# Patient Record
Sex: FEMALE | Race: WHITE | NOT HISPANIC OR LATINO | Employment: PART TIME | URBAN - METROPOLITAN AREA
[De-identification: names, ages, dates, MRNs, and addresses within clinical notes are randomized per-mention and may not be internally consistent; named-entity substitution may affect disease eponyms.]

---

## 2017-01-05 ENCOUNTER — OFFICE VISIT (OUTPATIENT)
Dept: FAMILY MEDICINE | Facility: CLINIC | Age: 67
End: 2017-01-05
Payer: COMMERCIAL

## 2017-01-05 VITALS
RESPIRATION RATE: 15 BRPM | TEMPERATURE: 98.7 F | BODY MASS INDEX: 27.43 KG/M2 | SYSTOLIC BLOOD PRESSURE: 114 MMHG | DIASTOLIC BLOOD PRESSURE: 60 MMHG | HEART RATE: 99 BPM | OXYGEN SATURATION: 96 % | WEIGHT: 150 LBS

## 2017-01-05 DIAGNOSIS — F17.200 TOBACCO USE DISORDER: ICD-10-CM

## 2017-01-05 DIAGNOSIS — J20.9 ACUTE BRONCHITIS, UNSPECIFIED ORGANISM: Primary | ICD-10-CM

## 2017-01-05 PROCEDURE — 99213 OFFICE O/P EST LOW 20 MIN: CPT | Performed by: PHYSICIAN ASSISTANT

## 2017-01-05 RX ORDER — AZITHROMYCIN 250 MG/1
TABLET, FILM COATED ORAL
Qty: 6 TABLET | Refills: 0 | Status: SHIPPED | OUTPATIENT
Start: 2017-01-05 | End: 2017-07-17

## 2017-01-05 NOTE — MR AVS SNAPSHOT
After Visit Summary   1/5/2017    Reanna Kumar    MRN: [id number]           Patient Information     Date Of Birth          1950        Visit Information        Provider Department      1/5/2017 1:10 PM Cassandra Gong PA-C [facility]        Today's Diagnoses     Acute bronchitis, unspecified organism    -  1     Tobacco use disorder           Care Instructions      What Is Acute Bronchitis?    Acute or short-term bronchitis last for days or weeks. It occurs when the bronchial tubes (airways in the lungs) are irritated by a virus, bacteria, or allergen. This causes a cough that produces yellow or greenish mucus.  Inside Healthy Lungs  Air travels in and out of the lungs through the airways. The linings of these airways produce sticky mucus. This mucus traps particles that enter the lungs. Tiny structures called cilia then sweep the particles out of the airways.    Healthy Airway: Airways are normally open. Air moves in and out easily.   Healthy Cilia: Tiny, hairlike cilia sweep mucus and particles up and out of the airways.   Lings with Bronchitis  Bronchitis often occurs when a cold or the flu virus. The airways become inflamed (red and swollen). There is a deep  hacking  cough from the extra mucus. Other symptoms may include:    Wheezin    Chest discomfort    Shortness of breath    Mild fever  A second infection, this time due to bacteria, may then occur. And, airways irritated by allergens or smoke are more likely to get infected.    Inflamed Airway: Inflammation and excess mucus narrow the airway, causing shortness of breath.   Impaired Cilia: Excess mucus impairs cilia, causing congestion and wheezing. Smoking worsens the problem.   Making a Diagnosis  A physical exam, medical history, and certain tests help your health care provider make the diagnosis.  Medical History  Your health care provider will ask you about your symptoms.  The Exam  Your  provider listens to your chest for signs of congestion. He or she may also check your ears, nose, and throat.  Possible Tests    A sputum test for bacteria. This requires a sample of mucus from the lungs.    A nasal or throat swab for bacterial infection.    A chest X-ray if your health care provider suspects pneumonia.    Tests to check for an underlying condition, such as allergies, asthma, or COPD. You may be referred to a specialist for further lung function testing.  Treating a Cough  The main treatment for bronchitis is easing symptoms. Avoiding smoke, allergens, and other things that trigger coughing can often help. If the infection is bacterial, antibiotics may be used. During the illness, it's important to get plenty of sleep. To ease symptoms:    Don t smoke, and avoid secondhand smoke.    Use a humidifier, or breathe in steam from a hot shower. This may help loosen mucus.    Drink a lot of water and juice. They can soothe the throat and may help thin mucus.    Sit up or use extra pillows when in bed to help lessen coughing and congestion.    Ask your provider about using cough medicine, pain and fever medication, or a decongestant.  Antibiotics  Most cases of bronchitis are caused by cold or flu viruses. Antibiotics don t treat viral illness. Taking antibiotics when they are not needed increases your risk of getting an infection later that is antibiotic-resistant. Your provider will prescribe antibiotics if the infection is caused by bacteria. If they are prescribed:    Take the medication until it is used up, even if symptoms have improved. If you don t, the bronchitis may come back.    Take them as directed. For instance, some medications should be taken with food.    Ask your provider or pharmacist what side effects are common, and what to do about them.  Follow-Up  You should go see your provider again in 2-3 weeks. By this time, symptoms should have improved. An infection that lasts longer may signal  "a more serious problem.  Prevention    Avoid tobacco smoke. If you smoke, quit. Stay away from smoky places. Ask friends and family not to smoke around you, or in your home or car.    Get checked for allergies.    Ask your provider about getting a yearly flu shot, and possibly a pneumoccocal or pneumonia shot.    Wash your hands often. This helps reduce the chance of picking up viruses that cause colds and flu.  Call Your Health Care Provider If:    Symptoms worsen, or new symptoms develop.    Breathing problems worsen or  become severe.    Symptoms don t improve within a week, or within 3 days of taking antibiotics.        2823-9747 [employer]. [address] [place] [address]. All rights reserved. This information is not intended as a substitute for professional medical care. Always follow your healthcare professional's instructions.        Follow-ups after your visit        Who to contact     If you have questions or need follow up information about today's clinic visit or your schedule please contact [facility] directly at [phone].  Normal or non-critical lab and imaging results will be communicated to you by [employer], letter or phone within 4 business days after the clinic has received the results. If you do not hear from us within 7 days, please contact the clinic through [employer] or phone. If you have a critical or abnormal lab result, we will notify you by phone as soon as possible.  Submit refill requests through [employer] or call your pharmacy and they will forward the refill request to us. Please allow 3 business days for your refill to be completed.          Additional Information About Your Visit        [employer] Information     [employer] lets you send messages to your doctor, view your test results, renew your prescriptions, schedule appointments and more. To sign up, go to [email or web address] . Click on \"Log in\" on the left side of the screen, " "which will take you to the Welcome page. Then click on \"Sign up Now\" on the right side of the page.     You will be asked to enter the access code listed below, as well as some personal information. Please follow the directions to create your username and password.     Your access code is: [email or web address]  Expires: 2017  3:04 PM     Your access code will  in 90 days. If you need help or a new code, please call your [facility] or [phone].        Care EveryWhere ID     This is your Care EveryWhere ID. This could be used by other organizations to access your [facility] medical records  [id number]        Your Vitals Were     Pulse Temperature Respirations Pulse Oximetry          99 98.7  F (37.1  C) (Tympanic) 15 96%         Blood Pressure from Last 3 Encounters:   17 114/60   10/04/16 114/62   16 134/76    Weight from Last 3 Encounters:   17 150 lb (68.04 kg)   10/04/16 146 lb (66.225 kg)   16 149 lb (67.586 kg)              Today, you had the following     No orders found for display         Today's Medication Changes          These changes are accurate as of: 17  3:04 PM.  If you have any questions, ask your nurse or doctor.               Start taking these medicines.        Dose/Directions    azithromycin 250 MG tablet   Commonly known as:  ZITHROMAX   Used for:  Acute bronchitis, unspecified organism   Started by:  Cassandra Gong PA-C        Two tablets first day, then one tablet daily for four days.   Quantity:  6 tablet   Refills:  0            Where to get your medicines      These medications were sent to [facility] [id number] [place] [address] AT [address] [address]     Phone:  [phone]    - azithromycin 250 MG tablet             Primary Care Provider Office Phone # Fax #    Cassandra Gong PA-C [phone]       [place] [address] " [address] [facility] [address]  [facility] [address]        Thank you!     Thank you for choosing [facility]  for your care. Our goal is always to provide you with excellent care. Hearing back from our patients is one way we can continue to improve our services. Please take a few minutes to complete the written survey that you may receive in the mail after your visit with us. Thank you!             Your Updated Medication List - Protect others around you: Learn how to safely use, store and throw away your medicines at www.disposemymeds.org.          This list is accurate as of: 1/5/17  3:04 PM.  Always use your most recent med list.                   Brand Name Dispense Instructions for use    azithromycin 250 MG tablet    ZITHROMAX    6 tablet    Two tablets first day, then one tablet daily for four days.       simvastatin 20 MG tablet    ZOCOR    90 tablet    Take 1 tablet (20 mg) by mouth daily       SLEEP AID PO      Take 1 capsule by mouth At Bedtime.       vitamin D 1000 UNITS capsule      Take 3 capsules by mouth daily.

## 2017-01-05 NOTE — NURSING NOTE
"Chief Complaint   Patient presents with     URI       Initial /60 mmHg  Pulse 99  Temp(Src) 98.7  F (37.1  C) (Tympanic)  Resp 15  Wt 150 lb (68.04 kg)  SpO2 96% Estimated body mass index is 27.43 kg/(m^2) as calculated from the following:    Height as of 9/24/16: 5' 2\" (1.575 m).    Weight as of this encounter: 150 lb (68.04 kg).  BP completed using cuff size: regular    "

## 2017-01-05 NOTE — PROGRESS NOTES
SUBJECTIVE:                                                    Reanna Kumar is a 66 year old female who presents to clinic today for the following health issues:    ENT Symptoms             Symptoms: cc Present Absent Comment   Fever/Chills   x    Fatigue  x     Muscle Aches   x    Eye Irritation   x    Sneezing   x    Nasal Scott/Drg   x    Sinus Pressure/Pain   x    Loss of smell   x    Dental pain   x    Sore Throat   x    Swollen Glands   x    Ear Pain/Fullness   x    Cough  x     Wheeze   x    Chest Pain   x    Shortness of breath   x    Rash   x    Other   x      Symptom duration:  2-3 weeks   Symptom severity:  moderate   Treatments tried:  nyquil   Contacts:  sister but she has different symptoms and got sick about a week after she saw her     Additional complaints: None    HPI additional notes: Manda presents today with   Chief Complaint   Patient presents with     URI   Cough seems to be getting worse like it did before she had pneumonia in September.  Denies fever.  Cough has been getting worsening in the evenings and at night.         ROS:  C: Negative for fever, chills, recent change in weight  Skin: Negative for worrisome rashes or lesions  ENT: Negative for ear, mouth and throat problems  Resp: POSITIVE for cough occasionally productive without  SOB and wheezing  MS: POSITIVE for generalized fatigue.  NEURO: Negative  for headaches or dizziness.  P: Negative for changes in mood or affect  ROS otherwise negative.    Chart Review:  History   Smoking status     Current Every Day Smoker -- 1.00 packs/day for 40 years     Types: Cigarettes   Smokeless tobacco     Never Used     Patient Active Problem List   Diagnosis     Hyperlipidemia LDL goal <130     Vitamin D deficiency     Advance Care Planning     Tobacco use disorder     Family history of hearing loss     Past Surgical History   Procedure Laterality Date     Hip surgery       Problem list, Medication list, Allergies, Medical/Social/Surg hx  reviewed in [facility], updated as appropriate.   OBJECTIVE:                                                    /60 mmHg  Pulse 99  Temp(Src) 98.7  F (37.1  C) (Tympanic)  Resp 15  Wt 150 lb (68.04 kg)  SpO2 96%  Body mass index is 27.43 kg/(m^2).  GENERAL: healthy, alert, in no acute distress  EYES: Grossly normal to inspection, EOMI, PERRL  HENT: Ear canals normal; TMs pearly gray without effusion. Nasal mucosa moist without edema or discharge. Oral mucous membranes moist, no lesions or ulcerations. Pharynx pink.  Uvula midline.  No postnasal drainage. Sinuses non-tender to palpation.  NECK:1+ posterior cervical LAD bilateral  RESP: lungs clear to auscultation - no rales, rhonchi or wheezes, prolonged expiratory phase, cough with deep inspiration  and rales R lower posterior and L lower posterior  CV: regular rate and rhythm, normal S1 S2.  No peripheral edema.  SKIN: no suspicious lesions, no rashes  PSYCH: Alert and oriented times 3;  Able to articulate logical thoughts. Affect is normal.    Diagnostic test results: None     ASSESSMENT/PLAN:                                                        ICD-10-CM    1. Acute bronchitis, unspecified organism J20.9 azithromycin (ZITHROMAX) 250 MG tablet   2. Tobacco use disorder F17.200        Please see patient instructions for treatment details.    Follow up in 7-10 days if not improving as anticipated.    Cassandra Gong PA-C  [facility]

## 2017-01-05 NOTE — PATIENT INSTRUCTIONS
What Is Acute Bronchitis?    Acute or short-term bronchitis last for days or weeks. It occurs when the bronchial tubes (airways in the lungs) are irritated by a virus, bacteria, or allergen. This causes a cough that produces yellow or greenish mucus.  Inside Healthy Lungs  Air travels in and out of the lungs through the airways. The linings of these airways produce sticky mucus. This mucus traps particles that enter the lungs. Tiny structures called cilia then sweep the particles out of the airways.    Healthy Airway: Airways are normally open. Air moves in and out easily.   Healthy Cilia: Tiny, hairlike cilia sweep mucus and particles up and out of the airways.   Lings with Bronchitis  Bronchitis often occurs when a cold or the flu virus. The airways become inflamed (red and swollen). There is a deep  hacking  cough from the extra mucus. Other symptoms may include:    Wheezin    Chest discomfort    Shortness of breath    Mild fever  A second infection, this time due to bacteria, may then occur. And, airways irritated by allergens or smoke are more likely to get infected.    Inflamed Airway: Inflammation and excess mucus narrow the airway, causing shortness of breath.   Impaired Cilia: Excess mucus impairs cilia, causing congestion and wheezing. Smoking worsens the problem.   Making a Diagnosis  A physical exam, medical history, and certain tests help your health care provider make the diagnosis.  Medical History  Your health care provider will ask you about your symptoms.  The Exam  Your provider listens to your chest for signs of congestion. He or she may also check your ears, nose, and throat.  Possible Tests    A sputum test for bacteria. This requires a sample of mucus from the lungs.    A nasal or throat swab for bacterial infection.    A chest X-ray if your health care provider suspects pneumonia.    Tests to check for an underlying condition, such as allergies, asthma, or COPD. You may be referred to a  specialist for further lung function testing.  Treating a Cough  The main treatment for bronchitis is easing symptoms. Avoiding smoke, allergens, and other things that trigger coughing can often help. If the infection is bacterial, antibiotics may be used. During the illness, it's important to get plenty of sleep. To ease symptoms:    Don t smoke, and avoid secondhand smoke.    Use a humidifier, or breathe in steam from a hot shower. This may help loosen mucus.    Drink a lot of water and juice. They can soothe the throat and may help thin mucus.    Sit up or use extra pillows when in bed to help lessen coughing and congestion.    Ask your provider about using cough medicine, pain and fever medication, or a decongestant.  Antibiotics  Most cases of bronchitis are caused by cold or flu viruses. Antibiotics don t treat viral illness. Taking antibiotics when they are not needed increases your risk of getting an infection later that is antibiotic-resistant. Your provider will prescribe antibiotics if the infection is caused by bacteria. If they are prescribed:    Take the medication until it is used up, even if symptoms have improved. If you don t, the bronchitis may come back.    Take them as directed. For instance, some medications should be taken with food.    Ask your provider or pharmacist what side effects are common, and what to do about them.  Follow-Up  You should go see your provider again in 2-3 weeks. By this time, symptoms should have improved. An infection that lasts longer may signal a more serious problem.  Prevention    Avoid tobacco smoke. If you smoke, quit. Stay away from smoky places. Ask friends and family not to smoke around you, or in your home or car.    Get checked for allergies.    Ask your provider about getting a yearly flu shot, and possibly a pneumoccocal or pneumonia shot.    Wash your hands often. This helps reduce the chance of picking up viruses that cause colds and flu.  Call Your  Health Care Provider If:    Symptoms worsen, or new symptoms develop.    Breathing problems worsen or  become severe.    Symptoms don t improve within a week, or within 3 days of taking antibiotics.        3898-8204 [employer]. [address] [place] [address], [place] [address]. All rights reserved. This information is not intended as a substitute for professional medical care. Always follow your healthcare professional's instructions.

## 2017-01-10 ENCOUNTER — TELEPHONE (OUTPATIENT)
Dept: FAMILY MEDICINE | Facility: CLINIC | Age: 67
End: 2017-01-10

## 2017-01-10 DIAGNOSIS — J20.9 ACUTE BRONCHITIS, UNSPECIFIED ORGANISM: Primary | ICD-10-CM

## 2017-01-10 DIAGNOSIS — F17.200 TOBACCO USE DISORDER: ICD-10-CM

## 2017-01-10 DIAGNOSIS — R06.2 WHEEZING: ICD-10-CM

## 2017-01-10 RX ORDER — PREDNISONE 20 MG/1
20 TABLET ORAL 2 TIMES DAILY
Qty: 10 TABLET | Refills: 0 | Status: SHIPPED | OUTPATIENT
Start: 2017-01-10 | End: 2017-07-17

## 2017-01-10 NOTE — TELEPHONE ENCOUNTER
Talked with Reanna.  Barking part of the cough better but still frequent cough. Has been using a product like dayquil and nightquil. Bringing up some phlemg. Is taking increased fluids. Not using any extra humidity.

## 2017-01-10 NOTE — TELEPHONE ENCOUNTER
Reason for call:  Patient reporting a symptom    Symptom or request:   Patient not getting better   Would like a steroid prescribed    Duration (how long have symptoms been present):   Over a week    Have you been treated for this before? Yes    Additional comments:   Ph[facility]acy is [facility] on [address] [place]    Phone Number patient can be reached at:  Home number on file [phone] (home)    Best Time: Anytime       Can we leave a detailed message on this number:  YES    Call taken on 1/10/2017 at 8:56 AM by ZHOU WALTON

## 2017-07-17 ENCOUNTER — HOSPITAL ENCOUNTER (OUTPATIENT)
Dept: MAMMOGRAPHY | Facility: CLINIC | Age: 67
Discharge: HOME | End: 2017-07-17
Attending: PHYSICIAN ASSISTANT | Admitting: PHYSICIAN ASSISTANT
Payer: COMMERCIAL

## 2017-07-17 DIAGNOSIS — Z12.31 VISIT FOR SCREENING MAMMOGRAM: ICD-10-CM

## 2017-07-17 PROCEDURE — G0202 SCR MAMMO BI INCL CAD: HCPCS

## 2017-07-17 NOTE — PROGRESS NOTES
SUBJECTIVE:   Reanna Kumar is a 67 year old female who presents for Preventive Visit.    Are you in the first 12 months of your Medicare Part B coverage?  No    Healthy Habits:    Do you get at least three servings of calcium containing foods daily (dairy, green leafy vegetables, etc.)? yes    Amount of exercise or daily activities, outside of work: 1-2 day(s) per week    Problems taking medications regularly No    Medication side effects: No    Have you had an eye exam in the past two years? no    Do you see a dentist twice per year? yes    Do you have sleep apnea, excessive snoring or daytime drowsiness? no    COGNITIVE SCREEN  1) Repeat 3 items (Banana, Sunrise, Chair)    2) Clock draw: NORMAL  3) 3 item recall: Recalls 3 objects  Results: 3 items recalled: COGNITIVE IMPAIRMENT LESS LIKELY    Mini-CogTM Copyright S [employer]. Licensed by the author for use in [facility]; reprinted with permission (rex@[facility]). All rights reserved.      Reviewed and updated as needed this visit by clinical staff  Tobacco  Allergies  Meds  Problems  Med Hx  Surg Hx  Fam Hx  Soc Hx          Reviewed and updated as needed this visit by Provider  Tobacco  Allergies  Meds  Problems  Med Hx  Surg Hx  Fam Hx  Soc Hx         Social History   Substance Use Topics     Smoking status: Current Every Day Smoker     Packs/day: 1.00     Years: 40.00     Types: Cigarettes     Smokeless tobacco: Never Used     Alcohol use Yes      Comment: Occ       The patient does not drink >3 drinks per day nor >7 drinks per week.    Today's PHQ-2 Score:   PHQ-2 ( 1999 Pfizer) 7/18/2017 1/5/2017   Q1: Little interest or pleasure in doing things 0 0   Q2: Feeling down, depressed or hopeless 0 0   PHQ-2 Score 0 0       Do you feel safe in your environment - Yes    Do you have a Health Care Directive?: Yes: Advance Directive has been received and scanned.    Current providers sharing in care for this patient include:   Patient Care  Team:  Cassandra Gong PA-C as PCP - General (Physician Assistant)    Hearing impairment: No    Ability to successfully perform activities of daily living: Yes, no assistance needed     Fall risk:  Fallen 2 or more times in the past year?: No  Any fall with injury in the past year?: No    Home safety:  none identified    The following health maintenance items are reviewed in Epic and correct as of today:  Health Maintenance   Topic Date Due     FALL RISK ASSESSMENT  03/20/2014     DEXA SCAN SCREENING (SYSTEM ASSIGNED)  03/20/2014     INFLUENZA VACCINE (SYSTEM ASSIGNED)  09/01/2017     TETANUS IMMUNIZATION (SYSTEM ASSIGNED)  03/26/2018     MAMMO SCREEN Q2 YR (SYSTEM ASSIGNED)  07/17/2019     COLONOSCOPY Q5 YR  07/23/2019     ADVANCE DIRECTIVE PLANNING Q5 YRS  09/30/2019     LIPID SCREEN Q5 YR FEMALE (SYSTEM ASSIGNED)  07/13/2021     PNEUMOCOCCAL  Completed     HEPATITIS C SCREENING  Completed     Labs reviewed in EPIC  BP Readings from Last 3 Encounters:   07/18/17 104/62   01/05/17 114/60   10/04/16 114/62    Wt Readings from Last 3 Encounters:   07/18/17 149 lb (67.6 kg)   01/05/17 150 lb (68 kg)   10/04/16 146 lb (66.2 kg)          Recent Labs   Lab Test  09/22/16   1904  07/13/16   0932  07/08/15   1012  04/17/13   1011  04/11/12   1427  03/24/11   1617   LDL   --   89  151*  93  124.4*  135*   HDL   --   68  65  56  56  63   TRIG   --   110  126  165*  143  134   ALT   --    --    --   21  31.0  8   CR  0.62   --    --    --    --    --    GFRESTIMATED  >90  Non  GFR Calc     --    --    --    --    --    GFRESTBLACK  >90   GFR Calc     --    --    --    --    --    POTASSIUM  4.2   --    --    --    --    --       Mammogram Screening: Patient over age 50, mutual decision to screen reflected in health maintenance.    ROS:  C: NEGATIVE for fever, chills, change in weight  I: NEGATIVE for worrisome rashes, moles or lesions  E: NEGATIVE for vision changes or  "irritation  E/M: NEGATIVE for ear, mouth and throat problems  R: NEGATIVE for significant cough or SOB  B: NEGATIVE for masses, tenderness or discharge  CV: NEGATIVE for chest pain, palpitations or peripheral edema  GI: NEGATIVE for nausea, abdominal pain, heartburn, or change in bowel habits  : NEGATIVE for frequency, dysuria, or hematuria  M: NEGATIVE for significant arthralgias or myalgia  N: NEGATIVE for weakness, dizziness or paresthesias  E: NEGATIVE for temperature intolerance, skin/hair changes  H: NEGATIVE for bleeding problems  P: NEGATIVE for changes in mood or affect    OBJECTIVE:   /62 (BP Location: Left arm, Patient Position: Chair, Cuff Size: Adult Regular)  Pulse 83  Temp 97.7  F (36.5  C) (Tympanic)  Resp 14  Ht 5' 2\" (1.575 m)  Wt 149 lb (67.6 kg)  SpO2 99%  BMI 27.25 kg/m2 Estimated body mass index is 27.25 kg/(m^2) as calculated from the following:    Height as of this encounter: 5' 2\" (1.575 m).    Weight as of this encounter: 149 lb (67.6 kg).  EXAM:   GENERAL APPEARANCE: healthy, alert and no distress  EYES: Eyes grossly normal to inspection, PERRL and conjunctivae and sclerae normal  HENT: ear canals and TM's normal, nose and mouth without ulcers or lesions, oropharynx clear and oral mucous membranes moist  NECK: no adenopathy, no asymmetry, masses, or scars and thyroid normal to palpation  RESP: lungs clear to auscultation - no rales, rhonchi or wheezes  CV: regular rate and rhythm, normal S1 S2, no S3 or S4, no murmur, click or rub, no peripheral edema and peripheral pulses strong  ABDOMEN: soft, nontender, no hepatosplenomegaly, no masses and bowel sounds normal  MS: no musculoskeletal defects are noted and gait is age appropriate without ataxia  SKIN: no suspicious lesions or rashes  NEURO: Normal strength and tone, sensory exam grossly normal, mentation intact and speech normal  PSYCH: mentation appears normal and affect normal/bright    ASSESSMENT / PLAN:       ICD-10-CM  " "  1. Routine general medical examination at a health care facility Z00.00 Glucose     Screening Pelvic / Breast Exam ()   2. Hyperlipidemia LDL goal <130 E78.5 Lipid with Reflex to Direct LDL     simvastatin (ZOCOR) 20 MG tablet   3. Asymptomatic postmenopausal status Z78.0    4. Vitamin D deficiency E55.9 Vitamin D Deficiency     End of Life Planning:  Patient currently has an advanced directive: Yes.  Practitioner is supportive of decision.    COUNSELING:  Reviewed preventive health counseling, as reflected in patient instructions      Estimated body mass index is 27.25 kg/(m^2) as calculated from the following:    Height as of this encounter: 5' 2\" (1.575 m).    Weight as of this encounter: 149 lb (67.6 kg).    Tobacco Cessation Action Plan: Information offered: Patient not interested at this time   reports that she has been smoking Cigarettes.  She has a 40.00 pack-year smoking history. She has never used smokeless tobacco.      Appropriate preventive services were discussed with this patient, including applicable screening as appropriate for cardiovascular disease, diabetes, osteopenia/osteoporosis, and glaucoma.  As appropriate for age/gender, discussed screening for colorectal cancer, prostate cancer, breast cancer, and cervical cancer. Checklist reviewing preventive services available has been given to the patient.    Reviewed patients plan of care and provided an AVS. The Basic Care Plan (routine screening as documented in Health Maintenance) for Reanna meets the Care Plan requirement. This Care Plan has been established and reviewed with the Patient.    Counseling Resources:  ATP IV Guidelines  Pooled Cohorts Equation Calculator  Breast Cancer Risk Calculator  FRAX Risk Assessment  ICSI Preventive Guidelines  Dietary Guidelines for Americans, 2010  USDA's MyPlate  ASA Prophylaxis  Lung CA Screening    Cassandra Gong PA-C  [facility]  "

## 2017-07-18 ENCOUNTER — OFFICE VISIT (OUTPATIENT)
Dept: FAMILY MEDICINE | Facility: CLINIC | Age: 67
End: 2017-07-18
Payer: COMMERCIAL

## 2017-07-18 VITALS
WEIGHT: 149 LBS | HEART RATE: 83 BPM | TEMPERATURE: 97.7 F | DIASTOLIC BLOOD PRESSURE: 62 MMHG | SYSTOLIC BLOOD PRESSURE: 104 MMHG | OXYGEN SATURATION: 99 % | RESPIRATION RATE: 14 BRPM | HEIGHT: 62 IN | BODY MASS INDEX: 27.42 KG/M2

## 2017-07-18 DIAGNOSIS — E55.9 VITAMIN D DEFICIENCY: ICD-10-CM

## 2017-07-18 DIAGNOSIS — Z00.00 ROUTINE GENERAL MEDICAL EXAMINATION AT A HEALTH CARE FACILITY: Primary | ICD-10-CM

## 2017-07-18 DIAGNOSIS — Z78.0 ASYMPTOMATIC POSTMENOPAUSAL STATUS: ICD-10-CM

## 2017-07-18 DIAGNOSIS — E78.5 HYPERLIPIDEMIA LDL GOAL <130: ICD-10-CM

## 2017-07-18 LAB
ANION GAP SERPL CALCULATED.3IONS-SCNC: 6 MMOL/L (ref 3–14)
BUN SERPL-MCNC: 16 MG/DL (ref 7–30)
CALCIUM SERPL-MCNC: 9 MG/DL (ref 8.5–10.1)
CHLORIDE SERPL-SCNC: 105 MMOL/L (ref 94–109)
CHOLEST SERPL-MCNC: 191 MG/DL
CO2 SERPL-SCNC: 27 MMOL/L (ref 20–32)
CREAT SERPL-MCNC: 0.6 MG/DL (ref 0.52–1.04)
DEPRECATED CALCIDIOL+CALCIFEROL SERPL-MC: 32 UG/L (ref 20–75)
GFR SERPL CREATININE-BSD FRML MDRD: ABNORMAL ML/MIN/1.7M2
GLUCOSE SERPL-MCNC: 109 MG/DL (ref 70–99)
HDLC SERPL-MCNC: 68 MG/DL
LDLC SERPL CALC-MCNC: 93 MG/DL
NONHDLC SERPL-MCNC: 123 MG/DL
POTASSIUM SERPL-SCNC: 4 MMOL/L (ref 3.4–5.3)
SODIUM SERPL-SCNC: 138 MMOL/L (ref 133–144)
TRIGL SERPL-MCNC: 150 MG/DL

## 2017-07-18 PROCEDURE — 80061 LIPID PANEL: CPT | Performed by: PHYSICIAN ASSISTANT

## 2017-07-18 PROCEDURE — 80048 BASIC METABOLIC PNL TOTAL CA: CPT | Performed by: PHYSICIAN ASSISTANT

## 2017-07-18 PROCEDURE — 36415 COLL VENOUS BLD VENIPUNCTURE: CPT | Performed by: PHYSICIAN ASSISTANT

## 2017-07-18 PROCEDURE — 99397 PER PM REEVAL EST PAT 65+ YR: CPT | Performed by: PHYSICIAN ASSISTANT

## 2017-07-18 PROCEDURE — 82306 VITAMIN D 25 HYDROXY: CPT | Performed by: PHYSICIAN ASSISTANT

## 2017-07-18 RX ORDER — SIMVASTATIN 20 MG
20 TABLET ORAL DAILY
Qty: 90 TABLET | Refills: 3 | Status: SHIPPED | OUTPATIENT
Start: 2017-07-18 | End: 2018-08-02

## 2017-07-18 NOTE — NURSING NOTE
"Chief Complaint   Patient presents with     Physical       Initial /62 (BP Location: Left arm, Patient Position: Chair, Cuff Size: Adult Regular)  Pulse 83  Temp 97.7  F (36.5  C) (Tympanic)  Resp 14  Ht 5' 2\" (1.575 m)  Wt 149 lb (67.6 kg)  SpO2 99%  BMI 27.25 kg/m2 Estimated body mass index is 27.25 kg/(m^2) as calculated from the following:    Height as of this encounter: 5' 2\" (1.575 m).    Weight as of this encounter: 149 lb (67.6 kg).  Medication Reconciliation: complete    "

## 2017-07-18 NOTE — MR AVS SNAPSHOT
After Visit Summary   7/18/2017    Reanna Kumar    MRN: [id number]           Patient Information     Date Of Birth          1950        Visit Information        Provider Department      7/18/2017 9:10 AM Cassandra Gong PA-C [facility]        Today's Diagnoses     Routine general medical examination at a health care facility    -  1    Hyperlipidemia LDL goal <130        Asymptomatic postmenopausal status        Vitamin D deficiency          Care Instructions      Preventive Health Recommendations    Female Ages 65 +    Yearly exam:     See your health care provider every year in order to  o Review health changes.   o Discuss preventive care.    o Review your medicines if your doctor has prescribed any.      You no longer need a yearly Pap test unless you've had an abnormal Pap test in the past 10 years. If you have vaginal symptoms, such as bleeding or discharge, be sure to talk with your provider about a Pap test.      Every 1 to 2 years, have a mammogram.  If you are over 69, talk with your health care provider about whether or not you want to continue having screening mammograms.      Every 10 years, have a colonoscopy. Or, have a yearly FIT test (stool test). These exams will check for colon cancer.       Have a cholesterol test every 5 years, or more often if your doctor advises it.       Have a diabetes test (fasting glucose) every three years. If you are at risk for diabetes, you should have this test more often.       At age 65, have a bone density scan (DEXA) to check for osteoporosis (brittle bone disease).    Shots:    Get a flu shot each year.    Get a tetanus shot every 10 years.    Talk to your doctor about your pneumonia vaccines. There are now two you should receive - Pneumovax (PPSV 23) and Prevnar (PCV 13).    Talk to your doctor about the shingles vaccine.    Talk to your doctor about the hepatitis B vaccine.    Nutrition:     Eat  "at least 5 servings of fruits and vegetables each day.      Eat whole-grain bread, whole-wheat pasta and brown rice instead of white grains and rice.      Talk to your provider about Calcium and Vitamin D.     Lifestyle    Exercise at least 150 minutes a week (30 minutes a day, 5 days a week). This will help you control your weight and prevent disease.      Limit alcohol to one drink per day.      No smoking.       Wear sunscreen to prevent skin cancer.       See your dentist twice a year for an exam and cleaning.      See your eye doctor every 1 to 2 years to screen for conditions such as glaucoma, macular degeneration and cataracts.          Follow-ups after your visit        Who to contact     If you have questions or need follow up information about today's clinic visit or your schedule please contact [facility] directly at [phone].  Normal or non-critical lab and imaging results will be communicated to you by Clipikhart, letter or phone within 4 business days after the clinic has received the results. If you do not hear from us within 7 days, please contact the clinic through Clipikhart or phone. If you have a critical or abnormal lab result, we will notify you by phone as soon as possible.  Submit refill requests through [employer] or call your pharmacy and they will forward the refill request to us. Please allow 3 business days for your refill to be completed.          Additional Information About Your Visit        [employer] Information     [employer] lets you send messages to your doctor, view your test results, renew your prescriptions, schedule appointments and more. To sign up, go to [email or web address] . Click on \"Log in\" on the left side of the screen, which will take you to the Welcome page. Then click on \"Sign up Now\" on the right side of the page.     You will be asked to enter the access code listed below, as well as some personal information. Please follow the directions " "to create your username and password.     Your access code is: [email or web address]  Expires: 10/16/2017  9:19 AM     Your access code will  in 90 days. If you need help or a new code, please call your [facility] clinic or [phone].        Care EveryWhere ID     This is your Care EveryWhere ID. This could be used by other organizations to access your [facility] medical records  [id number]        Your Vitals Were     Pulse Temperature Respirations Height Pulse Oximetry BMI (Body Mass Index)    83 97.7  F (36.5  C) (Tympanic) 14 5' 2\" (1.575 m) 99% 27.25 kg/m2       Blood Pressure from Last 3 Encounters:   17 104/62   17 114/60   10/04/16 114/62    Weight from Last 3 Encounters:   17 149 lb (67.6 kg)   17 150 lb (68 kg)   10/04/16 146 lb (66.2 kg)              We Performed the Following     Basic metabolic panel     Lipid with Reflex to Direct LDL     Vitamin D Deficiency          Today's Medication Changes          These changes are accurate as of: 17  9:20 AM.  If you have any questions, ask your nurse or doctor.               Stop taking these medicines if you haven't already. Please contact your care team if you have questions.     azithromycin 250 MG tablet   Commonly known as:  ZITHROMAX   Stopped by:  Cassandra Gong PA-C           predniSONE 20 MG tablet   Commonly known as:  DELTASONE   Stopped by:  Cassandra Gong PA-C                Where to get your medicines      These medications were sent to [facility] [address] [place] [address] AT [address] [address]     Phone:  [phone]     simvastatin 20 MG tablet                Primary Care Provider Office Phone # Fax #    Cassandra Gong PA-C [phone]       [facility] [address]  [facility] [address]        Equal Access to Services     CHRISTIANO ANGEL AH: Lali saxena " lorene Zimmer, watomasda lumatthewadaha, qaazamta kalisset pierre, taco voss karlierashida mejia laxupieter malena. So [facility] [phone].    ATENCIÓN: Si habla español, tiene a chowdhury disposición servicios gratuitos de asistencia lingüística. Dom al [phone].    We comply with applicable federal civil rights laws and [place] laws. We do not discriminate on the basis of race, color, national origin, age, disability sex, sexual orientation or gender identity.            Thank you!     Thank you for choosing [facility]  for your care. Our goal is always to provide you with excellent care. Hearing back from our patients is one way we can continue to improve our services. Please take a few minutes to complete the written survey that you may receive in the mail after your visit with us. Thank you!             Your Updated Medication List - Protect others around you: Learn how to safely use, store and throw away your medicines at www.disposemymeds.org.          This list is accurate as of: 7/18/17  9:20 AM.  Always use your most recent med list.                   Brand Name Dispense Instructions for use Diagnosis    simvastatin 20 MG tablet    ZOCOR    90 tablet    Take 1 tablet (20 mg) by mouth daily    Hyperlipidemia LDL goal <130       SLEEP AID PO      Take 1 capsule by mouth At Bedtime.        vitamin D 1000 UNITS capsule      Take 3 capsules by mouth daily.

## 2017-07-18 NOTE — LETTER
"      [facility]  [address]  [place] [id number] [phone]                                                                                                           Reanna Kumar  [address]  [place] [facility] [address]    July 19, 2017      Dear Reanna,    The results of your recent tests were reviewed and are enclosed.     - Your Cholesterol is normal.  - Your metabolic panel shows:  normal electrolytes (sodium, potassium, calcium) normal kidney function (creatinine and GFR) and a slightly elevated blood sugar (100-125), in the range of \"pre-diabetes\".  Try to decrease sugars and carbohydrates from your diet and increase exercise to keep those numbers down.  We'll recheck next year.  - Your Vitamin D level is normal.    Results for orders placed or performed in visit on 07/18/17   Lipid with Reflex to Direct LDL   Result Value Ref Range    Cholesterol 191 <200 mg/dL    Triglycerides 150 (H) <150 mg/dL    HDL Cholesterol 68 >49 mg/dL    LDL Cholesterol Calculated 93 <100 mg/dL    Non HDL Cholesterol 123 <130 mg/dL   Vitamin D Deficiency   Result Value Ref Range    Vitamin D Deficiency screening 32 20 - 75 ug/L   Basic metabolic panel   Result Value Ref Range    Sodium 138 133 - 144 mmol/L    Potassium 4.0 3.4 - 5.3 mmol/L    Chloride 105 94 - 109 mmol/L    Carbon Dioxide 27 20 - 32 mmol/L    Anion Gap 6 3 - 14 mmol/L    Glucose 109 (H) 70 - 99 mg/dL    Urea Nitrogen 16 7 - 30 mg/dL    Creatinine 0.60 0.52 - 1.04 mg/dL    GFR Estimate >90  Non  GFR Calc >60 mL/min/1.7m2    Calcium 9.0 8.5 - 10.1 mg/dL     Thank you for choosing [facility].  We appreciate the opportunity to serve you and look forward to supporting your healthcare needs in the future.    If you have any questions or concerns, please call me or my staff at [phone].      Sincerely,        Cassandra Gong PA-C    "

## 2017-11-07 ENCOUNTER — OFFICE VISIT (OUTPATIENT)
Dept: FAMILY MEDICINE | Facility: CLINIC | Age: 67
End: 2017-11-07
Payer: COMMERCIAL

## 2017-11-07 VITALS
BODY MASS INDEX: 27.44 KG/M2 | TEMPERATURE: 99 F | HEART RATE: 97 BPM | DIASTOLIC BLOOD PRESSURE: 84 MMHG | OXYGEN SATURATION: 97 % | SYSTOLIC BLOOD PRESSURE: 136 MMHG | RESPIRATION RATE: 16 BRPM | WEIGHT: 150 LBS

## 2017-11-07 DIAGNOSIS — F17.200 TOBACCO USE DISORDER: ICD-10-CM

## 2017-11-07 DIAGNOSIS — J20.9 ACUTE BRONCHITIS, UNSPECIFIED ORGANISM: Primary | ICD-10-CM

## 2017-11-07 PROCEDURE — 99213 OFFICE O/P EST LOW 20 MIN: CPT | Performed by: PHYSICIAN ASSISTANT

## 2017-11-07 RX ORDER — AZITHROMYCIN 250 MG/1
TABLET, FILM COATED ORAL
Qty: 6 TABLET | Refills: 0 | Status: SHIPPED | OUTPATIENT
Start: 2017-11-07 | End: 2018-02-07

## 2017-11-07 NOTE — MR AVS SNAPSHOT
After Visit Summary   11/7/2017    Reanna Kumar    MRN: [id number]           Patient Information     Date Of Birth          1950        Visit Information        Provider Department      11/7/2017 2:10 PM Cassandra Gong PA-C [facility]        Today's Diagnoses     Acute bronchitis, unspecified organism    -  1    Tobacco use disorder          Care Instructions      What Is Acute Bronchitis?    Acute or short-term bronchitis last for days or weeks. It occurs when the bronchial tubes (airways in the lungs) are irritated by a virus, bacteria, or allergen. This causes a cough that produces yellow or greenish mucus.  Inside Healthy Lungs  Air travels in and out of the lungs through the airways. The linings of these airways produce sticky mucus. This mucus traps particles that enter the lungs. Tiny structures called cilia then sweep the particles out of the airways.    Healthy Airway: Airways are normally open. Air moves in and out easily.   Healthy Cilia: Tiny, hairlike cilia sweep mucus and particles up and out of the airways.   Lings with Bronchitis  Bronchitis often occurs when a cold or the flu virus. The airways become inflamed (red and swollen). There is a deep  hacking  cough from the extra mucus. Other symptoms may include:    Wheezing    Chest discomfort    Shortness of breath    Mild fever  A second infection, this time due to bacteria, may then occur. And, airways irritated by allergens or smoke are more likely to get infected.    Inflamed Airway: Inflammation and excess mucus narrow the airway, causing shortness of breath.   Impaired Cilia: Excess mucus impairs cilia, causing congestion and wheezing. Smoking worsens the problem.   Making a Diagnosis  A physical exam, medical history, and certain tests help your health care provider make the diagnosis.  Medical History  Your health care provider will ask you about your symptoms.  The Exam  Your  provider listens to your chest for signs of congestion. He or she may also check your ears, nose, and throat.  Possible Tests    A sputum test for bacteria. This requires a sample of mucus from the lungs.    A nasal or throat swab for bacterial infection.    A chest X-ray if your health care provider suspects pneumonia.    Tests to check for an underlying condition, such as allergies, asthma, or COPD. You may be referred to a specialist for further lung function testing.  Treating a Cough  The main treatment for bronchitis is easing symptoms. Avoiding smoke, allergens, and other things that trigger coughing can often help. If the infection is bacterial, antibiotics may be used. During the illness, it's important to get plenty of sleep. To ease symptoms:    Don t smoke, and avoid secondhand smoke.    Use a humidifier, or breathe in steam from a hot shower. This may help loosen mucus.    Drink a lot of water and juice. They can soothe the throat and may help thin mucus.    Sit up or use extra pillows when in bed to help lessen coughing and congestion.    Ask your provider about using cough medicine, pain and fever medication, or a decongestant.  Antibiotics  Most cases of bronchitis are caused by cold or flu viruses. Antibiotics don t treat viral illness. Taking antibiotics when they are not needed increases your risk of getting an infection later that is antibiotic-resistant. Your provider will prescribe antibiotics if the infection is caused by bacteria. If they are prescribed:    Take the medication until it is used up, even if symptoms have improved. If you don t, the bronchitis may come back.    Take them as directed. For instance, some medications should be taken with food.    Ask your provider or pharmacist what side effects are common, and what to do about them.  Follow-Up  You should go see your provider again in 2-3 weeks. By this time, symptoms should have improved. An infection that lasts longer may signal  "a more serious problem.  Prevention    Avoid tobacco smoke. If you smoke, quit. Stay away from smoky places. Ask friends and family not to smoke around you, or in your home or car.    Get checked for allergies.    Ask your provider about getting a yearly flu shot, and possibly a pneumoccocal or pneumonia shot.    Wash your hands often. This helps reduce the chance of picking up viruses that cause colds and flu.  Call Your Health Care Provider If:    Symptoms worsen, or new symptoms develop.    Breathing problems worsen or  become severe.    Symptoms don t improve within a week, or within 3 days of taking antibiotics.        5789-0705 [employer]. [address] [place] [address]. All rights reserved. This information is not intended as a substitute for professional medical care. Always follow your healthcare professional's instructions.          Follow-ups after your visit        Who to contact     If you have questions or need follow up information about today's clinic visit or your schedule please contact [facility] directly at [phone].  Normal or non-critical lab and imaging results will be communicated to you by [facility], letter or phone within 4 business days after the clinic has received the results. If you do not hear from us within 7 days, please contact the clinic through [employer] [facility] or phone. If you have a critical or abnormal lab result, we will notify you by phone as soon as possible.  Submit refill requests through [facility] or call your pharmacy and they will forward the refill request to us. Please allow 3 business days for your refill to be completed.          Additional Information About Your Visit        [facility] Information     [facility] lets you send messages to your doctor, view your test results, renew your prescriptions, schedule appointments and more. To sign up, go to [email or web address] . Click on \"Log in\" on the left side of the screen, " "which will take you to the Welcome page. Then click on \"Sign up Now\" on the right side of the page.     You will be asked to enter the access code listed below, as well as some personal information. Please follow the directions to create your username and password.     Your access code is: [email or web address]  Expires: 2018  2:29 PM     Your access code will  in 90 days. If you need help or a new code, please call your [facility] or [phone].        Care EveryWhere ID     This is your Care EveryWhere ID. This could be used by other organizations to access your [facility] medical records  [id number]        Your Vitals Were     Pulse Temperature Respirations Pulse Oximetry BMI (Body Mass Index)       97 99  F (37.2  C) (Tympanic) 16 97% 27.44 kg/m2        Blood Pressure from Last 3 Encounters:   17 136/84   17 104/62   17 114/60    Weight from Last 3 Encounters:   17 150 lb (68 kg)   17 149 lb (67.6 kg)   17 150 lb (68 kg)              Today, you had the following     No orders found for display         Today's Medication Changes          These changes are accurate as of: 17  2:29 PM.  If you have any questions, ask your nurse or doctor.               Start taking these medicines.        Dose/Directions    azithromycin 250 MG tablet   Commonly known as:  ZITHROMAX   Used for:  Acute bronchitis, unspecified organism   Started by:  Cassandra Gong PA-C        Two tablets first day, then one tablet daily for four days.   Quantity:  6 tablet   Refills:  0            Where to get your medicines      These medications were sent to [facility] [address] [place] [address] AT [address] [place] [id number]     Phone:  [phone]     azithromycin 250 MG tablet                Primary Care Provider Office Phone # Fax #    Cassandra Gong PA-C [phone]       [address] [facility] [address]  [facility] [address]        Equal Access to Services     CHRISTIANO ANGEL : Hadii aad ku hadservandoo Soomaali, waaxda luqadaha, qaybta kaalmada pauljohnpolo, taco durant rizwanapieter jackaxellucretia guy. So [place] [phone].    ATENCIÓN: Si habla español, tiene a chowdhury disposición servicios gratuitos de asistencia lingüística. Llame al [phone].    We comply with applicable federal civil rights laws and [place] laws. We do not discriminate on the basis of race, color, national origin, age, disability, sex, sexual orientation, or gender identity.            Thank you!     Thank you for choosing [facility]  for your care. Our goal is always to provide you with excellent care. Hearing back from our patients is one way we can continue to improve our services. Please take a few minutes to complete the written survey that you may receive in the mail after your visit with us. Thank you!             Your Updated Medication List - Protect others around you: Learn how to safely use, store and throw away your medicines at www.disposemymeds.org.          This list is accurate as of: 11/7/17  2:29 PM.  Always use your most recent med list.                   Brand Name Dispense Instructions for use Diagnosis    azithromycin 250 MG tablet    ZITHROMAX    6 tablet    Two tablets first day, then one tablet daily for four days.    Acute bronchitis, unspecified organism       simvastatin 20 MG tablet    ZOCOR    90 tablet    Take 1 tablet (20 mg) by mouth daily    Hyperlipidemia LDL goal <130       SLEEP AID PO      Take 1 capsule by mouth At Bedtime.        vitamin D 1000 UNITS capsule      Take 3 capsules by mouth daily.

## 2017-11-07 NOTE — PROGRESS NOTES
SUBJECTIVE:   Reanna Kumar is a 67 year old female who presents to clinic today for the following health issues:    ENT Symptoms             Symptoms: cc Present Absent Comment   Fever/Chills  x     Fatigue  x     Muscle Aches  x     Eye Irritation   x    Sneezing   x    Nasal Scott/Drg  x     Sinus Pressure/Pain   x    Loss of smell   x    Dental pain   x    Sore Throat   x    Swollen Glands   x    Ear Pain/Fullness   x    Cough  x  Coughed so hard she vomited   Wheeze   x    Chest Pain   x    Shortness of breath   x    Rash   x    Other   x      Symptom duration:  started friday   Symptom severity:  moderate   Treatments tried:  cough medications OTC (robitussin DM)   Contacts:  none     Reviewed and updated as needed this visit by clinical staff  Tobacco  Allergies  Meds  Problems  Med Hx  Surg Hx  Fam Hx  Soc Hx        Reviewed and updated as needed this visit by Provider  Tobacco  Allergies  Meds  Problems  Med Hx  Surg Hx  Fam Hx  Soc Hx        Additional complaints: None    HPI additional notes: Manda presents today with   Chief Complaint   Patient presents with     URI   History of pneumonia last year that started with similar symptoms.  Has been using cough syrup at home.       ROS:  C: POSITIVE for fever and chills.  Skin: Negative for worrisome rashes or lesions  ENT/MOUTH:POSITIVE for congestion.  Negative for ear pain and sinus pressure.  Resp: POSITIVE for cough occasionally productive without  SOB and wheezing  MS: POSITIVE for generalized fatigue and malaise.  NEURO: Negative  for headaches or dizziness.  P: Negative for changes in mood or affect  ROS otherwise negative.    Chart Review:  History   Smoking Status     Current Every Day Smoker     Packs/day: 1.00     Years: 50.00     Types: Cigarettes   Smokeless Tobacco     Never Used       PFSH: Current tobacco abuse       Patient Active Problem List   Diagnosis     Hyperlipidemia LDL goal <130     Vitamin D deficiency     Advance  Care Planning     Tobacco use disorder     Family history of hearing loss     Past Surgical History:   Procedure Laterality Date     HIP SURGERY       Problem list, Medication list, Allergies, Medical/Social/Surg hx reviewed in [facility], updated as appropriate.   OBJECTIVE:                                                    /84  Pulse 97  Temp 99  F (37.2  C) (Tympanic)  Resp 16  Wt 150 lb (68 kg)  SpO2 97%  BMI 27.44 kg/m2  Body mass index is 27.44 kg/(m^2).  GENERAL: alert, active and mild distress  EYES: Grossly normal to inspection, EOMI, PERRL  HENT: Ear canals normal; TMs pearly gray without effusion. Nasal mucosa moist without edema or discharge. Oral mucous membranes moist, no lesions or ulcerations. Pharynx pink.  Uvula midline.  No postnasal drainage. Sinuses non-tender to palpation.  NECK: Non-tender, no adenopathy.  RESP: no rales or rhonchi, prolonged expiratory phase and cough with deep inspiration   CV: regular rate and rhythm, normal S1 S2.  No peripheral edema.  SKIN: no suspicious lesions, no rashes  PSYCH: Alert and oriented times 3;  Able to articulate logical thoughts. Affect is normal.    Diagnostic test results: None     ASSESSMENT/PLAN:                                                      Tobacco Cessation:   reports that she has been smoking Cigarettes.  She has a 50.00 pack-year smoking history. She has never used smokeless tobacco.  Tobacco Cessation Action Plan: Information offered: Patient not interested at this time        ICD-10-CM    1. Acute bronchitis, unspecified organism J20.9 azithromycin (ZITHROMAX) 250 MG tablet   2. Tobacco use disorder F17.200      Will start on antibiotic due to prolonged history of tobacco use.  No wheezing on exam but pt can call if she starts to wheeze at home and I will send in a prescription for prednisone.      Please see patient instructions for treatment details.    Follow up in 7-10 days if not improving as anticipated.    Cassandra Ingram  TYRA Gong  [facility]

## 2017-11-07 NOTE — PATIENT INSTRUCTIONS
What Is Acute Bronchitis?    Acute or short-term bronchitis last for days or weeks. It occurs when the bronchial tubes (airways in the lungs) are irritated by a virus, bacteria, or allergen. This causes a cough that produces yellow or greenish mucus.  Inside Healthy Lungs  Air travels in and out of the lungs through the airways. The linings of these airways produce sticky mucus. This mucus traps particles that enter the lungs. Tiny structures called cilia then sweep the particles out of the airways.    Healthy Airway: Airways are normally open. Air moves in and out easily.   Healthy Cilia: Tiny, hairlike cilia sweep mucus and particles up and out of the airways.   Lings with Bronchitis  Bronchitis often occurs when a cold or the flu virus. The airways become inflamed (red and swollen). There is a deep  hacking  cough from the extra mucus. Other symptoms may include:    Wheezing    Chest discomfort    Shortness of breath    Mild fever  A second infection, this time due to bacteria, may then occur. And, airways irritated by allergens or smoke are more likely to get infected.    Inflamed Airway: Inflammation and excess mucus narrow the airway, causing shortness of breath.   Impaired Cilia: Excess mucus impairs cilia, causing congestion and wheezing. Smoking worsens the problem.   Making a Diagnosis  A physical exam, medical history, and certain tests help your health care provider make the diagnosis.  Medical History  Your health care provider will ask you about your symptoms.  The Exam  Your provider listens to your chest for signs of congestion. He or she may also check your ears, nose, and throat.  Possible Tests    A sputum test for bacteria. This requires a sample of mucus from the lungs.    A nasal or throat swab for bacterial infection.    A chest X-ray if your health care provider suspects pneumonia.    Tests to check for an underlying condition, such as allergies, asthma, or COPD. You may be referred to a  specialist for further lung function testing.  Treating a Cough  The main treatment for bronchitis is easing symptoms. Avoiding smoke, allergens, and other things that trigger coughing can often help. If the infection is bacterial, antibiotics may be used. During the illness, it's important to get plenty of sleep. To ease symptoms:    Don t smoke, and avoid secondhand smoke.    Use a humidifier, or breathe in steam from a hot shower. This may help loosen mucus.    Drink a lot of water and juice. They can soothe the throat and may help thin mucus.    Sit up or use extra pillows when in bed to help lessen coughing and congestion.    Ask your provider about using cough medicine, pain and fever medication, or a decongestant.  Antibiotics  Most cases of bronchitis are caused by cold or flu viruses. Antibiotics don t treat viral illness. Taking antibiotics when they are not needed increases your risk of getting an infection later that is antibiotic-resistant. Your provider will prescribe antibiotics if the infection is caused by bacteria. If they are prescribed:    Take the medication until it is used up, even if symptoms have improved. If you don t, the bronchitis may come back.    Take them as directed. For instance, some medications should be taken with food.    Ask your provider or pharmacist what side effects are common, and what to do about them.  Follow-Up  You should go see your provider again in 2 3 weeks. By this time, symptoms should have improved. An infection that lasts longer may signal a more serious problem.  Prevention    Avoid tobacco smoke. If you smoke, quit. Stay away from smoky places. Ask friends and family not to smoke around you, or in your home or car.    Get checked for allergies.    Ask your provider about getting a yearly flu shot, and possibly a pneumoccocal or pneumonia shot.    Wash your hands often. This helps reduce the chance of picking up viruses that cause colds and flu.  Call Your  Health Care Provider If:    Symptoms worsen, or new symptoms develop.    Breathing problems worsen or  become severe.    Symptoms don t improve within a week, or within 3 days of taking antibiotics.        2243-9673 [employer]. [address] [place] [address], [place] [address]. All rights reserved. This information is not intended as a substitute for professional medical care. Always follow your healthcare professional's instructions.

## 2017-11-22 DIAGNOSIS — E78.5 HYPERLIPIDEMIA LDL GOAL <130: ICD-10-CM

## 2017-11-22 RX ORDER — SIMVASTATIN 20 MG
TABLET ORAL
Qty: 90 TABLET | Refills: 1 | Status: SHIPPED | OUTPATIENT
Start: 2017-11-22 | End: 2018-02-07

## 2017-11-22 NOTE — TELEPHONE ENCOUNTER
Last OV 11/07/2017.  Requested Prescriptions   Pending Prescriptions Disp Refills     simvastatin (ZOCOR) 20 MG tablet [Pharmacy Med Name: SIMVASTATIN 20MG TABLETS] 90 tablet 0     Sig: TAKE 1 TABLET BY MOUTH EVERY DAY    Statins Protocol Passed    11/22/2017 10:26 AM       Passed - LDL on file in past 12 months    Recent Labs   Lab Test  07/18/17   0920   LDL  93            Passed - No abnormal creatine kinase in past 12 months    No lab results found.         Passed - Recent or future visit with authorizing provider    Patient had office visit in the last year or has a visit in the next 30 days with authorizing provider.  See chart review.              Passed - Patient is age 18 or older       Passed - No active pregnancy on record       Passed - No positive pregnancy test in past 12 months

## 2018-02-07 ENCOUNTER — OFFICE VISIT (OUTPATIENT)
Dept: FAMILY MEDICINE | Facility: CLINIC | Age: 68
End: 2018-02-07
Payer: COMMERCIAL

## 2018-02-07 VITALS
DIASTOLIC BLOOD PRESSURE: 70 MMHG | RESPIRATION RATE: 14 BRPM | WEIGHT: 146 LBS | HEART RATE: 85 BPM | SYSTOLIC BLOOD PRESSURE: 110 MMHG | BODY MASS INDEX: 26.7 KG/M2 | OXYGEN SATURATION: 95 % | TEMPERATURE: 98.1 F

## 2018-02-07 DIAGNOSIS — R05.9 COUGH: ICD-10-CM

## 2018-02-07 DIAGNOSIS — J10.1 INFLUENZA A: Primary | ICD-10-CM

## 2018-02-07 DIAGNOSIS — F17.200 TOBACCO USE DISORDER: ICD-10-CM

## 2018-02-07 LAB
FLUAV+FLUBV AG SPEC QL: NEGATIVE
FLUAV+FLUBV AG SPEC QL: POSITIVE
SPECIMEN SOURCE: ABNORMAL

## 2018-02-07 PROCEDURE — 99213 OFFICE O/P EST LOW 20 MIN: CPT | Performed by: PHYSICIAN ASSISTANT

## 2018-02-07 PROCEDURE — 87804 INFLUENZA ASSAY W/OPTIC: CPT | Performed by: PHYSICIAN ASSISTANT

## 2018-02-07 RX ORDER — BENZONATATE 200 MG/1
200 CAPSULE ORAL 3 TIMES DAILY PRN
Qty: 30 CAPSULE | Refills: 1 | Status: SHIPPED | OUTPATIENT
Start: 2018-02-07 | End: 2018-08-03

## 2018-02-07 RX ORDER — AZITHROMYCIN 250 MG/1
TABLET, FILM COATED ORAL
Qty: 6 TABLET | Refills: 0 | Status: CANCELLED | OUTPATIENT
Start: 2018-02-07

## 2018-02-07 NOTE — NURSING NOTE
"Chief Complaint   Patient presents with     URI       Initial /70  Pulse 85  Temp 98.1  F (36.7  C) (Tympanic)  Resp 14  Wt 146 lb (66.2 kg)  SpO2 95%  BMI 26.7 kg/m2 Estimated body mass index is 26.7 kg/(m^2) as calculated from the following:    Height as of 7/18/17: 5' 2\" (1.575 m).    Weight as of this encounter: 146 lb (66.2 kg).  Medication Reconciliation: complete    "

## 2018-02-07 NOTE — MR AVS SNAPSHOT
After Visit Summary   2/7/2018    Reanna Kumar    MRN: [id number]           Patient Information     Date Of Birth          1950        Visit Information        Provider Department      2/7/2018 2:10 PM Cassandra Gong PA-C [facility]        Today's Diagnoses     Influenza A    -  1    Cough        Tobacco use disorder          Care Instructions      Influenza (Adult)    Influenza is also called the flu. It is a viral illness that affects the air passages of your lungs. It is different from the common cold. The flu can easily be passed from one to person to another. It may be spread through the air by coughing and sneezing. Or it can be spread by touching the sick person and then touching your own eyes, nose, or mouth.  The flu starts 1 to 3 days after you are exposed to the flu virus. It may last for 1 to 2 weeks but many people feel tired or fatigued for many weeks afterward. You usually don t need to take antibiotics unless you have a complication. This might be an ear or sinus infection or pneumonia.  Symptoms of the flu may be mild or severe. They can include extreme tiredness (wanting to stay in bed all day), chills, fevers, muscle aches, soreness with eye movement, headache, and a dry, hacking cough.  Home care  Follow these guidelines when caring for yourself at home:    Avoid being around cigarette smoke, whether yours or other people s.    Acetaminophen or ibuprofen will help ease your fever, muscle aches, and headache. Don t give aspirin to anyone younger than 18 who has the flu. Aspirin can harm the liver.    Nausea and loss of appetite are common with the flu. Eat light meals. Drink 6 to 8 glasses of liquids every day. Good choices are water, sport drinks, soft drinks without caffeine, juices, tea, and soup. Extra fluids will also help loosen secretions in your nose and lungs.    Over-the-counter cold medicines will not make the flu go  away faster. But the medicines may help with coughing, sore throat, and congestion in your nose and sinuses. Don t use a decongestant if you have high blood pressure.    Stay home until your fever has been gone for at least 24 hours without using medicine to reduce fever.  Follow-up care  Follow up with your healthcare provider, or as advised, if you are not getting better over the next week.  If you are age 65 or older, talk with your provider about getting a pneumococcal vaccine every 5 years. You should also get this vaccine if you have chronic asthma or COPD. All adults should get a flu vaccine every fall. Ask your provider about this.  When to seek medical advice  Call your healthcare provider right away if any of these occur:    Cough with lots of colored mucus (sputum) or blood in your mucus    Chest pain, shortness of breath, wheezing, or trouble breathing    Severe headache, or face, neck, or ear pain    New rash with fever    Fever of 100.4 F (38 C) or higher, or as directed by your healthcare provider    Confusion, behavior change, or seizure    Severe weakness or dizziness    You get a new fever or cough after getting better for a few days  Date Last Reviewed: 1/1/2017 2000-2017 [employer]. [address] [place] [address]. All rights reserved. This information is not intended as a substitute for professional medical care. Always follow your healthcare professional's instructions.                Follow-ups after your visit        Who to contact     If you have questions or need follow up information about today's clinic visit or your schedule please contact [facility] directly at [phone].  Normal or non-critical lab and imaging results will be communicated to you by MyChart, letter or phone within 4 business days after the clinic has received the results. If you do not hear from us within 7 days, please contact the clinic through [employer] or  "phone. If you have a critical or abnormal lab result, we will notify you by phone as soon as possible.  Submit refill requests through [employer] or call your pharmacy and they will forward the refill request to us. Please allow 3 business days for your refill to be completed.          Additional Information About Your Visit        [employer] Information     [employer] lets you send messages to your doctor, view your test results, renew your prescriptions, schedule appointments and more. To sign up, go to [email or web address] . Click on \"Log in\" on the left side of the screen, which will take you to the Welcome page. Then click on \"Sign up Now\" on the right side of the page.     You will be asked to enter the access code listed below, as well as some personal information. Please follow the directions to create your username and password.     Your access code is: [email or web address]  Expires: 2018  2:47 PM     Your access code will  in 90 days. If you need help or a new code, please call your [facility] clinic or [phone].        Care EveryWhere ID     This is your Care EveryWhere ID. This could be used by other organizations to access your [facility] medical records  [id number]        Your Vitals Were     Pulse Temperature Respirations Pulse Oximetry BMI (Body Mass Index)       85 98.1  F (36.7  C) (Tympanic) 14 95% 26.7 kg/m2        Blood Pressure from Last 3 Encounters:   18 110/70   17 136/84   17 104/62    Weight from Last 3 Encounters:   18 146 lb (66.2 kg)   17 150 lb (68 kg)   17 149 lb (67.6 kg)              We Performed the Following     Influenza A/B antigen          Today's Medication Changes          These changes are accurate as of 18  2:47 PM.  If you have any questions, ask your nurse or doctor.               Start taking these medicines.        Dose/Directions    benzonatate 200 MG capsule   Commonly known as:  TESSALON   Used for:  Cough   Started by:  " Cassandra Gong PA-C        Dose:  200 mg   Take 1 capsule (200 mg) by mouth 3 times daily as needed for cough   Quantity:  30 capsule   Refills:  1            Where to get your medicines      These medications were sent to [facility] [address] [address] AT [address] & [address] [address]     Phone:  [phone]     benzonatate 200 MG capsule                Primary Care Provider Office Phone # Fax #    Cassandra Gong PA-C [phone] [address] [facility] [address]  [facility] [place] [address]        Equal Access to Services     CHRISTIANO ANGEL : Hadii talia ku hadasho Soomaali, waaxda luqadaha, qaybta kaalmada adeegyada, taco sosain shanika guy. So [facility] [phone].    ATENCIÓN: Si habla español, tiene a chowdhury disposición servicios gratuitos de asistencia lingüística. Llame al [phone].    We comply with applicable federal civil rights laws and [place] laws. We do not discriminate on the basis of race, color, national origin, age, disability, sex, sexual orientation, or gender identity.            Thank you!     Thank you for choosing [facility]  for your care. Our goal is always to provide you with excellent care. Hearing back from our patients is one way we can continue to improve our services. Please take a few minutes to complete the written survey that you may receive in the mail after your visit with us. Thank you!             Your Updated Medication List - Protect others around you: Learn how to safely use, store and throw away your medicines at www.disposemymeds.org.          This list is accurate as of 2/7/18  2:47 PM.  Always use your most recent med list.                   Brand Name Dispense Instructions for use Diagnosis    benzonatate 200 MG capsule    TESSALON    30 capsule    Take 1 capsule (200 mg) by mouth 3 times daily as needed for cough     Cough       simvastatin 20 MG tablet    ZOCOR    90 tablet    Take 1 tablet (20 mg) by mouth daily    Hyperlipidemia LDL goal <130       SLEEP AID PO      Take 1 capsule by mouth At Bedtime.        vitamin D 1000 UNITS capsule      Take 3 capsules by mouth daily.

## 2018-02-07 NOTE — PROGRESS NOTES
SUBJECTIVE:   Reanna Kumar is a 67 year old female who presents to clinic today for the following health issues:    ENT Symptoms             Symptoms: cc Present Absent Comment   Fever/Chills  x  100.4 this am, low grade otherwise, has since resolved, pt is feeling slightly better today.     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  Has had decreased energy level and decreased appetite.        ROS:  C: POSITIVE for fever and chills.  Skin: Negative for worrisome rashes or lesions  ENT: Negative for ear, mouth and throat problems  Resp: POSITIVE for cough occasionally productive with  SOB and wheezing  GI: POSITIVE for abdominal pain mild generalized and diarrhea  MS: Negative for significant arthralgias or myalgias  NEURO: Negative  for headaches or dizziness.  P: Negative for changes in mood or affect  ROS otherwise negative.    Chart Review:  History   Smoking Status     Current Every Day Smoker     Packs/day: 1.00     Years: 50.00     Types: Cigarettes   Smokeless Tobacco     Never Used     Patient Active Problem  List   Diagnosis     Hyperlipidemia LDL goal <130     Vitamin D deficiency     Advance Care Planning     Tobacco use disorder     Family history of hearing loss     Past Surgical History:   Procedure Laterality Date     HIP SURGERY       Problem list, Medication list, Allergies, Medical/Social/Surg hx reviewed in [facility], updated as appropriate.   OBJECTIVE:                                                    /70  Pulse 85  Temp 98.1  F (36.7  C) (Tympanic)  Resp 14  Wt 146 lb (66.2 kg)  SpO2 95%  BMI 26.7 kg/m2  Body mass index is 26.7 kg/(m^2).  GENERAL: healthy, alert, in no acute distress  EYES: Grossly normal to inspection, EOMI, PERRL  HENT: Ear canals normal bilaterally. TM pearly gray without effusion bilaterally.  Nasal mucosa mildly edematous with clear rhinorrhea.  Mouth- mucous membranes moist, no lesions or ulcerations. Pharynx pink. and No tonsillary hypertrophy. Uvula midline, clear post-nasal drainage.  Maxillary and frontal sinuses nontender to palpation bilaterally  NECK: Non-tender, no adenopathy.  RESP: no rales or rhonchi, decreased respiratory effort and cough with deep inspiration   CV: regular rate and rhythm, normal S1 S2.  No peripheral edema.  SKIN: no suspicious lesions, no rashes  PSYCH: Alert and oriented times 3;  Able to articulate logical thoughts. Affect is normal.    Diagnostic test results:   Results for orders placed or performed in visit on 02/07/18 (from the past 24 hour(s))   Influenza A/B antigen   Result Value Ref Range    Influenza A/B Agn Specimen Nasal     Influenza A Positive (A) NEG^Negative    Influenza B Negative NEG^Negative        ASSESSMENT/PLAN:                                                          ICD-10-CM    1. Influenza A J10.1    2. Cough R05 Influenza A/B antigen     benzonatate (TESSALON) 200 MG capsule   3. Tobacco use disorder F17.200        Leaving tomorrow for [place] for 1 week.  Discussed flu precautions.  Push fluids, tylenol to help with  aches and fever.  Will send in tessalon for cough suppression.  Call if symptoms are worsening.        Please see patient instructions for treatment details.    Follow up in 7-10 days if not improving as anticipated.    Cassandra Gong PA-C  [facility]

## 2018-02-07 NOTE — PATIENT INSTRUCTIONS
Influenza (Adult)    Influenza is also called the flu. It is a viral illness that affects the air passages of your lungs. It is different from the common cold. The flu can easily be passed from one to person to another. It may be spread through the air by coughing and sneezing. Or it can be spread by touching the sick person and then touching your own eyes, nose, or mouth.  The flu starts 1 to 3 days after you are exposed to the flu virus. It may last for 1 to 2 weeks but many people feel tired or fatigued for many weeks afterward. You usually don t need to take antibiotics unless you have a complication. This might be an ear or sinus infection or pneumonia.  Symptoms of the flu may be mild or severe. They can include extreme tiredness (wanting to stay in bed all day), chills, fevers, muscle aches, soreness with eye movement, headache, and a dry, hacking cough.  Home care  Follow these guidelines when caring for yourself at home:    Avoid being around cigarette smoke, whether yours or other people s.    Acetaminophen or ibuprofen will help ease your fever, muscle aches, and headache. Don t give aspirin to anyone younger than 18 who has the flu. Aspirin can harm the liver.    Nausea and loss of appetite are common with the flu. Eat light meals. Drink 6 to 8 glasses of liquids every day. Good choices are water, sport drinks, soft drinks without caffeine, juices, tea, and soup. Extra fluids will also help loosen secretions in your nose and lungs.    Over-the-counter cold medicines will not make the flu go away faster. But the medicines may help with coughing, sore throat, and congestion in your nose and sinuses. Don t use a decongestant if you have high blood pressure.    Stay home until your fever has been gone for at least 24 hours without using medicine to reduce fever.  Follow-up care  Follow up with your healthcare provider, or as advised, if you are not getting better over the next week.  If you are age 65 or  older, talk with your provider about getting a pneumococcal vaccine every 5 years. You should also get this vaccine if you have chronic asthma or COPD. All adults should get a flu vaccine every fall. Ask your provider about this.  When to seek medical advice  Call your healthcare provider right away if any of these occur:    Cough with lots of colored mucus (sputum) or blood in your mucus    Chest pain, shortness of breath, wheezing, or trouble breathing    Severe headache, or face, neck, or ear pain    New rash with fever    Fever of 100.4 F (38 C) or higher, or as directed by your healthcare provider    Confusion, behavior change, or seizure    Severe weakness or dizziness    You get a new fever or cough after getting better for a few days  Date Last Reviewed: 1/1/2017 2000-2017 [employer]. [address] [place] [address], [place] [address]. All rights reserved. This information is not intended as a substitute for professional medical care. Always follow your healthcare professional's instructions.

## 2018-08-03 ENCOUNTER — OFFICE VISIT (OUTPATIENT)
Dept: FAMILY MEDICINE | Facility: CLINIC | Age: 68
End: 2018-08-03
Payer: COMMERCIAL

## 2018-08-03 ENCOUNTER — TRANSFERRED RECORDS (OUTPATIENT)
Dept: HEALTH INFORMATION MANAGEMENT | Facility: CLINIC | Age: 68
End: 2018-08-03

## 2018-08-03 ENCOUNTER — RADIANT APPOINTMENT (OUTPATIENT)
Dept: MAMMOGRAPHY | Facility: CLINIC | Age: 68
End: 2018-08-03
Payer: COMMERCIAL

## 2018-08-03 VITALS
BODY MASS INDEX: 26.5 KG/M2 | DIASTOLIC BLOOD PRESSURE: 62 MMHG | HEART RATE: 82 BPM | SYSTOLIC BLOOD PRESSURE: 110 MMHG | TEMPERATURE: 97.7 F | RESPIRATION RATE: 16 BRPM | HEIGHT: 62 IN | WEIGHT: 144 LBS | OXYGEN SATURATION: 98 %

## 2018-08-03 DIAGNOSIS — Z23 NEED FOR VACCINATION: ICD-10-CM

## 2018-08-03 DIAGNOSIS — E55.9 VITAMIN D DEFICIENCY: ICD-10-CM

## 2018-08-03 DIAGNOSIS — Z00.00 ROUTINE GENERAL MEDICAL EXAMINATION AT A HEALTH CARE FACILITY: Primary | ICD-10-CM

## 2018-08-03 DIAGNOSIS — Z78.0 ASYMPTOMATIC POSTMENOPAUSAL STATUS: ICD-10-CM

## 2018-08-03 DIAGNOSIS — E78.5 HYPERLIPIDEMIA LDL GOAL <130: ICD-10-CM

## 2018-08-03 DIAGNOSIS — F17.200 TOBACCO USE DISORDER: ICD-10-CM

## 2018-08-03 DIAGNOSIS — Z12.31 VISIT FOR SCREENING MAMMOGRAM: ICD-10-CM

## 2018-08-03 DIAGNOSIS — L98.9 SKIN LESION: ICD-10-CM

## 2018-08-03 LAB
ALBUMIN SERPL-MCNC: 3.8 G/DL (ref 3.4–5)
ALP SERPL-CCNC: 101 U/L (ref 40–150)
ALT SERPL W P-5'-P-CCNC: 14 U/L (ref 0–50)
ANION GAP SERPL CALCULATED.3IONS-SCNC: 9 MMOL/L (ref 3–14)
AST SERPL W P-5'-P-CCNC: 16 U/L (ref 0–45)
BILIRUB SERPL-MCNC: 0.4 MG/DL (ref 0.2–1.3)
BUN SERPL-MCNC: 11 MG/DL (ref 7–30)
CALCIUM SERPL-MCNC: 9.2 MG/DL (ref 8.5–10.1)
CHLORIDE SERPL-SCNC: 106 MMOL/L (ref 94–109)
CHOLEST SERPL-MCNC: 172 MG/DL
CO2 SERPL-SCNC: 26 MMOL/L (ref 20–32)
CREAT SERPL-MCNC: 0.58 MG/DL (ref 0.52–1.04)
GFR SERPL CREATININE-BSD FRML MDRD: >90 ML/MIN/1.7M2
GLUCOSE SERPL-MCNC: 110 MG/DL (ref 70–99)
HDLC SERPL-MCNC: 79 MG/DL
LDLC SERPL CALC-MCNC: 73 MG/DL
NONHDLC SERPL-MCNC: 93 MG/DL
POTASSIUM SERPL-SCNC: 3.9 MMOL/L (ref 3.4–5.3)
PROT SERPL-MCNC: 6.9 G/DL (ref 6.8–8.8)
SODIUM SERPL-SCNC: 141 MMOL/L (ref 133–144)
TRIGL SERPL-MCNC: 100 MG/DL

## 2018-08-03 PROCEDURE — 36415 COLL VENOUS BLD VENIPUNCTURE: CPT | Performed by: PHYSICIAN ASSISTANT

## 2018-08-03 PROCEDURE — 90471 IMMUNIZATION ADMIN: CPT | Performed by: PHYSICIAN ASSISTANT

## 2018-08-03 PROCEDURE — 90714 TD VACC NO PRESV 7 YRS+ IM: CPT | Performed by: PHYSICIAN ASSISTANT

## 2018-08-03 PROCEDURE — G0439 PPPS, SUBSEQ VISIT: HCPCS | Performed by: PHYSICIAN ASSISTANT

## 2018-08-03 PROCEDURE — 77067 SCR MAMMO BI INCL CAD: CPT | Mod: TC

## 2018-08-03 PROCEDURE — 80053 COMPREHEN METABOLIC PANEL: CPT | Performed by: PHYSICIAN ASSISTANT

## 2018-08-03 PROCEDURE — 82306 VITAMIN D 25 HYDROXY: CPT | Performed by: PHYSICIAN ASSISTANT

## 2018-08-03 PROCEDURE — 80061 LIPID PANEL: CPT | Performed by: PHYSICIAN ASSISTANT

## 2018-08-03 RX ORDER — SIMVASTATIN 20 MG
20 TABLET ORAL DAILY
Qty: 90 TABLET | Refills: 3 | Status: SHIPPED | OUTPATIENT
Start: 2018-08-03 | End: 2019-04-17

## 2018-08-03 ASSESSMENT — ACTIVITIES OF DAILY LIVING (ADL)
I_NEED_ASSISTANCE_FOR_THE_FOLLOWING_DAILY_ACTIVITIES:: NO ASSISTANCE IS NEEDED
CURRENT_FUNCTION: NO ASSISTANCE NEEDED

## 2018-08-03 NOTE — MR AVS SNAPSHOT
After Visit Summary   8/3/2018    Reanna Kumar    MRN: [id number]           Patient Information     Date Of Birth          1950        Visit Information        Provider Department      8/3/2018 9:30 AM Cassandra Gong [facility]C [facility]        Today's Diagnoses     Routine general medical examination at a health care facility    -  1    Hyperlipidemia LDL goal <130        Vitamin D deficiency        Need for vaccination        Tobacco use disorder        Asymptomatic postmenopausal status        Skin lesion          Care Instructions      Preventive Health Recommendations    Female Ages 65 +    Yearly exam:     See your health care provider every year in order to  o Review health changes.   o Discuss preventive care.    o Review your medicines if your doctor has prescribed any.      You no longer need a yearly Pap test unless you've had an abnormal Pap test in the past 10 years. If you have vaginal symptoms, such as bleeding or discharge, be sure to talk with your provider about a Pap test.      Every 1 to 2 years, have a mammogram.  If you are over 69, talk with your health care provider about whether or not you want to continue having screening mammograms.      Every 10 years, have a colonoscopy. Or, have a yearly FIT test (stool test). These exams will check for colon cancer.       Have a cholesterol test every 5 years, or more often if your doctor advises it.       Have a diabetes test (fasting glucose) every three years. If you are at risk for diabetes, you should have this test more often.       At age 65, have a bone density scan (DEXA) to check for osteoporosis (brittle bone disease).    Shots:    Get a flu shot each year.    Get a tetanus shot every 10 years.    Talk to your doctor about your pneumonia vaccines. There are now two you should receive - Pneumovax (PPSV 23) and Prevnar (PCV 13).    Talk to your pharmacist about the shingles  vaccine.    Talk to your doctor about the hepatitis B vaccine.    Nutrition:     Eat at least 5 servings of fruits and vegetables each day.      Eat whole-grain bread, whole-wheat pasta and brown rice instead of white grains and rice.      Get adequate Calcium and Vitamin D.     Lifestyle    Exercise at least 150 minutes a week (30 minutes a day, 5 days a week). This will help you control your weight and prevent disease.      Limit alcohol to one drink per day.      No smoking.       Wear sunscreen to prevent skin cancer.       See your dentist twice a year for an exam and cleaning.      See your eye doctor every 1 to 2 years to screen for conditions such as glaucoma, macular degeneration and cataracts.          Follow-ups after your visit        Additional Services     OPHTHALMOLOGY ADULT REFERRAL       Your provider has referred you to: [facility]: [facility] - [place] [phone] [email or web address]    Please be aware that coverage of these services is subject to the terms and limitations of your health insurance plan.  Call member services at your health plan with any benefit or coverage questions.      Please bring the following with you to your appointment:    (1) Any X-Rays, CTs or MRIs which have been performed.  Contact the facility where they were done to arrange for  prior to your scheduled appointment.    (2) List of current medications  (3) This referral request   (4) Any documents/labs given to you for this referral            PLASTIC SURGERY REFERRAL       Your provider has referred you to: Plastic Surgery [facility]    Please be aware that coverage of these services is subject to the terms and limitations of your health insurance plan.  Call member services at your health plan with any benefit or coverage questions.      Please bring the following with you to your appointment:    (1) Any X-Rays, CTs or MRIs which have been performed.  Contact the  facility where they were done to arrange for  prior to your scheduled appointment.    (2) List of current medications  (3) This referral request   (4) Any documents/labs given to you for this referral            RADIOLOGY REFERRAL       [facility]: [facility], [address]    [address] [place] [address]  Scheduling Phone: [phone]      Scheduling Fax: [phone]    Test Requested: Low Dose Chest CT lung cancer screening  Timeline Request: 2 weeks  Signs/Sx: None  Known Diagnosis: Tobacco abuse, 50 yr pack history, current smoker  Previous Testing:None  Lab Results       Component                Value               Date                       CR                       0.60                07/18/2017                Please be aware that coverage of these services is subject to the terms and limitations of your health insurance plan.  Call member services at your health plan with any benefit or coverage questions.      Please bring the following to your appointment:  >>   Any x-rays, CTs or MRIs which have been performed.    >>   List of current medications   >>   This referral request   >>   Any documents/labs given to you for this referral                  Who to contact     If you have questions or need follow up information about today's clinic visit or your schedule please contact [facility] directly at [phone].  Normal or non-critical lab and imaging results will be communicated to you by MyChart, letter or phone within 4 business days after the clinic has received the results. If you do not hear from us within 7 days, please contact the clinic through MyChart or phone. If you have a critical or abnormal lab result, we will notify you by phone as soon as possible.  Submit refill requests through [employer] or call your pharmacy and they will forward the refill request to us. Please allow 3 business days for your refill to be completed.        "   Additional Information About Your Visit        Care EveryWhere ID     This is your Care EveryWhere ID. This could be used by other organizations to access your [facility] medical records  [id number]        Your Vitals Were     Pulse Temperature Respirations Height Pulse Oximetry BMI (Body Mass Index)    82 97.7  F (36.5  C) (Tympanic) 16 5' 2\" (1.575 m) 98% 26.34 kg/m2       Blood Pressure from Last 3 Encounters:   08/03/18 110/62   02/07/18 110/70   11/07/17 136/84    Weight from Last 3 Encounters:   08/03/18 144 lb (65.3 kg)   02/07/18 146 lb (66.2 kg)   11/07/17 150 lb (68 kg)              We Performed the Following     Comprehensive metabolic panel     Lipid panel reflex to direct LDL Fasting     OPHTHALMOLOGY ADULT REFERRAL     PLASTIC SURGERY REFERRAL     RADIOLOGY REFERRAL     TD (ADULT, 7+) PRESERVE FREE     VACCINE ADMINISTRATION, INITIAL     Vitamin D Deficiency          Today's Medication Changes          These changes are accurate as of 8/3/18 10:04 AM.  If you have any questions, ask your nurse or doctor.               Stop taking these medicines if you haven't already. Please contact your care team if you have questions.     benzonatate 200 MG capsule   Commonly known as:  TESSALON   Stopped by:  Cassandra Gong PA-C                Where to get your medicines      These medications were sent to [facility] [address] [place] [address] AT [address] [place] [address] & [address] [address]     Phone:  [phone]     simvastatin 20 MG tablet                Primary Care Provider Office Phone # Fax #    Cassandra Gong PA-C [phone] [address] [facility] [address]  [facility] [address]        Equal Access to Services     CHRISTIANO ANGEL : Lali paulson Sogladys, waaxda luqadaha, qaybta kaalmada paulyapolo, taco guy. So [facility] [phone].    ATENCIÓN: Si bev espfelix, terrence a chowdhury " disposición servicios gratuitos de asistencia lingüística. Dom bedoya [phone].    We comply with applicable federal civil rights laws and [place] laws. We do not discriminate on the basis of race, color, national origin, age, disability, sex, sexual orientation, or gender identity.            Thank you!     Thank you for choosing [facility]  for your care. Our goal is always to provide you with excellent care. Hearing back from our patients is one way we can continue to improve our services. Please take a few minutes to complete the written survey that you may receive in the mail after your visit with us. Thank you!             Your Updated Medication List - Protect others around you: Learn how to safely use, store and throw away your medicines at www.disposemymeds.org.          This list is accurate as of 8/3/18 10:04 AM.  Always use your most recent med list.                   Brand Name Dispense Instructions for use Diagnosis    simvastatin 20 MG tablet    ZOCOR    90 tablet    Take 1 tablet (20 mg) by mouth daily    Hyperlipidemia LDL goal <130       SLEEP AID PO      Take 1 capsule by mouth At Bedtime.        vitamin D 1000 units capsule      Take 3 capsules by mouth daily.

## 2018-08-03 NOTE — PROGRESS NOTES
SUBJECTIVE:   Reanna Kumar is a 68 year old female who presents for Preventive Visit.      Are you in the first 12 months of your Medicare coverage?  No    Physical   Annual:     Getting at least 3 servings of Calcium per day:  Yes    Bi-annual eye exam:  NO    Dental care twice a year:  Yes    Sleep apnea or symptoms of sleep apnea:  Daytime drowsiness    Diet:  Low fat/cholesterol and Breakfast skipped    Frequency of exercise:  1 day/week    Duration of exercise:  15-30 minutes    Taking medications regularly:  Yes    Medication side effects:  None    Additional concerns today:  No    Ability to successfully perform activities of daily living: no assistance needed    Home Safety:  Throw rugs in the hallway and lack of grab bars in the bathroom    Hearing Impairment: no hearing concerns        Fall risk:  Fallen 2 or more times in the past year?: No  Any fall with injury in the past year?: No    COGNITIVE SCREEN  1) Repeat 3 items (Leader, Season, Table)    2) Clock draw: NORMAL  3) 3 item recall: Recalls 3 objects  Results: 3 items recalled: COGNITIVE IMPAIRMENT LESS LIKELY    Mini-CogTM Copyright GENNA Lisa. Licensed by the author for use in [facility]; reprinted with permission (soob@[facility]). All rights reserved.        Reviewed and updated as needed this visit by clinical staff  Tobacco  Allergies  Meds  Problems  Med Hx  Surg Hx  Fam Hx  Soc Hx          Reviewed and updated as needed this visit by Provider  Tobacco  Allergies  Meds  Problems  Med Hx  Surg Hx  Fam Hx  Soc Hx         Social History   Substance Use Topics     Smoking status: Current Every Day Smoker     Packs/day: 1.00     Years: 50.00     Types: Cigarettes     Smokeless tobacco: Never Used     Alcohol use Yes      Comment: Occ       Alcohol Use 8/3/2018   If you drink alcohol do you typically have greater than 3 drinks per day OR greater than 7 drinks per week? No   No flowsheet data found.        -small spot on  upper left eyelid, no pain and not itchy, wondering if it should be removed.    Today's PHQ-2 Score:   PHQ-2 ( 1999 Pfizer) 8/3/2018   Q1: Little interest or pleasure in doing things 0   Q2: Feeling down, depressed or hopeless 0   PHQ-2 Score 0   Q1: Little interest or pleasure in doing things Not at all   Q2: Feeling down, depressed or hopeless Not at all   PHQ-2 Score 0       Do you feel safe in your environment - Yes    Do you have a Health Care Directive?: Yes: Advance Directive has been received and scanned.    Current providers sharing in care for this patient include:   Patient Care Team:  Cassandra Gong PA-C as PCP - General (Physician Assistant)    The following health maintenance items are reviewed in Epic and correct as of today:  Health Maintenance   Topic Date Due     DEXA SCAN SCREENING (SYSTEM ASSIGNED)  03/20/2014     TETANUS IMMUNIZATION (SYSTEM ASSIGNED)  03/26/2018     LIPID MONITORING Q1 YEAR  07/18/2018     FALL RISK ASSESSMENT  07/18/2018     INFLUENZA VACCINE (1) 09/01/2018     PHQ-2 Q1 YR  11/07/2018     MAMMO SCREEN Q2 YR (SYSTEM ASSIGNED)  07/17/2019     COLONOSCOPY Q5 YR  07/23/2019     ADVANCE DIRECTIVE PLANNING Q5 YRS  09/30/2019     PNEUMOCOCCAL  Completed     HEPATITIS C SCREENING  Completed     BP Readings from Last 3 Encounters:   08/03/18 110/62   02/07/18 110/70   11/07/17 136/84    Wt Readings from Last 3 Encounters:   08/03/18 144 lb (65.3 kg)   02/07/18 146 lb (66.2 kg)   11/07/17 150 lb (68 kg)            Recent Labs   Lab Test  07/18/17   0920  09/22/16   1904  07/13/16   0932  07/08/15   1012  04/17/13   1011  04/11/12   1427  03/24/11   1617   LDL  93   --   89  151*  93  124.4*  135*   HDL  68   --   68  65  56  56  63   TRIG  150*   --   110  126  165*  143  134   ALT   --    --    --    --   21  31.0  8   CR  0.60  0.62   --    --    --    --    --    GFRESTIMATED  >90  Non  GFR Calc    >90  Non  GFR Calc     --    --    --    " --    --    GFRESTBLACK  >90   GFR Calc    >90   GFR Calc     --    --    --    --    --    POTASSIUM  4.0  4.2   --    --    --    --    --         Mammogram Screening: Patient over age 50, mutual decision to screen reflected in health maintenance.    Review of Systems  CONSTITUTIONAL: NEGATIVE for fever, chills, change in weight  INTEGUMENTARY/SKIN: POSITIVE for eye lid lesions she would like removed  EYES: NEGATIVE for vision changes or irritation, has not had a vision exam in about 15 years.  ENT/MOUTH: NEGATIVE for ear, mouth and throat problems  RESP: NEGATIVE for significant cough or SOB  BREAST: NEGATIVE for masses, tenderness or discharge  CV: NEGATIVE for chest pain, palpitations or peripheral edema  GI: NEGATIVE for nausea, abdominal pain, heartburn, or change in bowel habits  : NEGATIVE for frequency, dysuria, or hematuria  MUSCULOSKELETAL: NEGATIVE for significant arthralgias or myalgia  NEURO: NEGATIVE for weakness, dizziness or paresthesias  ENDOCRINE: NEGATIVE for temperature intolerance, skin/hair changes  HEME: NEGATIVE for bleeding problems  PSYCHIATRIC: NEGATIVE for changes in mood or affect    OBJECTIVE:   /62  Pulse 82  Temp 97.7  F (36.5  C) (Tympanic)  Resp 16  Ht 5' 2\" (1.575 m)  Wt 144 lb (65.3 kg)  SpO2 98%  BMI 26.34 kg/m2 Estimated body mass index is 26.34 kg/(m^2) as calculated from the following:    Height as of this encounter: 5' 2\" (1.575 m).    Weight as of this encounter: 144 lb (65.3 kg).  Physical Exam  GENERAL APPEARANCE: healthy, alert and no distress  EYES: Eyes grossly normal to inspection, PERRL and conjunctivae and sclerae normal  HENT: ear canals and TM's normal, nose and mouth without ulcers or lesions, oropharynx clear and oral mucous membranes moist  NECK: no adenopathy, no asymmetry, masses, or scars and thyroid normal to palpation  RESP: lungs clear to auscultation - no rales, rhonchi or wheezes  CV: regular rate and " "rhythm, normal S1 S2, no S3 or S4, no murmur, click or rub, no peripheral edema and peripheral pulses strong  ABDOMEN: soft, nontender, no hepatosplenomegaly, no masses and bowel sounds normal  MS: no musculoskeletal defects are noted and gait is age appropriate without ataxia  SKIN: no suspicious lesions or rashes and yellow deposits on eye lashes  NEURO: Normal strength and tone, sensory exam grossly normal, mentation intact and speech normal  PSYCH: mentation appears normal and affect normal/bright    Diagnostic Test Results: Pending    ASSESSMENT / PLAN:       ICD-10-CM    1. Routine general medical examination at a health care facility Z00.00 Lipid panel reflex to direct LDL Fasting     Comprehensive metabolic panel   2. Hyperlipidemia LDL goal <130 E78.5 simvastatin (ZOCOR) 20 MG tablet   3. Vitamin D deficiency E55.9 Vitamin D Deficiency   4. Need for vaccination Z23 TD (ADULT, 7+) PRESERVE FREE     VACCINE ADMINISTRATION, INITIAL   5. Tobacco use disorder F17.200 RADIOLOGY REFERRAL   6. Asymptomatic postmenopausal status Z78.0    7. Skin lesion L98.9    Will follow up with plastics on skin lesion removal, will get lung cancer screening CT.  Td today.  Mammogram today.    End of Life Planning:  Patient currently has an advanced directive: Yes.  Practitioner is supportive of decision.    COUNSELING:  Reviewed preventive health counseling, as reflected in patient instructions    BP Readings from Last 1 Encounters:   08/03/18 110/62     Estimated body mass index is 26.34 kg/(m^2) as calculated from the following:    Height as of this encounter: 5' 2\" (1.575 m).    Weight as of this encounter: 144 lb (65.3 kg).         reports that she has been smoking Cigarettes.  She has a 50.00 pack-year smoking history. She has never used smokeless tobacco.  Tobacco Cessation Action Plan: Information offered: Patient not interested at this time  Will get cancer screening CT    Appropriate preventive services were discussed " with this patient, including applicable screening as appropriate for cardiovascular disease, diabetes, osteopenia/osteoporosis, and glaucoma.  As appropriate for age/gender, discussed screening for colorectal cancer, prostate cancer, breast cancer, and cervical cancer. Checklist reviewing preventive services available has been given to the patient.    Reviewed patients plan of care and provided an AVS. The Basic Care Plan (routine screening as documented in Health Maintenance) for Reanna meets the Care Plan requirement. This Care Plan has been established and reviewed with the Patient.    Counseling Resources:  ATP IV Guidelines  Pooled Cohorts Equation Calculator  Breast Cancer Risk Calculator  FRAX Risk Assessment  ICSI Preventive Guidelines  Dietary Guidelines for Americans, 2010  USDA's MyPlate  ASA Prophylaxis  Lung CA Screening    Cassandra Gong PA-C  [facility]  Answers for HPI/ROS submitted by the patient on 8/3/2018   PHQ-2 Score: 0

## 2018-08-03 NOTE — NURSING NOTE
Screening Questionnaire for Adult Immunization    Are you sick today?   No   Do you have allergies to medications, food, a vaccine component or latex?   No   Have you ever had a serious reaction after receiving a vaccination?   No   Do you have a long-term health problem with heart disease, lung disease, asthma, kidney disease, metabolic disease (e.g. diabetes), anemia, or other blood disorder?   No   Do you have cancer, leukemia, HIV/AIDS, or any other immune system problem?   No   In the past 3 months, have you taken medications that affect  your immune system, such as prednisone, other steroids, or anticancer drugs; drugs for the treatment of rheumatoid arthritis, Crohn s disease, or psoriasis; or have you had radiation treatments?   No   Have you had a seizure, or a brain or other nervous system problem?   No   During the past year, have you received a transfusion of blood or blood     products, or been given immune (gamma) globulin or antiviral drug?   No   For women: Are you pregnant or is there a chance you could become        pregnant during the next month?   No   Have you received any vaccinations in the past 4 weeks?   No     Immunization questionnaire answers were all negative.        Per orders of JO ANN Mcgowan, injection of TD given by Cinda Zarco. Patient instructed to remain in clinic for 15 minutes afterwards, and to report any adverse reaction to me immediately.       Screening performed by Cinda Zarco on 8/3/2018 at 9:56 AM.

## 2018-08-03 NOTE — LETTER
"August 6, 2018      Reanna Kumar  [address]  [facility] [address]        Dear ,    We are writing to inform you of your test results.    - Your Cholesterol is normal.  - Your metabolic panel shows:  normal electrolytes (sodium, potassium, calcium), normal kidney function (Cr GFR), normal liver function (AST, ALT) and a slightly elevated fasting blood sugar (100-125), in the range of \"pre-diabetes\".  Try to decrease sugars and carbohydrates from your diet and increase exercise to keep those numbers down.  We'll recheck next year.  - Your Vitamin D level is normal.      Resulted Orders   Lipid panel reflex to direct LDL Fasting   Result Value Ref Range    Cholesterol 172 <200 mg/dL    Triglycerides 100 <150 mg/dL      Fasting specimen    HDL Cholesterol 79 >49 mg/dL    LDL Cholesterol Calculated 73 <100 mg/dL      Comment:      Desirable:       <100 mg/dl    Non HDL Cholesterol 93 <130 mg/dL   Vitamin D Deficiency   Result Value Ref Range    Vitamin D Deficiency screening 46 20 - 75 ug/L   Comprehensive metabolic panel   Result Value Ref Range    Sodium 141 133 - 144 mmol/L    Potassium 3.9 3.4 - 5.3 mmol/L    Chloride 106 94 - 109 mmol/L    Carbon Dioxide 26 20 - 32 mmol/L    Anion Gap 9 3 - 14 mmol/L    Glucose 110 (H) 70 - 99 mg/dL      Comment:      Fasting specimen    Urea Nitrogen 11 7 - 30 mg/dL    Creatinine 0.58 0.52 - 1.04 mg/dL    GFR Estimate >90 >60 mL/min/1.7m2      Non African American GFR Calc    Calcium 9.2 8.5 - 10.1 mg/dL    Bilirubin Total 0.4 0.2 - 1.3 mg/dL    Albumin 3.8 3.4 - 5.0 g/dL    Protein Total 6.9 6.8 - 8.8 g/dL    Alkaline Phosphatase 101 40 - 150 U/L    ALT 14 0 - 50 U/L    AST 16 0 - 45 U/L       If you have any questions or concerns, please call the clinic at the number listed above.       Sincerely,        Cassandra Gong PA-C                "

## 2018-08-05 LAB — DEPRECATED CALCIDIOL+CALCIFEROL SERPL-MC: 46 UG/L (ref 20–75)

## 2018-08-06 NOTE — PROGRESS NOTES
"Lab letter printed and signed.  Message comments below:  - Your Cholesterol is normal.  - Your metabolic panel shows:  normal electrolytes (sodium, potassium, calcium), normal kidney function (Cr GFR), normal liver function (AST, ALT) and a slightly elevated fasting blood sugar (100-125), in the range of \"pre-diabetes\".  Try to decrease sugars and carbohydrates from your diet and increase exercise to keep those numbers down.  We'll recheck next year.  - Your Vitamin D level is normal."

## 2018-08-07 PROBLEM — R91.8 PULMONARY NODULES: Status: ACTIVE | Noted: 2018-08-07

## 2018-08-24 ENCOUNTER — TELEPHONE (OUTPATIENT)
Dept: FAMILY MEDICINE | Facility: CLINIC | Age: 68
End: 2018-08-24

## 2018-08-24 NOTE — TELEPHONE ENCOUNTER
Per chart review, CT order was scanned on epic.  Routing message to provider for advice on follow up orders.

## 2018-08-24 NOTE — TELEPHONE ENCOUNTER
Reason for Call:  Other call back    Detailed comments: Roland from [facility] called: On the CT lung cancer screening there is a category 3 finding. 6 Month follow up recommended. And an incidental finding of moderate to severe coronary artery calcium. Return a call to Roalnd for any questions.     Phone Number Patient can be reached at: [facility], ask for Roland: [phone]    Best Time: any    Can we leave a detailed message on this number? YES    Call taken on 8/24/2018 at 3:23 PM by Eliaan Cortez

## 2018-08-27 NOTE — TELEPHONE ENCOUNTER
I sent her a letter on the 7th:      Your lung CT showed three small and likely innocent nodules in your lungs.  The radiologist would like to repeat this test in 6 months to make sure they aren't increasing in size.  I have placed a remind in your chart so we will call you when this is due.

## 2018-09-13 ENCOUNTER — TELEPHONE (OUTPATIENT)
Dept: FAMILY MEDICINE | Facility: CLINIC | Age: 68
End: 2018-09-13

## 2018-09-13 DIAGNOSIS — G47.00 INSOMNIA, UNSPECIFIED TYPE: Primary | ICD-10-CM

## 2018-09-13 RX ORDER — TRAZODONE HYDROCHLORIDE 50 MG/1
50 TABLET, FILM COATED ORAL
Qty: 30 TABLET | Refills: 5 | Status: SHIPPED | OUTPATIENT
Start: 2018-09-13 | End: 2018-09-27

## 2018-09-13 NOTE — TELEPHONE ENCOUNTER
I sent in prescription for trazodone.  She can increase it to two pills after three nights if not working.  If not having improvement then, should follow up with office visit.

## 2018-09-13 NOTE — TELEPHONE ENCOUNTER
Reason for Call:  Other call back    Detailed comments: Discuss getting a sleep aide. Having issues sleeping and is still working. Patient has been using over the counter. Didn't think needed to come in. Possible telephone visit. Please call to advise.    Phone Number Patient can be reached at: Cell [phone]    Best Time: any    Can we leave a detailed message on this number? YES    Call taken on 9/13/2018 at 8:01 AM by WILI MOSQUEDA

## 2018-09-13 NOTE — TELEPHONE ENCOUNTER
Patient is requesting a prescription sleep aid as she is still working and she is struggling with sleep.Pt states that it has been a problem for ten years but now has become a problem.Pt states that she goes to bed around 10 at night and is able to fall asleep right away,but will wake up between 1:30 and 3:30 and is unable to get back to sleep. Has tried numerous OTC sleep aids without any effectiveness. Wants to know if provider could prescribe something or do a telephone visit. Will forward to provider patient request.

## 2018-09-27 ENCOUNTER — OFFICE VISIT (OUTPATIENT)
Dept: FAMILY MEDICINE | Facility: CLINIC | Age: 68
End: 2018-09-27
Payer: COMMERCIAL

## 2018-09-27 VITALS
SYSTOLIC BLOOD PRESSURE: 104 MMHG | BODY MASS INDEX: 26.52 KG/M2 | WEIGHT: 145 LBS | TEMPERATURE: 97.6 F | HEART RATE: 86 BPM | RESPIRATION RATE: 16 BRPM | DIASTOLIC BLOOD PRESSURE: 68 MMHG | OXYGEN SATURATION: 96 %

## 2018-09-27 DIAGNOSIS — F17.200 TOBACCO USE DISORDER: ICD-10-CM

## 2018-09-27 DIAGNOSIS — J20.9 ACUTE BRONCHITIS WITH SYMPTOMS > 10 DAYS: Primary | ICD-10-CM

## 2018-09-27 DIAGNOSIS — G47.00 INSOMNIA, UNSPECIFIED TYPE: ICD-10-CM

## 2018-09-27 PROCEDURE — 99213 OFFICE O/P EST LOW 20 MIN: CPT | Performed by: PHYSICIAN ASSISTANT

## 2018-09-27 RX ORDER — AZITHROMYCIN 250 MG/1
TABLET, FILM COATED ORAL
Qty: 6 TABLET | Refills: 0 | Status: SHIPPED | OUTPATIENT
Start: 2018-09-27 | End: 2019-02-08

## 2018-09-27 RX ORDER — QUETIAPINE FUMARATE 50 MG/1
50 TABLET, FILM COATED ORAL
Qty: 30 TABLET | Refills: 1 | Status: SHIPPED | OUTPATIENT
Start: 2018-09-27 | End: 2018-12-30

## 2018-09-27 NOTE — MR AVS SNAPSHOT
After Visit Summary   9/27/2018    Reanna Kumar    MRN: [id number]           Patient Information     Date Of Birth          1950        Visit Information        Provider Department      9/27/2018 2:30 PM Cassandra Gong PA-C [facility]        Today's Diagnoses     Acute bronchitis with symptoms > 10 days    -  1    Tobacco use disorder        Insomnia, unspecified type          Care Instructions      What Is Acute Bronchitis?    Acute or short-term bronchitis last for days or weeks. It occurs when the bronchial tubes (airways in the lungs) are irritated by a virus, bacteria, or allergen. This causes a cough that produces yellow or greenish mucus.  Inside Healthy Lungs  Air travels in and out of the lungs through the airways. The linings of these airways produce sticky mucus. This mucus traps particles that enter the lungs. Tiny structures called cilia then sweep the particles out of the airways.    Healthy Airway: Airways are normally open. Air moves in and out easily.   Healthy Cilia: Tiny, hairlike cilia sweep mucus and particles up and out of the airways.   Lings with Bronchitis  Bronchitis often occurs when a cold or the flu virus. The airways become inflamed (red and swollen). There is a deep  hacking  cough from the extra mucus. Other symptoms may include:    Wheezing    Chest discomfort    Shortness of breath    Mild fever  A second infection, this time due to bacteria, may then occur. And, airways irritated by allergens or smoke are more likely to get infected.    Inflamed Airway: Inflammation and excess mucus narrow the airway, causing shortness of breath.   Impaired Cilia: Excess mucus impairs cilia, causing congestion and wheezing. Smoking worsens the problem.   Making a Diagnosis  A physical exam, medical history, and certain tests help your health care provider make the diagnosis.  Medical History  Your health care provider will ask  you about your symptoms.  The Exam  Your provider listens to your chest for signs of congestion. He or she may also check your ears, nose, and throat.  Possible Tests    A sputum test for bacteria. This requires a sample of mucus from the lungs.    A nasal or throat swab for bacterial infection.    A chest X-ray if your health care provider suspects pneumonia.    Tests to check for an underlying condition, such as allergies, asthma, or COPD. You may be referred to a specialist for further lung function testing.  Treating a Cough  The main treatment for bronchitis is easing symptoms. Avoiding smoke, allergens, and other things that trigger coughing can often help. If the infection is bacterial, antibiotics may be used. During the illness, it's important to get plenty of sleep. To ease symptoms:    Don t smoke, and avoid secondhand smoke.    Use a humidifier, or breathe in steam from a hot shower. This may help loosen mucus.    Drink a lot of water and juice. They can soothe the throat and may help thin mucus.    Sit up or use extra pillows when in bed to help lessen coughing and congestion.    Ask your provider about using cough medicine, pain and fever medication, or a decongestant.  Antibiotics  Most cases of bronchitis are caused by cold or flu viruses. Antibiotics don t treat viral illness. Taking antibiotics when they are not needed increases your risk of getting an infection later that is antibiotic-resistant. Your provider will prescribe antibiotics if the infection is caused by bacteria. If they are prescribed:    Take the medication until it is used up, even if symptoms have improved. If you don t, the bronchitis may come back.    Take them as directed. For instance, some medications should be taken with food.    Ask your provider or pharmacist what side effects are common, and what to do about them.  Follow-Up  You should go see your provider again in 2-3 weeks. By this time, symptoms should have improved.  An infection that lasts longer may signal a more serious problem.  Prevention    Avoid tobacco smoke. If you smoke, quit. Stay away from smoky places. Ask friends and family not to smoke around you, or in your home or car.    Get checked for allergies.    Ask your provider about getting a yearly flu shot, and possibly a pneumoccocal or pneumonia shot.    Wash your hands often. This helps reduce the chance of picking up viruses that cause colds and flu.  Call Your Health Care Provider If:    Symptoms worsen, or new symptoms develop.    Breathing problems worsen or  become severe.    Symptoms don t improve within a week, or within 3 days of taking antibiotics.        7036-3815 [employer]. [address] [place] [address]. All rights reserved. This information is not intended as a substitute for professional medical care. Always follow your healthcare professional's instructions.              Follow-ups after your visit        Follow-up notes from your care team     Return in about 9 months (around 6/27/2019) for Annual Exam.      Who to contact     If you have questions or need follow up information about today's clinic visit or your schedule please contact [facility] directly at [phone].  Normal or non-critical lab and imaging results will be communicated to you by MyChart, letter or phone within 4 business days after the clinic has received the results. If you do not hear from us within 7 days, please contact the clinic through MyChart or phone. If you have a critical or abnormal lab result, we will notify you by phone as soon as possible.  Submit refill requests through [facility] or call your pharmacy and they will forward the refill request to us. Please allow 3 business days for your refill to be completed.          Additional Information About Your Visit        Care EveryWhere ID     This is your Care EveryWhere ID. This could be used by other  organizations to access your [facility] medical records  [id number]        Your Vitals Were     Pulse Temperature Respirations Pulse Oximetry BMI (Body Mass Index)       86 97.6  F (36.4  C) (Tympanic) 16 96% 26.52 kg/m2        Blood Pressure from Last 3 Encounters:   09/27/18 104/68   08/03/18 110/62   02/07/18 110/70    Weight from Last 3 Encounters:   09/27/18 145 lb (65.8 kg)   08/03/18 144 lb (65.3 kg)   02/07/18 146 lb (66.2 kg)              Today, you had the following     No orders found for display         Today's Medication Changes          These changes are accurate as of 9/27/18  2:55 PM.  If you have any questions, ask your nurse or doctor.               Start taking these medicines.        Dose/Directions    azithromycin 250 MG tablet   Commonly known as:  ZITHROMAX   Used for:  Acute bronchitis with symptoms > 10 days   Started by:  Cassandra Gong PA-C        Two tablets first day, then one tablet daily for four days.   Quantity:  6 tablet   Refills:  0       QUEtiapine 50 MG tablet   Commonly known as:  SEROQUEL   Used for:  Insomnia, unspecified type   Started by:  Cassandra Gong PA-C        Dose:  50 mg   Take 1 tablet (50 mg) by mouth nightly as needed (for sleep)   Quantity:  30 tablet   Refills:  1         Stop taking these medicines if you haven't already. Please contact your care team if you have questions.     traZODone 50 MG tablet   Commonly known as:  DESYREL   Stopped by:  Cassandra Gong PA-C                Where to get your medicines      These medications were sent to [facility] [address] [place] [address] AT [address] [address]     Phone:  [phone]     azithromycin 250 MG tablet    QUEtiapine 50 MG tablet                Primary Care Provider Office Phone # Fax #    Cassandra Gong PA-C [phone] [address]  [facility] [address]        Equal Access to Services     CHRISTIANO ANGEL : Hadii talia saxena ambermeena Soleonorali, waaxda luqadaha, qaybta kaalmataco kam. So [facility] [phone].    ATENCIÓN: Si habla español, tiene a chowdhury disposición servicios gratuitos de asistencia lingüística. Kristiname al [phone].    We comply with applicable federal civil rights laws and [place] laws. We do not discriminate on the basis of race, color, national origin, age, disability, sex, sexual orientation, or gender identity.            Thank you!     Thank you for choosing [facility]  for your care. Our goal is always to provide you with excellent care. Hearing back from our patients is one way we can continue to improve our services. Please take a few minutes to complete the written survey that you may receive in the mail after your visit with us. Thank you!             Your Updated Medication List - Protect others around you: Learn how to safely use, store and throw away your medicines at www.disposemymeds.org.          This list is accurate as of 9/27/18  2:55 PM.  Always use your most recent med list.                   Brand Name Dispense Instructions for use Diagnosis    azithromycin 250 MG tablet    ZITHROMAX    6 tablet    Two tablets first day, then one tablet daily for four days.    Acute bronchitis with symptoms > 10 days       QUEtiapine 50 MG tablet    SEROQUEL    30 tablet    Take 1 tablet (50 mg) by mouth nightly as needed (for sleep)    Insomnia, unspecified type       simvastatin 20 MG tablet    ZOCOR    90 tablet    Take 1 tablet (20 mg) by mouth daily    Hyperlipidemia LDL goal <130       SLEEP AID PO      Take 1 capsule by mouth At Bedtime.        vitamin D 1000 units capsule      Take 3 capsules by mouth daily.

## 2018-09-27 NOTE — PROGRESS NOTES
SUBJECTIVE:   Reanna Kumar is a 68 year old female who presents to clinic today for the following health issues:      ENT Symptoms             Symptoms: cc Present Absent Comment   Fever/Chills  x     Fatigue  x     Muscle Aches   x    Eye Irritation   x    Sneezing   x    Nasal Scott/Drg  x     Sinus Pressure/Pain   x    Loss of smell   x    Dental pain   x    Sore Throat   x    Swollen Glands   x    Ear Pain/Fullness   x    Cough  x     Wheeze   x no   Chest Pain  x  Heaviness in chest   Shortness of breath  x     Rash   x    Other   x      Symptom duration:  3-4 weeks   Symptom severity:  moderate   Treatments tried:     Contacts:       Reviewed and updated as needed this visit by clinical staff  Tobacco  Allergies  Meds  Problems  Med Hx  Surg Hx  Fam Hx  Soc Hx        Reviewed and updated as needed this visit by Provider  Tobacco  Allergies  Meds  Problems  Med Hx  Surg Hx  Fam Hx  Soc Hx        Additional complaints: None    HPI additional notes: Manda presents today with   Chief Complaint   Patient presents with     URI            ROS:  C: POSITIVE for fever and chills.  Skin: Negative for worrisome rashes or lesions  ENT/MOUTH:POSITIVE for congestion and post-nasal drainage.  Negative for ear pain, sore throat and sinus pressure.  Resp: POSITIVE for cough occasionally productive with  SOB and wheezing  MS: Negative for significant arthralgias or myalgias  NEURO: Negative  for headaches or dizziness.  P: Negative for changes in mood or affect  ROS otherwise negative.    Chart Review:  History   Smoking Status     Current Every Day Smoker     Packs/day: 1.00     Years: 50.00     Types: Cigarettes   Smokeless Tobacco     Never Used       PFSH: History of tobacco abuse       Patient Active Problem List   Diagnosis     Hyperlipidemia LDL goal <130     Vitamin D deficiency     Advance Care Planning     Tobacco use disorder     Family history of hearing loss     Pulmonary nodules     Insomnia,  unspecified type     Past Surgical History:   Procedure Laterality Date     HIP SURGERY       Problem list, Medication list, Allergies, Medical/Social/Surg hx reviewed in [facility], updated as appropriate.   OBJECTIVE:                                                    /68  Pulse 86  Temp 97.6  F (36.4  C) (Tympanic)  Resp 16  Wt 145 lb (65.8 kg)  SpO2 96%  BMI 26.52 kg/m2  Body mass index is 26.52 kg/(m^2).  GENERAL: healthy, alert, in no acute distress  EYES: Grossly normal to inspection, EOMI, PERRL  HENT: Ear canals normal bilaterally. TM pearly gray without effusion bilaterally.  Nasal mucosa mildly edematous with purulent rhinorrhea.  No septal deviation.  Mouth- mucous membranes moist, no lesions or ulcerations. Pharynx pink. and No tonsillary hypertrophy. Uvula midline, purulent post-nasal drainage.  Maxillary and frontal sinuses nontender to palpation bilaterally  NECK: Non-tender, no adenopathy.  RESP: no rales or rhonchi, expiratory wheezes bibasilar, prolonged expiratory phase and cough with deep inspiration   CV: regular rate and rhythm, normal S1 S2.  No peripheral edema.  SKIN: no suspicious lesions, no rashes  PSYCH: Alert and oriented times 3;  Able to articulate logical thoughts. Affect is normal.    Diagnostic test results: None     ASSESSMENT/PLAN:                                                          ICD-10-CM    1. Acute bronchitis with symptoms > 10 days J20.9 azithromycin (ZITHROMAX) 250 MG tablet   2. Tobacco use disorder F17.200    3. Insomnia, unspecified type G47.00 QUEtiapine (SEROQUEL) 50 MG tablet   Trazodone did not help with insomnia, will trial seroquel.      Will call if wants to trial albuterol inhaler.    Please see patient instructions for treatment details.    Return in about 9 months (around 6/27/2019) for Annual Exam.      Cassandra Gong PA-C  [facility]

## 2018-09-27 NOTE — PATIENT INSTRUCTIONS
What Is Acute Bronchitis?    Acute or short-term bronchitis last for days or weeks. It occurs when the bronchial tubes (airways in the lungs) are irritated by a virus, bacteria, or allergen. This causes a cough that produces yellow or greenish mucus.  Inside Healthy Lungs  Air travels in and out of the lungs through the airways. The linings of these airways produce sticky mucus. This mucus traps particles that enter the lungs. Tiny structures called cilia then sweep the particles out of the airways.    Healthy Airway: Airways are normally open. Air moves in and out easily.   Healthy Cilia: Tiny, hairlike cilia sweep mucus and particles up and out of the airways.   Lings with Bronchitis  Bronchitis often occurs when a cold or the flu virus. The airways become inflamed (red and swollen). There is a deep  hacking  cough from the extra mucus. Other symptoms may include:    Wheezing    Chest discomfort    Shortness of breath    Mild fever  A second infection, this time due to bacteria, may then occur. And, airways irritated by allergens or smoke are more likely to get infected.    Inflamed Airway: Inflammation and excess mucus narrow the airway, causing shortness of breath.   Impaired Cilia: Excess mucus impairs cilia, causing congestion and wheezing. Smoking worsens the problem.   Making a Diagnosis  A physical exam, medical history, and certain tests help your health care provider make the diagnosis.  Medical History  Your health care provider will ask you about your symptoms.  The Exam  Your provider listens to your chest for signs of congestion. He or she may also check your ears, nose, and throat.  Possible Tests    A sputum test for bacteria. This requires a sample of mucus from the lungs.    A nasal or throat swab for bacterial infection.    A chest X-ray if your health care provider suspects pneumonia.    Tests to check for an underlying condition, such as allergies, asthma, or COPD. You may be referred to a  specialist for further lung function testing.  Treating a Cough  The main treatment for bronchitis is easing symptoms. Avoiding smoke, allergens, and other things that trigger coughing can often help. If the infection is bacterial, antibiotics may be used. During the illness, it's important to get plenty of sleep. To ease symptoms:    Don t smoke, and avoid secondhand smoke.    Use a humidifier, or breathe in steam from a hot shower. This may help loosen mucus.    Drink a lot of water and juice. They can soothe the throat and may help thin mucus.    Sit up or use extra pillows when in bed to help lessen coughing and congestion.    Ask your provider about using cough medicine, pain and fever medication, or a decongestant.  Antibiotics  Most cases of bronchitis are caused by cold or flu viruses. Antibiotics don t treat viral illness. Taking antibiotics when they are not needed increases your risk of getting an infection later that is antibiotic-resistant. Your provider will prescribe antibiotics if the infection is caused by bacteria. If they are prescribed:    Take the medication until it is used up, even if symptoms have improved. If you don t, the bronchitis may come back.    Take them as directed. For instance, some medications should be taken with food.    Ask your provider or pharmacist what side effects are common, and what to do about them.  Follow-Up  You should go see your provider again in 2-3 weeks. By this time, symptoms should have improved. An infection that lasts longer may signal a more serious problem.  Prevention    Avoid tobacco smoke. If you smoke, quit. Stay away from smoky places. Ask friends and family not to smoke around you, or in your home or car.    Get checked for allergies.    Ask your provider about getting a yearly flu shot, and possibly a pneumoccocal or pneumonia shot.    Wash your hands often. This helps reduce the chance of picking up viruses that cause colds and flu.  Call Your  Health Care Provider If:    Symptoms worsen, or new symptoms develop.    Breathing problems worsen or  become severe.    Symptoms don t improve within a week, or within 3 days of taking antibiotics.        8628-8483 [employer]. [address] [place] [address], [place] [address]. All rights reserved. This information is not intended as a substitute for professional medical care. Always follow your healthcare professional's instructions.

## 2018-12-30 DIAGNOSIS — G47.00 INSOMNIA, UNSPECIFIED TYPE: ICD-10-CM

## 2018-12-31 RX ORDER — QUETIAPINE FUMARATE 50 MG/1
TABLET, FILM COATED ORAL
Qty: 30 TABLET | Refills: 0 | Status: SHIPPED | OUTPATIENT
Start: 2018-12-31 | End: 2019-02-14

## 2019-02-08 DIAGNOSIS — R91.8 PULMONARY NODULES: Primary | ICD-10-CM

## 2019-02-08 NOTE — PROGRESS NOTES
Pt due for 6 month repeat lung nodule CT surveillance.  Order in my outbox for faxing.  She can call and schedule:    [facility]: [facility], [address]    [address] [place] [address]  Scheduling Phone: [phone]      Scheduling Fax: [phone]

## 2019-02-14 DIAGNOSIS — G47.00 INSOMNIA, UNSPECIFIED TYPE: ICD-10-CM

## 2019-02-14 NOTE — TELEPHONE ENCOUNTER
QUETIAPINE 50MG TABLETS  Last Written Prescription Date:  12/31/18  Last Fill Quantity: 30,  # refills: 0   Last office visit: 9/27/2018 with prescribing provider:  09/27/18   Future Office Visit:    Requested Prescriptions   Pending Prescriptions Disp Refills     QUEtiapine (SEROQUEL) 50 MG tablet [Pharmacy Med Name: QUETIAPINE 50MG TABLETS] 30 tablet 0     Sig: TAKE 1 TABLET(50 MG) BY MOUTH EVERY NIGHT AS NEEDED FOR SLEEP    Antipsychotic Medications Failed - 2/14/2019  8:18 AM       Failed - CBC on file in past 12 months    Recent Labs   Lab Test 09/22/16  1531   WBC 30.2*   RBC 4.46   HGB 14.4   HCT 43.0                   Passed - Blood pressure under 140/90 in past 12 months    BP Readings from Last 3 Encounters:   09/27/18 104/68   08/03/18 110/62   02/07/18 110/70                Passed - Patient is 12 years of age or older       Passed - Lipid panel on file within the past 12 months    Recent Labs   Lab Test 08/03/18  0957  07/08/15  1012   CHOL 172   < > 241*   TRIG 100   < > 126   HDL 79   < > 65   LDL 73   < > 151*   NHDL 93   < >  --    VLDL  --   --  25   CHOLHDLRATIO  --   --  3.7    < > = values in this interval not displayed.              Passed - Heart Rate on file within past 12 months    Pulse Readings from Last 3 Encounters:   09/27/18 86   08/03/18 82   02/07/18 85              Passed - A1c or Glucose on file in past 12 months    Recent Labs   Lab Test 08/03/18  0957   *       Please review patients last 3 weights. If a weight gain of >10 lbs exists, you may refill the prescription once after instructing the patient to schedule an appointment within the next 30 days.    Wt Readings from Last 3 Encounters:   09/27/18 65.8 kg (145 lb)   08/03/18 65.3 kg (144 lb)   02/07/18 66.2 kg (146 lb)            Passed - Medication is active on med list       Passed - Patient is not pregnant       Passed - No positve pregnancy test on file in past 12 months       Passed - Recent (6 mo) or future  "(30 days) visit within the authorizing provider's specialty    Patient had office visit in the last 6 months or has a visit in the next 30 days with authorizing provider or within the authorizing provider's specialty.  See \"Patient Info\" tab in inbasket, or \"Choose Columns\" in Meds & Orders section of the refill encounter.              "

## 2019-02-15 ENCOUNTER — TRANSFERRED RECORDS (OUTPATIENT)
Dept: HEALTH INFORMATION MANAGEMENT | Facility: CLINIC | Age: 69
End: 2019-02-15

## 2019-02-19 RX ORDER — QUETIAPINE FUMARATE 50 MG/1
TABLET, FILM COATED ORAL
Qty: 30 TABLET | Refills: 3 | Status: SHIPPED | OUTPATIENT
Start: 2019-02-19 | End: 2019-04-17 | Stop reason: DRUGHIGH

## 2019-02-28 ENCOUNTER — TELEPHONE (OUTPATIENT)
Dept: FAMILY MEDICINE | Facility: CLINIC | Age: 69
End: 2019-02-28

## 2019-02-28 DIAGNOSIS — I25.10 CORONARY ARTERY CALCIFICATION: Primary | ICD-10-CM

## 2019-02-28 NOTE — TELEPHONE ENCOUNTER
Pt returned call please call again  Patient Contact    Attempt # 1    Was call answered?  No.  Left message on voicemail with information to call me back.

## 2019-02-28 NOTE — TELEPHONE ENCOUNTER
Pt chest CT showed high coronary calcifications, recommend follow up with cardiology.  Referral placed:    [facility] [phone]   [email or web address]    Please call and have pt schedule.

## 2019-03-01 NOTE — TELEPHONE ENCOUNTER
Return call to patient. She was given provider message. She will be calling [facility] to schedule a consult.

## 2019-04-17 ENCOUNTER — OFFICE VISIT (OUTPATIENT)
Dept: CARDIOLOGY | Facility: CLINIC | Age: 69
End: 2019-04-17
Attending: PHYSICIAN ASSISTANT
Payer: COMMERCIAL

## 2019-04-17 VITALS
SYSTOLIC BLOOD PRESSURE: 133 MMHG | BODY MASS INDEX: 26.52 KG/M2 | HEART RATE: 92 BPM | HEIGHT: 62 IN | DIASTOLIC BLOOD PRESSURE: 79 MMHG

## 2019-04-17 DIAGNOSIS — E78.5 HYPERLIPIDEMIA LDL GOAL <130: Primary | ICD-10-CM

## 2019-04-17 DIAGNOSIS — I25.10 CORONARY ARTERY CALCIFICATION SEEN ON CT SCAN: ICD-10-CM

## 2019-04-17 PROCEDURE — 99204 OFFICE O/P NEW MOD 45 MIN: CPT | Performed by: INTERNAL MEDICINE

## 2019-04-17 PROCEDURE — 93000 ELECTROCARDIOGRAM COMPLETE: CPT | Performed by: INTERNAL MEDICINE

## 2019-04-17 RX ORDER — ROSUVASTATIN CALCIUM 20 MG/1
20 TABLET, COATED ORAL DAILY
Qty: 90 TABLET | Refills: 3 | Status: SHIPPED | OUTPATIENT
Start: 2019-04-17 | End: 2020-10-14

## 2019-04-17 RX ORDER — QUETIAPINE FUMARATE 50 MG/1
25 TABLET, FILM COATED ORAL DAILY
COMMUNITY
End: 2019-08-26

## 2019-04-17 NOTE — PATIENT INSTRUCTIONS
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  We don't know if it's narrowing the blood flow to the heart just yet.  By your symptoms, I think they're probably okay.  But to test, we will do the STRESS ECHOCARDIOGRAM to check it out.    In the future, we might do a CAROTID ULTRASOUND to check the health of your neck arteries. Please let me know anytime you are interested in that.    Would love you to cut back on the total number of cigarettes if you can.    Would like to change your SIMVASTATIN to a stronger cousin called ROSUVASTATIN (generic Crestor).    If you get muscle aches or any issues with the new medication, please let me know.

## 2019-04-17 NOTE — PROGRESS NOTES
"Cardiology Consultation      Reanna Kumar MRN# [id number]   YOB: 1950 Age: 69 year old   Date of Visit 04/17/2019     Reason for consult:  Coronary calcification           Assessment and Plan:     1. Seemingly asymptomatic coronary calcification incidentally found on CT    We will do stress echocardiogram for baseline assessment.    Change simvastatin to rosuvastatin 20 mg daily.    Recommend nicotine cessation.    Follow-up in 1 year per patient request    This note was transcribed using electronic voice recognition software, typographical errors may be present.                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  She was recently found to have significant coronary artery calcification on CT that she had for following pulmonary nodules.  She denies any exertional chest discomfort or dyspnea on exertion.  She has reasonable LDL control on simvastatin 20 mg daily.  She denies any myalgias or intolerance of statins.    No exertional chest pain  No PND / orthopnea  No presyncope / syncope  No significant palpitations           Physical Exam:     Vitals: /79   Pulse 92   Ht 1.575 m (5' 2\")   BMI 26.52 kg/m    Constitutional:  cooperative, alert and oriented, well developed, well nourished, in no acute distress        Skin:  warm and dry to the touch, no apparent skin lesions or masses noted        Head:  normocephalic, no masses or lesions        Eyes:  pupils equal and round, conjunctivae and lids unremarkable, sclera white, no xanthalasma, EOMS intact, no nystagmus        ENT:  no pallor or cyanosis, dentition good        Neck:  JVP normal        Chest:  normal breath sounds, clear to auscultation, normal A-P diameter, normal symmetry, normal respiratory excursion, no use of accessory muscles        Cardiac: regular rhythm       grade " 1;early systolic murmur;RUSB          Abdomen:      benign    Extremities and Back:  no deformities, clubbing, cyanosis, erythema observed        Neurological:  no gross motor deficits;affect appropriate                    Past Medical History:   I have reviewed this patient's past medical history  History reviewed. No pertinent past medical history.          Past Surgical History:   I have reviewed this patient's past surgical history  Past Surgical History:   Procedure Laterality Date     HIP SURGERY                 Social History:   I have reviewed this patient's social history  Social History     Tobacco Use     Smoking status: Current Every Day Smoker     Packs/day: 1.00     Years: 50.00     Pack years: 50.00     Types: Cigarettes     Smokeless tobacco: Never Used   Substance Use Topics     Alcohol use: Yes     Comment: Occ             Family History:   I have reviewed this patient's family history  Family History   Problem Relation Age of Onset     Cancer Mother      Osteoporosis Mother      Arthritis Father      Genitourinary Problems Sister         Renal calculi             Allergies:     Allergies   Allergen Reactions     Zyban [Bupropion Hydrobromide]      Increased anxiety             Medications:   I have reviewed this patient's current medications  Current Outpatient Medications   Medication Sig Dispense Refill     Cholecalciferol (VITAMIN D) 1000 UNITS capsule Take 3 capsules by mouth daily.       QUEtiapine (SEROQUEL) 50 MG tablet Take 25 mg by mouth 2 times daily       rosuvastatin (CRESTOR) 20 MG tablet Take 1 tablet (20 mg) by mouth daily 90 tablet 3               Review of Systems:     Review of Systems:  Skin:  Negative     Eyes:  Positive for glasses  ENT:  Negative    Respiratory:  Positive for cough  Cardiovascular:  Negative;palpitations;chest pain;edema;fatigue;lightheadedness;dizziness    Gastroenterology: Negative    Genitourinary:  Negative    Musculoskeletal:  Negative    Neurologic:   Negative    Psychiatric:  Negative    Heme/Lymph/Imm:  Negative    Endocrine:  Negative                       Data:   All laboratory data reviewed  Lab Results   Component Value Date    CHOL 172 08/03/2018     Lab Results   Component Value Date    HDL 79 08/03/2018     Lab Results   Component Value Date    LDL 73 08/03/2018     Lab Results   Component Value Date    TRIG 100 08/03/2018     Lab Results   Component Value Date    CHOLHDLRATIO 3.7 07/08/2015     No results found for: TSH  Last Basic Metabolic Panel:  Lab Results   Component Value Date     08/03/2018      Lab Results   Component Value Date    POTASSIUM 3.9 08/03/2018     Lab Results   Component Value Date    CHLORIDE 106 08/03/2018     Lab Results   Component Value Date    NALINI 9.2 08/03/2018     Lab Results   Component Value Date    CO2 26 08/03/2018     Lab Results   Component Value Date    BUN 11 08/03/2018     Lab Results   Component Value Date    CR 0.58 08/03/2018     Lab Results   Component Value Date     08/03/2018     Lab Results   Component Value Date    WBC 30.2 09/22/2016     Lab Results   Component Value Date    RBC 4.46 09/22/2016     Lab Results   Component Value Date    HGB 14.4 09/22/2016     Lab Results   Component Value Date    HCT 43.0 09/22/2016     Lab Results   Component Value Date    MCV 96 09/22/2016     Lab Results   Component Value Date    MCH 32.3 09/22/2016     Lab Results   Component Value Date    MCHC 33.5 09/22/2016     Lab Results   Component Value Date    RDW 13.2 09/22/2016     Lab Results   Component Value Date     09/22/2016

## 2019-04-17 NOTE — LETTER
"4/17/2019    Cassandra Gong PA-C  [address]  [facility] [address]    RE: Reanna Kumar       Dear Colleague,    I had the pleasure of seeing Reanna Kumar in the [facility].    Cardiology Consultation      Reanna Kumar MRN# [id number]   YOB: 1950 Age: 69 year old   Date of Visit 04/17/2019     Reason for consult:  Coronary calcification           Assessment and Plan:     1. Seemingly asymptomatic coronary calcification incidentally found on CT    We will do stress echocardiogram for baseline assessment.    Change simvastatin to rosuvastatin 20 mg daily.    Recommend nicotine cessation.    Follow-up in 1 year per patient request    This note was transcribed using electronic voice recognition software, typographical errors may be present.                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  She was recently found to have significant coronary artery calcification on CT that she had for following pulmonary nodules.  She denies any exertional chest discomfort or dyspnea on exertion.  She has reasonable LDL control on simvastatin 20 mg daily.  She denies any myalgias or intolerance of statins.    No exertional chest pain  No PND / orthopnea  No presyncope / syncope  No significant palpitations           Physical Exam:     Vitals: /79   Pulse 92   Ht 1.575 m (5' 2\")   BMI 26.52 kg/m     Constitutional:  cooperative, alert and oriented, well developed, well nourished, in no acute distress        Skin:  warm and dry to the touch, no apparent skin lesions or masses noted        Head:  normocephalic, no masses or lesions        Eyes:  pupils equal and round, conjunctivae and lids unremarkable, sclera white, no xanthalasma, EOMS intact, no nystagmus        ENT:  no pallor or " cyanosis, dentition good        Neck:  JVP normal        Chest:  normal breath sounds, clear to auscultation, normal A-P diameter, normal symmetry, normal respiratory excursion, no use of accessory muscles        Cardiac: regular rhythm       grade 1;early systolic murmur;RUSB          Abdomen:      benign    Extremities and Back:  no deformities, clubbing, cyanosis, erythema observed        Neurological:  no gross motor deficits;affect appropriate                    Past Medical History:   I have reviewed this patient's past medical history  History reviewed. No pertinent past medical history.          Past Surgical History:   I have reviewed this patient's past surgical history  Past Surgical History:   Procedure Laterality Date     HIP SURGERY                 Social History:   I have reviewed this patient's social history  Social History     Tobacco Use     Smoking status: Current Every Day Smoker     Packs/day: 1.00     Years: 50.00     Pack years: 50.00     Types: Cigarettes     Smokeless tobacco: Never Used   Substance Use Topics     Alcohol use: Yes     Comment: Occ             Family History:   I have reviewed this patient's family history  Family History   Problem Relation Age of Onset     Cancer Mother      Osteoporosis Mother      Arthritis Father      Genitourinary Problems Sister         Renal calculi             Allergies:     Allergies   Allergen Reactions     Zyban [Bupropion Hydrobromide]      Increased anxiety             Medications:   I have reviewed this patient's current medications  Current Outpatient Medications   Medication Sig Dispense Refill     Cholecalciferol (VITAMIN D) 1000 UNITS capsule Take 3 capsules by mouth daily.       QUEtiapine (SEROQUEL) 50 MG tablet Take 25 mg by mouth 2 times daily       rosuvastatin (CRESTOR) 20 MG tablet Take 1 tablet (20 mg) by mouth daily 90 tablet 3               Review of Systems:     Review of Systems:  Skin:  Negative     Eyes:  Positive for  glasses  ENT:  Negative    Respiratory:  Positive for cough  Cardiovascular:  Negative;palpitations;chest pain;edema;fatigue;lightheadedness;dizziness    Gastroenterology: Negative    Genitourinary:  Negative    Musculoskeletal:  Negative    Neurologic:  Negative    Psychiatric:  Negative    Heme/Lymph/Imm:  Negative    Endocrine:  Negative                       Data:   All laboratory data reviewed  Lab Results   Component Value Date    CHOL 172 08/03/2018     Lab Results   Component Value Date    HDL 79 08/03/2018     Lab Results   Component Value Date    LDL 73 08/03/2018     Lab Results   Component Value Date    TRIG 100 08/03/2018     Lab Results   Component Value Date    CHOLHDLRATIO 3.7 07/08/2015     No results found for: TSH  Last Basic Metabolic Panel:  Lab Results   Component Value Date     08/03/2018      Lab Results   Component Value Date    POTASSIUM 3.9 08/03/2018     Lab Results   Component Value Date    CHLORIDE 106 08/03/2018     Lab Results   Component Value Date    NALINI 9.2 08/03/2018     Lab Results   Component Value Date    CO2 26 08/03/2018     Lab Results   Component Value Date    BUN 11 08/03/2018     Lab Results   Component Value Date    CR 0.58 08/03/2018     Lab Results   Component Value Date     08/03/2018     Lab Results   Component Value Date    WBC 30.2 09/22/2016     Lab Results   Component Value Date    RBC 4.46 09/22/2016     Lab Results   Component Value Date    HGB 14.4 09/22/2016     Lab Results   Component Value Date    HCT 43.0 09/22/2016     Lab Results   Component Value Date    MCV 96 09/22/2016     Lab Results   Component Value Date    MCH 32.3 09/22/2016     Lab Results   Component Value Date    MCHC 33.5 09/22/2016     Lab Results   Component Value Date    RDW 13.2 09/22/2016     Lab Results   Component Value Date     09/22/2016       Thank you for allowing me to participate in the care of your patient.    Sincerely,     Aric Angelo MD      [facility]

## 2019-04-24 ENCOUNTER — HOSPITAL ENCOUNTER (OUTPATIENT)
Dept: CARDIOLOGY | Facility: CLINIC | Age: 69
Discharge: HOME | End: 2019-04-24
Attending: INTERNAL MEDICINE | Admitting: INTERNAL MEDICINE
Payer: COMMERCIAL

## 2019-04-24 DIAGNOSIS — I25.10 CORONARY ARTERY CALCIFICATION SEEN ON CT SCAN: ICD-10-CM

## 2019-04-24 DIAGNOSIS — E78.5 HYPERLIPIDEMIA LDL GOAL <130: ICD-10-CM

## 2019-04-24 PROCEDURE — 93350 STRESS TTE ONLY: CPT | Mod: 26 | Performed by: INTERNAL MEDICINE

## 2019-04-24 PROCEDURE — 40000264 ECHO STRESS ECHOCARDIOGRAM

## 2019-04-24 PROCEDURE — 25500064 ZZH RX 255 OP 636: Performed by: INTERNAL MEDICINE

## 2019-04-24 PROCEDURE — 93018 CV STRESS TEST I&R ONLY: CPT | Performed by: INTERNAL MEDICINE

## 2019-04-24 PROCEDURE — 93325 DOPPLER ECHO COLOR FLOW MAPG: CPT | Mod: 26 | Performed by: INTERNAL MEDICINE

## 2019-04-24 PROCEDURE — 93016 CV STRESS TEST SUPVJ ONLY: CPT | Performed by: INTERNAL MEDICINE

## 2019-04-24 PROCEDURE — 93321 DOPPLER ECHO F-UP/LMTD STD: CPT | Mod: 26 | Performed by: INTERNAL MEDICINE

## 2019-04-24 RX ADMIN — HUMAN ALBUMIN MICROSPHERES AND PERFLUTREN 9 ML: 10; .22 INJECTION, SOLUTION INTRAVENOUS at 15:44

## 2019-04-26 ENCOUNTER — TELEPHONE (OUTPATIENT)
Dept: CARDIOLOGY | Facility: CLINIC | Age: 69
End: 2019-04-26

## 2019-04-26 NOTE — TELEPHONE ENCOUNTER
Dr. Angelo's response:          I think [facility] can load it into our PACS system. Jack can help when he gets back, I think there's a [facility] portal way to do it.     Re: the stress echo, she really didn't go very far. We don't know if the shortness of breath is from her smoking or if there might be heart blockages that stopped her from doing more and because she only went for a few minutes on the treadmill, we didn't see it.     Options are:   1. Do more testing if shortness of breath gets worse.     Or.     2. Dobutamine stress echocardiogram so that the lung part of the equation is out of the picture.     I am okay with either.        Tried to call patient at her work per her request. No answer. Left VM to call team 4 back with direct number. Left VM also on home phone.   Will work on getting CT into PACs.

## 2019-04-26 NOTE — TELEPHONE ENCOUNTER
Pt called stating that when she saw Dr. Angelo last week he had mentioned that he would like to see the imaging from the CT scan done at [facility]. Pt wondering if Dr. Angelo would like her to connect with [facility] to get a CD or does he not need it anymore since she had the stress echo.   Pt also wondering the results of the stress echo. Reviewed preliminary results with patient but informed her RN would send over to Dr. Angelo to review and give any recommendations.     4/24/19 stress echo   Interpretation Summary     1. Exercise echocardiogram is negative for myocardial ischemia at a peak heart  rate of 153 BPM (102% of maximal predicted heart rate).  2. No regional wall motion abnormalities.  3. Below average exercise capacity (3:13 minutes on the Devante protocol, 4.6  METS).  4. Patient significantly limited by exertional dyspnea.      4/17/19 OV with Dr. Angelo   1. Seemingly asymptomatic coronary calcification incidentally found on CT    We will do stress echocardiogram for baseline assessment.    Change simvastatin to rosuvastatin 20 mg daily.    Recommend nicotine cessation.    Follow-up in 1 year per patient request     Will route to Dr. Angelo to see if he would still like to get CD of images from CT scan and also review the stress echo

## 2019-04-29 NOTE — TELEPHONE ENCOUNTER
MINI from patient returning call from Friday regarding test results. Spoke with patient about stress echocardiogram results and Dr. Angelo's recommendations. Patient states that she would like to just continue to monitor her symptoms for now and she will let us know if her symptoms worsen or if she changes her mind regarding testing. Patient had no further questions.

## 2019-08-06 ENCOUNTER — ANCILLARY PROCEDURE (OUTPATIENT)
Dept: MAMMOGRAPHY | Facility: CLINIC | Age: 69
End: 2019-08-06
Attending: PHYSICIAN ASSISTANT
Payer: COMMERCIAL

## 2019-08-06 DIAGNOSIS — Z12.31 VISIT FOR SCREENING MAMMOGRAM: ICD-10-CM

## 2019-08-06 PROCEDURE — 77067 SCR MAMMO BI INCL CAD: CPT | Mod: TC

## 2019-08-26 ENCOUNTER — OFFICE VISIT (OUTPATIENT)
Dept: FAMILY MEDICINE | Facility: CLINIC | Age: 69
End: 2019-08-26
Payer: COMMERCIAL

## 2019-08-26 VITALS
HEIGHT: 62 IN | TEMPERATURE: 97.3 F | WEIGHT: 147 LBS | OXYGEN SATURATION: 96 % | DIASTOLIC BLOOD PRESSURE: 60 MMHG | RESPIRATION RATE: 14 BRPM | HEART RATE: 82 BPM | SYSTOLIC BLOOD PRESSURE: 104 MMHG | BODY MASS INDEX: 27.05 KG/M2

## 2019-08-26 DIAGNOSIS — G47.00 INSOMNIA, UNSPECIFIED TYPE: ICD-10-CM

## 2019-08-26 DIAGNOSIS — Z00.00 ROUTINE GENERAL MEDICAL EXAMINATION AT A HEALTH CARE FACILITY: Primary | ICD-10-CM

## 2019-08-26 DIAGNOSIS — Z12.11 SCREENING FOR COLON CANCER: ICD-10-CM

## 2019-08-26 DIAGNOSIS — R73.01 ABNORMAL FASTING GLUCOSE: ICD-10-CM

## 2019-08-26 DIAGNOSIS — E55.9 VITAMIN D DEFICIENCY: ICD-10-CM

## 2019-08-26 DIAGNOSIS — F17.200 TOBACCO USE DISORDER: ICD-10-CM

## 2019-08-26 DIAGNOSIS — E78.5 HYPERLIPIDEMIA LDL GOAL <130: ICD-10-CM

## 2019-08-26 DIAGNOSIS — M25.551 HIP PAIN, RIGHT: ICD-10-CM

## 2019-08-26 LAB
ALBUMIN SERPL-MCNC: 3.9 G/DL (ref 3.4–5)
ALP SERPL-CCNC: 112 U/L (ref 40–150)
ALT SERPL W P-5'-P-CCNC: 16 U/L (ref 0–50)
ANION GAP SERPL CALCULATED.3IONS-SCNC: 6 MMOL/L (ref 3–14)
AST SERPL W P-5'-P-CCNC: 16 U/L (ref 0–45)
BILIRUB SERPL-MCNC: 0.4 MG/DL (ref 0.2–1.3)
BUN SERPL-MCNC: 12 MG/DL (ref 7–30)
CALCIUM SERPL-MCNC: 9.4 MG/DL (ref 8.5–10.1)
CHLORIDE SERPL-SCNC: 106 MMOL/L (ref 94–109)
CHOLEST SERPL-MCNC: 165 MG/DL
CO2 SERPL-SCNC: 26 MMOL/L (ref 20–32)
CREAT SERPL-MCNC: 0.54 MG/DL (ref 0.52–1.04)
DEPRECATED CALCIDIOL+CALCIFEROL SERPL-MC: 38 UG/L (ref 20–75)
ERYTHROCYTE [DISTWIDTH] IN BLOOD BY AUTOMATED COUNT: 13.7 % (ref 10–15)
GFR SERPL CREATININE-BSD FRML MDRD: >90 ML/MIN/{1.73_M2}
GLUCOSE SERPL-MCNC: 117 MG/DL (ref 70–99)
HCT VFR BLD AUTO: 44.7 % (ref 35–47)
HDLC SERPL-MCNC: 69 MG/DL
HGB BLD-MCNC: 15.3 G/DL (ref 11.7–15.7)
LDLC SERPL CALC-MCNC: 65 MG/DL
MCH RBC QN AUTO: 33.6 PG (ref 26.5–33)
MCHC RBC AUTO-ENTMCNC: 34.2 G/DL (ref 31.5–36.5)
MCV RBC AUTO: 98 FL (ref 78–100)
NONHDLC SERPL-MCNC: 96 MG/DL
PLATELET # BLD AUTO: 342 10E9/L (ref 150–450)
POTASSIUM SERPL-SCNC: 4 MMOL/L (ref 3.4–5.3)
PROT SERPL-MCNC: 7.2 G/DL (ref 6.8–8.8)
RBC # BLD AUTO: 4.56 10E12/L (ref 3.8–5.2)
SODIUM SERPL-SCNC: 138 MMOL/L (ref 133–144)
TRIGL SERPL-MCNC: 153 MG/DL
WBC # BLD AUTO: 7.1 10E9/L (ref 4–11)

## 2019-08-26 PROCEDURE — G0439 PPPS, SUBSEQ VISIT: HCPCS | Performed by: PHYSICIAN ASSISTANT

## 2019-08-26 PROCEDURE — 80061 LIPID PANEL: CPT | Performed by: PHYSICIAN ASSISTANT

## 2019-08-26 PROCEDURE — 80053 COMPREHEN METABOLIC PANEL: CPT | Performed by: PHYSICIAN ASSISTANT

## 2019-08-26 PROCEDURE — 36415 COLL VENOUS BLD VENIPUNCTURE: CPT | Performed by: PHYSICIAN ASSISTANT

## 2019-08-26 PROCEDURE — 85027 COMPLETE CBC AUTOMATED: CPT | Performed by: PHYSICIAN ASSISTANT

## 2019-08-26 PROCEDURE — 82306 VITAMIN D 25 HYDROXY: CPT | Performed by: PHYSICIAN ASSISTANT

## 2019-08-26 RX ORDER — QUETIAPINE FUMARATE 50 MG/1
25 TABLET, FILM COATED ORAL DAILY
Qty: 45 TABLET | Refills: 3 | Status: SHIPPED | OUTPATIENT
Start: 2019-08-26 | End: 2020-01-15

## 2019-08-26 ASSESSMENT — ACTIVITIES OF DAILY LIVING (ADL): CURRENT_FUNCTION: NO ASSISTANCE NEEDED

## 2019-08-26 ASSESSMENT — MIFFLIN-ST. JEOR: SCORE: 1140.04

## 2019-08-26 NOTE — LETTER
"August 27, 2019      Reanna Kumar  [address]  [place] [facility] [place] [place] [address]        Dear ,    We are writing to inform you of your test results.    - Your Cholesterol is normal.  - Your metabolic panel shows:  normal electrolytes (sodium, potassium, calcium) normal kidney function (creatinine and GFR) normal liver function (AST/ALT) and a slightly elevated fasting blood sugar (100-125), in the range of \"pre-diabetes\".  Try to decrease sugars and carbohydrates from your diet and increase exercise to keep those numbers down.  We'll recheck next year.  - Your Vitamin D level is normal.  - Your Blood Count Results show normal White Blood Cell count (no sign of infection), normal Hemoglobin (not anemia), and normal Platelets (affects clotting).      Resulted Orders   Lipid panel reflex to direct LDL Fasting   Result Value Ref Range    Cholesterol 165 <200 mg/dL    Triglycerides 153 (H) <150 mg/dL      Comment:      Borderline high:  150-199 mg/dl  High:             200-499 mg/dl  Very high:       >499 mg/dl  Fasting specimen      HDL Cholesterol 69 >49 mg/dL    LDL Cholesterol Calculated 65 <100 mg/dL      Comment:      Desirable:       <100 mg/dl    Non HDL Cholesterol 96 <130 mg/dL   CBC with platelets   Result Value Ref Range    WBC 7.1 4.0 - 11.0 10e9/L    RBC Count 4.56 3.8 - 5.2 10e12/L    Hemoglobin 15.3 11.7 - 15.7 g/dL    Hematocrit 44.7 35.0 - 47.0 %    MCV 98 78 - 100 fl    MCH 33.6 (H) 26.5 - 33.0 pg    MCHC 34.2 31.5 - 36.5 g/dL    RDW 13.7 10.0 - 15.0 %    Platelet Count 342 150 - 450 10e9/L   Comprehensive metabolic panel   Result Value Ref Range    Sodium 138 133 - 144 mmol/L    Potassium 4.0 3.4 - 5.3 mmol/L    Chloride 106 94 - 109 mmol/L    Carbon Dioxide 26 20 - 32 mmol/L    Anion Gap 6 3 - 14 mmol/L    Glucose 117 (H) 70 - 99 mg/dL      Comment:      Fasting specimen    Urea Nitrogen 12 7 - 30 mg/dL    Creatinine 0.54 0.52 - 1.04 mg/dL    GFR Estimate >90 >60 mL/min/[1.73_m2]    Calcium " 9.4 8.5 - 10.1 mg/dL    Bilirubin Total 0.4 0.2 - 1.3 mg/dL    Albumin 3.9 3.4 - 5.0 g/dL    Protein Total 7.2 6.8 - 8.8 g/dL    Alkaline Phosphatase 112 40 - 150 U/L    ALT 16 0 - 50 U/L    AST 16 0 - 45 U/L   Vitamin D Deficiency   Result Value Ref Range    Vitamin D Deficiency screening 38 20 - 75 ug/L       If you have any questions or concerns, please call the clinic at the number listed above.       Sincerely,        Cassandra Gong PA-C

## 2019-08-26 NOTE — PROGRESS NOTES
"SUBJECTIVE:   Reanna Kumar is a 69 year old female who presents for Preventive Visit.    Are you in the first 12 months of your Medicare coverage?  No    Healthy Habits:     In general, how would you rate your overall health?  Good    Frequency of exercise:  None    Do you usually eat at least 4 servings of fruit and vegetables a day, include whole grains    & fiber and avoid regularly eating high fat or \"junk\" foods?  No    Taking medications regularly:  Yes    Medication side effects:  None    Ability to successfully perform activities of daily living:  No assistance needed    Home Safety:  No safety concerns identified    Hearing Impairment:  Feel that people are mumbling or not speaking clearly    In the past 6 months, have you been bothered by leaking of urine? Yes    In general, how would you rate your overall mental or emotional health?  Excellent      PHQ-2 Total Score: 0    Additional concerns today:  Yes    Do you feel safe in your environment? Yes    Do you have a Health Care Directive? Yes: Advance Directive has been received and scanned.      Fall risk  Fallen 2 or more times in the past year?: No  Any fall with injury in the past year?: No    Cognitive Screening   1) Repeat 3 items (Leader, Season, Table)    2) Clock draw: NORMAL  3) 3 item recall: Recalls 3 objects  Results: 3 items recalled: COGNITIVE IMPAIRMENT LESS LIKELY    Mini-CogTM Copyright S [employer]. Licensed by the author for use in [facility]; reprinted with permission (rex@.[facility]). All rights reserved.      Do you have sleep apnea, excessive snoring or daytime drowsiness?: no    Reviewed and updated as needed this visit by clinical staff  Tobacco  Allergies  Meds  Problems  Med Hx  Surg Hx  Fam Hx  Soc Hx          Reviewed and updated as needed this visit by Provider  Tobacco  Allergies  Meds  Problems  Med Hx  Surg Hx  Fam Hx  Soc Hx         Social History     Tobacco Use     Smoking status: Current Every " Day Smoker     Packs/day: 1.00     Years: 50.00     Pack years: 50.00     Types: Cigarettes     Smokeless tobacco: Never Used   Substance Use Topics     Alcohol use: Yes     Comment: Occ     If you drink alcohol do you typically have >3 drinks per day or >7 drinks per week? No    Alcohol Use 8/26/2019   Prescreen: >3 drinks/day or >7 drinks/week? No   Prescreen: >3 drinks/day or >7 drinks/week? -   No flowsheet data found.    -right hip pain, becoming worse within the last month, sharp pains, reminds her of when she needed to have her left hip replaced.  Has been having pain for 4 weeks that hasn't been going away.      Current providers sharing in care for this patient include:   Patient Care Team:  Cassandra Gong PA-C as PCP - General (Physician Assistant)  Cassandra Gong PA-C as Assigned PCP    The following health maintenance items are reviewed in Epic and correct as of today:  Health Maintenance   Topic Date Due     ZOSTER IMMUNIZATION (1 of 2) 03/20/1999     PHQ-2  01/01/2019     LIPID  08/03/2019     FALL RISK ASSESSMENT  08/03/2019     COLONOSCOPY  07/23/2019     MEDICARE ANNUAL WELLNESS VISIT  08/03/2019     INFLUENZA VACCINE (1) 09/01/2019     ADVANCE CARE PLANNING  09/30/2019     MAMMO SCREENING  08/06/2021     DTAP/TDAP/TD IMMUNIZATION (3 - Td) 08/03/2028     DEXA  Completed     HEPATITIS C SCREENING  Completed     PNEUMOCOCCAL IMMUNIZATION 65+ LOW/MEDIUM RISK  Completed     IPV IMMUNIZATION  Aged Out     MENINGITIS IMMUNIZATION  Aged Out     BP Readings from Last 3 Encounters:   08/26/19 104/60   04/17/19 133/79   09/27/18 104/68    Wt Readings from Last 3 Encounters:   08/26/19 66.7 kg (147 lb)   09/27/18 65.8 kg (145 lb)   08/03/18 65.3 kg (144 lb)                  Recent Labs   Lab Test 08/03/18  0957 07/18/17  0920  07/13/16  0932  04/17/13  1011 04/11/12  1427   LDL 73 93  --  89   < > 93 124.4*   HDL 79 68  --  68   < > 56 56   TRIG 100 150*  --  110   < > 165* 143  "  ALT 14  --   --   --   --  21 31.0   CR 0.58 0.60   < >  --   --   --   --    GFRESTIMATED >90 >90  Non African American GFR Calc     < >  --   --   --   --    GFRESTBLACK >90 >90  African American GFR Calc     < >  --   --   --   --    POTASSIUM 3.9 4.0   < >  --   --   --   --     < > = values in this interval not displayed.      Mammogram Screening: Mammogram Screening: Patient over age 50, mutual decision to screen reflected in health maintenance.    Review of Systems  Constitutional, HEENT, cardiovascular, pulmonary, GI, , musculoskeletal, neuro, skin, endocrine and psych systems are negative, except as otherwise noted.    OBJECTIVE:   /60   Pulse 82   Temp 97.3  F (36.3  C) (Tympanic)   Resp 14   Ht 1.575 m (5' 2\")   Wt 66.7 kg (147 lb)   SpO2 96%   BMI 26.89 kg/m   Estimated body mass index is 26.89 kg/m  as calculated from the following:    Height as of this encounter: 1.575 m (5' 2\").    Weight as of this encounter: 66.7 kg (147 lb).  Physical Exam  GENERAL APPEARANCE: healthy, alert and no distress  EYES: Eyes grossly normal to inspection, PERRL and conjunctivae and sclerae normal  HENT: ear canals and TM's normal, nose and mouth without ulcers or lesions, oropharynx clear and oral mucous membranes moist  NECK: no adenopathy, no asymmetry, masses, or scars and thyroid normal to palpation  RESP: lungs clear to auscultation - no rales, rhonchi or wheezes  BREAST: normal without masses, tenderness or nipple discharge and no palpable axillary masses or adenopathy  CV: regular rate and rhythm, normal S1 S2, no S3 or S4, no murmur, click or rub, no peripheral edema and peripheral pulses strong  ABDOMEN: soft, nontender, no hepatosplenomegaly, no masses and bowel sounds normal  MS: no musculoskeletal defects are noted, gait is age appropriate without ataxia  SKIN: no suspicious lesions or rashes  NEURO: Normal strength and tone, sensory exam grossly normal, mentation intact and speech " "normal  PSYCH: mentation appears normal and affect normal/bright    Diagnostic Test Results:  Labs reviewed in Epic    ASSESSMENT / PLAN:       ICD-10-CM    1. Routine general medical examination at a health care facility Z00.00 Lipid panel reflex to direct LDL Fasting     CBC with platelets     Comprehensive metabolic panel   2. Vitamin D deficiency E55.9 Vitamin D Deficiency   3. Hyperlipidemia LDL goal <130 E78.5    4. Pulmonary nodules R91.8    5. Tobacco use disorder F17.200    6. Screening for colon cancer Z12.11 Fecal colorectal cancer screen FIT   7. Hip pain, right M25.551    8. Insomnia, unspecified type G47.00 QUEtiapine (SEROQUEL) 50 MG tablet     Will do fit rather than colonoscopy.    Will follow up with [facility] when ready for hip, does not need referral for this.  Will call in Feb when due for lung cancer screening.     Will see how cholesterol is doing since starting crestor.      End of Life Planning:  Patient currently has an advanced directive: Yes.  Practitioner is supportive of decision.    COUNSELING:  Reviewed preventive health counseling, as reflected in patient instructions    Estimated body mass index is 26.89 kg/m  as calculated from the following:    Height as of this encounter: 1.575 m (5' 2\").    Weight as of this encounter: 66.7 kg (147 lb).         reports that she has been smoking cigarettes.  She has a 50.00 pack-year smoking history. She has never used smokeless tobacco.  Tobacco Cessation Action Plan: Information offered: Patient not interested at this time    Appropriate preventive services were discussed with this patient, including applicable screening as appropriate for cardiovascular disease, diabetes, osteopenia/osteoporosis, and glaucoma.  As appropriate for age/gender, discussed screening for colorectal cancer, prostate cancer, breast cancer, and cervical cancer. Checklist reviewing preventive services available has been given to the patient.    Reviewed patients plan of care " and provided an AVS. The Basic Care Plan (routine screening as documented in Health Maintenance) for Reanna meets the Care Plan requirement. This Care Plan has been established and reviewed with the Patient.    Counseling Resources:  ATP IV Guidelines  Pooled Cohorts Equation Calculator  Breast Cancer Risk Calculator  FRAX Risk Assessment  ICSI Preventive Guidelines  Dietary Guidelines for Americans, 2010  USDA's MyPlate  ASA Prophylaxis  Lung CA Screening    Cassandra Gong PA-C  [facility]    Identified Health Risks:

## 2019-08-26 NOTE — PROGRESS NOTES
She is at risk for lack of exercise and has been provided with information to increase physical activity for the benefit of her well-being.  The patient was counseled and encouraged to consider modifying their diet and eating habits. She was provided with information on recommended healthy diet options.  The patient was provided with written information regarding signs of hearing loss.  Information on urinary incontinence and treatment options given to patient.

## 2019-08-27 PROBLEM — R73.01 ABNORMAL FASTING GLUCOSE: Status: ACTIVE | Noted: 2019-08-27

## 2019-08-27 NOTE — RESULT ENCOUNTER NOTE
"Lab letter printed and signed.  Message comments below:  - Your Cholesterol is normal.  - Your metabolic panel shows:  normal electrolytes (sodium, potassium, calcium) normal kidney function (creatinine and GFR) normal liver function (AST/ALT) and a slightly elevated fasting blood sugar (100-125), in the range of \"pre-diabetes\".  Try to decrease sugars and carbohydrates from your diet and increase exercise to keep those numbers down.  We'll recheck next year.  - Your Vitamin D level is normal.  - Your Blood Count Results show normal White Blood Cell count (no sign of infection), normal Hemoglobin (not anemia), and normal Platelets (affects clotting)."

## 2019-08-28 PROCEDURE — 82274 ASSAY TEST FOR BLOOD FECAL: CPT | Performed by: PHYSICIAN ASSISTANT

## 2019-09-01 LAB — HEMOCCULT STL QL IA: NEGATIVE

## 2019-09-03 DIAGNOSIS — Z12.11 SCREENING FOR COLON CANCER: ICD-10-CM

## 2020-01-08 ENCOUNTER — HOSPITAL ENCOUNTER (INPATIENT)
Facility: CLINIC | Age: 70
Setting detail: SURGERY ADMIT
End: 2020-01-08
Attending: ORTHOPAEDIC SURGERY | Admitting: ORTHOPAEDIC SURGERY

## 2020-01-13 DIAGNOSIS — G47.00 INSOMNIA, UNSPECIFIED TYPE: ICD-10-CM

## 2020-01-13 NOTE — TELEPHONE ENCOUNTER
Reason for Call:  Medication or medication refill:  Do you use a [facility]?  Name of the pharmacy and phone number for the current request:  [id number] - [address] AT [address] [place] [address] & [facility]  [phone]  Name of the medication requested: QUEtiapine (SEROQUEL) 50 MG tablet  Other request: any  Can we leave a detailed message on this number? YES  Phone number patient can be reached at: Home number on file [phone] (home)  Best Time: any  Call taken on 1/13/2020 at 9:05 AM by PERLA WHIPPLE

## 2020-01-13 NOTE — TELEPHONE ENCOUNTER
Requested Prescriptions   Pending Prescriptions Disp Refills     QUEtiapine (SEROQUEL) 50 MG tablet  Last Written Prescription Date:  8/26/2019  Last Fill Quantity: 45 tablet,  # refills: 3   Last office visit: 8/26/2019 with prescribing provider:  Farideh   Future Office Visit:     45 tablet 3     Sig: Take 0.5 tablets (25 mg) by mouth daily       Antipsychotic Medications Passed - 1/13/2020  9:06 AM        Passed - Blood pressure under 140/90 in past 12 months     BP Readings from Last 3 Encounters:   08/26/19 104/60   04/17/19 133/79   09/27/18 104/68                 Passed - Patient is 12 years of age or older        Passed - Lipid panel on file within the past 12 months     Recent Labs   Lab Test 08/26/19  0939  07/08/15  1012   CHOL 165   < > 241*   TRIG 153*   < > 126   HDL 69   < > 65   LDL 65   < > 151*   NHDL 96   < >  --    VLDL  --   --  25   CHOLHDLRATIO  --   --  3.7    < > = values in this interval not displayed.               Passed - CBC on file in past 12 months     Recent Labs   Lab Test 08/26/19  0939   WBC 7.1   RBC 4.56   HGB 15.3   HCT 44.7                    Passed - Heart Rate on file within past 12 months     Pulse Readings from Last 3 Encounters:   08/26/19 82   04/17/19 92   09/27/18 86               Passed - A1c or Glucose on file in past 12 months     Recent Labs   Lab Test 08/26/19  0939   *       Please review patients last 3 weights. If a weight gain of >10 lbs exists, you may refill the prescription once after instructing the patient to schedule an appointment within the next 30 days.    Wt Readings from Last 3 Encounters:   08/26/19 66.7 kg (147 lb)   09/27/18 65.8 kg (145 lb)   08/03/18 65.3 kg (144 lb)             Passed - Medication is active on med list        Passed - Patient is not pregnant        Passed - No positve pregnancy test on file in past 12 months        Passed - Recent (6 mo) or future (30 days) visit within the authorizing provider's specialty      "Patient had office visit in the last 6 months or has a visit in the next 30 days with authorizing provider or within the authorizing provider's specialty.  See \"Patient Info\" tab in inbasket, or \"Choose Columns\" in Meds & Orders section of the refill encounter.               "

## 2020-01-14 RX ORDER — QUETIAPINE FUMARATE 50 MG/1
25 TABLET, FILM COATED ORAL DAILY
Qty: 45 TABLET | Refills: 3 | OUTPATIENT
Start: 2020-01-14

## 2020-01-14 NOTE — TELEPHONE ENCOUNTER
Manda returned a call.  She is asking that a nurse or Marilynn return a call to her yet today at [phone].    Manda feels there was a communication error in the message yesterday and would like a call back.

## 2020-01-14 NOTE — TELEPHONE ENCOUNTER
Duplicate.     Orderd 8/26/2019 for 34 tabs and 3 refills. Taking 0.5 a day, patient should have enough until 5/20

## 2020-01-14 NOTE — TELEPHONE ENCOUNTER
Patient called back. She takes 25 mg (half a tab) and sometimes when it does not work she takes another 25 mg. So most days she takes 50 mg a day. She is requesting a refill because she is totally out.

## 2020-01-14 NOTE — TELEPHONE ENCOUNTER
Patient Contact    Attempt # 1    Was call answered?  No.  Left message on voicemail with information to call me back.    QUEtiapine (SEROQUEL) 50 MG tablet 45 tablet 3 8/26/2019  No   Sig - Route: Take 0.5 tablets (25 mg) by mouth daily - Oral     Patient should have enough until August.

## 2020-01-15 RX ORDER — QUETIAPINE FUMARATE 50 MG/1
25 TABLET, FILM COATED ORAL DAILY
Qty: 45 TABLET | Refills: 3 | Status: SHIPPED | OUTPATIENT
Start: 2020-01-15 | End: 2020-05-22

## 2020-02-11 ENCOUNTER — TELEPHONE (OUTPATIENT)
Dept: FAMILY MEDICINE | Facility: CLINIC | Age: 70
End: 2020-02-11

## 2020-02-11 DIAGNOSIS — F17.200 TOBACCO USE DISORDER: Primary | ICD-10-CM

## 2020-02-11 NOTE — TELEPHONE ENCOUNTER
Order faxed to [facility].  Called patient and left VM explaining order was faxed and she can call to schedule an appointment.

## 2020-02-11 NOTE — TELEPHONE ENCOUNTER
Reason for Call: Request for an order or referral:  Order or referral being requested: referral for a ct scan for the lungs  Date needed: as soon as possible  Has the patient been seen by the PCP for this problem? YES  Additional comments: please call patient when order is sent  Phone number Patient can be reached at:  Home number on file [phone] (home)  Best Time:  any  Can we leave a detailed message on this number?  YES  Call taken on 2/11/2020 at 8:08 AM by PERLA WHIPPLE

## 2020-02-11 NOTE — TELEPHONE ENCOUNTER
Order in my outbox for faxing, can call and schedule:    [facility]: [facility], [address]    [address] [place] [address]  Scheduling Phone: [phone]      Scheduling Fax: [phone]

## 2020-02-18 ENCOUNTER — TRANSFERRED RECORDS (OUTPATIENT)
Dept: HEALTH INFORMATION MANAGEMENT | Facility: CLINIC | Age: 70
End: 2020-02-18

## 2020-05-05 DIAGNOSIS — Z11.59 ENCOUNTER FOR SCREENING FOR OTHER VIRAL DISEASES: Primary | ICD-10-CM

## 2020-05-19 ENCOUNTER — OFFICE VISIT (OUTPATIENT)
Dept: FAMILY MEDICINE | Facility: CLINIC | Age: 70
End: 2020-05-19
Payer: COMMERCIAL

## 2020-05-19 VITALS
BODY MASS INDEX: 27.6 KG/M2 | TEMPERATURE: 97.2 F | OXYGEN SATURATION: 99 % | WEIGHT: 150 LBS | HEIGHT: 62 IN | DIASTOLIC BLOOD PRESSURE: 80 MMHG | HEART RATE: 94 BPM | RESPIRATION RATE: 14 BRPM | SYSTOLIC BLOOD PRESSURE: 120 MMHG

## 2020-05-19 DIAGNOSIS — M25.551 HIP PAIN, RIGHT: ICD-10-CM

## 2020-05-19 DIAGNOSIS — G47.00 INSOMNIA, UNSPECIFIED TYPE: ICD-10-CM

## 2020-05-19 DIAGNOSIS — F17.200 TOBACCO USE DISORDER: ICD-10-CM

## 2020-05-19 DIAGNOSIS — I25.10 CORONARY ARTERY CALCIFICATION: ICD-10-CM

## 2020-05-19 DIAGNOSIS — E55.9 VITAMIN D DEFICIENCY: ICD-10-CM

## 2020-05-19 DIAGNOSIS — E78.5 HYPERLIPIDEMIA LDL GOAL <130: ICD-10-CM

## 2020-05-19 DIAGNOSIS — Z01.818 PREOP GENERAL PHYSICAL EXAM: Primary | ICD-10-CM

## 2020-05-19 LAB
ALBUMIN UR-MCNC: NEGATIVE MG/DL
APPEARANCE UR: CLEAR
BASOPHILS # BLD AUTO: 0 10E9/L (ref 0–0.2)
BASOPHILS NFR BLD AUTO: 0.5 %
BILIRUB UR QL STRIP: NEGATIVE
COLOR UR AUTO: YELLOW
DIFFERENTIAL METHOD BLD: ABNORMAL
EOSINOPHIL # BLD AUTO: 0.3 10E9/L (ref 0–0.7)
EOSINOPHIL NFR BLD AUTO: 3.2 %
ERYTHROCYTE [DISTWIDTH] IN BLOOD BY AUTOMATED COUNT: 12.6 % (ref 10–15)
GLUCOSE UR STRIP-MCNC: NEGATIVE MG/DL
HCT VFR BLD AUTO: 47.9 % (ref 35–47)
HGB BLD-MCNC: 15.8 G/DL (ref 11.7–15.7)
HGB UR QL STRIP: NEGATIVE
KETONES UR STRIP-MCNC: NEGATIVE MG/DL
LEUKOCYTE ESTERASE UR QL STRIP: NEGATIVE
LYMPHOCYTES # BLD AUTO: 1.6 10E9/L (ref 0.8–5.3)
LYMPHOCYTES NFR BLD AUTO: 21 %
MCH RBC QN AUTO: 33.5 PG (ref 26.5–33)
MCHC RBC AUTO-ENTMCNC: 33 G/DL (ref 31.5–36.5)
MCV RBC AUTO: 102 FL (ref 78–100)
MONOCYTES # BLD AUTO: 0.8 10E9/L (ref 0–1.3)
MONOCYTES NFR BLD AUTO: 10.9 %
NEUTROPHILS # BLD AUTO: 5 10E9/L (ref 1.6–8.3)
NEUTROPHILS NFR BLD AUTO: 64.4 %
NITRATE UR QL: NEGATIVE
PH UR STRIP: 5.5 PH (ref 5–7)
PLATELET # BLD AUTO: 330 10E9/L (ref 150–450)
RBC # BLD AUTO: 4.72 10E12/L (ref 3.8–5.2)
RBC #/AREA URNS AUTO: NORMAL /HPF
SOURCE: NORMAL
SP GR UR STRIP: <=1.005 (ref 1–1.03)
UROBILINOGEN UR STRIP-ACNC: 0.2 EU/DL (ref 0.2–1)
WBC # BLD AUTO: 7.7 10E9/L (ref 4–11)
WBC #/AREA URNS AUTO: NORMAL /HPF

## 2020-05-19 PROCEDURE — 85025 COMPLETE CBC W/AUTO DIFF WBC: CPT | Performed by: FAMILY MEDICINE

## 2020-05-19 PROCEDURE — 99214 OFFICE O/P EST MOD 30 MIN: CPT | Mod: 25 | Performed by: FAMILY MEDICINE

## 2020-05-19 PROCEDURE — 80061 LIPID PANEL: CPT | Performed by: FAMILY MEDICINE

## 2020-05-19 PROCEDURE — 36415 COLL VENOUS BLD VENIPUNCTURE: CPT | Performed by: FAMILY MEDICINE

## 2020-05-19 PROCEDURE — 80048 BASIC METABOLIC PNL TOTAL CA: CPT | Performed by: FAMILY MEDICINE

## 2020-05-19 PROCEDURE — 93000 ELECTROCARDIOGRAM COMPLETE: CPT | Performed by: FAMILY MEDICINE

## 2020-05-19 PROCEDURE — 81001 URINALYSIS AUTO W/SCOPE: CPT | Performed by: FAMILY MEDICINE

## 2020-05-19 PROCEDURE — 84460 ALANINE AMINO (ALT) (SGPT): CPT | Performed by: FAMILY MEDICINE

## 2020-05-19 ASSESSMENT — MIFFLIN-ST. JEOR: SCORE: 1148.65

## 2020-05-19 NOTE — LETTER
May 20, 2020      Reanna Kumar  [address]  [facility] [facility] [address]        Dear ,    We are writing to inform you of your test results.    Your test results fall within the expected range(s) or remain unchanged from previous results.  Please continue with current treatment plan.    Resulted Orders   UA with Microscopic   Result Value Ref Range    Color Urine Yellow     Appearance Urine Clear     Glucose Urine Negative NEG^Negative mg/dL    Bilirubin Urine Negative NEG^Negative    Ketones Urine Negative NEG^Negative mg/dL    Specific Gravity Urine <=1.005 1.003 - 1.035    pH Urine 5.5 5.0 - 7.0 pH    Protein Albumin Urine Negative NEG^Negative mg/dL    Urobilinogen Urine 0.2 0.2 - 1.0 EU/dL    Nitrite Urine Negative NEG^Negative    Blood Urine Negative NEG^Negative    Leukocyte Esterase Urine Negative NEG^Negative    Source Midstream Urine     WBC Urine 0 - 5 OTO5^0 - 5 /HPF    RBC Urine O - 2 OTO2^O - 2 /HPF   CBC with platelets differential   Result Value Ref Range    WBC 7.7 4.0 - 11.0 10e9/L    RBC Count 4.72 3.8 - 5.2 10e12/L    Hemoglobin 15.8 (H) 11.7 - 15.7 g/dL    Hematocrit 47.9 (H) 35.0 - 47.0 %     (H) 78 - 100 fl    MCH 33.5 (H) 26.5 - 33.0 pg    MCHC 33.0 31.5 - 36.5 g/dL    RDW 12.6 10.0 - 15.0 %    Platelet Count 330 150 - 450 10e9/L    % Neutrophils 64.4 %    % Lymphocytes 21.0 %    % Monocytes 10.9 %    % Eosinophils 3.2 %    % Basophils 0.5 %    Absolute Neutrophil 5.0 1.6 - 8.3 10e9/L    Absolute Lymphocytes 1.6 0.8 - 5.3 10e9/L    Absolute Monocytes 0.8 0.0 - 1.3 10e9/L    Absolute Eosinophils 0.3 0.0 - 0.7 10e9/L    Absolute Basophils 0.0 0.0 - 0.2 10e9/L    Diff Method Automated Method    Lipid Profile   Result Value Ref Range    Cholesterol 182 <200 mg/dL    Triglycerides 118 <150 mg/dL      Comment:      Fasting specimen    HDL Cholesterol 71 >49 mg/dL    LDL Cholesterol Calculated 87 <100 mg/dL      Comment:      Desirable:       <100 mg/dl    Non HDL Cholesterol 111  <130 mg/dL   ALT   Result Value Ref Range    ALT 18 0 - 50 U/L   Basic metabolic panel   Result Value Ref Range    Sodium 137 133 - 144 mmol/L    Potassium 4.5 3.4 - 5.3 mmol/L    Chloride 103 94 - 109 mmol/L    Carbon Dioxide 27 20 - 32 mmol/L    Anion Gap 7 3 - 14 mmol/L    Glucose 119 (H) 70 - 99 mg/dL      Comment:      Fasting specimen    Urea Nitrogen 12 7 - 30 mg/dL    Creatinine 0.53 0.52 - 1.04 mg/dL    GFR Estimate >90 >60 mL/min/[1.73_m2]      Comment:      Non  GFR Calc  Starting 12/18/2018, serum creatinine based estimated GFR (eGFR) will be   calculated using the Chronic Kidney Disease Epidemiology Collaboration   (CKD-EPI) equation.      GFR Estimate If Black >90 >60 mL/min/[1.73_m2]      Comment:       GFR Calc  Starting 12/18/2018, serum creatinine based estimated GFR (eGFR) will be   calculated using the Chronic Kidney Disease Epidemiology Collaboration   (CKD-EPI) equation.      Calcium 9.8 8.5 - 10.1 mg/dL       If you have any questions or concerns, please call the clinic at the number listed above.       Sincerely,        Evaristo Pierson MD

## 2020-05-19 NOTE — PROGRESS NOTES
[facility]  [address]  [facility] [address]  [phone]  Dept: [phone]    PRE-OP EVALUATION:  Today's date: 2020    Reanna Kumar (: 1950) presents for pre-operative evaluation assessment as requested by Dr. Augie Murray.  She requires evaluation and anesthesia risk assessment prior to undergoing surgery/procedure for treatment of right hip pain.    Proposed Surgery/ Procedure: RIGHT TOTAL HIP ARTHROPLASTY  Date of Surgery/ Procedure: 2020  Time of Surgery/ Procedure: 7:30  Hospital/Surgical center: [facility]  Fax number: [phone]  Primary Physician: Cassandra Gong  Type of Anesthesia Anticipated: General    Patient has a Health Care Directive or Living Will:  YES-on file    1. NO - Do you have a history of heart attack, stroke, stent, bypass or surgery on an artery in the head, neck, heart or legs?  2. NO - Do you ever have any pain or discomfort in your chest?  3. NO - Do you have a history of  Heart Failure?  4. NO - Are you troubled by shortness of breath when: walking on the level, up a slight hill or at night?  5. NO - Do you currently have a cold, bronchitis or other respiratory infection?  6. NO - Do you have a cough, shortness of breath or wheezing?  7. NO - Do you sometimes get pains in the calves of your legs when you walk?  8. NO - Do you or anyone in your family have previous history of blood clots?  9. NO - Do you or does anyone in your family have a serious bleeding problem such as prolonged bleeding following surgeries or cuts?  10. NO - Have you ever had problems with anemia or been told to take iron pills?  11. NO - Have you had any abnormal blood loss such as black, tarry or bloody stools, or abnormal vaginal bleeding?  12. NO - Have you ever had a blood transfusion?  13. NO - Have you or any of your relatives ever had problems with anesthesia?  14. NO - Do you have sleep apnea, excessive  snoring or daytime drowsiness?  15. NO - Do you have any prosthetic heart valves?  16. YES - Do you have prosthetic joints?-left hip  17. NO - Is there any chance that you may be pregnant?      HPI:     HPI related to upcoming procedure: increasing problems with right hip pain due to OA      See problem list for active medical problems.  Problems all longstanding and stable, except as noted/documented.  See ROS for pertinent symptoms related to these conditions.      MEDICAL HISTORY:     Patient Active Problem List    Diagnosis Date Noted     Abnormal fasting glucose 08/27/2019     Priority: Medium     Hip pain, right 08/26/2019     Priority: Medium     Coronary artery calcification 02/28/2019     Priority: Medium     Insomnia, unspecified type 09/13/2018     Priority: Medium     Pulmonary nodules 08/07/2018     Priority: Medium     8/2018: 5 mm spiculated nodule in RLL along major fissure, 3 mm endobrachial nodule in ROEL, 2 mm nodule in periphery of ROEL.  Repeat in 6 months, reminder placed.  Continue annual screening.       Family history of hearing loss 02/18/2016     Priority: Medium     Tobacco use disorder 07/06/2015     Priority: Medium     Advance Care Planning 09/30/2014     Priority: Medium     Advance Care Planning:   Receipt of ACP document:  Received: Health Care Directive which was witnessed or notarized on 11/1/01.  Document not previously scanned.  Validation form completed and sent with document to be scanned.  Code Status needs to be updated to reflect choices in most recent ACP document. Notification sent to Cherise Roth for followup.  Confirmed/documented designated decision maker(s). See permanent comments section of demographics in clinical tab. View document(s) and details by clicking on code status.   Added by Gail Madrid on 9/30/2014.             Hyperlipidemia LDL goal <130 04/17/2013     Priority: Medium     Vitamin D deficiency 04/17/2013     Priority: Medium      Past Medical History:  "  Diagnosis Date     Hyperlipidemia      Insomnia 2015     Past Surgical History:   Procedure Laterality Date     HIP SURGERY       JOINT REPLACEMENT Left 2008    hip     Current Outpatient Medications   Medication Sig Dispense Refill     Cholecalciferol (VITAMIN D) 1000 UNITS capsule Take 3 capsules by mouth daily.       QUEtiapine (SEROQUEL) 50 MG tablet Take 0.5 tablets (25 mg) by mouth daily 45 tablet 3     rosuvastatin (CRESTOR) 20 MG tablet Take 1 tablet (20 mg) by mouth daily 90 tablet 3     OTC products: None, except as noted above    Allergies   Allergen Reactions     Zyban [Bupropion Hydrobromide]      Increased anxiety      Latex Allergy: NO    Social History     Tobacco Use     Smoking status: Current Every Day Smoker     Packs/day: 1.00     Years: 50.00     Pack years: 50.00     Types: Cigarettes     Smokeless tobacco: Never Used   Substance Use Topics     Alcohol use: Yes     Comment: Occ     History   Drug Use No       REVIEW OF SYSTEMS:   CONSTITUTIONAL: NEGATIVE for fever, chills, change in weight  INTEGUMENTARY/SKIN: NEGATIVE for worrisome rashes, moles or lesions  EYES: NEGATIVE for vision changes or irritation  ENT/MOUTH: NEGATIVE for ear, mouth and throat problems  RESP: NEGATIVE for significant cough or SOB  BREAST: NEGATIVE for masses, tenderness or discharge  CV: NEGATIVE for chest pain, palpitations or peripheral edema  GI: NEGATIVE for nausea, abdominal pain, heartburn, or change in bowel habits  : NEGATIVE for frequency, dysuria, or hematuria  MUSCULOSKELETAL:POSITIVE  for arthralgias right hip  NEURO: NEGATIVE for weakness, dizziness or paresthesias  ENDOCRINE: NEGATIVE for temperature intolerance, skin/hair changes  HEME: NEGATIVE for bleeding problems  PSYCHIATRIC: NEGATIVE for changes in mood or affect    EXAM:   /80   Pulse 94   Temp 97.2  F (36.2  C) (Tympanic)   Resp 14   Ht 1.575 m (5' 2\")   Wt 68 kg (150 lb)   SpO2 99%   BMI 27.44 kg/m      GENERAL APPEARANCE: " healthy, alert and no distress     EYES: EOMI, PERRL     HENT: ear canals and TM's normal and nose and mouth without ulcers or lesions     NECK: no adenopathy, no asymmetry, masses, or scars and thyroid normal to palpation     RESP: lungs clear to auscultation - no rales, rhonchi or wheezes     CV: regular rates and rhythm, normal S1 S2, no S3 or S4 and no murmur, click or rub     ABDOMEN:  soft, nontender, no HSM or masses and bowel sounds normal     MS: extremities normal- no gross deformities noted, no evidence of inflammation in joints, FROM in all extremities.     SKIN: no suspicious lesions or rashes     NEURO: Normal strength and tone, sensory exam grossly normal, mentation intact and speech normal     PSYCH: mentation appears normal. and affect normal/bright     LYMPHATICS: No cervical adenopathy    DIAGNOSTICS:       Recent Labs   Lab Test 08/26/19  0939 08/03/18  0957  09/22/16  1531   HGB 15.3  --   --  14.4     --   --  316    141   < >  --    POTASSIUM 4.0 3.9   < >  --    CR 0.54 0.58   < >  --     < > = values in this interval not displayed.        IMPRESSION:   Reason for surgery/procedure: increasing pain right hip due to OA    The proposed surgical procedure is considered INTERMEDIATE risk.    REVISED CARDIAC RISK INDEX  The patient has the following serious cardiovascular risks for perioperative complications such as (MI, PE, VFib and 3  AV Block):  No serious cardiac risks  INTERPRETATION: 0 risks: Class I (very low risk - 0.4% complication rate)    The patient has the following additional risks for perioperative complications:  No identified additional risks      ICD-10-CM    1. Preop general physical exam  Z01.818 UA with Microscopic     CBC with platelets differential     Basic metabolic panel     EKG 12-lead complete w/read - Clinics   2. Hip pain, right  M25.551    3. Hyperlipidemia LDL goal <130  E78.5 Lipid Profile     ALT   4. Tobacco use disorder  F17.200    5. Vitamin D  deficiency  E55.9    6. Insomnia, unspecified type  G47.00    7. Coronary artery calcification  I25.10     I25.84        RECOMMENDATIONS:             APPROVAL GIVEN to proceed with proposed procedure, without further diagnostic evaluation       Signed Electronically by: Evaristo Pierson MD    Copy of this evaluation report is provided to requesting physician.    [facility] Preop Guidelines    Revised Cardiac Risk Index

## 2020-05-20 LAB
ALT SERPL W P-5'-P-CCNC: 18 U/L (ref 0–50)
ANION GAP SERPL CALCULATED.3IONS-SCNC: 7 MMOL/L (ref 3–14)
BUN SERPL-MCNC: 12 MG/DL (ref 7–30)
CALCIUM SERPL-MCNC: 9.8 MG/DL (ref 8.5–10.1)
CHLORIDE SERPL-SCNC: 103 MMOL/L (ref 94–109)
CHOLEST SERPL-MCNC: 182 MG/DL
CO2 SERPL-SCNC: 27 MMOL/L (ref 20–32)
CREAT SERPL-MCNC: 0.53 MG/DL (ref 0.52–1.04)
GFR SERPL CREATININE-BSD FRML MDRD: >90 ML/MIN/{1.73_M2}
GLUCOSE SERPL-MCNC: 119 MG/DL (ref 70–99)
HDLC SERPL-MCNC: 71 MG/DL
LDLC SERPL CALC-MCNC: 87 MG/DL
NONHDLC SERPL-MCNC: 111 MG/DL
POTASSIUM SERPL-SCNC: 4.5 MMOL/L (ref 3.4–5.3)
SODIUM SERPL-SCNC: 137 MMOL/L (ref 133–144)
TRIGL SERPL-MCNC: 118 MG/DL

## 2020-05-22 ENCOUNTER — TELEPHONE (OUTPATIENT)
Dept: FAMILY MEDICINE | Facility: CLINIC | Age: 70
End: 2020-05-22

## 2020-05-22 DIAGNOSIS — G47.00 INSOMNIA, UNSPECIFIED TYPE: ICD-10-CM

## 2020-05-22 RX ORDER — QUETIAPINE FUMARATE 50 MG/1
25 TABLET, FILM COATED ORAL DAILY
Qty: 45 TABLET | Refills: 3 | Status: SHIPPED | OUTPATIENT
Start: 2020-05-22 | End: 2020-06-01

## 2020-05-22 NOTE — TELEPHONE ENCOUNTER
Call back from patient.     States that she takes 0.5mg of Seroquel at HS, but on occasion she does take a second 0.5mg so she runs out early. States that her provider told her this was okay to do. The pharmacy needs a new order that states she can take the second 0.5mg when needed.     This was pended for PCP review.

## 2020-05-22 NOTE — TELEPHONE ENCOUNTER
You meant that she takes 0.5 tablet of the 50 mg Seroquel at night.  25 mg and then takes a 2nd half tablet some nights.  Rx approved for that

## 2020-05-22 NOTE — TELEPHONE ENCOUNTER
Disp  Refills  Start  End  CHRISTIAN    QUEtiapine (SEROQUEL) 50 MG tablet  45 tablet  3  1/15/2020   No    Sig - Route: Take 0.5 tablets (25 mg) by mouth daily - Oral    Sent to pharmacy as: QUEtiapine (SEROQUEL) 50 MG tablet    Class: E-Prescribe    Order: [id number]    E-Prescribing Status: Receipt confirmed by pharmacy (1/15/2020  7:02 AM CST)        MESSAGE TO PRESCRIBER    Patient was told may take up to 1 tablet per day. Please send updated prescription. Thanks.

## 2020-05-22 NOTE — TELEPHONE ENCOUNTER
Left voice message asking pt to call triage back. On call back, please find what Seroquel dose dose pt is taking.

## 2020-05-30 ENCOUNTER — AMBULATORY - HEALTHEAST (OUTPATIENT)
Dept: FAMILY MEDICINE | Facility: CLINIC | Age: 70
End: 2020-05-30

## 2020-05-30 DIAGNOSIS — Z20.822 SUSPECTED COVID-19 VIRUS INFECTION: ICD-10-CM

## 2020-06-01 ENCOUNTER — DOCUMENTATION ONLY (OUTPATIENT)
Dept: EDUCATION SERVICES | Facility: CLINIC | Age: 70
End: 2020-06-01

## 2020-06-01 RX ORDER — QUETIAPINE FUMARATE 50 MG/1
25 TABLET, FILM COATED ORAL EVERY EVENING
COMMUNITY
End: 2020-10-14

## 2020-06-01 RX ORDER — QUETIAPINE FUMARATE 50 MG/1
25 TABLET, FILM COATED ORAL
COMMUNITY
End: 2021-02-24

## 2020-06-01 RX ORDER — AMOXICILLIN 500 MG/1
2000 TABLET, FILM COATED ORAL DAILY PRN
Status: ON HOLD | COMMUNITY
End: 2021-05-10

## 2020-06-02 ENCOUNTER — ANESTHESIA EVENT (OUTPATIENT)
Dept: SURGERY | Facility: CLINIC | Age: 70
End: 2020-06-02
Payer: COMMERCIAL

## 2020-06-02 ENCOUNTER — ANESTHESIA (OUTPATIENT)
Dept: SURGERY | Facility: CLINIC | Age: 70
End: 2020-06-02
Payer: COMMERCIAL

## 2020-06-02 ENCOUNTER — APPOINTMENT (OUTPATIENT)
Dept: GENERAL RADIOLOGY | Facility: CLINIC | Age: 70
End: 2020-06-02
Attending: ORTHOPAEDIC SURGERY
Payer: COMMERCIAL

## 2020-06-02 ENCOUNTER — APPOINTMENT (OUTPATIENT)
Dept: PHYSICAL THERAPY | Facility: CLINIC | Age: 70
End: 2020-06-02
Attending: ORTHOPAEDIC SURGERY
Payer: COMMERCIAL

## 2020-06-02 ENCOUNTER — HOSPITAL ENCOUNTER (OUTPATIENT)
Facility: CLINIC | Age: 70
Discharge: HOME | End: 2020-06-03
Attending: ORTHOPAEDIC SURGERY | Admitting: ORTHOPAEDIC SURGERY
Payer: COMMERCIAL

## 2020-06-02 DIAGNOSIS — Z96.641 STATUS POST TOTAL HIP REPLACEMENT, RIGHT: Primary | ICD-10-CM

## 2020-06-02 LAB
ABO + RH BLD: NORMAL
ABO + RH BLD: NORMAL
BLD GP AB SCN SERPL QL: NORMAL
BLOOD BANK CMNT PATIENT-IMP: NORMAL
SPECIMEN EXP DATE BLD: NORMAL

## 2020-06-02 PROCEDURE — 40000170 ZZH STATISTIC PRE-PROCEDURE ASSESSMENT II: Performed by: ORTHOPAEDIC SURGERY

## 2020-06-02 PROCEDURE — 25000128 H RX IP 250 OP 636: Performed by: NURSE ANESTHETIST, CERTIFIED REGISTERED

## 2020-06-02 PROCEDURE — 97110 THERAPEUTIC EXERCISES: CPT | Mod: GP | Performed by: PHYSICAL THERAPIST

## 2020-06-02 PROCEDURE — 97530 THERAPEUTIC ACTIVITIES: CPT | Mod: GP | Performed by: PHYSICAL THERAPIST

## 2020-06-02 PROCEDURE — 25000128 H RX IP 250 OP 636: Performed by: PHYSICIAN ASSISTANT

## 2020-06-02 PROCEDURE — 27210794 ZZH OR GENERAL SUPPLY STERILE: Performed by: ORTHOPAEDIC SURGERY

## 2020-06-02 PROCEDURE — 36000063 ZZH SURGERY LEVEL 4 EA 15 ADDTL MIN: Performed by: ORTHOPAEDIC SURGERY

## 2020-06-02 PROCEDURE — 25000128 H RX IP 250 OP 636: Performed by: ORTHOPAEDIC SURGERY

## 2020-06-02 PROCEDURE — 40000985 XR PELVIS AD HIP PORTABLE RIGHT 1 VIEW

## 2020-06-02 PROCEDURE — 37000009 ZZH ANESTHESIA TECHNICAL FEE, EACH ADDTL 15 MIN: Performed by: ORTHOPAEDIC SURGERY

## 2020-06-02 PROCEDURE — 97116 GAIT TRAINING THERAPY: CPT | Mod: GP,59 | Performed by: PHYSICAL THERAPIST

## 2020-06-02 PROCEDURE — 25800030 ZZH RX IP 258 OP 636: Performed by: NURSE ANESTHETIST, CERTIFIED REGISTERED

## 2020-06-02 PROCEDURE — 25800025 ZZH RX 258: Performed by: ORTHOPAEDIC SURGERY

## 2020-06-02 PROCEDURE — 25000125 ZZHC RX 250: Performed by: NURSE ANESTHETIST, CERTIFIED REGISTERED

## 2020-06-02 PROCEDURE — 25000125 ZZHC RX 250: Performed by: PHYSICIAN ASSISTANT

## 2020-06-02 PROCEDURE — 25800030 ZZH RX IP 258 OP 636: Performed by: ANESTHESIOLOGY

## 2020-06-02 PROCEDURE — 36000093 ZZH SURGERY LEVEL 4 1ST 30 MIN: Performed by: ORTHOPAEDIC SURGERY

## 2020-06-02 PROCEDURE — C1713 ANCHOR/SCREW BN/BN,TIS/BN: HCPCS | Performed by: ORTHOPAEDIC SURGERY

## 2020-06-02 PROCEDURE — 37000008 ZZH ANESTHESIA TECHNICAL FEE, 1ST 30 MIN: Performed by: ORTHOPAEDIC SURGERY

## 2020-06-02 PROCEDURE — 25800030 ZZH RX IP 258 OP 636: Performed by: ORTHOPAEDIC SURGERY

## 2020-06-02 PROCEDURE — 97161 PT EVAL LOW COMPLEX 20 MIN: CPT | Mod: GP | Performed by: PHYSICAL THERAPIST

## 2020-06-02 PROCEDURE — 71000012 ZZH RECOVERY PHASE 1 LEVEL 1 FIRST HR: Performed by: ORTHOPAEDIC SURGERY

## 2020-06-02 PROCEDURE — 86850 RBC ANTIBODY SCREEN: CPT | Performed by: ANESTHESIOLOGY

## 2020-06-02 PROCEDURE — 25000132 ZZH RX MED GY IP 250 OP 250 PS 637: Performed by: ORTHOPAEDIC SURGERY

## 2020-06-02 PROCEDURE — 86900 BLOOD TYPING SEROLOGIC ABO: CPT | Performed by: ANESTHESIOLOGY

## 2020-06-02 PROCEDURE — 25000125 ZZHC RX 250: Performed by: ORTHOPAEDIC SURGERY

## 2020-06-02 PROCEDURE — C1776 JOINT DEVICE (IMPLANTABLE): HCPCS | Performed by: ORTHOPAEDIC SURGERY

## 2020-06-02 PROCEDURE — 86901 BLOOD TYPING SEROLOGIC RH(D): CPT | Performed by: ANESTHESIOLOGY

## 2020-06-02 DEVICE — IMP STEM SNN STD OFFSET SZ 6 71356006: Type: IMPLANTABLE DEVICE | Site: HIP | Status: FUNCTIONAL

## 2020-06-02 DEVICE — IMP SHELL SNR ACET R3 3H 50MM 71335550: Type: IMPLANTABLE DEVICE | Site: HIP | Status: FUNCTIONAL

## 2020-06-02 DEVICE — IMPLANTABLE DEVICE: Type: IMPLANTABLE DEVICE | Site: HIP | Status: FUNCTIONAL

## 2020-06-02 DEVICE — IMP HEAD FEMORAL SNN 12/14 OXIN 36MM +4MM 71343604: Type: IMPLANTABLE DEVICE | Site: HIP | Status: FUNCTIONAL

## 2020-06-02 DEVICE — IMP SCR ACET SNN SPHERICAL HEAD 6.5X25MM 71332525: Type: IMPLANTABLE DEVICE | Site: HIP | Status: FUNCTIONAL

## 2020-06-02 RX ORDER — ACETAMINOPHEN 325 MG/1
975 TABLET ORAL EVERY 8 HOURS
Status: DISCONTINUED | OUTPATIENT
Start: 2020-06-02 | End: 2020-06-03 | Stop reason: HOSPADM

## 2020-06-02 RX ORDER — DEXAMETHASONE SODIUM PHOSPHATE 4 MG/ML
4 INJECTION, SOLUTION INTRA-ARTICULAR; INTRALESIONAL; INTRAMUSCULAR; INTRAVENOUS; SOFT TISSUE
Status: DISCONTINUED | OUTPATIENT
Start: 2020-06-02 | End: 2020-06-02 | Stop reason: HOSPADM

## 2020-06-02 RX ORDER — ONDANSETRON 4 MG/1
4 TABLET, ORALLY DISINTEGRATING ORAL EVERY 6 HOURS PRN
Status: DISCONTINUED | OUTPATIENT
Start: 2020-06-02 | End: 2020-06-03 | Stop reason: HOSPADM

## 2020-06-02 RX ORDER — ONDANSETRON 2 MG/ML
INJECTION INTRAMUSCULAR; INTRAVENOUS PRN
Status: DISCONTINUED | OUTPATIENT
Start: 2020-06-02 | End: 2020-06-02

## 2020-06-02 RX ORDER — FENTANYL CITRATE 50 UG/ML
INJECTION, SOLUTION INTRAMUSCULAR; INTRAVENOUS PRN
Status: DISCONTINUED | OUTPATIENT
Start: 2020-06-02 | End: 2020-06-02

## 2020-06-02 RX ORDER — ROSUVASTATIN CALCIUM 20 MG/1
20 TABLET, COATED ORAL DAILY
Status: DISCONTINUED | OUTPATIENT
Start: 2020-06-02 | End: 2020-06-03 | Stop reason: HOSPADM

## 2020-06-02 RX ORDER — CEFAZOLIN SODIUM 1 G/3ML
1 INJECTION, POWDER, FOR SOLUTION INTRAMUSCULAR; INTRAVENOUS SEE ADMIN INSTRUCTIONS
Status: DISCONTINUED | OUTPATIENT
Start: 2020-06-02 | End: 2020-06-02 | Stop reason: HOSPADM

## 2020-06-02 RX ORDER — TRANEXAMIC ACID 10 MG/ML
1 INJECTION, SOLUTION INTRAVENOUS ONCE
Status: COMPLETED | OUTPATIENT
Start: 2020-06-02 | End: 2020-06-02

## 2020-06-02 RX ORDER — CEFAZOLIN SODIUM 2 G/100ML
2 INJECTION, SOLUTION INTRAVENOUS
Status: COMPLETED | OUTPATIENT
Start: 2020-06-02 | End: 2020-06-02

## 2020-06-02 RX ORDER — ONDANSETRON 2 MG/ML
4 INJECTION INTRAMUSCULAR; INTRAVENOUS EVERY 30 MIN PRN
Status: DISCONTINUED | OUTPATIENT
Start: 2020-06-02 | End: 2020-06-02 | Stop reason: HOSPADM

## 2020-06-02 RX ORDER — NALOXONE HYDROCHLORIDE 0.4 MG/ML
.1-.4 INJECTION, SOLUTION INTRAMUSCULAR; INTRAVENOUS; SUBCUTANEOUS
Status: CANCELLED | OUTPATIENT
Start: 2020-06-02 | End: 2020-06-03

## 2020-06-02 RX ORDER — ONDANSETRON 4 MG/1
4 TABLET, ORALLY DISINTEGRATING ORAL EVERY 30 MIN PRN
Status: DISCONTINUED | OUTPATIENT
Start: 2020-06-02 | End: 2020-06-02 | Stop reason: HOSPADM

## 2020-06-02 RX ORDER — OXYCODONE HYDROCHLORIDE 5 MG/1
5-10 TABLET ORAL
Status: DISCONTINUED | OUTPATIENT
Start: 2020-06-02 | End: 2020-06-03 | Stop reason: HOSPADM

## 2020-06-02 RX ORDER — QUETIAPINE FUMARATE 25 MG/1
25 TABLET, FILM COATED ORAL EVERY EVENING
Status: DISCONTINUED | OUTPATIENT
Start: 2020-06-02 | End: 2020-06-03 | Stop reason: HOSPADM

## 2020-06-02 RX ORDER — METOCLOPRAMIDE 5 MG/1
5 TABLET ORAL EVERY 6 HOURS PRN
Status: DISCONTINUED | OUTPATIENT
Start: 2020-06-02 | End: 2020-06-03 | Stop reason: HOSPADM

## 2020-06-02 RX ORDER — NALOXONE HYDROCHLORIDE 0.4 MG/ML
.1-.4 INJECTION, SOLUTION INTRAMUSCULAR; INTRAVENOUS; SUBCUTANEOUS
Status: DISCONTINUED | OUTPATIENT
Start: 2020-06-02 | End: 2020-06-03 | Stop reason: HOSPADM

## 2020-06-02 RX ORDER — CEFAZOLIN SODIUM 1 G/3ML
1 INJECTION, POWDER, FOR SOLUTION INTRAMUSCULAR; INTRAVENOUS EVERY 8 HOURS
Status: COMPLETED | OUTPATIENT
Start: 2020-06-02 | End: 2020-06-03

## 2020-06-02 RX ORDER — HYDROMORPHONE HYDROCHLORIDE 1 MG/ML
.3-.5 INJECTION, SOLUTION INTRAMUSCULAR; INTRAVENOUS; SUBCUTANEOUS
Status: DISCONTINUED | OUTPATIENT
Start: 2020-06-02 | End: 2020-06-03 | Stop reason: HOSPADM

## 2020-06-02 RX ORDER — HYDROMORPHONE HYDROCHLORIDE 1 MG/ML
.3-.5 INJECTION, SOLUTION INTRAMUSCULAR; INTRAVENOUS; SUBCUTANEOUS EVERY 5 MIN PRN
Status: DISCONTINUED | OUTPATIENT
Start: 2020-06-02 | End: 2020-06-02 | Stop reason: HOSPADM

## 2020-06-02 RX ORDER — CALCIUM CARBONATE 500 MG/1
1000 TABLET, CHEWABLE ORAL 4 TIMES DAILY PRN
Status: DISCONTINUED | OUTPATIENT
Start: 2020-06-02 | End: 2020-06-03 | Stop reason: HOSPADM

## 2020-06-02 RX ORDER — SODIUM CHLORIDE, SODIUM LACTATE, POTASSIUM CHLORIDE, CALCIUM CHLORIDE 600; 310; 30; 20 MG/100ML; MG/100ML; MG/100ML; MG/100ML
INJECTION, SOLUTION INTRAVENOUS CONTINUOUS
Status: DISCONTINUED | OUTPATIENT
Start: 2020-06-02 | End: 2020-06-02 | Stop reason: HOSPADM

## 2020-06-02 RX ORDER — LIDOCAINE 40 MG/G
CREAM TOPICAL
Status: DISCONTINUED | OUTPATIENT
Start: 2020-06-02 | End: 2020-06-03 | Stop reason: HOSPADM

## 2020-06-02 RX ORDER — PROPOFOL 10 MG/ML
INJECTION, EMULSION INTRAVENOUS CONTINUOUS PRN
Status: DISCONTINUED | OUTPATIENT
Start: 2020-06-02 | End: 2020-06-02

## 2020-06-02 RX ORDER — PROCHLORPERAZINE MALEATE 5 MG
5 TABLET ORAL EVERY 6 HOURS PRN
Status: DISCONTINUED | OUTPATIENT
Start: 2020-06-02 | End: 2020-06-03 | Stop reason: HOSPADM

## 2020-06-02 RX ORDER — METOCLOPRAMIDE HYDROCHLORIDE 5 MG/ML
5 INJECTION INTRAMUSCULAR; INTRAVENOUS EVERY 6 HOURS PRN
Status: DISCONTINUED | OUTPATIENT
Start: 2020-06-02 | End: 2020-06-03 | Stop reason: HOSPADM

## 2020-06-02 RX ORDER — SODIUM CHLORIDE 9 MG/ML
INJECTION, SOLUTION INTRAVENOUS CONTINUOUS
Status: DISCONTINUED | OUTPATIENT
Start: 2020-06-02 | End: 2020-06-03 | Stop reason: HOSPADM

## 2020-06-02 RX ORDER — FENTANYL CITRATE 50 UG/ML
25-50 INJECTION, SOLUTION INTRAMUSCULAR; INTRAVENOUS
Status: DISCONTINUED | OUTPATIENT
Start: 2020-06-02 | End: 2020-06-02 | Stop reason: HOSPADM

## 2020-06-02 RX ORDER — DEXAMETHASONE SODIUM PHOSPHATE 4 MG/ML
INJECTION, SOLUTION INTRA-ARTICULAR; INTRALESIONAL; INTRAMUSCULAR; INTRAVENOUS; SOFT TISSUE PRN
Status: DISCONTINUED | OUTPATIENT
Start: 2020-06-02 | End: 2020-06-02

## 2020-06-02 RX ORDER — MAGNESIUM HYDROXIDE 1200 MG/15ML
LIQUID ORAL PRN
Status: DISCONTINUED | OUTPATIENT
Start: 2020-06-02 | End: 2020-06-02 | Stop reason: HOSPADM

## 2020-06-02 RX ORDER — ONDANSETRON 2 MG/ML
4 INJECTION INTRAMUSCULAR; INTRAVENOUS EVERY 6 HOURS PRN
Status: DISCONTINUED | OUTPATIENT
Start: 2020-06-02 | End: 2020-06-03 | Stop reason: HOSPADM

## 2020-06-02 RX ORDER — BUPIVACAINE HYDROCHLORIDE 5 MG/ML
INJECTION, SOLUTION EPIDURAL; INTRACAUDAL PRN
Status: DISCONTINUED | OUTPATIENT
Start: 2020-06-02 | End: 2020-06-02

## 2020-06-02 RX ADMIN — CEFAZOLIN 1 G: 330 INJECTION, POWDER, FOR SOLUTION INTRAMUSCULAR; INTRAVENOUS at 23:46

## 2020-06-02 RX ADMIN — DEXAMETHASONE SODIUM PHOSPHATE 4 MG: 4 INJECTION, SOLUTION INTRA-ARTICULAR; INTRALESIONAL; INTRAMUSCULAR; INTRAVENOUS; SOFT TISSUE at 08:35

## 2020-06-02 RX ADMIN — ACETAMINOPHEN 975 MG: 325 TABLET, FILM COATED ORAL at 12:36

## 2020-06-02 RX ADMIN — OXYCODONE HYDROCHLORIDE 5 MG: 5 TABLET ORAL at 17:11

## 2020-06-02 RX ADMIN — SODIUM CHLORIDE, POTASSIUM CHLORIDE, SODIUM LACTATE AND CALCIUM CHLORIDE: 600; 310; 30; 20 INJECTION, SOLUTION INTRAVENOUS at 07:22

## 2020-06-02 RX ADMIN — ASPIRIN 325 MG: 325 TABLET, COATED ORAL at 12:36

## 2020-06-02 RX ADMIN — SODIUM CHLORIDE, POTASSIUM CHLORIDE, SODIUM LACTATE AND CALCIUM CHLORIDE: 600; 310; 30; 20 INJECTION, SOLUTION INTRAVENOUS at 07:45

## 2020-06-02 RX ADMIN — OXYCODONE HYDROCHLORIDE 10 MG: 5 TABLET ORAL at 23:46

## 2020-06-02 RX ADMIN — SODIUM CHLORIDE: 9 INJECTION, SOLUTION INTRAVENOUS at 12:37

## 2020-06-02 RX ADMIN — OXYCODONE HYDROCHLORIDE 10 MG: 5 TABLET ORAL at 20:12

## 2020-06-02 RX ADMIN — SODIUM CHLORIDE, POTASSIUM CHLORIDE, SODIUM LACTATE AND CALCIUM CHLORIDE: 600; 310; 30; 20 INJECTION, SOLUTION INTRAVENOUS at 09:16

## 2020-06-02 RX ADMIN — PHENYLEPHRINE HYDROCHLORIDE 0.25 MCG/KG/MIN: 10 INJECTION INTRAVENOUS at 07:53

## 2020-06-02 RX ADMIN — FENTANYL CITRATE 50 MCG: 50 INJECTION, SOLUTION INTRAMUSCULAR; INTRAVENOUS at 07:33

## 2020-06-02 RX ADMIN — PROPOFOL 75 MCG/KG/MIN: 10 INJECTION, EMULSION INTRAVENOUS at 07:44

## 2020-06-02 RX ADMIN — PHENYLEPHRINE HYDROCHLORIDE 100 MCG: 10 INJECTION INTRAVENOUS at 08:03

## 2020-06-02 RX ADMIN — CEFAZOLIN 1 G: 330 INJECTION, POWDER, FOR SOLUTION INTRAMUSCULAR; INTRAVENOUS at 15:49

## 2020-06-02 RX ADMIN — CEFAZOLIN SODIUM 2 G: 2 INJECTION, SOLUTION INTRAVENOUS at 07:36

## 2020-06-02 RX ADMIN — TRANEXAMIC ACID 1 G: 10 INJECTION, SOLUTION INTRAVENOUS at 07:36

## 2020-06-02 RX ADMIN — ONDANSETRON 4 MG: 2 INJECTION INTRAMUSCULAR; INTRAVENOUS at 09:00

## 2020-06-02 RX ADMIN — MIDAZOLAM 2 MG: 1 INJECTION INTRAMUSCULAR; INTRAVENOUS at 07:22

## 2020-06-02 RX ADMIN — TRANEXAMIC ACID 1 G: 10 INJECTION, SOLUTION INTRAVENOUS at 08:57

## 2020-06-02 RX ADMIN — QUETIAPINE 25 MG: 25 TABLET ORAL at 22:07

## 2020-06-02 RX ADMIN — FENTANYL CITRATE 50 MCG: 50 INJECTION, SOLUTION INTRAMUSCULAR; INTRAVENOUS at 07:36

## 2020-06-02 RX ADMIN — ACETAMINOPHEN 975 MG: 325 TABLET, FILM COATED ORAL at 20:04

## 2020-06-02 RX ADMIN — BUPIVACAINE HYDROCHLORIDE 2.5 ML: 5 INJECTION, SOLUTION EPIDURAL; INTRACAUDAL; PERINEURAL at 07:38

## 2020-06-02 ASSESSMENT — COPD QUESTIONNAIRES: COPD: 0

## 2020-06-02 ASSESSMENT — MIFFLIN-ST. JEOR: SCORE: 1147.29

## 2020-06-02 ASSESSMENT — LIFESTYLE VARIABLES: TOBACCO_USE: 1

## 2020-06-02 NOTE — ANESTHESIA PREPROCEDURE EVALUATION
Anesthesia Pre-Procedure Evaluation    Patient: Reanna Kumar   MRN: [id number] : 1950          Preoperative Diagnosis: Degenerative joint disease of right hip [M16.11]    Procedure(s):  RIGHT TOTAL HIP ARTHROPLASTY    Past Medical History:   Diagnosis Date     Hyperlipidemia      Insomnia      Past Surgical History:   Procedure Laterality Date     HIP SURGERY       JOINT REPLACEMENT Left     hip       Anesthesia Evaluation     . Pt has had prior anesthetic. Type: General    No history of anesthetic complications          ROS/MED HX    ENT/Pulmonary:     (+)tobacco use, Current use , . .   (-) asthma and COPD   Neurologic:      (-) CVA, TIA and Neuropathy   Cardiovascular:     (+) --CAD (calcifications seen on chest CT), --. : . . . :. . Previous cardiac testing Echodate:results:Stress Testdate: results: date: results: date: results:         (-) hypertension, irregular heartbeat/palpitations and stent   METS/Exercise Tolerance:     Hematologic:        (-) anemia   Musculoskeletal:   (+) arthritis,  -       GI/Hepatic:        (-) GERD and liver disease   Renal/Genitourinary:      (-) renal disease   Endo:      (-) Type I DM, Type II DM and thyroid disease   Psychiatric:         Infectious Disease:  - neg infectious disease ROS       Malignancy:         Other:                          Physical Exam  Normal systems: cardiovascular, pulmonary and dental    Airway   Mallampati: II  TM distance: >3 FB  Neck ROM: full    Dental     Cardiovascular       Pulmonary              COVID-19 PCR Results     COVID-19 PCR Results  20    COVID-19 Virus PCR to [facility] - Result  Not Detected    COVID-19 Virus PCR to [facility] - Source  Nasopharyngeal          Exercise Stress Echocardiogram   Order: [id number]   Status:  Edited Result - FINAL   Visible to patient:  No (inaccessible in MyChart) Next appt:  Today at 04:30 PM in Physical Therapy (Kymberly Beckford, PT) Dx:  Hyperlipidemia LDL goal <130; Coronar...   Details      Reading Physician  Reading Date  Result Priority    Indio Pete MD  [phone]  2019        Narrative & Impression            [facility]  [facility]  [address]  [address]  [place] [address]  Phone [phone]  Fax [phone]        Name: MACHO PETERSEN  MRN: [id number]  : 1950  Study Date: 2019 03:18 PM  Age: 69 yrs  Gender: Female  Reason For Study: HYPERLIPIDEMIA, CORONARY ARTERY CALCIFICATION  Ordering Physician: SAHIL SANDOVAL  Referring Physician: AWAIS DIEHL  Performed By: Everett Galvez RDCS     BSA: 1.7 m2  Height: 62 in  Weight: 145 lb  HR: 86  BP: 138/90 mmHg  _____________________________________________________________________________  __        Procedure  Stress Echo Complete. Contrast Optison.  _____________________________________________________________________________  __        Interpretation Summary     1. Exercise echocardiogram is negative for myocardial ischemia at a peak heart  rate of 153 BPM (102% of maximal predicted heart rate).  2. No regional wall motion abnormalities.  3. Below average exercise capacity (3:13 minutes on the Devante protocol, 4.6  METS).  4. Patient significantly limited by exertional dyspnea.                  Lab Results   Component Value Date    WBC 7.7 2020    HGB 15.8 (H) 2020    HCT 47.9 (H) 2020     2020     2020    POTASSIUM 4.5 2020    CHLORIDE 103 2020    CO2 27 2020    BUN 12 2020    CR 0.53 2020     (H) 2020    NALINI 9.8 2020    ALBUMIN 3.9 2019    PROTTOTAL 7.2 2019    ALT 18 2020    AST 16 2019    ALKPHOS 112 2019    BILITOTAL 0.4 2019    PTT 26 2008    INR 1.48 (H) 2008       Preop Vitals  BP Readings from Last 3 Encounters:   20 120/80   19 104/60   19 133/79    Pulse Readings from Last 3 Encounters:  "  05/19/20 94   08/26/19 82   04/17/19 92      Resp Readings from Last 3 Encounters:   05/19/20 14   08/26/19 14   09/27/18 16    SpO2 Readings from Last 3 Encounters:   05/19/20 99%   08/26/19 96%   09/27/18 96%      Temp Readings from Last 1 Encounters:   05/19/20 36.2  C (97.2  F) (Tympanic)    Ht Readings from Last 1 Encounters:   05/19/20 1.575 m (5' 2\")      Wt Readings from Last 1 Encounters:   05/19/20 68 kg (150 lb)    Estimated body mass index is 27.44 kg/m  as calculated from the following:    Height as of 5/19/20: 1.575 m (5' 2\").    Weight as of 5/19/20: 68 kg (150 lb).       Anesthesia Plan      History & Physical Review  History and physical reviewed and following examination; no interval change.    ASA Status:  2 .    NPO Status:  > 6 hours    Plan for Spinal   PONV prophylaxis:  Ondansetron (or other 5HT-3)         Postoperative Care  Postoperative pain management:  IV analgesics and Oral pain medications.      Consents  Anesthetic plan, risks, benefits and alternatives discussed with:  Patient..                 Baldev Johnson MD  "

## 2020-06-02 NOTE — OP NOTE
Procedure Date: 06/02/2020      PREOPERATIVE DIAGNOSIS:  Right hip advanced osteoarthritis.      POSTOPERATIVE DIAGNOSIS:  Right hip advanced osteoarthritis.      PROCEDURE:  Right total hip arthroplasty.      SURGEON:  Augie Murray MD      ASSISTANT:  Lacie Mariano, MSN, PA-C      ANESTHESIA:  Spinal.      ESTIMATED BLOOD LOSS:  150 mL.      IMPLANTS:  [employer]hew:   1.  Size 50 R3 uncemented acetabular component.   2.  Size 25 acetabular shell screws x2.   3.  +4 offset 0-degree highly cross-linked polyethylene for 50 cup and 36 head.   4.  +4, 36 mm Oxinium femoral head.   5.  Size 6 standard offset Anthology uncemented femoral stem.      INDICATIONS:  Manda was brought to the operating room 06/02/2020 for elective right total hip arthroplasty.  Seen in the preoperative holding area.  Right hip marked as the correct operative site.  Received a dose of IV antibiotic within 30 minutes prior to surgical incision.  Post-surgical antibiotic and DVT prophylaxis are indicated and will be prescribed.  A skilled assistant was used for positioning, draping, retraction, closure, dressing application.      DESCRIPTION OF PROCEDURE:  The patient was brought to the operating room and placed under a spinal anesthesia.  She was positioned in lateral decubitus with the right hip up.  The right lower extremity was prepped and draped in the standard sterile fashion.  She received a dose of tranexamic acid before incision and a second dose during closure.  Preoperative timeout was taken.  I made an oblique posterolateral surgical incision over the right hip.  Dissection was carried down to deep fascia which was incised longitudinally and posterior hip approach was then carried out, including a T capsulotomy.  The femoral head was dislocated from the acetabulum and the femoral neck was cut 5 mm proximal to the palpable and visible lesser trochanter.  The femoral head was removed and deep retractors were used to expose  the acetabulum.  Pulvinar labrum and osteophytes debrided.  I reamed the acetabulum starting with a 44, extending up to a 51.  I implanted the size 50 R3 uncemented acetabular component into a position of approximately 45 degrees of abduction and 20 degrees of anteversion.  I placed two 25 mm acetabular shell screws for additional fixation and stability of the cup.  I placed a trial +4 offset 0-degree liner for the 50 cup and 36 head.  We then addressed the femur.  Box osteotome, canal finder, and sequential broaching used for the Anthology uncemented stem.  I broached up to a 6.  With the size 6 broach seated, I felt that it was very stable.  I trialed and selected a standard offset modular trial neck and a +4, 36 mm trial head.  With this combination of trial implants and with the trial prosthesis reduced, I was very pleased with length and stability.  Trial components were removed.  Irrigation performed.  I implanted the +4 offset 0-degree highly crosslink polyethylene liner for the 50 cup and 36 head.  I implanted the size 6 standard offset Anthology femoral stem.  I implanted the +4, 36 mm Oxinium femoral head.  Prosthesis reduced and length and stability confirmed to be excellent once again.  Betadine wash performed.  Thorough jet lavage irrigation performed.  Posterior capsule and piriformis tendon repaired.  Deep fascia closed over a medium Hemovac drain.  Superficial soft tissues irrigated and closed in layers.  A sterile dressing was applied.  Hip abduction pillow applied.  The patient was extubated and brought to recovery in stable condition.  Needle and lap counts were correct at the end of the case.  There were no known complications.         BARTOLO NAM MD             D: 2020   T: 2020   MT: DANUTA      Name:     MACHO PETERSEN   MRN:      [id number]        Account:        [id number]   :      1950           Procedure Date: 2020      Document: [id number]

## 2020-06-02 NOTE — BRIEF OP NOTE
[facility]    Brief Operative Note    Pre-operative diagnosis: Degenerative joint disease of right hip [M16.11]  Post-operative diagnosis Same as pre-operative diagnosis    Procedure: Procedure(s):  RIGHT TOTAL HIP ARTHROPLASTY  Surgeon: Surgeon(s) and Role:     * Augie Murray MD - Primary     * Lacie Mariano PA-C - Assisting  Anesthesia: Spinal   Estimated blood loss: 150  Drains: None  Specimens:   ID Type Source Tests Collected by Time Destination   1 : RIGHT HIP BONE DISPOSED PER POLICY AND EXCLUSION LIST Bone Bone OR DOCUMENTATION ONLY Augie Murray MD 6/2/2020  8:22 AM      Findings:   None.  Complications: None.  Implants:   Implant Name Type Inv. Item Serial No.  Lot No. LRB No. Used Action   IMP SCR ACET SNN SPHERICAL HEAD 6.5X25MM [id number] Metallic Hardware/[place] IMP SCR ACET SNN SPHERICAL HEAD 6.5X25MM [id number]  [place]  [id number] Right 1 Implanted   IMP SHELL SNR ACET R3 3H 50MM [id number] Total Joint Component/Insert IMP SHELL SNR ACET R3 3H 50MM [id number]  [place]  [id number] Right 1 Implanted   IMP SCR ACET SNN SPHERICAL HEAD 6.5X25MM [id number] Metallic Hardware/[place] IMP SCR ACET SNN SPHERICAL HEAD 6.5X25MM [id number]  [place]  [id number] Right 1 Implanted   IMP STEM SNN STD OFFSET SZ 6 [id number] Total Joint Component/Insert IMP STEM SNN STD OFFSET SZ 6 [id number]  [place]  [id number] Right 1 Implanted   IMP ACET LINER S&amp;N R3 [id number] +4MM  XLPE 0DEGREE    [employer] [id number] Right 1 Implanted   IMP HEAD FEMORAL SNN 12/14 OXIN 36MM +4MM [id number] Total Joint Component/Insert IMP HEAD FEMORAL SNN 12/14 OXIN 36MM +4MM [id number]  [place]  [id number] Right 1 Implanted

## 2020-06-02 NOTE — PROGRESS NOTES
[facility]      OUTPATIENT PHYSICAL THERAPY EVALUATION  PLAN OF TREATMENT FOR OUTPATIENT REHABILITATION  (COMPLETE FOR INITIAL CLAIMS ONLY)  Patient's Last Name, First Name, M.I.  YOB: 1950  Reanna Kumar                        Provider's Name  [facility] Medical Record No.  [id number]                               Onset Date:  06/02/20   Start of Care Date:  06/02/20      Type:     _X_PT   ___OT   ___SLP Medical Diagnosis:  s/p R ANJALI                        PT Diagnosis:  Impaired gait   Visits from SOC:  1   _________________________________________________________________________________  Plan of Treatment/Functional Goals    Planned Interventions:  ,    bed mobility training, gait training, ROM, strengthening, transfer training, progressive activity/exercise, home program guidelines,       Goals: See Physical Therapy Goals on Care Plan in [employer] electronic health record.    Therapy Frequency: 2x/day  Predicted Duration of Therapy Intervention: 2 days  _________________________________________________________________________________    I CERTIFY THE NEED FOR THESE SERVICES FURNISHED UNDER        THIS PLAN OF TREATMENT AND WHILE UNDER MY CARE     (Physician co-signature of this document indicates review and certification of the therapy plan).                Certification date from: 06/02/20, Certification date to: 06/03/20    Referring Physician: Augie Murray MD            Initial Assessment        See Physical Therapy evaluation dated 06/02/20 in Epic electronic health record.

## 2020-06-02 NOTE — DISCHARGE INSTRUCTIONS
DISCHARGE INSTRUCTIONS FOR YOUR TOTAL HIP REPLACEMENT     BARTOLO NAM MD    Wound Care:    Change your dressing daily. Inspect your incision at the time of dressing change for increased redness, swelling, and drainage.  Some discoloration and bruising is usually seen, but call my office if you are concerned or if you see changes in the wound appearance.  Also, call if you develop a fever above 101 degrees.     You may shower directly over the wound beginning 4 days after your surgery, but do not submerge wound under water until the sutures are removed and the wound is completely sealed and without any drainage. Keep a dressing over the wound until after your post operative clinic visit when the sutures are removed.      Activities and outpatient Physical Therapy:  Physical activity may be resumed gradually according to your comfort level and the restrictions on your hip positioning as instructed in the pre-op class and by therapy. Do not cross your legs and do not flex your hip up past 90 degrees. You may bear weight as tolerated on the operated leg.  Use crutches or the walker as instructed by Physical Therapy.  Follow your home exercise program as instructed by your therapist.     Wear your anti-embolism stockings day and night until seen for your post operative appointment.  Keep them flat on your skin.  Do not allow them to roll up or crease your skin.  Remove twice daily for one hour at a time.  You may hand wash and air dry your stockings.  Notify my office if you experience new pain behind your calf or thigh.  Pump your ankles frequently.        Outpatient Physical Therapy visits are required following discharge from the hospital.  The referral for these outpatient therapy visits is routinely given to you at the time of your surgical scheduling. You should have already scheduled your therapy sessions in advance.  If you have not done so, please immediately contact the therapy location of your choice to  schedule the appointments for the physical therapy regimen that has been prescribed for you. You may discontinue the crutches or walker per the therapist's recommendation.      Medications:  New medications for you on discharge include a pain medication, a stool softener, and a blood thinner.  Detailed instructions come with those medications.  You will also receive instructions on when to resume your home medications.     Antibiotic coverage will be needed before any type of dental procedure for at least the next two years due to the risk of infection.  After that, antibiotic coverage is optional. You should notify your dentist of your total hip surgery and call your dentist or our office one week before a dental appointment for antibiotics.         Post operative clinic appointment:  Your post operative clinic visit has been prearranged.   Call [phone] with any questions.    Augie Murray MD

## 2020-06-02 NOTE — ANESTHESIA PROCEDURE NOTES
Procedure note : intrathecal  Staff -   Anesthesiologist:  Baldev Johnson MD      Performed By: anesthesiologist        Pre-Procedure  Performed by Baldev Johnson MD  Location: OR      Pre-Anesthestic Checklist: patient identified, IV checked, site marked, risks and benefits discussed, informed consent, monitors and equipment checked and pre-op evaluation    Timeout  Correct Patient: Yes   Correct Procedure: Yes   Correct Site: Yes     Correct Position: Yes     .   Procedure Documentation    .    Procedure: intrathecal, .   Patient Position:sitting Insertion Site:L4-5  (right paramedian approach)     Patient Prep/Sterile Barriers; mask, sterile gloves, povidone-iodine 7.5% surgical scrub, patient draped.  .  Needle:  Spinal Needle (gauge): 25  Spinal/LP Needle Length (inches): 3.5 # of attempts: 2 and # of redirects:  Introducer used Introducer: 20 G .        Assessment/Narrative  Paresthesias: No.  .  .  clear CSF fluid removed . Time Injected:while sitting   Comments:  Attempted midline at L4/5, L3/4 with os, +CSF at L4/5 R paramedian.    Injected 12.5 mg of 0.5% bupivacaine PF

## 2020-06-02 NOTE — ANESTHESIA CARE TRANSFER NOTE
Patient: Reanna Kumar    Procedure(s):  RIGHT TOTAL HIP ARTHROPLASTY    Diagnosis: Degenerative joint disease of right hip [M16.11]  Diagnosis Additional Information: No value filed.    Anesthesia Type:   Spinal     Note:  Airway :Face Mask  Patient transferred to:PACU  Comments: At end of procedure, spontaneous respirations, patient alert to voice, able to follow commands. Oxygen via facemask at 8 liters per minute to PACU. Oxygen tubing connected to wall O2 in PACU, SpO2, NiBP, and EKG monitors and alarms on and functioning, Radha Hugger warmer connected to patient gown, report on patient's clinical status given to PACU RN, RN questions answered.Handoff Report: Identifed the Patient, Identified the Reponsible Provider, Reviewed the pertinent medical history, Discussed the surgical course, Reviewed Intra-OP anesthesia mangement and issues during anesthesia, Set expectations for post-procedure period and Allowed opportunity for questions and acknowledgement of understanding      Vitals: (Last set prior to Anesthesia Care Transfer)    CRNA VITALS  6/2/2020 0848 - 6/2/2020 0924      6/2/2020             Resp Rate (set):  10                Electronically Signed By: RENETTA Kumar CRNA  June 2, 2020  9:24 AM

## 2020-06-02 NOTE — ANESTHESIA POSTPROCEDURE EVALUATION
Patient: Reanna Kumar    Procedure(s):  RIGHT TOTAL HIP ARTHROPLASTY    Diagnosis:Degenerative joint disease of right hip [M16.11]  Diagnosis Additional Information: No value filed.    Anesthesia Type:  Spinal    Note:  Anesthesia Post Evaluation    Patient location during evaluation: PACU  Patient participation: Able to participate in evaluation but full recovery from regional anesthesia has not yet ocurrred but is anticipated to occur within 48 hours  Level of consciousness: awake and alert  Pain management: adequate  Airway patency: patent  Cardiovascular status: stable  Respiratory status: room air  Hydration status: stable  PONV: none     Anesthetic complications: None    Comments: No signs of subarachnoid block complications        Last vitals:  Vitals:    06/02/20 1145 06/02/20 1215 06/02/20 1315   BP: 122/76 118/64 124/76   Pulse:      Resp: 14 18 21   Temp:      SpO2: 94% 95% 96%         Electronically Signed By: Evaristo Flores MD  June 2, 2020  2:17 PM

## 2020-06-02 NOTE — PROGRESS NOTES
06/02/20 1630   Quick Adds   Type of Visit Initial PT Evaluation   Living Environment   Lives With spouse   Living Arrangements house   Home Accessibility stairs to enter home   Number of Stairs, Main Entrance 2  (platform steps)   Transportation Anticipated car, drives self   Living Environment Comment Pt has set up main level living for herself, half bath available on main floor; has RTS, upstairs shower   Self-Care   Usual Activity Tolerance excellent   Current Activity Tolerance good   Regular Exercise No   Equipment Currently Used at Home none   Activity/Exercise/Self-Care Comment Stays active, works in school system   Functional Level Prior   Ambulation 0-->independent   Transferring 0-->independent   Toileting 0-->independent   Bathing 0-->independent   Fall history within last six months no   Which of the above functional risks had a recent onset or change? ambulation;transferring   General Information   Onset of Illness/Injury or Date of Surgery - Date 06/02/20   Referring Physician Augie Murray MD   Patient/Family Goals Statement Home   Pertinent History of Current Problem (include personal factors and/or comorbidities that impact the POC) Pt is a 71yo female seen PDO#0 s/p R ANJALI, posterior hip precautions, WBAT.   Precautions/Limitations fall precautions;right hip precautions   Weight-Bearing Status - RLE weight-bearing as tolerated   General Observations Pt resting in bed; pt with hemovac, capnography, IV   General Info Comments Activity: ambulate with assistance 3 times daily   Cognitive Status Examination   Orientation orientation to person, place and time   Level of Consciousness alert   Follows Commands and Answers Questions 100% of the time   Personal Safety and Judgment intact   Integumentary/Edema   Integumentary/Edema Comments dressing and hemovac R hip appears intact   Posture    Posture Not impaired   Range of Motion (ROM)   ROM Comment R hip limited by pain and precautions otherwise  "BLE WFL   Strength   Strength Comments Fransisca for SAQ and heel slide on R   Bed Mobility   Bed Mobility Comments Fransisca supine>sit   Transfer Skills   Transfer Comments Fransisca sit>stand to FWW   Gait   Gait Comments CGA 5' with FWW, step to gait pattern, WBAT on R   Balance   Balance Comments decreased standing balance without UE support   Sensory Examination   Sensory Perception Comments denies N/T   General Therapy Interventions   Planned Therapy Interventions bed mobility training;gait training;ROM;strengthening;transfer training;progressive activity/exercise;home program guidelines   Clinical Impression   Criteria for Skilled Therapeutic Intervention yes, treatment indicated   PT Diagnosis Impaired gait   Influenced by the following impairments Pain, decreased ROM, decreased balance   Functional limitations due to impairments Decreased safety and IND with functional transfers, gait, stairs   Clinical Presentation Stable/Uncomplicated   Clinical Decision Making (Complexity) Low complexity   Therapy Frequency 2x/day   Predicted Duration of Therapy Intervention (days/wks) 3 days   Anticipated Discharge Disposition   (defer to ortho MD)   Risk & Benefits of therapy have been explained Yes   Patient, Family & other staff in agreement with plan of care Yes   [facility] [employer] TM \"6 Clicks\"   2016, Trustees of [facility], under license to [employer].  All rights reserved.   6 Clicks Short Forms Basic Mobility Inpatient Short Form   [facility] [employer]  \"6 Clicks\" V.2 Basic Mobility Inpatient Short Form   1. Turning from your back to your side while in a flat bed without using bedrails? 4 - None   2. Moving from lying on your back to sitting on the side of a flat bed without using bedrails? 3 - A Little   3. Moving to and from a bed to a chair (including a wheelchair)? 3 - A Little   4. Standing up from a chair using your arms (e.g., wheelchair, or bedside chair)? 3 - A Little   5. To walk in hospital " room? 3 - A Little   6. Climbing 3-5 steps with a railing? 3 - A Little   Basic Mobility Raw Score (Score out of 24.Lower scores equate to lower levels of function) 19   Total Evaluation Time   Total Evaluation Time (Minutes) 10

## 2020-06-02 NOTE — PLAN OF CARE
Patient plan: Home tomorrow, states she has OP PT scheduled    Current status: PT orders received, eval completed, treatment initiated. Pt is a 71yo female seen PDO#0 s/p R ANJALI, posterior hip precautions, WBAT.    Pt rates pain 2/10 at rest, 5/10 with activity. Educated on hip precautions. Pt participated in ANJALI exercises and tolerated well. Pt requires Fransisca for RLE for bed mobility, Fransisca for sit<>stand transfers, CGA to ambulate 100' with FWW.     Anticipated status at discharge: Ax1 on stairs, supervision for transfers and ambulation with FWW

## 2020-06-02 NOTE — PROGRESS NOTES
PTA medications updated by Medication Scribe day before surgery via phone call with patient      -LAST DOSES ENTERED BY NURSE-    Medication history sources: Patient, Surescripts and H&P  Medication history source reliability: Good  Adherence assessment: N/A Not Observed    Significant changes made to the medication list:  None      Additional medication history information:   None        Prior to Admission medications    Medication Sig Last Dose Taking? Auth Provider   amoxicillin (AMOXIL) 500 MG tablet Take 2,000 mg by mouth daily as needed (prior to dental procedures)  at prn Yes Reported, Patient   Cholecalciferol (VITAMIN D) 1000 UNITS capsule Take 3 capsules by mouth daily.  Yes Reported, Patient   QUEtiapine (SEROQUEL) 50 MG tablet Take 25 mg by mouth every evening (takes 0.5 x 50mg)  at PM Yes Reported, Patient   QUEtiapine (SEROQUEL) 50 MG tablet Take 25 mg by mouth every evening as needed (in additional to scheduled PM dose)  at PRN Yes Reported, Patient   rosuvastatin (CRESTOR) 20 MG tablet Take 1 tablet (20 mg) by mouth daily  at AM Yes Aric Angelo MD

## 2020-06-03 ENCOUNTER — APPOINTMENT (OUTPATIENT)
Dept: PHYSICAL THERAPY | Facility: CLINIC | Age: 70
End: 2020-06-03
Attending: ORTHOPAEDIC SURGERY
Payer: COMMERCIAL

## 2020-06-03 VITALS
OXYGEN SATURATION: 93 % | SYSTOLIC BLOOD PRESSURE: 93 MMHG | WEIGHT: 146.2 LBS | TEMPERATURE: 98.5 F | HEIGHT: 63 IN | BODY MASS INDEX: 25.91 KG/M2 | HEART RATE: 71 BPM | DIASTOLIC BLOOD PRESSURE: 56 MMHG | RESPIRATION RATE: 16 BRPM

## 2020-06-03 LAB
GLUCOSE SERPL-MCNC: 119 MG/DL (ref 70–99)
HGB BLD-MCNC: 13.5 G/DL (ref 11.7–15.7)

## 2020-06-03 PROCEDURE — 25000132 ZZH RX MED GY IP 250 OP 250 PS 637: Performed by: ORTHOPAEDIC SURGERY

## 2020-06-03 PROCEDURE — 85018 HEMOGLOBIN: CPT | Performed by: ORTHOPAEDIC SURGERY

## 2020-06-03 PROCEDURE — 97110 THERAPEUTIC EXERCISES: CPT | Mod: GP | Performed by: PHYSICAL THERAPIST

## 2020-06-03 PROCEDURE — 97530 THERAPEUTIC ACTIVITIES: CPT | Mod: GP | Performed by: PHYSICAL THERAPIST

## 2020-06-03 PROCEDURE — 36415 COLL VENOUS BLD VENIPUNCTURE: CPT | Performed by: ORTHOPAEDIC SURGERY

## 2020-06-03 PROCEDURE — 97116 GAIT TRAINING THERAPY: CPT | Mod: GP | Performed by: PHYSICAL THERAPIST

## 2020-06-03 PROCEDURE — 82947 ASSAY GLUCOSE BLOOD QUANT: CPT | Performed by: ORTHOPAEDIC SURGERY

## 2020-06-03 RX ORDER — ONDANSETRON 4 MG/1
4-8 TABLET, ORALLY DISINTEGRATING ORAL EVERY 8 HOURS PRN
Qty: 4 TABLET | Refills: 0 | Status: SHIPPED | OUTPATIENT
Start: 2020-06-03 | End: 2021-03-16

## 2020-06-03 RX ORDER — AMOXICILLIN 250 MG
1-2 CAPSULE ORAL 2 TIMES DAILY
Qty: 30 TABLET | Refills: 0 | Status: SHIPPED | OUTPATIENT
Start: 2020-06-03 | End: 2020-10-13

## 2020-06-03 RX ORDER — OXYCODONE HYDROCHLORIDE 5 MG/1
5-10 TABLET ORAL
Qty: 40 TABLET | Refills: 0 | Status: SHIPPED | OUTPATIENT
Start: 2020-06-03 | End: 2020-10-13

## 2020-06-03 RX ADMIN — OXYCODONE HYDROCHLORIDE 10 MG: 5 TABLET ORAL at 12:17

## 2020-06-03 RX ADMIN — ROSUVASTATIN CALCIUM 20 MG: 20 TABLET, FILM COATED ORAL at 09:09

## 2020-06-03 RX ADMIN — ASPIRIN 325 MG: 325 TABLET, COATED ORAL at 09:09

## 2020-06-03 RX ADMIN — OXYCODONE HYDROCHLORIDE 10 MG: 5 TABLET ORAL at 06:20

## 2020-06-03 RX ADMIN — ACETAMINOPHEN 975 MG: 325 TABLET, FILM COATED ORAL at 06:31

## 2020-06-03 RX ADMIN — OXYCODONE HYDROCHLORIDE 10 MG: 5 TABLET ORAL at 09:19

## 2020-06-03 NOTE — PROGRESS NOTES
POD#1 R ANJALI    Pain well managed  No CP, SOB, N/V  AVSS  CMS intact  Dressing CDI  Calf soft  Hgb pending  Plan home today after PT/OT  Aspirin  Follow-up with me in 2 weeks  Posterior hip precautions    Augie Murray MD

## 2020-06-03 NOTE — PROGRESS NOTES
~ Patient vital signs are at baseline: MET   ~ Patient able to ambulate as they were prior to admission or with assist devices provided by therapies during their stay: MET   ~ Patient MUST void prior to discharge: MET   ~ Patient able to tolerate oral intake to maintain hydration status: MET   ~ Patient has adequate pain control using Oral analgesics: MET    A/Ox4. VSS on 1L. Up w 1 and walker. Voiding adequately in BR. Tolerating reg diet. Slept most of night. Will continue to monitor.

## 2020-06-03 NOTE — PROGRESS NOTES
~ Patient vital signs are at baseline: MET   ~ Patient able to ambulate as they were prior to admission or with assist devices provided by therapies during their stay: MET   ~ Patient MUST void prior to discharge: MET   ~ Patient able to tolerate oral intake to maintain hydration status: MET   ~ Patient has adequate pain control using Oral analgesics: MET

## 2020-06-03 NOTE — PLAN OF CARE
A&OX4.  Up with 1 and walker.  Voiding adequately in the bathroom.  Oxycodone given X1.  Tolerating regular diet.  Progressing per plan of care.

## 2020-06-03 NOTE — PLAN OF CARE
Patient vital signs are at baseline: Yes  Patient able to ambulate as they were prior to admission or with assist devices provided by therapies during their stay:  Yes  Patient MUST void prior to discharge:  Yes  Patient able to tolerate oral intake:  Yes  Pain has adequate pain control using Oral analgesics:  Yes   Dressing changed, went over discharge instruction and medications, all questions answered. Pt refused alexandra stocking- educated on risk factors. Ready for discharge- waiting for ride (daughter).

## 2020-06-03 NOTE — PLAN OF CARE
Patient plan: Home today, states she has OP PT scheduled     Current status: Pt rates pain 3/10 at rest, 5/10 with activity. Reviewed hip precautions, pt demo's understanding. Pt participated in ANJALI exercises and tolerated well. Pt requires Fransisca for RLE for bed mobility, SBA for sit<>stand transfers, SBA to ambulate 120' with FWW and 120' with 4WW. Pt able to ascend/descend 2 platform steps with FWW and SBA.     Anticipated status at discharge: supervision for transfers and ambulation with 4WW (pt has at home), supervision on stairs    Physical Therapy Discharge Summary    Reason for therapy discharge:    All goals and outcomes met, no further needs identified.    Progress towards therapy goal(s). See goals on Care Plan in [facility] electronic health record for goal details.  Goals met    Therapy recommendation(s):    Continue home exercise program.  OP PT per MD plan

## 2020-06-03 NOTE — PLAN OF CARE
Discharge Planner OT   Patient plan for discharge: Home   Current status: IP OT orders received, chart reviewed, attempted evaluation, and discussed with patient. Patient reported no concerns for I/ADLs. Pt requesting to discontinue IP OT orders. Will cancel/complete IP OT orders/evaluation per pt request.   Barriers to return to prior living situation: Defer to IP PT   Recommendations for discharge: Defer to IP PT   Rationale for recommendations: Will cancel/complete IP OT orders/evaluation per pt request.        Entered by: Lisbeth Garsia 06/03/2020 11:49 AM

## 2020-10-14 ENCOUNTER — OFFICE VISIT (OUTPATIENT)
Dept: FAMILY MEDICINE | Facility: CLINIC | Age: 70
End: 2020-10-14
Payer: COMMERCIAL

## 2020-10-14 VITALS
OXYGEN SATURATION: 97 % | RESPIRATION RATE: 16 BRPM | WEIGHT: 135 LBS | HEART RATE: 87 BPM | DIASTOLIC BLOOD PRESSURE: 76 MMHG | TEMPERATURE: 97.3 F | SYSTOLIC BLOOD PRESSURE: 120 MMHG | BODY MASS INDEX: 23.91 KG/M2

## 2020-10-14 DIAGNOSIS — E55.9 VITAMIN D DEFICIENCY: ICD-10-CM

## 2020-10-14 DIAGNOSIS — R73.01 ABNORMAL FASTING GLUCOSE: ICD-10-CM

## 2020-10-14 DIAGNOSIS — Z12.11 SCREENING FOR COLON CANCER: ICD-10-CM

## 2020-10-14 DIAGNOSIS — F17.200 TOBACCO USE DISORDER: ICD-10-CM

## 2020-10-14 DIAGNOSIS — Z71.89 ADVANCED CARE PLANNING/COUNSELING DISCUSSION: ICD-10-CM

## 2020-10-14 DIAGNOSIS — E78.5 HYPERLIPIDEMIA LDL GOAL <130: ICD-10-CM

## 2020-10-14 DIAGNOSIS — G47.00 INSOMNIA, UNSPECIFIED TYPE: ICD-10-CM

## 2020-10-14 DIAGNOSIS — Z23 NEED FOR PROPHYLACTIC VACCINATION AND INOCULATION AGAINST INFLUENZA: ICD-10-CM

## 2020-10-14 DIAGNOSIS — R91.8 PULMONARY NODULES: ICD-10-CM

## 2020-10-14 DIAGNOSIS — Z12.31 VISIT FOR SCREENING MAMMOGRAM: ICD-10-CM

## 2020-10-14 DIAGNOSIS — I25.10 CORONARY ARTERY CALCIFICATION SEEN ON CT SCAN: ICD-10-CM

## 2020-10-14 DIAGNOSIS — Z00.00 MEDICARE ANNUAL WELLNESS VISIT, SUBSEQUENT: Primary | ICD-10-CM

## 2020-10-14 LAB
ERYTHROCYTE [DISTWIDTH] IN BLOOD BY AUTOMATED COUNT: 15.4 % (ref 10–15)
HBA1C MFR BLD: 5.8 % (ref 0–5.6)
HCT VFR BLD AUTO: 47.7 % (ref 35–47)
HGB BLD-MCNC: 15.8 G/DL (ref 11.7–15.7)
MCH RBC QN AUTO: 31.9 PG (ref 26.5–33)
MCHC RBC AUTO-ENTMCNC: 33.1 G/DL (ref 31.5–36.5)
MCV RBC AUTO: 96 FL (ref 78–100)
PLATELET # BLD AUTO: 335 10E9/L (ref 150–450)
RBC # BLD AUTO: 4.96 10E12/L (ref 3.8–5.2)
WBC # BLD AUTO: 5.9 10E9/L (ref 4–11)

## 2020-10-14 PROCEDURE — 99397 PER PM REEVAL EST PAT 65+ YR: CPT | Mod: 25 | Performed by: PHYSICIAN ASSISTANT

## 2020-10-14 PROCEDURE — 80053 COMPREHEN METABOLIC PANEL: CPT | Performed by: PHYSICIAN ASSISTANT

## 2020-10-14 PROCEDURE — 83036 HEMOGLOBIN GLYCOSYLATED A1C: CPT | Performed by: PHYSICIAN ASSISTANT

## 2020-10-14 PROCEDURE — 82306 VITAMIN D 25 HYDROXY: CPT | Performed by: PHYSICIAN ASSISTANT

## 2020-10-14 PROCEDURE — 85027 COMPLETE CBC AUTOMATED: CPT | Performed by: PHYSICIAN ASSISTANT

## 2020-10-14 PROCEDURE — G0008 ADMIN INFLUENZA VIRUS VAC: HCPCS | Performed by: PHYSICIAN ASSISTANT

## 2020-10-14 PROCEDURE — 90662 IIV NO PRSV INCREASED AG IM: CPT | Performed by: PHYSICIAN ASSISTANT

## 2020-10-14 PROCEDURE — 80061 LIPID PANEL: CPT | Performed by: PHYSICIAN ASSISTANT

## 2020-10-14 PROCEDURE — 36415 COLL VENOUS BLD VENIPUNCTURE: CPT | Performed by: PHYSICIAN ASSISTANT

## 2020-10-14 RX ORDER — QUETIAPINE FUMARATE 50 MG/1
25-50 TABLET, FILM COATED ORAL AT BEDTIME
Qty: 180 TABLET | Refills: 3 | Status: SHIPPED | OUTPATIENT
Start: 2020-10-14 | End: 2021-10-18

## 2020-10-14 RX ORDER — ROSUVASTATIN CALCIUM 20 MG/1
20 TABLET, COATED ORAL DAILY
Qty: 90 TABLET | Refills: 3 | Status: SHIPPED | OUTPATIENT
Start: 2020-10-14 | End: 2021-10-18

## 2020-10-14 ASSESSMENT — ACTIVITIES OF DAILY LIVING (ADL): CURRENT_FUNCTION: NO ASSISTANCE NEEDED

## 2020-10-14 NOTE — PROGRESS NOTES
"SUBJECTIVE:   Reanna Kumar is a 70 year old female who presents for Preventive Visit.      Patient has been advised of split billing requirements and indicates understanding: No   Are you in the first 12 months of your Medicare coverage?  No    Healthy Habits:     In general, how would you rate your overall health?  Good    Frequency of exercise:  2-3 days/week    Duration of exercise:  15-30 minutes    Do you usually eat at least 4 servings of fruit and vegetables a day, include whole grains    & fiber and avoid regularly eating high fat or \"junk\" foods?  Yes    Taking medications regularly:  Yes    Barriers to taking medications:  None    Medication side effects:  None    Ability to successfully perform activities of daily living:  No assistance needed    Home Safety:  No safety concerns identified    Hearing Impairment:  No hearing concerns    In the past 6 months, have you been bothered by leaking of urine?  No    In general, how would you rate your overall mental or emotional health?  Good      PHQ-2 Total Score: 0    Additional concerns today:  No    Do you feel safe in your environment? Yes    Have you ever done Advance Care Planning? (For example, a Health Directive, POLST, or a discussion with a medical provider or your loved ones about your wishes): Yes, advance care planning is on file.    Fall risk  Fallen 2 or more times in the past year?: No  Any fall with injury in the past year?: No    Cognitive Screening   1) Repeat 3 items (Leader, Season, Table)    2) Clock draw: NORMAL  3) 3 item recall: Recalls 3 objects  Results: 3 items recalled: COGNITIVE IMPAIRMENT LESS LIKELY    [employer]TM Copyright GEN[employer]. Licensed by the author for use in [facility]; reprinted with permission (rex@.[facility]). All rights reserved.      Do you have sleep apnea, excessive snoring or daytime drowsiness?: no    Reviewed and updated as needed this visit by clinical staff  Tobacco  Allergies  Meds   Med Hx  " Surg Hx  Fam Hx  Soc Hx        Reviewed and updated as needed this visit by Provider                Social History     Tobacco Use     Smoking status: Current Every Day Smoker     Packs/day: 1.00     Years: 50.00     Pack years: 50.00     Types: Cigarettes     Smokeless tobacco: Never Used   Substance Use Topics     Alcohol use: Yes     Comment: Occ     If you drink alcohol do you typically have >3 drinks per day or >7 drinks per week? No    Alcohol Use 10/14/2020   Prescreen: >3 drinks/day or >7 drinks/week? No   Prescreen: >3 drinks/day or >7 drinks/week? -       Current providers sharing in care for this patient include:   Patient Care Team:  Cassandra Gong PA-C as PCP - General (Physician Assistant)  Evaristo Pierson MD as Assigned PCP    The following health maintenance items are reviewed in Epic and correct as of today:  Health Maintenance   Topic Date Due     ZOSTER IMMUNIZATION (1 of 2) 03/20/1999     COLORECTAL CANCER SCREENING  08/29/2019     FALL RISK ASSESSMENT  08/26/2020     MAMMO SCREENING  08/06/2021     MEDICARE ANNUAL WELLNESS VISIT  10/14/2021     LIPID  10/14/2021     ADVANCE CARE PLANNING  10/14/2025     DTAP/TDAP/TD IMMUNIZATION (3 - Td) 08/03/2028     DEXA  Completed     HEPATITIS C SCREENING  Completed     PHQ-2  Completed     INFLUENZA VACCINE  Completed     Pneumococcal Vaccine: 65+ Years  Completed     Pneumococcal Vaccine: Pediatrics (0 to 5 Years) and At-Risk Patients (6 to 64 Years)  Aged Out     IPV IMMUNIZATION  Aged Out     MENINGITIS IMMUNIZATION  Aged Out     HEPATITIS B IMMUNIZATION  Aged Out     BP Readings from Last 3 Encounters:   10/14/20 120/76   06/03/20 93/56   05/19/20 120/80    Wt Readings from Last 3 Encounters:   10/14/20 61.2 kg (135 lb)   06/02/20 66.3 kg (146 lb 3.2 oz)   05/19/20 68 kg (150 lb)                  Recent Labs   Lab Test 05/19/20  1001 08/26/19  0939 08/03/18  0957   LDL 87 65 73   HDL 71 69 79   TRIG 118 153* 100   ALT 18 16 14  "  CR 0.53 0.54 0.58   GFRESTIMATED >90 >90 >90   GFRESTBLACK >90 >90 >90   POTASSIUM 4.5 4.0 3.9      Mammogram Screening: Mammogram Screening: Patient over age 50, mutual decision to screen reflected in health maintenance.    Review of Systems  Constitutional, HEENT, cardiovascular, pulmonary, GI, , musculoskeletal, neuro, skin, endocrine and psych systems are negative, except as otherwise noted.    OBJECTIVE:   /76   Pulse 87   Temp 97.3  F (36.3  C)   Resp 16   Wt 61.2 kg (135 lb)   SpO2 97%   BMI 23.91 kg/m   Estimated body mass index is 23.91 kg/m  as calculated from the following:    Height as of 6/2/20: 1.6 m (5' 3\").    Weight as of this encounter: 61.2 kg (135 lb).  Physical Exam  GENERAL APPEARANCE: healthy, alert and no distress  EYES: Eyes grossly normal to inspection, PERRL and conjunctivae and sclerae normal  HENT: ear canals and TM's normal, nose and mouth without ulcers or lesions, oropharynx clear and oral mucous membranes moist  NECK: no adenopathy, no asymmetry, masses, or scars and thyroid normal to palpation  RESP: lungs clear to auscultation - no rales, rhonchi or wheezes  CV: regular rate and rhythm, normal S1 S2, no S3 or S4, no murmur, click or rub, no peripheral edema and peripheral pulses strong  ABDOMEN: soft, nontender, no hepatosplenomegaly, no masses and bowel sounds normal  MS: no musculoskeletal defects are noted and gait is age appropriate without ataxia  SKIN: no suspicious lesions or rashes  NEURO: Normal strength and tone, sensory exam grossly normal, mentation intact and speech normal  PSYCH: mentation appears normal and affect normal/bright    Diagnostic Test Results:  Labs reviewed in Epic    ASSESSMENT / PLAN:       ICD-10-CM    1. Medicare annual wellness visit, subsequent  Z00.00 Lipid panel reflex to direct LDL Fasting     CBC with platelets     Comprehensive metabolic panel   2. Visit for screening mammogram  Z12.31 MA Screening Digital Bilateral   3. " "Screening for colon cancer  Z12.11 Fecal colorectal cancer screen FIT   4. Abnormal fasting glucose  R73.01 Hemoglobin A1c   5. Vitamin D deficiency  E55.9 Vitamin D Deficiency   6. Tobacco use disorder  F17.200    7. Pulmonary nodules  R91.8    8. Advanced care planning/counseling discussion  Z71.89    9. Hyperlipidemia LDL goal <130  E78.5 rosuvastatin (CRESTOR) 20 MG tablet   10. Coronary artery calcification seen on CT scan  I25.10 rosuvastatin (CRESTOR) 20 MG tablet   11. Insomnia, unspecified type  G47.00 QUEtiapine (SEROQUEL) 50 MG tablet   12. Need for prophylactic vaccination and inoculation against influenza  Z23 FLUZONE HIGH DOSE 65+  [67286]     ADMIN INFLUENZA (For MEDICARE Patients ONLY) []       Patient has been advised of split billing requirements and indicates understanding: Yes  COUNSELING:  Reviewed preventive health counseling, as reflected in patient instructions    Estimated body mass index is 23.91 kg/m  as calculated from the following:    Height as of 6/2/20: 1.6 m (5' 3\").    Weight as of this encounter: 61.2 kg (135 lb).        She reports that she has been smoking cigarettes. She has a 50.00 pack-year smoking history. She has never used smokeless tobacco.  Tobacco Cessation Action Plan:   Information offered: Patient not interested at this time      Appropriate preventive services were discussed with this patient, including applicable screening as appropriate for cardiovascular disease, diabetes, osteopenia/osteoporosis, and glaucoma.  As appropriate for age/gender, discussed screening for colorectal cancer, prostate cancer, breast cancer, and cervical cancer. Checklist reviewing preventive services available has been given to the patient.    Reviewed patients plan of care and provided an AVS. The Basic Care Plan (routine screening as documented in Health Maintenance) for Reanna meets the Care Plan requirement. This Care Plan has been established and reviewed with the " Patient.    Counseling Resources:  ATP IV Guidelines  Pooled Cohorts Equation Calculator  Breast Cancer Risk Calculator  Breast Cancer: Medication to Reduce Risk  FRAX Risk Assessment  ICSI Preventive Guidelines  Dietary Guidelines for Americans, 2010  USDA's MyPlate  ASA Prophylaxis  Lung CA Screening    Cassandra Gong PA-C  [facility]    Identified Health Risks:

## 2020-10-14 NOTE — LETTER
October 15, 2020      Reanna Kumar  [address] [place] [address]        Dear ,    We are writing to inform you of your test results.    - Hemoglobin A1c is a test shows your blood sugar level over the last 2-3 months. A normal result for someone who does not have diabetes is 4-5.7% (fasting blood sugar <100). - Your A1c is in the pre-diabetic range and we want to make sure it doesn't reach 6.5%.  Try to decrease sugars and carbohydrates from your diet and increase exercise to keep those numbers down.  We'll recheck next year.  - Please ignore any other labs that are flagged as high or low that I have not mentioned. These are normal variations and not a cause for concern.      Resulted Orders   Lipid panel reflex to direct LDL Fasting   Result Value Ref Range    Cholesterol 228 (H) <200 mg/dL      Comment:      Desirable:       <200 mg/dl    Triglycerides 118 <150 mg/dL      Comment:      Fasting specimen    HDL Cholesterol 83 >49 mg/dL    LDL Cholesterol Calculated 121 (H) <100 mg/dL      Comment:      Above desirable:  100-129 mg/dl  Borderline High:  130-159 mg/dL  High:             160-189 mg/dL  Very high:       >189 mg/dl      Non HDL Cholesterol 145 (H) <130 mg/dL      Comment:      Above Desirable:  130-159 mg/dl  Borderline high:  160-189 mg/dl  High:             190-219 mg/dl  Very high:       >219 mg/dl     CBC with platelets   Result Value Ref Range    WBC 5.9 4.0 - 11.0 10e9/L    RBC Count 4.96 3.8 - 5.2 10e12/L    Hemoglobin 15.8 (H) 11.7 - 15.7 g/dL    Hematocrit 47.7 (H) 35.0 - 47.0 %    MCV 96 78 - 100 fl    MCH 31.9 26.5 - 33.0 pg    MCHC 33.1 31.5 - 36.5 g/dL    RDW 15.4 (H) 10.0 - 15.0 %    Platelet Count 335 150 - 450 10e9/L   Comprehensive metabolic panel   Result Value Ref Range    Sodium 134 133 - 144 mmol/L    Potassium 4.7 3.4 - 5.3 mmol/L    Chloride 101 94 - 109 mmol/L    Carbon Dioxide 26 20 - 32 mmol/L    Anion Gap 7 3 - 14 mmol/L    Glucose 97 70 - 99 mg/dL       Comment:      Fasting specimen    Urea Nitrogen 17 7 - 30 mg/dL    Creatinine 0.61 0.52 - 1.04 mg/dL    GFR Estimate >90 >60 mL/min/[1.73_m2]    Calcium 10.0 8.5 - 10.1 mg/dL    Bilirubin Total 0.6 0.2 - 1.3 mg/dL    Albumin 4.3 3.4 - 5.0 g/dL    Protein Total 7.3 6.8 - 8.8 g/dL    Alkaline Phosphatase 113 40 - 150 U/L    ALT 17 0 - 50 U/L    AST 15 0 - 45 U/L   Hemoglobin A1c   Result Value Ref Range    Hemoglobin A1C 5.8 (H) 0 - 5.6 %      Comment:      Normal <5.7% Prediabetes 5.7-6.4%  Diabetes 6.5% or higher - adopted from ADA   consensus guidelines.     Vitamin D Deficiency   Result Value Ref Range    Vitamin D Deficiency screening 27 20 - 75 ug/L   If you have any questions or concerns, please call the clinic at the number listed above.     Sincerely,      Cassandra Gong PA-C

## 2020-10-15 LAB
ALBUMIN SERPL-MCNC: 4.3 G/DL (ref 3.4–5)
ALP SERPL-CCNC: 113 U/L (ref 40–150)
ALT SERPL W P-5'-P-CCNC: 17 U/L (ref 0–50)
ANION GAP SERPL CALCULATED.3IONS-SCNC: 7 MMOL/L (ref 3–14)
AST SERPL W P-5'-P-CCNC: 15 U/L (ref 0–45)
BILIRUB SERPL-MCNC: 0.6 MG/DL (ref 0.2–1.3)
BUN SERPL-MCNC: 17 MG/DL (ref 7–30)
CALCIUM SERPL-MCNC: 10 MG/DL (ref 8.5–10.1)
CHLORIDE SERPL-SCNC: 101 MMOL/L (ref 94–109)
CHOLEST SERPL-MCNC: 228 MG/DL
CO2 SERPL-SCNC: 26 MMOL/L (ref 20–32)
CREAT SERPL-MCNC: 0.61 MG/DL (ref 0.52–1.04)
DEPRECATED CALCIDIOL+CALCIFEROL SERPL-MC: 27 UG/L (ref 20–75)
GFR SERPL CREATININE-BSD FRML MDRD: >90 ML/MIN/{1.73_M2}
GLUCOSE SERPL-MCNC: 97 MG/DL (ref 70–99)
HDLC SERPL-MCNC: 83 MG/DL
LDLC SERPL CALC-MCNC: 121 MG/DL
NONHDLC SERPL-MCNC: 145 MG/DL
POTASSIUM SERPL-SCNC: 4.7 MMOL/L (ref 3.4–5.3)
PROT SERPL-MCNC: 7.3 G/DL (ref 6.8–8.8)
SODIUM SERPL-SCNC: 134 MMOL/L (ref 133–144)
TRIGL SERPL-MCNC: 118 MG/DL

## 2020-11-17 ENCOUNTER — ANCILLARY PROCEDURE (OUTPATIENT)
Dept: MAMMOGRAPHY | Facility: CLINIC | Age: 70
End: 2020-11-17
Attending: PHYSICIAN ASSISTANT
Payer: COMMERCIAL

## 2020-11-17 DIAGNOSIS — Z12.31 VISIT FOR SCREENING MAMMOGRAM: ICD-10-CM

## 2020-11-17 PROCEDURE — 77067 SCR MAMMO BI INCL CAD: CPT | Performed by: RADIOLOGY

## 2020-11-24 DIAGNOSIS — Z12.11 SCREENING FOR COLON CANCER: ICD-10-CM

## 2020-11-24 PROCEDURE — 82274 ASSAY TEST FOR BLOOD FECAL: CPT | Performed by: PHYSICIAN ASSISTANT

## 2020-11-27 LAB — HEMOCCULT STL QL IA: NEGATIVE

## 2021-01-18 ENCOUNTER — TELEPHONE (OUTPATIENT)
Dept: INTERNAL MEDICINE | Facility: CLINIC | Age: 71
End: 2021-01-18

## 2021-01-18 DIAGNOSIS — F17.200 TOBACCO USE DISORDER: Primary | ICD-10-CM

## 2021-01-18 NOTE — TELEPHONE ENCOUNTER
Can you place a order or need to see patient, according to chart she will be due on 02/18/21 for another 12 month screening?    Gloria Thomas

## 2021-01-18 NOTE — TELEPHONE ENCOUNTER
Please print and fax [facility] to [facility], pt can then call and schedule:    [facility]: [facility], [address]  [address] [place] [address]  Scheduling Phone: [phone]      Scheduling Fax: [phone]

## 2021-01-18 NOTE — TELEPHONE ENCOUNTER
Patient was in to see pcp 10/14/2020 and she was suppose to be getting some information on scheduling a  lung scan at [facility].  Please place order and call patient at [phone] when this has been done.

## 2021-02-22 ENCOUNTER — TRANSFERRED RECORDS (OUTPATIENT)
Dept: HEALTH INFORMATION MANAGEMENT | Facility: CLINIC | Age: 71
End: 2021-02-22

## 2021-02-22 ENCOUNTER — MYC MEDICAL ADVICE (OUTPATIENT)
Dept: FAMILY MEDICINE | Facility: CLINIC | Age: 71
End: 2021-02-22

## 2021-02-22 DIAGNOSIS — R91.8 PULMONARY NODULES: Primary | ICD-10-CM

## 2021-03-01 DIAGNOSIS — R91.8 PULMONARY NODULES: Primary | ICD-10-CM

## 2021-03-01 NOTE — PROGRESS NOTES
Called and discussed nodule size increased from 3 mm last year to almost 2 cm, concern for malignancy, will get pet scan at [facility].  Scheduling number given to pt.  She will call today to schedule.

## 2021-03-08 ENCOUNTER — HOSPITAL ENCOUNTER (OUTPATIENT)
Dept: PET IMAGING | Facility: CLINIC | Age: 71
Discharge: HOME | End: 2021-03-08
Attending: PHYSICIAN ASSISTANT | Admitting: PHYSICIAN ASSISTANT
Payer: COMMERCIAL

## 2021-03-08 DIAGNOSIS — R91.8 PULMONARY NODULES: ICD-10-CM

## 2021-03-08 PROCEDURE — 78816 PET IMAGE W/CT FULL BODY: CPT | Mod: PI

## 2021-03-08 PROCEDURE — 78816 PET IMAGE W/CT FULL BODY: CPT | Mod: 26

## 2021-03-08 PROCEDURE — 71260 CT THORAX DX C+: CPT | Mod: 26

## 2021-03-08 PROCEDURE — 343N000001 HC RX 343: Performed by: PHYSICIAN ASSISTANT

## 2021-03-08 PROCEDURE — 250N000011 HC RX IP 250 OP 636: Performed by: PHYSICIAN ASSISTANT

## 2021-03-08 PROCEDURE — 71260 CT THORAX DX C+: CPT

## 2021-03-08 PROCEDURE — A9552 F18 FDG: HCPCS | Performed by: PHYSICIAN ASSISTANT

## 2021-03-08 RX ORDER — IOPAMIDOL 755 MG/ML
10-135 INJECTION, SOLUTION INTRAVASCULAR ONCE
Status: COMPLETED | OUTPATIENT
Start: 2021-03-08 | End: 2021-03-08

## 2021-03-08 RX ADMIN — FLUDEOXYGLUCOSE F-18 12.02 MCI.: 500 INJECTION, SOLUTION INTRAVENOUS at 14:24

## 2021-03-08 RX ADMIN — IOPAMIDOL 82 ML: 755 INJECTION, SOLUTION INTRAVENOUS at 14:24

## 2021-03-09 ENCOUNTER — TELEPHONE (OUTPATIENT)
Dept: FAMILY MEDICINE | Facility: CLINIC | Age: 71
End: 2021-03-09

## 2021-03-09 DIAGNOSIS — R91.1 PULMONARY NODULE LESS THAN 1 CM IN DIAMETER WITH MODERATE TO HIGH RISK FOR MALIGNANT NEOPLASM: Primary | ICD-10-CM

## 2021-03-09 DIAGNOSIS — Z91.89 PULMONARY NODULE LESS THAN 1 CM IN DIAMETER WITH MODERATE TO HIGH RISK FOR MALIGNANT NEOPLASM: Primary | ICD-10-CM

## 2021-03-09 NOTE — TELEPHONE ENCOUNTER
Called pt with Pet scan results.  Discussed referral was placed for oncology.  They will call her once they have reviewed the referral and get her in for an appointment.  She will wait for their call.  No additional questions at this time.

## 2021-03-09 NOTE — TELEPHONE ENCOUNTER
Called Oncology per Adrienne she states that they have 24 hours from the time they received the referral to review it and will contact the patient to schedule once the referral is placed and reviewed.     Please let patient know of this, and to expect a call, or have you call and consult with a Oncology doctor on call.     Diana Vee/

## 2021-03-09 NOTE — TELEPHONE ENCOUNTER
with moderate to high risk for malignant neoplasm   Order: Oncology/Hematology Adult Referral     Cancer Clinic   [address]   [facility][1] [id number]   Phone: [phone]

## 2021-03-09 NOTE — TELEPHONE ENCOUNTER
Pt needs urgent referral to oncology for pulmonary nodule in [place], has suspicious PET scan read as primary lung cancer until diagnosed otherwise.    I haven't called pt yet, want to see when we can get her appointment for when I call:    for malignant neoplasm   Order: Oncology/Hematology Adult Referral     Cancer Clinic   [address]   [facility] [id number]   Phone: [phone]

## 2021-03-12 NOTE — TELEPHONE ENCOUNTER
Action    Action Taken 3/12/21:    -Spoke w/ [facility] med rec - they will push imaging to [place] PACS shortly.    -Imaging resolved to PACS  2:50 PM       RECORDS STATUS - ALL OTHER DIAGNOSIS      RECORDS RECEIVED FROM: [facility]   DATE RECEIVED: 3/12   NOTES STATUS DETAILS   OFFICE NOTE from referring provider Cassandra Elder PA-C in [facility]: 10/14/20   OFFICE NOTE from medical oncologist     DISCHARGE SUMMARY from hospital     DISCHARGE REPORT from the ER     OPERATIVE REPORT     MEDICATION LIST [facility]    CLINICAL TRIAL TREATMENTS TO DATE     LABS     PATHOLOGY REPORTS     ANYTHING RELATED TO DIAGNOSIS Epic 11/24/20   GENONOMIC TESTING     TYPE:     IMAGING (NEED IMAGES & REPORT)     CT SCANS PACS 2/22/21: [facility], Report in [place]    PACS/Epic:  3/8/21   MRI     MAMMO     ULTRASOUND     PET PACS 3/8/21: Epic

## 2021-03-16 ENCOUNTER — PRE VISIT (OUTPATIENT)
Dept: PULMONOLOGY | Facility: CLINIC | Age: 71
End: 2021-03-16

## 2021-03-16 ENCOUNTER — VIRTUAL VISIT (OUTPATIENT)
Dept: PULMONOLOGY | Facility: CLINIC | Age: 71
End: 2021-03-16
Attending: PHYSICIAN ASSISTANT
Payer: COMMERCIAL

## 2021-03-16 DIAGNOSIS — R91.1 PULMONARY NODULE LESS THAN 1 CM IN DIAMETER WITH MODERATE TO HIGH RISK FOR MALIGNANT NEOPLASM: ICD-10-CM

## 2021-03-16 DIAGNOSIS — Z91.89 PULMONARY NODULE LESS THAN 1 CM IN DIAMETER WITH MODERATE TO HIGH RISK FOR MALIGNANT NEOPLASM: ICD-10-CM

## 2021-03-16 PROCEDURE — 99214 OFFICE O/P EST MOD 30 MIN: CPT | Mod: 95 | Performed by: INTERNAL MEDICINE

## 2021-03-16 NOTE — TELEPHONE ENCOUNTER
Lung Nodule Conference      Patient Name: Reanna Kumar    Reason for conference discussion (brief overview): 70F, good functional status, still working for fun, 2 flights of stairs ok. No COPD or asthma dx.  Still smoking. Small but pet avid RLL nodule which has increased in size, no lymphadenopathy. PFTs ordered.     Specific Question:    EBUS with attempt at kai followed by surg or XRT vs. Straight to surgery     Pertinent Histology:      Referring Physician: Dr. Stevo Mustafa    The patient's case was presented at the multidisciplinary conference for the above noted reason.  There was a consensus recommendation for the following actions:     Recommendation is for patient to see thoracic surgery to discuss a resection.   PFT's will be arranged prior.    Case Lead:  {RENETTA White, CNS    Interventional Radiology Staff Present: TYRA Miranda will call patient and place referral for Dr. Mustafa, order PFTs

## 2021-03-16 NOTE — LETTER
3/16/2021       RE: Reanna Kumar  [address]  [place] [address]     Dear Colleague,    Thank you for referring your patient, Reanna Kumar, to the [facility] at [facility]. Please see a copy of my visit note below.    Manda is a 70 year old who is being evaluated via a billable telephone visit.      What phone number would you like to be contacted at? [phone]  How would you like to obtain your AVS? MyChart    70F with history of smoking, found to have enlarging RLL nodule on screening chest CT.    No bronchitis syndrome.    Good functional status.    Currently smoking.    No other issues.    CT chest reviewed by me and discussed with the patient.  PET avid nodule with no other evident disease.  Small lingula nodule.    Plan for likely EBUS/Guillermo bronch followed by XRT or surgery, or straight to surgery.    PFT ordered.    Will discuss at nodule conference.    25 minutes spent on this telephone visit.              Again, thank you for allowing me to participate in the care of your patient.      Sincerely,    Stevo Mustafa MD

## 2021-03-16 NOTE — PROGRESS NOTES
Manda is a 70 year old who is being evaluated via a billable telephone visit.      What phone number would you like to be contacted at? [phone]  How would you like to obtain your AVS? Victoriano    70F with history of smoking, found to have enlarging RLL nodule on screening chest CT.    No bronchitis syndrome.    Good functional status.    Currently smoking.    No other issues.    CT chest reviewed by me and discussed with the patient.  PET avid nodule with no other evident disease.  Small lingula nodule.    Plan for likely EBUS/Guillermo bronch followed by XRT or surgery, or straight to surgery.    PFT ordered.    Will discuss at nodule conference.    25 minutes spent on this telephone visit.

## 2021-03-19 ENCOUNTER — TEAM CONFERENCE (OUTPATIENT)
Dept: SURGERY | Facility: CLINIC | Age: 71
End: 2021-03-19

## 2021-03-19 DIAGNOSIS — R91.1 NODULE OF RIGHT LUNG: Primary | ICD-10-CM

## 2021-03-19 NOTE — TELEPHONE ENCOUNTER
I called Manda to discuss the recommendations after reviewing her case at Lung Nodule Conference.   The recommendation is for her to get PFT's and meet with thoracic surgery to discuss a resection of the enlarging RLL nodule.    Referral was placed and schedulers will be in touch.  She is in agreement with this plan.

## 2021-03-25 NOTE — PATIENT INSTRUCTIONS
Patient Education   Personalized Prevention Plan  You are due for the preventive services outlined below.  Your care team is available to assist you in scheduling these services.  If you have already completed any of these items, please share that information with your care team to update in your medical record.  Health Maintenance Due   Topic Date Due     Zoster (Shingles) Vaccine (1 of 2) 03/20/1999     PHQ-2  01/01/2019     Cholesterol Lab  08/03/2019     FALL RISK ASSESSMENT  08/03/2019     Colonscopy  07/23/2019     Annual Wellness Visit  08/03/2019       Exercise for a Healthier Heart     Exercise with a friend. When activity is fun, you're more likely to stick with it.   You may wonder how you can improve the health of your heart. If you re thinking about exercise, you re on the right track. You don t need to become an athlete, but you do need a certain amount of brisk exercise to help strengthen your heart. If you have been diagnosed with a heart condition, your doctor may recommend exercise to help stabilize your condition. To help make exercise a habit, choose safe, fun activities.  Be sure to check with your healthcare provider before starting an exercise program.   Why exercise?  Exercising regularly offers many healthy rewards. It can help you do all of the following:    Improve your blood cholesterol level to help prevent further heart trouble    Lower your blood pressure to help prevent a stroke or heart attack    Control diabetes, or reduce your risk of getting this disease    Improve your heart and lung function    Reach and maintain a healthy weight    Make your muscles stronger and more limber so you can stay active    Prevent falls and fractures by slowing the loss of bone mass (osteoporosis)    Manage stress better    Reduce your blood pressure    Improve your sense of self and your body image  Exercise tips  Ease into your routine. Set small goals. Then build on them.  Exercise on most days.  Introduced child life services. Emotional support provided. Coloring pages provided for play.   Aim for a total of 150 or more minutes of moderate to  vigorous intensity activity each week. Consider 40 minutes, 3 to 4 times a week. For best results, activity should last for 40 minutes on average. It is OK to work up to the 40 minute period over time. Examples of moderate-intensity activity is walking 1 mile in 15 minutes or 30 to 45 minutes of yard work.  Step up your daily activity level. Along with your exercise program, try being more active throughout the day. Walk instead of drive. Do more household tasks or yard work.  Choose one or more activities you enjoy. Walking is one of the easiest things you can do. You can also try swimming, riding a bike, dancing, or taking an exercise class.  Stop exercising and call your doctor if you:    Have chest pain or feel dizzy or lightheaded    Feel burning, tightness, pressure, or heaviness in your chest, neck, shoulders, back, or arms    Have unusual shortness of breath    Have increased joint or muscle pain    Have palpitations or an irregular heartbeat   Date Last Reviewed: 5/1/2016 2000-2018 [employer]. [address] [place] [address]. All rights reserved. This information is not intended as a substitute for professional medical care. Always follow your healthcare professional's instructions.          Understanding USDA MyPlate  The USDA (U.S. Department of Agriculture) has guidelines to help you make healthy food choices. These are called MyPlate. MyPlate shows the food groups that make up healthy meals using the image of a place setting. Before you eat, think about the healthiest choices for what to put onto your plate or into your cup or bowl. To learn more about building a healthy plate, visit www.choosemyplate.gov.    The food groups    Fruits. Any fruit or 100% fruit juice counts as part of the Fruit Group. Fruits may be fresh, canned, frozen, or dried, and may be whole, cut-up, or pureed. Make half your plate fruits and  vegetables.    Vegetables. Any vegetable or 100% vegetable juice counts as a member of the Vegetable Group. Vegetables may be fresh, frozen, canned, or dried. They can be served raw or cooked and may be whole, cut-up, or mashed. Make half your plate fruits and vegetables.    Grains. All foods made from grains are part of the Grains Group. These include wheat, rice, oats, cornmeal, and barley such as bread, pasta, oatmeal, cereal, tortillas, and grits. Grains should be no more than a quarter of your plate. At least half of your grains should be whole grains.    Protein. This group includes meat, poultry, seafood, beans and peas, eggs, processed soy products (like tofu), nuts (including nut butters), and seeds. Make protein choices no more than a quarter of your plate. Meat and poultry choices should be lean or low fat.    Dairy. All fluid milk products and foods made from milk that contain calcium, like yogurt and cheese, are part of the Dairy Group. (Foods that have little calcium, such as cream, butter, and cream cheese, are not part of the group.) Most dairy choices should be low-fat or fat-free.    Oils. These are fats that are liquid at room temperature. They include canola, corn, olive, soybean, and sunflower oil. Foods that are mainly oil include mayonnaise, certain salad dressings, and soft margarines. You should have only 5 to 7 teaspoons of oils a day. You probably already get this much from the food you eat.  Date Last Reviewed: 8/1/2017 2000-2018 [employer]. [address] [place] [address], [place] [address]. All rights reserved. This information is not intended as a substitute for professional medical care. Always follow your healthcare professional's instructions.          Signs of Hearing Loss     Hearing much better with one ear can be a sign of hearing loss.     Hearing loss is a problem shared by many people. In fact, it is one of the most common health conditions, particularly as people  age. Most people over age 65 have some hearing loss, and by age 80, almost everyone does. Because hearing loss usually occurs slowly over the years, you may not realize your hearing ability has gotten worse.  Have your hearing checked  Contact your healthcare provider if you:    Have to strain to hear normal conversation    Have to watch other people s faces very carefully to follow what they re saying    Need to ask people to repeat what they ve said    Often misunderstand what people are saying    Turn the volume of the television or radio up so high that others complain    Feel that people are mumbling when they re talking to you    Find that the effort to hear leaves you feeling tired and irritated    Notice, when using the phone, that you hear better with one ear than the other  Date Last Reviewed: 12/1/2016 2000-2018 [employer]. [address] [place] [address]. All rights reserved. This information is not intended as a substitute for professional medical care. Always follow your healthcare professional's instructions.          Urinary Incontinence, Female (Adult)  Urinary incontinence means loss of control of the bladder. This problem affects many women, especially as they get older. If you have incontinence, you may be embarrassed to ask for help. But know that this problem can be treated.  Types of Incontinence  There are different types of incontinence. Two of the main types are described here. You can have more than one type.    Stress incontinence. With this type, urine leaks when pressure (stress) is put on the bladder. This may happen when you cough, sneeze, or laugh. Stress incontinence most often occurs because the pelvic floor muscles that support the bladder and urethra are weak. This can happen after pregnancy and vaginal childbirth or a hysterectomy. It can also be due to excess body weight or hormone changes.    Urge incontinence (also called overactive bladder). With  this type, a sudden urge to urinate is felt often. This may happen even though there may not be much urine in the bladder. The need to urinate often during the night is common. Urge incontinence most often occurs because of bladder spasms. This may be due to bladder irritation or infection. Damage to bladder nerves or pelvic muscles, constipation, and certain medicines can also lead to urge incontinence.  Treatment of urinary incontinence depends on the cause. Further evaluation is needed to find the type you have. This will likely include an exam and certain tests. Based on the results, you and your healthcare provider can then plan treatment. Until a diagnosis is made, the home care tips below can help relieve symptoms.  Home care    Do pelvic floor muscle exercises, if they are prescribed. The pelvic floor muscles help support the bladder and urethra. Many women find that their symptoms improve when doing special exercises that strengthen these muscles. To do the exercises contract the muscles you would use to stop your stream of urine, but do this when you re not urinating. Hold for 10 seconds, then relax. Repeat 10 to 20 times in a row, at least 3 times a day. Your provider may give you other instructions for how to do the exercises and how often.    Keep a bladder diary. This helps track how often and how much you urinate over a set period of time. Bring this diary with you to your next visit with the provider. The information can help your provider learn more about your bladder problem.    Lose weight, if advised to by your provider. Excess weight puts pressure on the bladder. Your provider can help you create a weight-loss plan that s right for you. This may include exercising more and making certain diet changes.    Don't consume foods and drinks that may irritate the bladder. These can include alcohol and caffeinated drinks.    Quit smoking. Smoking and other tobacco use can lead to chronic cough that  strains the pelvic floor muscles. Smoking may also damage the bladder and urethra. Talk with your provider about treatments or methods you can use to quit smoking.    If drinking large amounts of fluid causes you to have symptoms, you may be advised to limit your fluid intake. You may also be advised to drink most of your fluids during the day and to limit fluids at night.    If you re worried about urine leakage or accidents, you may wear absorbent pads to catch urine. Change the pads often. This helps reduce discomfort. It may also reduce the risk of skin or bladder infections.  Follow-up care  Follow up with your healthcare provider, or as directed. It may take some to find the right treatment for your problem. Your treatment plan may include special therapies or medicines. Certain procedures or surgery may also be options. Be sure to discuss any questions you have with your provider.  When to seek medical advice  Call the healthcare provider right away if any of these occur:    Fever of 100.4 F (38 C) or higher, or as directed by your provider    Bladder pain or fullness    Abdominal swelling    Nausea or vomiting    Back pain    Weakness, dizziness or fainting  Date Last Reviewed: 10/1/2017    0781-2193 [employer]. [address] [place] [address]. All rights reserved. This information is not intended as a substitute for professional medical care. Always follow your healthcare professional's instructions.

## 2021-03-30 NOTE — PROGRESS NOTES
"Manda is a 71 year old who is being evaluated via a billable video visit.      How would you like to obtain your AVS? MyChart  If the video visit is dropped, the invitation should be resent by: Send to e-mail at: [email or web address]  Will anyone else be joining your video visit? No     Vitals - Patient Reported  Weight (Patient Reported): 63 kg (139 lb)  Height (Patient Reported): 156.2 cm (5' 1.5\")  BMI (Based on Pt Reported Ht/Wt): 25.84  Pain Score: No Pain (0)    Sabina Scherer CMA March 31, 2021  3:29 PM         Video Start Time: 4:18  Video-Visit Details    Type of service:  Video Visit    Video End Time: 4:38    Originating Location (pt. Location): Home    Distant Location (provider location):  [facility]     Platform used for Video Visit: [facility]      THORACIC SURGERY - NEW PATIENT OFFICE VISIT      Dear Dr. Gong, Cassandra Ingram,    I saw Ms. Kumar at [facility]'s ([facility]) request in consultation for the evaluation and treatment of a RLL indeterminate pulmonary nodule.     HPI  Ms. Reanna Kumar is a 71 year old F with history of smoking who presents with RLL nodule which is 1.2x1.0 cm in size in addition to a small lingular nodule. She does currently smoke. She reports good functional status. Is able to climb 2 flights of stairs without issue.      ECOG 0    Previsit Tests   PFT 3/31/21: FEV1 3L 97%. DLCO 98%.     PET CT Chest 3/8/2021: 1.2 x 1.0 cm  Hypermetabolic RLL nodule (max SUV 3.6). No mediastinal adenopathy or suspicious hypermetabolic activity elsewhere.         CT Chest 2/22/2021: RLL spiculated nodule.             PMH  Right hip arthroscopy   Past Medical History:   Diagnosis Date     Hyperlipidemia      Insomnia 2015      Past Surgical History:   Procedure Laterality Date     ARTHROPLASTY HIP Right 6/2/2020    Procedure: RIGHT TOTAL HIP ARTHROPLASTY;  Surgeon: Augie Murray MD;  Location: [facility] OR     HIP SURGERY       JOINT REPLACEMENT Left 2008 "    hip        Allergies   Allergen Reactions     Zyban [Bupropion Hydrobromide]      Increased anxiety     Current Outpatient Medications   Medication     Cholecalciferol (VITAMIN D) 1000 UNITS capsule     QUEtiapine (SEROQUEL) 50 MG tablet     rosuvastatin (CRESTOR) 20 MG tablet     amoxicillin (AMOXIL) 500 MG tablet     No current facility-administered medications for this visit.        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       IMPRESSION   71 year-old female current smoker, with indeterminate RLL pulmonary nodule.  I had an extensive discussion with the patient and her family regarding the rationale for surgery, the alternatives risks and benefits of the procedure. I explained the expected hospital stay, postoperative course, recovery time, diet and activity restrictions. Informed consent was obtained and they agreed to proceed.    Stage  [id number] by PET.     PLAN  I spent a total of 30 minutes with Ms. Kumar, reviewing her chart and imaging studies. I reviewed the plan as follows:    Procedure planned: Uniportal thoracoscopic segmentectomy (S6), if positive for malignancy then we will proceed with lobectomy and MLND (nodule is to central and unlikely to achieve adequate margins with a segmentectomy).   Necessary Preop Testing: In-person visit and PAC same day.   Regional Anesthesia Plan: Intraop intercostal nerve block.   Anticoagulation Plan: Enoxaparin in preop.  Smoking cessation: I strongly encouraged her to quit smoking at least 3 weeks prior to surgery however, if she is unable to quit we will still proceed with surgery.     They had all their questions answered and were in agreement with the plan.  I appreciate the opportunity to participate in the care of your patient and will keep you updated.  Sincerely,    Palomo Galindo MD

## 2021-03-31 ENCOUNTER — VIRTUAL VISIT (OUTPATIENT)
Dept: SURGERY | Facility: CLINIC | Age: 71
End: 2021-03-31
Attending: CLINICAL NURSE SPECIALIST
Payer: COMMERCIAL

## 2021-03-31 DIAGNOSIS — R91.1 NODULE OF RIGHT LUNG: ICD-10-CM

## 2021-03-31 LAB
6 MIN WALK (FT): 1215 FT
6 MIN WALK (M): 370 M

## 2021-03-31 PROCEDURE — 999N001193 HC VIDEO/TELEPHONE VISIT; NO CHARGE

## 2021-03-31 PROCEDURE — 94618 PULMONARY STRESS TESTING: CPT | Performed by: INTERNAL MEDICINE

## 2021-03-31 PROCEDURE — 94726 PLETHYSMOGRAPHY LUNG VOLUMES: CPT | Performed by: INTERNAL MEDICINE

## 2021-03-31 PROCEDURE — 99204 OFFICE O/P NEW MOD 45 MIN: CPT | Mod: 95 | Performed by: THORACIC SURGERY (CARDIOTHORACIC VASCULAR SURGERY)

## 2021-03-31 PROCEDURE — 94060 EVALUATION OF WHEEZING: CPT | Performed by: INTERNAL MEDICINE

## 2021-03-31 PROCEDURE — 94729 DIFFUSING CAPACITY: CPT | Performed by: INTERNAL MEDICINE

## 2021-03-31 NOTE — LETTER
3/31/2021         RE: Reanna Kumar  [address]  [place] [address]        Dear Colleague,    Thank you for referring your patient, Reanna Kumar, to the [facility]. Please see a copy of my visit note below.    THORACIC SURGERY - NEW PATIENT OFFICE VISIT      Dear Dr. Gong, Cassandra Ingram,    I saw Ms. Kumar at [facility]'s ([facility] request in consultation for the evaluation and treatment of a RLL indeterminate pulmonary nodule.     HPI  Ms. Reanna Kumar is a 71 year old F with history of smoking who presents with RLL nodule which is 1.2x1.0 cm in size in addition to a small lingular nodule. She does currently smoke. She reports good functional status. Is able to climb 2 flights of stairs without issue.      ECOG 0    Previsit Tests   PFT 3/31/21: FEV1 3L 97%. DLCO 98%.     PET CT Chest 3/8/2021: 1.2 x 1.0 cm  Hypermetabolic RLL nodule (max SUV 3.6). No mediastinal adenopathy or suspicious hypermetabolic activity elsewhere.         CT Chest 2/22/2021: RLL spiculated nodule.             PMH  Right hip arthroscopy   Past Medical History:   Diagnosis Date     Hyperlipidemia      Insomnia 2015      Past Surgical History:   Procedure Laterality Date     ARTHROPLASTY HIP Right 6/2/2020    Procedure: RIGHT TOTAL HIP ARTHROPLASTY;  Surgeon: Augie Murray MD;  Location: [facility] OR     HIP SURGERY       JOINT REPLACEMENT Left 2008    hip        Allergies   Allergen Reactions     Zyban [Bupropion Hydrobromide]      Increased anxiety     Current Outpatient Medications   Medication     Cholecalciferol (VITAMIN D) 1000 UNITS capsule     QUEtiapine (SEROQUEL) 50 MG tablet     rosuvastatin (CRESTOR) 20 MG tablet     amoxicillin (AMOXIL) 500 MG tablet     No current facility-administered medications for this visit.        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       IMPRESSION   71 year-old female current smoker, with indeterminate RLL pulmonary nodule.  I had an extensive discussion with the patient and her family regarding the rationale for surgery, the alternatives risks and benefits of the procedure. I explained the expected hospital stay, postoperative course, recovery time, diet and activity restrictions. Informed consent was obtained and they agreed to proceed.    Stage  [id number] by PET.     PLAN  I spent a total of 30 minutes with Ms. Kumar, reviewing her chart and imaging studies. I reviewed the plan as follows:    Procedure planned: Uniportal thoracoscopic segmentectomy (S6), if positive for malignancy then we will proceed with lobectomy and MLND (nodule is to central and unlikely to achieve adequate margins with a segmentectomy).   Necessary Preop Testing: In-person visit and PAC same day.   Regional Anesthesia Plan: Intraop intercostal nerve block.   Anticoagulation Plan: Enoxaparin in preop.  Smoking cessation: I strongly encouraged her to quit smoking at least 3 weeks prior to surgery however, if she is unable to quit we will still proceed with surgery.     They had all their questions answered and were in agreement with the plan.  I appreciate the opportunity to participate in the care of your patient and will keep you updated.    Sincerely,    Palomo Galindo MD

## 2021-04-01 ENCOUNTER — PREP FOR PROCEDURE (OUTPATIENT)
Dept: SURGERY | Facility: CLINIC | Age: 71
End: 2021-04-01

## 2021-04-01 DIAGNOSIS — R91.1 LUNG NODULE: Primary | ICD-10-CM

## 2021-04-01 LAB
DLCOUNC-%PRED-PRE: 98 %
DLCOUNC-PRE: 17.51 ML/MIN/MMHG
DLCOUNC-PRED: 17.72 ML/MIN/MMHG
ERV-%PRED-PRE: 185 %
ERV-PRE: 1.07 L
ERV-PRED: 0.58 L
EXPTIME-PRE: 7.28 SEC
FEF2575-%PRED-POST: 44 %
FEF2575-%PRED-PRE: 57 %
FEF2575-POST: 0.76 L/SEC
FEF2575-PRE: 0.98 L/SEC
FEF2575-PRED: 1.7 L/SEC
FEFMAX-%PRED-PRE: 93 %
FEFMAX-PRE: 4.81 L/SEC
FEFMAX-PRED: 5.12 L/SEC
FEV1-%PRED-PRE: 97 %
FEV1-PRE: 1.93 L
FEV1FEV6-PRE: 66 %
FEV1FEV6-PRED: 79 %
FEV1FVC-PRE: 64 %
FEV1FVC-PRED: 78 %
FEV1SVC-PRE: 63 %
FEV1SVC-PRED: 73 %
FIFMAX-PRE: 2.81 L/SEC
FRCPLETH-%PRED-PRE: 141 %
FRCPLETH-PRE: 3.65 L
FRCPLETH-PRED: 2.57 L
FVC-%PRED-PRE: 118 %
FVC-PRE: 3.02 L
FVC-PRED: 2.55 L
IC-%PRED-PRE: 94 %
IC-PRE: 2.01 L
IC-PRED: 2.13 L
RVPLETH-%PRED-PRE: 130 %
RVPLETH-PRE: 2.57 L
RVPLETH-PRED: 1.98 L
TLCPLETH-%PRED-PRE: 125 %
TLCPLETH-PRE: 5.65 L
TLCPLETH-PRED: 4.52 L
VA-%PRED-PRE: 110 %
VA-PRE: 4.65 L
VC-%PRED-PRE: 113 %
VC-PRE: 3.08 L
VC-PRED: 2.7 L

## 2021-04-01 RX ORDER — CEFAZOLIN SODIUM 2 G/50ML
2 SOLUTION INTRAVENOUS
Status: CANCELLED | OUTPATIENT
Start: 2021-04-01

## 2021-04-01 RX ORDER — ACETAMINOPHEN 325 MG/1
975 TABLET ORAL ONCE
Status: CANCELLED | OUTPATIENT
Start: 2021-04-01 | End: 2021-04-01

## 2021-04-01 RX ORDER — CEFAZOLIN SODIUM 2 G/50ML
2 SOLUTION INTRAVENOUS SEE ADMIN INSTRUCTIONS
Status: CANCELLED | OUTPATIENT
Start: 2021-04-01

## 2021-04-05 ENCOUNTER — MYC MEDICAL ADVICE (OUTPATIENT)
Dept: FAMILY MEDICINE | Facility: CLINIC | Age: 71
End: 2021-04-05

## 2021-04-05 DIAGNOSIS — F17.200 TOBACCO USE DISORDER: Primary | ICD-10-CM

## 2021-04-07 ENCOUNTER — TELEPHONE (OUTPATIENT)
Dept: SURGERY | Facility: CLINIC | Age: 71
End: 2021-04-07

## 2021-04-07 NOTE — TELEPHONE ENCOUNTER
Called patient to schedule procedure with Dr. Palomo Thomas, there was no answer.  Left message with my direct line [phone].

## 2021-04-08 PROBLEM — R91.1 LUNG NODULE: Status: ACTIVE | Noted: 2021-04-08

## 2021-04-08 NOTE — TELEPHONE ENCOUNTER
Spoke with patient to schedule procedure with Dr. Palomo Thomas   Procedure was scheduled on 05/10 at [facility] OR  Patient will have H&P with PAC     Patient is aware a COVID-19 test is needed before their procedure. The test should be with-in 4 days of their procedure.   Test Details: Date 05/07 Location [facility]    Patient is aware a / is needed day of surgery.   Surgery letter was sent via [employer] and mail, patient has my direct contact information for any further questions.

## 2021-04-09 NOTE — TELEPHONE ENCOUNTER
FUTURE VISIT INFORMATION      SURGERY INFORMATION:    Date: 5.10.21    Location: [facility] OR    Surgeon:  Dr. Kian Thomas    Anesthesia Type:  general    Procedure: Possible lobectomy and mediastinal lymphadenectomy    Consult: 3.31.21    RECORDS REQUESTED FROM:       Primary Care Provider: Cassandra Gong    Most recent EKG+ Tracin20    Most recent ECHO: 19 exercise stress echo    Most recent PFT's:3.31.21

## 2021-04-16 ENCOUNTER — MYC MEDICAL ADVICE (OUTPATIENT)
Dept: FAMILY MEDICINE | Facility: CLINIC | Age: 71
End: 2021-04-16

## 2021-04-16 DIAGNOSIS — F17.200 TOBACCO USE DISORDER: Primary | ICD-10-CM

## 2021-04-16 RX ORDER — VARENICLINE TARTRATE 1 MG/1
1 TABLET, FILM COATED ORAL 2 TIMES DAILY
Qty: 60 TABLET | Refills: 2 | Status: SHIPPED | OUTPATIENT
Start: 2021-04-16 | End: 2021-05-03

## 2021-04-16 NOTE — TELEPHONE ENCOUNTER
Pharmacy calling the RX sent for Chantix starting pack is not typical directions do you want the directions as sent?     If not please send new RX?      Eleonora Johnston RN

## 2021-04-16 NOTE — TELEPHONE ENCOUNTER
They are completely different medications.  Will send over chantix.  She can use it with the patch.  Let me know if she needs a prescription for the patch as well and if yes, how much a day she is currently smoking.

## 2021-04-16 NOTE — TELEPHONE ENCOUNTER
"Routing to PCP,    Copied Antengo message from other encounter (4/16-today), patient sends follow-up from current thread (below)    \"Wondering if the Chantix medication to assist in stopping smoking is different from the welbutrin I tried years ago. I think I should give it a try. I have the patches and can they be used together?  If so, do you need to order it through my  pharmacy? Let me know  . . .thanks\"    Hilario Dye RN   "

## 2021-04-20 DIAGNOSIS — Z11.59 ENCOUNTER FOR SCREENING FOR OTHER VIRAL DISEASES: ICD-10-CM

## 2021-04-28 ENCOUNTER — MYC MEDICAL ADVICE (OUTPATIENT)
Dept: FAMILY MEDICINE | Facility: CLINIC | Age: 71
End: 2021-04-28

## 2021-04-29 ENCOUNTER — ANESTHESIA EVENT (OUTPATIENT)
Dept: SURGERY | Facility: CLINIC | Age: 71
DRG: 164 | End: 2021-04-29
Payer: COMMERCIAL

## 2021-04-29 ENCOUNTER — OFFICE VISIT (OUTPATIENT)
Dept: SURGERY | Facility: CLINIC | Age: 71
End: 2021-04-29
Payer: COMMERCIAL

## 2021-04-29 ENCOUNTER — PRE VISIT (OUTPATIENT)
Dept: SURGERY | Facility: CLINIC | Age: 71
End: 2021-04-29

## 2021-04-29 VITALS
HEART RATE: 91 BPM | TEMPERATURE: 98.9 F | BODY MASS INDEX: 24.91 KG/M2 | WEIGHT: 140.6 LBS | RESPIRATION RATE: 18 BRPM | HEIGHT: 63 IN | DIASTOLIC BLOOD PRESSURE: 86 MMHG | OXYGEN SATURATION: 96 % | SYSTOLIC BLOOD PRESSURE: 127 MMHG

## 2021-04-29 DIAGNOSIS — R91.8 PULMONARY NODULES: ICD-10-CM

## 2021-04-29 DIAGNOSIS — Z01.818 PREOP EXAMINATION: Primary | ICD-10-CM

## 2021-04-29 LAB
ANION GAP SERPL CALCULATED.3IONS-SCNC: 6 MMOL/L (ref 3–14)
BUN SERPL-MCNC: 13 MG/DL (ref 7–30)
CALCIUM SERPL-MCNC: 10.4 MG/DL (ref 8.5–10.1)
CHLORIDE SERPL-SCNC: 103 MMOL/L (ref 94–109)
CO2 SERPL-SCNC: 30 MMOL/L (ref 20–32)
CREAT SERPL-MCNC: 0.67 MG/DL (ref 0.52–1.04)
ERYTHROCYTE [DISTWIDTH] IN BLOOD BY AUTOMATED COUNT: 12.6 % (ref 10–15)
GFR SERPL CREATININE-BSD FRML MDRD: 88 ML/MIN/{1.73_M2}
GLUCOSE SERPL-MCNC: 94 MG/DL (ref 70–99)
HCT VFR BLD AUTO: 47.1 % (ref 35–47)
HGB BLD-MCNC: 15.3 G/DL (ref 11.7–15.7)
MCH RBC QN AUTO: 32.8 PG (ref 26.5–33)
MCHC RBC AUTO-ENTMCNC: 32.5 G/DL (ref 31.5–36.5)
MCV RBC AUTO: 101 FL (ref 78–100)
PLATELET # BLD AUTO: 340 10E9/L (ref 150–450)
POTASSIUM SERPL-SCNC: 4.2 MMOL/L (ref 3.4–5.3)
RBC # BLD AUTO: 4.67 10E12/L (ref 3.8–5.2)
SODIUM SERPL-SCNC: 139 MMOL/L (ref 133–144)
WBC # BLD AUTO: 6.6 10E9/L (ref 4–11)

## 2021-04-29 PROCEDURE — 86900 BLOOD TYPING SEROLOGIC ABO: CPT | Performed by: PATHOLOGY

## 2021-04-29 PROCEDURE — 99203 OFFICE O/P NEW LOW 30 MIN: CPT | Performed by: CLINICAL NURSE SPECIALIST

## 2021-04-29 PROCEDURE — 80048 BASIC METABOLIC PNL TOTAL CA: CPT | Performed by: PATHOLOGY

## 2021-04-29 PROCEDURE — 86850 RBC ANTIBODY SCREEN: CPT | Performed by: PATHOLOGY

## 2021-04-29 PROCEDURE — 36415 COLL VENOUS BLD VENIPUNCTURE: CPT | Performed by: PATHOLOGY

## 2021-04-29 PROCEDURE — 86901 BLOOD TYPING SEROLOGIC RH(D): CPT | Performed by: PATHOLOGY

## 2021-04-29 PROCEDURE — 85027 COMPLETE CBC AUTOMATED: CPT | Performed by: PATHOLOGY

## 2021-04-29 RX ORDER — CHLORHEXIDINE GLUCONATE ORAL RINSE 1.2 MG/ML
15 SOLUTION DENTAL ONCE
Status: CANCELLED | OUTPATIENT
Start: 2021-04-29 | End: 2021-04-29

## 2021-04-29 RX ORDER — CELECOXIB 200 MG/1
200 CAPSULE ORAL ONCE
Status: CANCELLED | OUTPATIENT
Start: 2021-04-29 | End: 2021-04-29

## 2021-04-29 RX ORDER — ACETAMINOPHEN 325 MG/1
975 TABLET ORAL ONCE
Status: CANCELLED | OUTPATIENT
Start: 2021-04-29 | End: 2021-04-29

## 2021-04-29 RX ORDER — GABAPENTIN 300 MG/1
300 CAPSULE ORAL ONCE
Status: CANCELLED | OUTPATIENT
Start: 2021-04-29 | End: 2021-04-29

## 2021-04-29 ASSESSMENT — MIFFLIN-ST. JEOR: SCORE: 1116.89

## 2021-04-29 ASSESSMENT — LIFESTYLE VARIABLES: TOBACCO_USE: 1

## 2021-04-29 ASSESSMENT — PAIN SCALES - GENERAL: PAINLEVEL: NO PAIN (0)

## 2021-04-29 NOTE — ANESTHESIA PREPROCEDURE EVALUATION
Anesthesia Pre-Procedure Evaluation    Patient: Reanna Kumar   MRN: [id number] : 1950        Preoperative Diagnosis: Lung nodule [R91.1]   Procedure : Procedure(s):  Uniportal thoracoscopic right lower lobe segmentectomy (S6)  Possible lobectomy and mediastinal lymphadenectomy     Past Medical History:   Diagnosis Date     Hyperlipidemia      Insomnia      Pulmonary nodules       Past Surgical History:   Procedure Laterality Date     ARTHROPLASTY HIP Right 2020    Procedure: RIGHT TOTAL HIP ARTHROPLASTY;  Surgeon: Augie Murray MD;  Location: [facility] OR     HIP SURGERY       JOINT REPLACEMENT Left     hip      Allergies   Allergen Reactions     Zyban [Bupropion Hydrobromide]      Increased anxiety      Social History     Tobacco Use     Smoking status: Current Every Day Smoker     Packs/day: 1.00     Years: 50.00     Pack years: 50.00     Types: Cigarettes     Smokeless tobacco: Never Used   Substance Use Topics     Alcohol use: Yes     Comment: Occ      Wt Readings from Last 1 Encounters:   21 63.8 kg (140 lb 9.6 oz)        Anesthesia Evaluation   Pt has had prior anesthetic. Type: General.    No history of anesthetic complications       ROS/MED HX  ENT/Pulmonary: Comment: Trying to quit with nicotine patches. Now 4-5 cigs daily.     (+) tobacco use, Current use, 50  Pack-Year Hx,      Neurologic: Comment: Insomnia      Cardiovascular: Comment: Coronary artery calcifications      (+) Dyslipidemia --CAD ---Previous cardiac testing   Echo: Date: Results:    Stress Test: Date: Results:  Stress test with no wall motion abnormalities but reduced functional capacity from expected  ECG Reviewed: Date: 20 Results:  SR, LAE  Cath: Date: Results:   (-) taking anticoagulants/antiplatelets   METS/Exercise Tolerance: >4 METS    Hematologic:  - neg hematologic  ROS     Musculoskeletal: Comment: Bilateral hip replacements, last right in 20      GI/Hepatic:  - neg GI/hepatic ROS  (-) GERD    Renal/Genitourinary:  - neg Renal ROS  (-) renal disease   Endo:  - neg endo ROS     Psychiatric/Substance Use:  - neg psychiatric ROS     Infectious Disease:  - neg infectious disease ROS     Malignancy: Comment: Lung nodules      Other:  - neg other ROS          Physical Exam    Airway        Mallampati: II   TM distance: > 3 FB   Neck ROM: full   Mouth opening: > 3 cm    Respiratory Devices and Support         Dental  no notable dental history         Cardiovascular          Rhythm and rate: regular and normal     Pulmonary           breath sounds clear to auscultation   (+) decreased breath sounds           OUTSIDE LABS:  CBC:   Lab Results   Component Value Date    WBC 5.9 10/14/2020    WBC 7.7 05/19/2020    HGB 15.8 (H) 10/14/2020    HGB 13.5 06/03/2020    HCT 47.7 (H) 10/14/2020    HCT 47.9 (H) 05/19/2020     10/14/2020     05/19/2020     BMP:   Lab Results   Component Value Date     10/14/2020     05/19/2020    POTASSIUM 4.7 10/14/2020    POTASSIUM 4.5 05/19/2020    CHLORIDE 101 10/14/2020    CHLORIDE 103 05/19/2020    CO2 26 10/14/2020    CO2 27 05/19/2020    BUN 17 10/14/2020    BUN 12 05/19/2020    CR 0.61 10/14/2020    CR 0.53 05/19/2020    GLC 97 10/14/2020     (H) 06/03/2020     COAGS:   Lab Results   Component Value Date    PTT 26 01/02/2008    INR 1.48 (H) 01/06/2008     POC: No results found for: BGM, HCG, HCGS  HEPATIC:   Lab Results   Component Value Date    ALBUMIN 4.3 10/14/2020    PROTTOTAL 7.3 10/14/2020    ALT 17 10/14/2020    AST 15 10/14/2020    ALKPHOS 113 10/14/2020    BILITOTAL 0.6 10/14/2020     OTHER:   Lab Results   Component Value Date    LACT 2.1 09/22/2016    A1C 5.8 (H) 10/14/2020    NALINI 10.0 10/14/2020       Anesthesia Plan    ASA Status:  3   NPO Status:  NPO Appropriate    Anesthesia Type: General.   Induction: Intravenous.      Techniques and Equipment:     - Airway: Fiberoptic Bronchoscope     - Lines/Monitors: 2nd IV, Arterial Line     -  Blood: T&C     Consents    Anesthesia Plan(s) and associated risks, benefits, and realistic alternatives discussed. Questions answered and patient/representative(s) expressed understanding.     - Discussed with:  Patient      - Extended Intubation/Ventilatory Support Discussed: No.      - Patient is DNR/DNI Status: No    Use of blood products discussed: Yes.     - Discussed with: Patient.     - Consented: consented to blood products            Reason for refusal: other.     Postoperative Care    Pain management: IV analgesics, Multi-modal analgesia.   PONV prophylaxis: Ondansetron (or other 5HT-3), Dexamethasone or Solumedrol     Comments:              PAC Discussion and Assessment    ASA Classification: 3  Case is suitable for: [facility]  Anesthetic techniques and relevant risks discussed: GA  Invasive monitoring and risk discussed: No    Possibility and Risk of blood transfusion discussed: Yes            PAC Resident/NP Anesthesia Assessment: Reanna Kumar is a 71 year old female scheduled to undergo Uniportal thoracoscopic right lower lobe segmentectomy (S6), Possible lobectomy and mediastinal lymphadenectomy with Dr. Kian Thomas on 5/10/21. She has the following specific operative considerations:   - RCRI : 0.9% risk of major adverse cardiac event.   - VTE risk: 0.5-3%  - NICOLA # of risks 1/8 = Low risk    - Risk of PONV score = 2.  If > 2, anti-emetic intervention recommended.    --Pulmonary nodules with above procedure now planned.   --No history of problems with anesthesia.  --HLD. Will take rosuvastatin on DOS. No cardiac history, symptoms or meds. EKG above. Exercise echocardiogram in 2019 negative for myocardial ischemia at a peak heart rate of 153 BPM (102% of maximal predicted heart rate), no regional wall motion abnormalities, below average exercise capacity. Generally good activity tolerance.   --50 pack year smoking history, has cut down to 4-5 cigarettes daily. Using nicotine patch. Denies  pulmonary symptoms.   --Insomnia. Seroquel at HS.  --Bilateral hip replacements. Will require careful positioning.   --Pain management. Discussed possibility of nerve block if appropriate for patient's procedure. Surgeon plan for intraop intercostal nerve block. Final counseling and decisions by regional team on DOS.  --Type and screen drawn today.   --Enhanced recovery pathway.       Patient was discussed with Dr Travis.      Reviewed and Signed by PAC Mid-Level Provider/Resident  Mid-Level Provider/Resident: RENETTA Murphy, CNS  Date: 4/29/21  Time: 4:00pm                               RENETTA Gordillo CNS

## 2021-04-29 NOTE — PATIENT INSTRUCTIONS
Preparing for Your Surgery      Name:  Reanna Kumar   MRN:  [id number]   :  1950   Today's Date:  2021       Arriving for surgery:  Surgery date:  5/10/21  Arrival time:  08:50 am    Restrictions due to COVID 19:  One consistent visitor per patient is allowed.  The visitor will be allowed in the pre-op area.  Visitors are asked to leave the building during the surgery.  No ill visitors.  All visitors must wear face mask.    [employer] parking is available for anyone with mobility limitations or disabilities.  ([place]  24 hours/ 7 days a week; [facility]  7 am- 3:30 pm, Mon- Fri)    Please come to:   [facility] [address]  [address] [place]  [address]  -    Please proceed to [address] on the 3rd floor. [phone]?     - ?If you are in need of directions, wheelchair or escort please stop at the Information Desk in the lobby.  Inform the information person that you are here for surgery; a wheelchair and escort to [address] will be provided.?     What can I eat or drink?  -  You may eat and drink normally for up to 8 hours before your surgery.  -  You may have clear liquids until 2 hours before surgery.     Examples of clear liquids:  Water  Clear broth  Juices (apple, white grape, white cranberry  and cider) without pulp  Noncarbonated, powder based beverages  (lemonade and Federico-Aid)  Sodas (Sprite, 7-Up, ginger ale and seltzer)  Coffee or tea (without milk or cream)  Gatorade    -  No Alcohol for at least 24 hours before surgery     Which medicines can I take?  Hold Aspirin for 7 days before surgery.   Hold Multivitamins for 7 days before surgery.  Hold Supplements for 7 days before surgery.  Hold Ibuprofen (Advil, Motrin) for 1 day before surgery--unless otherwise directed by surgeon.  Hold Naproxen (Aleve) for 4 days before surgery.  -  PLEASE TAKE these medications the day of surgery:  Tylenol if needed; take morning  medications.    How do I prepare myself?  - Please take 2 showers before surgery using Scrubcare or Hibiclens soap.    Use this soap only from the neck to your toes.     Leave the soap on your skin for one minute--then rinse thoroughly.      You may use your own shampoo and conditioner; no other hair products.   - Please remove all jewelry and body piercings.  - No lotions, deodorants or fragrance.  - No makeup or fingernail polish.   - Bring your ID and insurance card.    - All patients are required to have a Covid-19 test within 4 days of surgery/procedure.      -Patients will be contacted by the [facility] scheduling team within 1 week of surgery to make an appointment.      - Patients may call the Scheduling team at [phone] if they have not been scheduled within 4 days of  surgery.    Questions or Concerns:    - For any questions regarding the day of surgery or your hospital stay, please contact the Pre Admission Nursing Office at [phone].       - If you have health changes between today and your surgery please call your surgeon.       For questions after surgery please call your surgeons office.     Enhanced Recovery After Surgery     This is a team effort, including you, to get you back on your feet, eating and drinking normally and out of the hospital as quickly as possible.  The goals are: 1) NO INFECTIONS and   2) RETURN TO NORMAL DIET    How can we achieve these goals?  1) STAY ACTIVE: Walk every day before your surgery; try to increase the amount every day.  Walk after surgery as much as you can-the nurses will help you.  Walking speeds healing and gets you home quicker, you heal better at home and have less risk of infection.     2) INCENTIVE SPIROMETER: Practice your incentive spirometer 4 times per day with 5 repetitions each time.  Using the incentive spirometer can strengthen your muscles between your ribs and help you have a strong cough after surgery.  A more effective cough can  help prevent problems with your lungs.    3) STAY HYDRATED: Drink clear liquids up until 2 hours before your surgery. We would like you to purchase a drink such as Gatorade or Ensure Clear (not the milkshake type).  Drink this before bedtime and on the way into the hospital, drink between 8-10 ounces or until you feel hydrated.  Keeping well hydrated leads to your veins being plump, you wake up faster, and you are less likely to be nauseated. Start drinking water as soon as you can after surgery and advance to clear liquids and food as tolerated.  IV fluids contain salt, drinking fluids will minimize the amount of IV fluids you need and decrease the amount of salt you get.    The most common reason for the patient to be readmitted is dehydration. Staying hydrated after you go home from the hospital is very important.  Ensure or Ensure Clear are good options to keep you hydrated.     4) PAIN MANAGEMENT: If we minimize the amount of opioids and narcotics, and use regional blocks (which numb the area where your surgery is) along with oral pain medications; you will have less side effects of nausea and constipation. Narcotics can slow down your bowels and cause you to stay in the hospital longer.     Our goal is to keep you comfortable; eating and drinking normally and back home safely.

## 2021-04-30 ENCOUNTER — MYC MEDICAL ADVICE (OUTPATIENT)
Dept: FAMILY MEDICINE | Facility: CLINIC | Age: 71
End: 2021-04-30

## 2021-04-30 NOTE — H&P
Pre-Operative H & P     CC:  Preoperative exam to assess for increased cardiopulmonary risk while undergoing surgery and anesthesia.    Date of Encounter: 4/29/2021  Primary Care Physician:  Cassandra Gong  Reason for visit: Lung nodule [R91.1]  HPI  Reanna Kumar is a 71 year old female who presents for pre-operative H & P in preparation for Uniportal thoracoscopic right lower lobe segmentectomy (S6), Possible lobectomy and mediastinal lymphadenectomy with Dr. Kian Thomas on 5/10/21 at [facility]. History is obtained from the patient and medical records.     Patient who was recently evaluated by Dr. Kian Thomas for findings of a RLL lung nodule 1.2 x 1.0 cm in size in addition to a small lingular nodule. She is currently a smoker but is trying to quit with the help of nicotine patches. She was encouraged to continue cessation. Her imaging was reviewed and she was counseled for above procedure.     Patient's history is otherwise significant for insomnia.     Today patient denies fever, cough, shortness of breath, chest pain, irregular HR, or ankle edema. She is able to be active.      Past Medical History  Past Medical History:   Diagnosis Date     Hyperlipidemia      Insomnia 2015     Pulmonary nodules        Past Surgical History  Past Surgical History:   Procedure Laterality Date     ARTHROPLASTY HIP Right 6/2/2020    Procedure: RIGHT TOTAL HIP ARTHROPLASTY;  Surgeon: Augie Murray MD;  Location: [facility] OR     HIP SURGERY       JOINT REPLACEMENT Left 2008    hip       Hx of Blood transfusions/reactions: Denies.      Hx of abnormal bleeding or anti-platelet use: Denies.     Menstrual history: No LMP recorded. Patient is postmenopausal.    Steroid use in the last year: Denies.     Personal or FH with difficulty with Anesthesia:  Denies.     Prior to Admission Medications  Current Outpatient Medications   Medication Sig Dispense Refill     nicotine  Patch in Place Place onto the skin every 24 hours       QUEtiapine (SEROQUEL) 50 MG tablet Take 0.5-1 tablets (25-50 mg) by mouth At Bedtime (takes 0.5 x 50mg) 180 tablet 3     rosuvastatin (CRESTOR) 20 MG tablet Take 1 tablet (20 mg) by mouth daily 90 tablet 3     amoxicillin (AMOXIL) 500 MG tablet Take 2,000 mg by mouth daily as needed (prior to dental procedures)        Cholecalciferol (VITAMIN D) 1000 UNITS capsule Take 3 capsules by mouth daily        varenicline (CHANTIX EMIGDIO) 0.5 MG X 11 & 1 MG X 42 tablet Take 0.5 mg tab daily for 3 days, THEN 0.5 mg tab twice daily for 4 days, THEN 1 mg twice daily. (Patient not taking: Reported on 4/29/2021) 53 tablet 0     varenicline (CHANTIX STARTING MONTH PAK) 0.5 MG X 11 & 1 MG X 42 tablet Take 0.5 mg PO daily for 3 days, take 0.5 mg PO bid for 4 days, increase to 1 mg daily. (Patient not taking: Reported on 4/29/2021) 53 tablet 0     varenicline (CHANTIX) 1 MG tablet Take 1 tablet (1 mg) by mouth 2 times daily (Patient not taking: Reported on 4/29/2021) 60 tablet 2       Allergies  Allergies   Allergen Reactions     Zyban [Bupropion Hydrobromide]      Increased anxiety       Social History  Social History     Socioeconomic History     Marital status:      Spouse name: Not on file     Number of children: Not on file     Years of education: Not on file     Highest education level: Not on file   Occupational History     Not on file   Social Needs     Financial resource strain: Not on file     Food insecurity     Worry: Not on file     Inability: Not on file     Transportation needs     Medical: Not on file     Non-medical: Not on file   Tobacco Use     Smoking status: Current Every Day Smoker     Packs/day: 1.00     Years: 50.00     Pack years: 50.00     Types: Cigarettes     Smokeless tobacco: Never Used   Substance and Sexual Activity     Alcohol use: Yes     Comment: Occ     Drug use: No     Sexual activity: Not Currently     Partners: Male   Lifestyle      Physical activity     Days per week: Not on file     Minutes per session: Not on file     Stress: Not on file   Relationships     Social connections     Talks on phone: Not on file     Gets together: Not on file     Attends [employer] service: Not on file     Active member of club or organization: Not on file     Attends meetings of clubs or organizations: Not on file     Relationship status: Not on file     Intimate partner violence     Fear of current or ex partner: Not on file     Emotionally abused: Not on file     Physically abused: Not on file     Forced sexual activity: Not on file   Other Topics Concern     Parent/sibling w/ CABG, MI or angioplasty before 65F 55M? No   Social History Narrative     Not on file       Family History  Family History   Problem Relation Age of Onset     Cancer Mother      Osteoporosis Mother      Breast Cancer Mother      Uterine Cancer Mother      Arthritis Father      Genitourinary Problems Sister         Renal calculi     ROS/MED HISTORY  The complete review of systems is negative other than noted in the HPI or here.    ENT/Pulmonary: Comment: Trying to quit with nicotine patches. Now 4-5 cigs daily.     (+) tobacco use, Current use, 50  Pack-Year Hx,      Neurologic: Comment: Insomnia      Cardiovascular:     (+) Dyslipidemia -----Previous cardiac testing   Echo: Date: Results:    Stress Test: Date: Results:    ECG Reviewed: Date: 5/19/20 Results:  SR, LAE  Cath: Date: Results:   (-) taking anticoagulants/antiplatelets   METS/Exercise Tolerance: >4 METS    Hematologic:  - neg hematologic  ROS     Musculoskeletal: Comment: Bilateral hip replacements, last right in 6/2/20      GI/Hepatic:  - neg GI/hepatic ROS     Renal/Genitourinary:  - neg Renal ROS     Endo:  - neg endo ROS     Psychiatric/Substance Use:  - neg psychiatric ROS     Infectious Disease:  - neg infectious disease ROS     Malignancy: Comment: Lung nodules      Other:  - neg other ROS           Temp: 98.9  F (37.2  " C) Temp src: Oral BP: 127/86 Pulse: 91   Resp: 18 SpO2: 96 %         140 lbs 9.6 oz  5' 3\"[pt reported[   Body mass index is 24.91 kg/m .       Physical Exam  Constitutional: Awake, alert, cooperative, no apparent distress, and appears stated age.  Eyes: Pupils equal, round and reactive to light, extra ocular muscles intact, sclera clear, conjunctiva normal.  HENT: Normocephalic, oral pharynx with moist mucus membranes, good dentition. No goiter appreciated.   Respiratory: Clear to auscultation bilaterally, no crackles or wheezing. Diminished breath sounds. No cough or obvious dyspnea.  Cardiovascular: Regular rate and rhythm, normal S1 and S2, and no murmur noted.  Carotids +2, no bruits. No edema. Palpable pulses to radial  DP and PT arteries.   GI: Normal bowel sounds, soft, non-distended, non-tender, no masses palpated, no hepatosplenomegaly.    Lymph/Hematologic: No cervical lymphadenopathy and no supraclavicular lymphadenopathy.  Genitourinary:  Deferred.   Skin: Warm and dry.  No rashes at anticipated surgical site.   Musculoskeletal: Full ROM of neck. There is no redness, warmth, or swelling of the joints. Gross motor strength is normal.    Neurologic: Awake, alert, oriented to name, place and time. Cranial nerves II-XII are grossly intact. Gait is normal.   Neuropsychiatric: Calm, cooperative. Normal affect.     Labs: (personally reviewed)  Lab Results   Component Value Date    WBC 6.6 04/29/2021     Lab Results   Component Value Date    RBC 4.67 04/29/2021     Lab Results   Component Value Date    HGB 15.3 04/29/2021     Lab Results   Component Value Date    HCT 47.1 04/29/2021     Lab Results   Component Value Date     04/29/2021     Lab Results   Component Value Date    MCH 32.8 04/29/2021     Lab Results   Component Value Date    MCHC 32.5 04/29/2021     Lab Results   Component Value Date    RDW 12.6 04/29/2021     Lab Results   Component Value Date     04/29/2021     Last Comprehensive " Metabolic Panel:  Sodium   Date Value Ref Range Status   04/29/2021 139 133 - 144 mmol/L Final     Potassium   Date Value Ref Range Status   04/29/2021 4.2 3.4 - 5.3 mmol/L Final     Chloride   Date Value Ref Range Status   04/29/2021 103 94 - 109 mmol/L Final     Carbon Dioxide   Date Value Ref Range Status   04/29/2021 30 20 - 32 mmol/L Final     Anion Gap   Date Value Ref Range Status   04/29/2021 6 3 - 14 mmol/L Final     Glucose   Date Value Ref Range Status   04/29/2021 94 70 - 99 mg/dL Final     Urea Nitrogen   Date Value Ref Range Status   04/29/2021 13 7 - 30 mg/dL Final     Creatinine   Date Value Ref Range Status   04/29/2021 0.67 0.52 - 1.04 mg/dL Final     GFR Estimate   Date Value Ref Range Status   04/29/2021 88 >60 mL/min/[1.73_m2] Final     Comment:     Non  GFR Calc  Starting 12/18/2018, serum creatinine based estimated GFR (eGFR) will be   calculated using the Chronic Kidney Disease Epidemiology Collaboration   (CKD-EPI) equation.       Calcium   Date Value Ref Range Status   04/29/2021 10.4 (H) 8.5 - 10.1 mg/dL Final     EKG: Personally reviewed 5/19/20 Sinus rhythm, left atrial enlargement  Stress test: Exercise stress echo 2019     Interpretation Summary   1. Exercise echocardiogram is negative for myocardial ischemia at a peak heart  rate of 153 BPM (102% of maximal predicted heart rate).  2. No regional wall motion abnormalities.  3. Below average exercise capacity (3:13 minutes on the Devante protocol, 4.6  METS).  4. Patient significantly limited by exertional dyspnea.  PFTs 3/31/21  Most Recent [facility] Pulmonary Function Testing    FVC-Pred   Date Value Ref Range Status   03/31/2021 2.55 L      FVC-Pre   Date Value Ref Range Status   03/31/2021 3.02 L      FVC-%Pred-Pre   Date Value Ref Range Status   03/31/2021 118 %      FEV1-Pre   Date Value Ref Range Status   03/31/2021 1.93 L      FEV1-%Pred-Pre   Date Value Ref Range Status   03/31/2021 97 %      FEV1FVC-Pred   Date  Value Ref Range Status   03/31/2021 78 %      FEV1FVC-Pre   Date Value Ref Range Status   03/31/2021 64 %      FEFMax-Pred   Date Value Ref Range Status   03/31/2021 5.12 L/sec      FEFMax-Pre   Date Value Ref Range Status   03/31/2021 4.81 L/sec      FEFMax-%Pred-Pre   Date Value Ref Range Status   03/31/2021 93 %      ExpTime-Pre   Date Value Ref Range Status   03/31/2021 7.28 sec      FIFMax-Pre   Date Value Ref Range Status   03/31/2021 2.81 L/sec      FEV1FEV6-Pred   Date Value Ref Range Status   03/31/2021 79 %      FEV1FEV6-Pre   Date Value Ref Range Status   03/31/2021 66 %        CT Chest/PET 3/8/21                                                   IMPRESSION: In this patient with a history of growing pulmonary  nodule:     1. Hypermetabolic right lower lobe spiculated nodule represents  primary lung malignancy until proven otherwise.  2. No evidence of metastatic disease.  3. Stable 4 mm nodule in the lingula, similar since 2/15/2019.  4. 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     Imaging and cardiac testing reviewed by this provider     Outside records reviewed from: Care Everywhere    ASSESSMENT and PLAN  Reanna Kumar is a 71 year old female scheduled to undergo Uniportal thoracoscopic right lower lobe segmentectomy (S6), Possible lobectomy and mediastinal lymphadenectomy with Dr. Kian Thomas on 5/10/21. She has the following specific operative considerations:   - RCRI : 0.9% risk of major adverse cardiac event.   - Anesthesia considerations:  Refer to PAC assessment in anesthesia records  - VTE risk: 0.5-3%  - NICOLA # of risks 1/8 = Low risk    - Risk of PONV score = 2.  If > 2, anti-emetic intervention recommended.    --Pulmonary nodules with above procedure now planned.   --No history of problems with anesthesia.  --HLD. Will take rosuvastatin on DOS. No cardiac history, symptoms or meds. EKG above. Exercise  echocardiogram in 2019 negative for myocardial ischemia at a peak heart rate of 153 BPM (102% of maximal predicted heart rate), no regional wall motion abnormalities, below average exercise capacity. Generally good activity tolerance.   --50 pack year smoking history, has cut down to 4-5 cigarettes daily. Using nicotine patch. Denies pulmonary symptoms.   --Insomnia. Seroquel at HS.  --Bilateral hip replacements. Will require careful positioning.   --Pain management. Discussed possibility of nerve block if appropriate for patient's procedure. Surgeon plan for intraop intercostal nerve block. Final counseling and decisions by regional team on DOS.  --Type and screen drawn today.   --Enhanced recovery pathway.     Arrival time, NPO, shower and medication instructions provided by nursing staff today.     Patient was discussed with Dr Travis.    RENETTA Gordillo CNS  Preoperative Assessment Center  [facility]  Phone: [phone]  Fax: [phone]

## 2021-05-04 DIAGNOSIS — R91.1 NODULE OF RIGHT LUNG: Primary | ICD-10-CM

## 2021-05-05 ENCOUNTER — ANCILLARY PROCEDURE (OUTPATIENT)
Dept: CT IMAGING | Facility: CLINIC | Age: 71
End: 2021-05-05
Attending: CLINICAL NURSE SPECIALIST
Payer: COMMERCIAL

## 2021-05-05 ENCOUNTER — OFFICE VISIT (OUTPATIENT)
Dept: SURGERY | Facility: CLINIC | Age: 71
End: 2021-05-05
Attending: THORACIC SURGERY (CARDIOTHORACIC VASCULAR SURGERY)
Payer: COMMERCIAL

## 2021-05-05 VITALS
OXYGEN SATURATION: 96 % | HEIGHT: 63 IN | WEIGHT: 141 LBS | HEART RATE: 100 BPM | SYSTOLIC BLOOD PRESSURE: 147 MMHG | TEMPERATURE: 98.5 F | BODY MASS INDEX: 24.98 KG/M2 | RESPIRATION RATE: 16 BRPM | DIASTOLIC BLOOD PRESSURE: 74 MMHG

## 2021-05-05 DIAGNOSIS — R91.8 PULMONARY NODULES: Primary | ICD-10-CM

## 2021-05-05 DIAGNOSIS — Z01.818 PREOP EXAMINATION: ICD-10-CM

## 2021-05-05 DIAGNOSIS — R91.1 NODULE OF RIGHT LUNG: ICD-10-CM

## 2021-05-05 PROCEDURE — G0463 HOSPITAL OUTPT CLINIC VISIT: HCPCS

## 2021-05-05 PROCEDURE — 71250 CT THORAX DX C-: CPT | Mod: GC | Performed by: RADIOLOGY

## 2021-05-05 PROCEDURE — 99214 OFFICE O/P EST MOD 30 MIN: CPT | Performed by: THORACIC SURGERY (CARDIOTHORACIC VASCULAR SURGERY)

## 2021-05-05 ASSESSMENT — MIFFLIN-ST. JEOR: SCORE: 1118.57

## 2021-05-05 ASSESSMENT — PAIN SCALES - GENERAL: PAINLEVEL: NO PAIN (0)

## 2021-05-05 NOTE — NURSING NOTE
"Oncology Rooming Note    May 5, 2021 3:37 PM   Reanna Kumar is a 71 year old female who presents for:    Chief Complaint   Patient presents with     Oncology Clinic Visit     RETURN; PULMONARY NODULES     Initial Vitals: BP (!) 147/74 (BP Location: Right arm, Patient Position: Sitting, Cuff Size: Adult Regular)   Pulse 100   Temp 98.5  F (36.9  C) (Tympanic)   Resp 16   Ht 1.6 m (5' 2.99\")   Wt 64 kg (141 lb)   SpO2 96%   BMI 24.98 kg/m   Estimated body mass index is 24.98 kg/m  as calculated from the following:    Height as of this encounter: 1.6 m (5' 2.99\").    Weight as of this encounter: 64 kg (141 lb). Body surface area is 1.69 meters squared.  No Pain (0) Comment: Data Unavailable   No LMP recorded. Patient is postmenopausal.  Allergies reviewed: Yes  Medications reviewed: Yes    Medications: Medication refills not needed today.  Pharmacy name entered into [employer]: [id number] - [place], [place] - [address]    Clinical concerns: Patient is unsure why CT was scheduled for today, states no one informed her of this.        Brandie Montoya, [facility]              "

## 2021-05-05 NOTE — LETTER
"    5/5/2021         RE: Reanna Kumar  [address]  [place] [address]        Dear Colleague,    Thank you for referring your patient, Reanna Kumar, to the [facility]. Please see a copy of my visit note below.    THORACIC SURGERY FOLLOW UP VISIT    I saw Ms. Kumar in follow-up today. The clinical summary follows:     DIAGNOSIS   RLL indeterminate pulmonary nodule.     Previsit Tests   PFT 3/31/21: FEV1 3L 97%. DLCO 98%.      PET CT Chest 3/8/2021: 1.2 x 1.0 cm  Hypermetabolic RLL nodule (max SUV 3.6). No mediastinal adenopathy or suspicious hypermetabolic activity elsewhere.           CT Chest 2/22/2021: RLL spiculated nodule.           INTERVAL STUDIES  Updated CT scan scheduled for today.     Past Medical History:   Diagnosis Date     Hyperlipidemia      Insomnia 2015     Pulmonary nodules      Past Surgical History:   Procedure Laterality Date     ARTHROPLASTY HIP Right 6/2/2020    Procedure: RIGHT TOTAL HIP ARTHROPLASTY;  Surgeon: Augie Murray MD;  Location: [facility] OR     HIP SURGERY       JOINT REPLACEMENT Left 2008    hip        Allergies   Allergen Reactions     Zyban [Bupropion Hydrobromide]      Increased anxiety     Current Outpatient Medications   Medication     amoxicillin (AMOXIL) 500 MG tablet     nicotine Patch in Place     QUEtiapine (SEROQUEL) 50 MG tablet     rosuvastatin (CRESTOR) 20 MG tablet     No current facility-administered medications for this visit.          Exam  BP (!) 147/74 (BP Location: Right arm, Patient Position: Sitting, Cuff Size: Adult Regular)   Pulse 100   Temp 98.5  F (36.9  C) (Tympanic)   Resp 16   Ht 1.6 m (5' 2.99\")   Wt 64 kg (141 lb)   SpO2 96%   BMI 24.98 kg/m    Alert and oriented.   Bilateral breath sounds.   RRR.     ETOH: None   TOB: current smoker   BMI 24    SUBJECTIVE   She is asymptomatic. She comes to clinic for a preop visit.       From a personal perspective, she comes to clinic with her  Evaristo. 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     IMPRESSION   71 year-old female current smoker, with indeterminate RLL pulmonary nodule.  I had an extensive discussion with the patient and her family regarding the rationale for surgery, the alternatives risks and benefits of the procedure. I explained the expected hospital stay, postoperative course, recovery time, diet and activity restrictions. Informed consent was obtained and they agreed to proceed.    Stage  [id number], stage IA2 by PET.     PLAN  I spent 30 min on the date of the encounter in chart review, patient visit, review of tests, documentation and/or discussion with other providers about the issues documented above. I reviewed the plan as follows:  Procedure planned: Uniportal thoracoscopic RLL segmentectomy (S6), if positive for malignancy then we will proceed with completion lobectomy and MLND (nodule is to central and unlikely to achieve adequate margins with a segmentectomy).   Necessary Preop Testing: Updated CT scan today.   Regional Anesthesia Plan: Intraop intercostal nerve block.   Anticoagulation Plan: Enoxaparin in preop.  Smoking cessation: She has cut down to 4-5 cigarettes a day. I strongly encouraged her to quit smoking  however, if she is unable to quit we will still proceed with surgery.   All questions were answered and the patient and present family were in agreement with the plan.  I appreciate the opportunity to participate in the care of your patient and will keep you updated.  Sincerely,  Palomo Galindo MD          Again, thank you for allowing me to participate in the care of your patient.        Sincerely,        William Langston MD

## 2021-05-05 NOTE — PROGRESS NOTES
"THORACIC SURGERY FOLLOW UP VISIT    I saw Ms. Kumar in follow-up today. The clinical summary follows:     DIAGNOSIS   RLL indeterminate pulmonary nodule.     Previsit Tests   PFT 3/31/21: FEV1 3L 97%. DLCO 98%.      PET CT Chest 3/8/2021: 1.2 x 1.0 cm  Hypermetabolic RLL nodule (max SUV 3.6). No mediastinal adenopathy or suspicious hypermetabolic activity elsewhere.           CT Chest 2/22/2021: RLL spiculated nodule.           INTERVAL STUDIES  Updated CT scan scheduled for today.     Past Medical History:   Diagnosis Date     Hyperlipidemia      Insomnia 2015     Pulmonary nodules      Past Surgical History:   Procedure Laterality Date     ARTHROPLASTY HIP Right 6/2/2020    Procedure: RIGHT TOTAL HIP ARTHROPLASTY;  Surgeon: Augie Murray MD;  Location: [facility] OR     HIP SURGERY       JOINT REPLACEMENT Left 2008    hip        Allergies   Allergen Reactions     Zyban [Bupropion Hydrobromide]      Increased anxiety     Current Outpatient Medications   Medication     amoxicillin (AMOXIL) 500 MG tablet     nicotine Patch in Place     QUEtiapine (SEROQUEL) 50 MG tablet     rosuvastatin (CRESTOR) 20 MG tablet     No current facility-administered medications for this visit.          Exam  BP (!) 147/74 (BP Location: Right arm, Patient Position: Sitting, Cuff Size: Adult Regular)   Pulse 100   Temp 98.5  F (36.9  C) (Tympanic)   Resp 16   Ht 1.6 m (5' 2.99\")   Wt 64 kg (141 lb)   SpO2 96%   BMI 24.98 kg/m    Alert and oriented.   Bilateral breath sounds.   RRR.     ETOH: None   TOB: current smoker   BMI 24    SUBJECTIVE   She is asymptomatic. She comes to clinic for a preop visit.       From a personal perspective, she comes to clinic with her  Evaristo. 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     IMPRESSION   71 year-old female current smoker, with indeterminate RLL pulmonary nodule.  I had an extensive discussion with the patient and her family regarding the rationale for " surgery, the alternatives risks and benefits of the procedure. I explained the expected hospital stay, postoperative course, recovery time, diet and activity restrictions. Informed consent was obtained and they agreed to proceed.    Stage  [id number], stage IA2 by PET.     PLAN  I spent 30 min on the date of the encounter in chart review, patient visit, review of tests, documentation and/or discussion with other providers about the issues documented above. I reviewed the plan as follows:  Procedure planned: Uniportal thoracoscopic RLL segmentectomy (S6), if positive for malignancy then we will proceed with completion lobectomy and MLND (nodule is to central and unlikely to achieve adequate margins with a segmentectomy).   Necessary Preop Testing: Updated CT scan today.   Regional Anesthesia Plan: Intraop intercostal nerve block.   Anticoagulation Plan: Enoxaparin in preop.  Smoking cessation: She has cut down to 4-5 cigarettes a day. I strongly encouraged her to quit smoking  however, if she is unable to quit we will still proceed with surgery.   All questions were answered and the patient and present family were in agreement with the plan.  I appreciate the opportunity to participate in the care of your patient and will keep you updated.  Sincerely,  Palomo Galindo MD

## 2021-05-07 DIAGNOSIS — Z11.59 ENCOUNTER FOR SCREENING FOR OTHER VIRAL DISEASES: ICD-10-CM

## 2021-05-07 LAB
SARS-COV-2 RNA RESP QL NAA+PROBE: NORMAL
SPECIMEN SOURCE: NORMAL

## 2021-05-07 PROCEDURE — U0005 INFEC AGEN DETEC AMPLI PROBE: HCPCS | Performed by: THORACIC SURGERY (CARDIOTHORACIC VASCULAR SURGERY)

## 2021-05-07 PROCEDURE — U0003 INFECTIOUS AGENT DETECTION BY NUCLEIC ACID (DNA OR RNA); SEVERE ACUTE RESPIRATORY SYNDROME CORONAVIRUS 2 (SARS-COV-2) (CORONAVIRUS DISEASE [COVID-19]), AMPLIFIED PROBE TECHNIQUE, MAKING USE OF HIGH THROUGHPUT TECHNOLOGIES AS DESCRIBED BY CMS-2020-01-R: HCPCS | Performed by: THORACIC SURGERY (CARDIOTHORACIC VASCULAR SURGERY)

## 2021-05-08 LAB
LABORATORY COMMENT REPORT: NORMAL
SARS-COV-2 RNA RESP QL NAA+PROBE: NEGATIVE
SPECIMEN SOURCE: NORMAL

## 2021-05-10 ENCOUNTER — ANESTHESIA (OUTPATIENT)
Dept: SURGERY | Facility: CLINIC | Age: 71
DRG: 164 | End: 2021-05-10
Payer: COMMERCIAL

## 2021-05-10 ENCOUNTER — APPOINTMENT (OUTPATIENT)
Dept: GENERAL RADIOLOGY | Facility: CLINIC | Age: 71
DRG: 164 | End: 2021-05-10
Attending: THORACIC SURGERY (CARDIOTHORACIC VASCULAR SURGERY)
Payer: COMMERCIAL

## 2021-05-10 ENCOUNTER — HOSPITAL ENCOUNTER (INPATIENT)
Facility: CLINIC | Age: 71
LOS: 9 days | Discharge: HOME | DRG: 164 | End: 2021-05-19
Attending: THORACIC SURGERY (CARDIOTHORACIC VASCULAR SURGERY) | Admitting: THORACIC SURGERY (CARDIOTHORACIC VASCULAR SURGERY)
Payer: COMMERCIAL

## 2021-05-10 DIAGNOSIS — R91.1 LUNG NODULE: ICD-10-CM

## 2021-05-10 LAB
ABO + RH BLD: NORMAL
ABO + RH BLD: NORMAL
ANION GAP SERPL CALCULATED.3IONS-SCNC: 6 MMOL/L (ref 3–14)
ANION GAP SERPL CALCULATED.3IONS-SCNC: 6 MMOL/L (ref 3–14)
BASE DEFICIT BLDA-SCNC: 2.9 MMOL/L
BLD GP AB SCN SERPL QL: NORMAL
BLOOD BANK CMNT PATIENT-IMP: NORMAL
BLOOD BANK CMNT PATIENT-IMP: NORMAL
BUN SERPL-MCNC: 15 MG/DL (ref 7–30)
BUN SERPL-MCNC: 15 MG/DL (ref 7–30)
CA-I BLD-MCNC: 4.7 MG/DL (ref 4.4–5.2)
CALCIUM SERPL-MCNC: 8.4 MG/DL (ref 8.5–10.1)
CALCIUM SERPL-MCNC: 9.2 MG/DL (ref 8.5–10.1)
CHLORIDE SERPL-SCNC: 106 MMOL/L (ref 94–109)
CHLORIDE SERPL-SCNC: 106 MMOL/L (ref 94–109)
CO2 SERPL-SCNC: 25 MMOL/L (ref 20–32)
CO2 SERPL-SCNC: 25 MMOL/L (ref 20–32)
CREAT SERPL-MCNC: 0.53 MG/DL (ref 0.52–1.04)
CREAT SERPL-MCNC: 0.63 MG/DL (ref 0.52–1.04)
ERYTHROCYTE [DISTWIDTH] IN BLOOD BY AUTOMATED COUNT: 12.6 % (ref 10–15)
ERYTHROCYTE [DISTWIDTH] IN BLOOD BY AUTOMATED COUNT: 12.8 % (ref 10–15)
GFR SERPL CREATININE-BSD FRML MDRD: 90 ML/MIN/{1.73_M2}
GFR SERPL CREATININE-BSD FRML MDRD: >90 ML/MIN/{1.73_M2}
GLUCOSE BLD-MCNC: 140 MG/DL (ref 70–99)
GLUCOSE BLDC GLUCOMTR-MCNC: 142 MG/DL (ref 70–99)
GLUCOSE SERPL-MCNC: 143 MG/DL (ref 70–99)
GLUCOSE SERPL-MCNC: 149 MG/DL (ref 70–99)
HCO3 BLD-SCNC: 23 MMOL/L (ref 21–28)
HCT VFR BLD AUTO: 36.6 % (ref 35–47)
HCT VFR BLD AUTO: 37.5 % (ref 35–47)
HGB BLD-MCNC: 11.9 G/DL (ref 11.7–15.7)
HGB BLD-MCNC: 12.3 G/DL (ref 11.7–15.7)
HGB BLD-MCNC: 13.3 G/DL (ref 11.7–15.7)
LACTATE BLD-SCNC: 1.4 MMOL/L (ref 0.7–2)
MAGNESIUM SERPL-MCNC: 1.5 MG/DL (ref 1.6–2.3)
MCH RBC QN AUTO: 32.8 PG (ref 26.5–33)
MCH RBC QN AUTO: 32.8 PG (ref 26.5–33)
MCHC RBC AUTO-ENTMCNC: 32.5 G/DL (ref 31.5–36.5)
MCHC RBC AUTO-ENTMCNC: 32.8 G/DL (ref 31.5–36.5)
MCV RBC AUTO: 100 FL (ref 78–100)
MCV RBC AUTO: 101 FL (ref 78–100)
O2/TOTAL GAS SETTING VFR VENT: 63 %
OXYHGB MFR BLD: 96 % (ref 92–100)
PCO2 BLD: 44 MM HG (ref 35–45)
PH BLD: 7.33 PH (ref 7.35–7.45)
PHOSPHATE SERPL-MCNC: 4 MG/DL (ref 2.5–4.5)
PLATELET # BLD AUTO: 241 10E9/L (ref 150–450)
PLATELET # BLD AUTO: 279 10E9/L (ref 150–450)
PO2 BLD: 127 MM HG (ref 80–105)
POTASSIUM BLD-SCNC: 4.1 MMOL/L (ref 3.4–5.3)
POTASSIUM SERPL-SCNC: 4 MMOL/L (ref 3.4–5.3)
POTASSIUM SERPL-SCNC: 4.8 MMOL/L (ref 3.4–5.3)
RBC # BLD AUTO: 3.63 10E12/L (ref 3.8–5.2)
RBC # BLD AUTO: 3.75 10E12/L (ref 3.8–5.2)
SODIUM BLD-SCNC: 136 MMOL/L (ref 133–144)
SODIUM SERPL-SCNC: 138 MMOL/L (ref 133–144)
SODIUM SERPL-SCNC: 138 MMOL/L (ref 133–144)
SPECIMEN EXP DATE BLD: NORMAL
WBC # BLD AUTO: 9.6 10E9/L (ref 4–11)
WBC # BLD AUTO: 9.9 10E9/L (ref 4–11)

## 2021-05-10 PROCEDURE — P9041 ALBUMIN (HUMAN),5%, 50ML: HCPCS | Performed by: ANESTHESIOLOGY

## 2021-05-10 PROCEDURE — 272N000001 HC OR GENERAL SUPPLY STERILE: Performed by: THORACIC SURGERY (CARDIOTHORACIC VASCULAR SURGERY)

## 2021-05-10 PROCEDURE — 0BTF0ZZ RESECTION OF RIGHT LOWER LUNG LOBE, OPEN APPROACH: ICD-10-PCS | Performed by: THORACIC SURGERY (CARDIOTHORACIC VASCULAR SURGERY)

## 2021-05-10 PROCEDURE — 82803 BLOOD GASES ANY COMBINATION: CPT | Performed by: THORACIC SURGERY (CARDIOTHORACIC VASCULAR SURGERY)

## 2021-05-10 PROCEDURE — 71045 X-RAY EXAM CHEST 1 VIEW: CPT | Mod: 26 | Performed by: RADIOLOGY

## 2021-05-10 PROCEDURE — 999N000015 HC STATISTIC ARTERIAL MONITORING DAILY

## 2021-05-10 PROCEDURE — 0BTD0ZZ RESECTION OF RIGHT MIDDLE LUNG LOBE, OPEN APPROACH: ICD-10-PCS | Performed by: THORACIC SURGERY (CARDIOTHORACIC VASCULAR SURGERY)

## 2021-05-10 PROCEDURE — 80048 BASIC METABOLIC PNL TOTAL CA: CPT | Performed by: STUDENT IN AN ORGANIZED HEALTH CARE EDUCATION/TRAINING PROGRAM

## 2021-05-10 PROCEDURE — 88331 PATH CONSLTJ SURG 1 BLK 1SPC: CPT | Mod: TC | Performed by: THORACIC SURGERY (CARDIOTHORACIC VASCULAR SURGERY)

## 2021-05-10 PROCEDURE — 278N000051 HC OR IMPLANT GENERAL: Performed by: THORACIC SURGERY (CARDIOTHORACIC VASCULAR SURGERY)

## 2021-05-10 PROCEDURE — 88331 PATH CONSLTJ SURG 1 BLK 1SPC: CPT | Mod: 26 | Performed by: PATHOLOGY

## 2021-05-10 PROCEDURE — 3E033XZ INTRODUCTION OF VASOPRESSOR INTO PERIPHERAL VEIN, PERCUTANEOUS APPROACH: ICD-10-PCS | Performed by: SURGERY

## 2021-05-10 PROCEDURE — 82330 ASSAY OF CALCIUM: CPT | Performed by: THORACIC SURGERY (CARDIOTHORACIC VASCULAR SURGERY)

## 2021-05-10 PROCEDURE — 07B74ZX EXCISION OF THORAX LYMPHATIC, PERCUTANEOUS ENDOSCOPIC APPROACH, DIAGNOSTIC: ICD-10-PCS | Performed by: THORACIC SURGERY (CARDIOTHORACIC VASCULAR SURGERY)

## 2021-05-10 PROCEDURE — 250N000024 HC ISOFLURANE, PER MIN: Performed by: THORACIC SURGERY (CARDIOTHORACIC VASCULAR SURGERY)

## 2021-05-10 PROCEDURE — 258N000003 HC RX IP 258 OP 636: Performed by: STUDENT IN AN ORGANIZED HEALTH CARE EDUCATION/TRAINING PROGRAM

## 2021-05-10 PROCEDURE — 01580ZZ DESTRUCTION OF THORACIC NERVE, OPEN APPROACH: ICD-10-PCS | Performed by: THORACIC SURGERY (CARDIOTHORACIC VASCULAR SURGERY)

## 2021-05-10 PROCEDURE — 85027 COMPLETE CBC AUTOMATED: CPT | Performed by: STUDENT IN AN ORGANIZED HEALTH CARE EDUCATION/TRAINING PROGRAM

## 2021-05-10 PROCEDURE — 250N000009 HC RX 250: Performed by: NURSE ANESTHETIST, CERTIFIED REGISTERED

## 2021-05-10 PROCEDURE — 82533 TOTAL CORTISOL: CPT | Performed by: STUDENT IN AN ORGANIZED HEALTH CARE EDUCATION/TRAINING PROGRAM

## 2021-05-10 PROCEDURE — 84295 ASSAY OF SERUM SODIUM: CPT | Performed by: THORACIC SURGERY (CARDIOTHORACIC VASCULAR SURGERY)

## 2021-05-10 PROCEDURE — 250N000011 HC RX IP 250 OP 636: Performed by: THORACIC SURGERY (CARDIOTHORACIC VASCULAR SURGERY)

## 2021-05-10 PROCEDURE — 258N000003 HC RX IP 258 OP 636: Performed by: NURSE ANESTHETIST, CERTIFIED REGISTERED

## 2021-05-10 PROCEDURE — 83605 ASSAY OF LACTIC ACID: CPT | Performed by: STUDENT IN AN ORGANIZED HEALTH CARE EDUCATION/TRAINING PROGRAM

## 2021-05-10 PROCEDURE — 250N000013 HC RX MED GY IP 250 OP 250 PS 637: Performed by: STUDENT IN AN ORGANIZED HEALTH CARE EDUCATION/TRAINING PROGRAM

## 2021-05-10 PROCEDURE — 82947 ASSAY GLUCOSE BLOOD QUANT: CPT | Performed by: THORACIC SURGERY (CARDIOTHORACIC VASCULAR SURGERY)

## 2021-05-10 PROCEDURE — 0BJ08ZZ INSPECTION OF TRACHEOBRONCHIAL TREE, VIA NATURAL OR ARTIFICIAL OPENING ENDOSCOPIC: ICD-10-PCS | Performed by: THORACIC SURGERY (CARDIOTHORACIC VASCULAR SURGERY)

## 2021-05-10 PROCEDURE — 88307 TISSUE EXAM BY PATHOLOGIST: CPT | Mod: 26 | Performed by: PATHOLOGY

## 2021-05-10 PROCEDURE — 83735 ASSAY OF MAGNESIUM: CPT | Performed by: STUDENT IN AN ORGANIZED HEALTH CARE EDUCATION/TRAINING PROGRAM

## 2021-05-10 PROCEDURE — 250N000013 HC RX MED GY IP 250 OP 250 PS 637: Performed by: CLINICAL NURSE SPECIALIST

## 2021-05-10 PROCEDURE — 88309 TISSUE EXAM BY PATHOLOGIST: CPT | Mod: 26 | Performed by: PATHOLOGY

## 2021-05-10 PROCEDURE — 0KXH0ZZ TRANSFER RIGHT THORAX MUSCLE, OPEN APPROACH: ICD-10-PCS | Performed by: THORACIC SURGERY (CARDIOTHORACIC VASCULAR SURGERY)

## 2021-05-10 PROCEDURE — 250N000011 HC RX IP 250 OP 636: Performed by: STUDENT IN AN ORGANIZED HEALTH CARE EDUCATION/TRAINING PROGRAM

## 2021-05-10 PROCEDURE — 84100 ASSAY OF PHOSPHORUS: CPT | Performed by: STUDENT IN AN ORGANIZED HEALTH CARE EDUCATION/TRAINING PROGRAM

## 2021-05-10 PROCEDURE — 250N000009 HC RX 250: Performed by: ANESTHESIOLOGY

## 2021-05-10 PROCEDURE — 999N000141 HC STATISTIC PRE-PROCEDURE NURSING ASSESSMENT: Performed by: THORACIC SURGERY (CARDIOTHORACIC VASCULAR SURGERY)

## 2021-05-10 PROCEDURE — 82810 BLOOD GASES O2 SAT ONLY: CPT | Performed by: THORACIC SURGERY (CARDIOTHORACIC VASCULAR SURGERY)

## 2021-05-10 PROCEDURE — 370N000017 HC ANESTHESIA TECHNICAL FEE, PER MIN: Performed by: THORACIC SURGERY (CARDIOTHORACIC VASCULAR SURGERY)

## 2021-05-10 PROCEDURE — 999N001017 HC STATISTIC GLUCOSE BY METER IP

## 2021-05-10 PROCEDURE — 0BB30ZZ EXCISION OF RIGHT MAIN BRONCHUS, OPEN APPROACH: ICD-10-PCS | Performed by: THORACIC SURGERY (CARDIOTHORACIC VASCULAR SURGERY)

## 2021-05-10 PROCEDURE — 84132 ASSAY OF SERUM POTASSIUM: CPT | Performed by: THORACIC SURGERY (CARDIOTHORACIC VASCULAR SURGERY)

## 2021-05-10 PROCEDURE — 88309 TISSUE EXAM BY PATHOLOGIST: CPT | Mod: TC | Performed by: THORACIC SURGERY (CARDIOTHORACIC VASCULAR SURGERY)

## 2021-05-10 PROCEDURE — 88305 TISSUE EXAM BY PATHOLOGIST: CPT | Mod: 26 | Performed by: PATHOLOGY

## 2021-05-10 PROCEDURE — C2618 PROBE/NEEDLE, CRYO: HCPCS | Performed by: THORACIC SURGERY (CARDIOTHORACIC VASCULAR SURGERY)

## 2021-05-10 PROCEDURE — 88307 TISSUE EXAM BY PATHOLOGIST: CPT | Mod: TC | Performed by: THORACIC SURGERY (CARDIOTHORACIC VASCULAR SURGERY)

## 2021-05-10 PROCEDURE — 88305 TISSUE EXAM BY PATHOLOGIST: CPT | Mod: TC | Performed by: THORACIC SURGERY (CARDIOTHORACIC VASCULAR SURGERY)

## 2021-05-10 PROCEDURE — 360N000077 HC SURGERY LEVEL 4, PER MIN: Performed by: THORACIC SURGERY (CARDIOTHORACIC VASCULAR SURGERY)

## 2021-05-10 PROCEDURE — 999N000065 XR CHEST PORT 1 VIEW

## 2021-05-10 PROCEDURE — 99221 1ST HOSP IP/OBS SF/LOW 40: CPT | Mod: 24 | Performed by: SURGERY

## 2021-05-10 PROCEDURE — 999N000157 HC STATISTIC RCP TIME EA 10 MIN

## 2021-05-10 PROCEDURE — 999N000054 HC STATISTIC EKG NON-CHARGEABLE

## 2021-05-10 PROCEDURE — 250N000011 HC RX IP 250 OP 636: Performed by: ANESTHESIOLOGY

## 2021-05-10 PROCEDURE — 200N000002 HC R&B ICU UMMC

## 2021-05-10 PROCEDURE — 258N000003 HC RX IP 258 OP 636: Performed by: ANESTHESIOLOGY

## 2021-05-10 PROCEDURE — 93010 ELECTROCARDIOGRAM REPORT: CPT | Mod: 59 | Performed by: INTERNAL MEDICINE

## 2021-05-10 PROCEDURE — 0BBF4ZX EXCISION OF RIGHT LOWER LUNG LOBE, PERCUTANEOUS ENDOSCOPIC APPROACH, DIAGNOSTIC: ICD-10-PCS | Performed by: THORACIC SURGERY (CARDIOTHORACIC VASCULAR SURGERY)

## 2021-05-10 PROCEDURE — 250N000011 HC RX IP 250 OP 636: Performed by: NURSE ANESTHETIST, CERTIFIED REGISTERED

## 2021-05-10 PROCEDURE — 710N000010 HC RECOVERY PHASE 1, LEVEL 2, PER MIN: Performed by: THORACIC SURGERY (CARDIOTHORACIC VASCULAR SURGERY)

## 2021-05-10 DEVICE — SEALANT PLEURAL AIRLEAK PROGEL 4ML PGPS002: Type: IMPLANTABLE DEVICE | Site: CHEST | Status: FUNCTIONAL

## 2021-05-10 RX ORDER — BUPIVACAINE HYDROCHLORIDE 2.5 MG/ML
INJECTION, SOLUTION EPIDURAL; INFILTRATION; INTRACAUDAL PRN
Status: DISCONTINUED | OUTPATIENT
Start: 2021-05-10 | End: 2021-05-11 | Stop reason: HOSPADM

## 2021-05-10 RX ORDER — CEFAZOLIN SODIUM 2 G/100ML
2 INJECTION, SOLUTION INTRAVENOUS SEE ADMIN INSTRUCTIONS
Status: DISCONTINUED | OUTPATIENT
Start: 2021-05-10 | End: 2021-05-11 | Stop reason: HOSPADM

## 2021-05-10 RX ORDER — PHENYLEPHRINE HCL IN 0.9% NACL 50MG/250ML
.2-1.25 PLASTIC BAG, INJECTION (ML) INTRAVENOUS CONTINUOUS
Status: DISCONTINUED | OUTPATIENT
Start: 2021-05-10 | End: 2021-05-11 | Stop reason: HOSPADM

## 2021-05-10 RX ORDER — ACETAMINOPHEN 325 MG/1
975 TABLET ORAL ONCE
Status: DISCONTINUED | OUTPATIENT
Start: 2021-05-10 | End: 2021-05-11 | Stop reason: HOSPADM

## 2021-05-10 RX ORDER — ALBUMIN, HUMAN INJ 5% 5 %
250 SOLUTION INTRAVENOUS ONCE
Status: COMPLETED | OUTPATIENT
Start: 2021-05-10 | End: 2021-05-10

## 2021-05-10 RX ORDER — CEFAZOLIN SODIUM 2 G/100ML
2 INJECTION, SOLUTION INTRAVENOUS
Status: COMPLETED | OUTPATIENT
Start: 2021-05-10 | End: 2021-05-10

## 2021-05-10 RX ORDER — SODIUM CHLORIDE, SODIUM LACTATE, POTASSIUM CHLORIDE, CALCIUM CHLORIDE 600; 310; 30; 20 MG/100ML; MG/100ML; MG/100ML; MG/100ML
INJECTION, SOLUTION INTRAVENOUS CONTINUOUS
Status: DISCONTINUED | OUTPATIENT
Start: 2021-05-10 | End: 2021-05-11 | Stop reason: HOSPADM

## 2021-05-10 RX ORDER — GABAPENTIN 100 MG/1
100 CAPSULE ORAL 3 TIMES DAILY
Status: DISCONTINUED | OUTPATIENT
Start: 2021-05-11 | End: 2021-05-19 | Stop reason: HOSPADM

## 2021-05-10 RX ORDER — NALOXONE HYDROCHLORIDE 0.4 MG/ML
0.2 INJECTION, SOLUTION INTRAMUSCULAR; INTRAVENOUS; SUBCUTANEOUS
Status: DISCONTINUED | OUTPATIENT
Start: 2021-05-10 | End: 2021-05-11

## 2021-05-10 RX ORDER — NALOXONE HYDROCHLORIDE 0.4 MG/ML
0.2 INJECTION, SOLUTION INTRAMUSCULAR; INTRAVENOUS; SUBCUTANEOUS
Status: DISCONTINUED | OUTPATIENT
Start: 2021-05-10 | End: 2021-05-14

## 2021-05-10 RX ORDER — NALOXONE HYDROCHLORIDE 0.4 MG/ML
0.4 INJECTION, SOLUTION INTRAMUSCULAR; INTRAVENOUS; SUBCUTANEOUS
Status: DISCONTINUED | OUTPATIENT
Start: 2021-05-10 | End: 2021-05-11

## 2021-05-10 RX ORDER — NALOXONE HYDROCHLORIDE 0.4 MG/ML
0.4 INJECTION, SOLUTION INTRAMUSCULAR; INTRAVENOUS; SUBCUTANEOUS
Status: DISCONTINUED | OUTPATIENT
Start: 2021-05-10 | End: 2021-05-14

## 2021-05-10 RX ORDER — ONDANSETRON 2 MG/ML
INJECTION INTRAMUSCULAR; INTRAVENOUS PRN
Status: DISCONTINUED | OUTPATIENT
Start: 2021-05-10 | End: 2021-05-10

## 2021-05-10 RX ORDER — LIDOCAINE HYDROCHLORIDE 20 MG/ML
INJECTION, SOLUTION INFILTRATION; PERINEURAL PRN
Status: DISCONTINUED | OUTPATIENT
Start: 2021-05-10 | End: 2021-05-10

## 2021-05-10 RX ORDER — CELECOXIB 200 MG/1
200 CAPSULE ORAL ONCE
Status: COMPLETED | OUTPATIENT
Start: 2021-05-10 | End: 2021-05-10

## 2021-05-10 RX ORDER — OXYCODONE HYDROCHLORIDE 5 MG/1
5-10 TABLET ORAL EVERY 4 HOURS PRN
Status: DISCONTINUED | OUTPATIENT
Start: 2021-05-10 | End: 2021-05-11 | Stop reason: DRUGHIGH

## 2021-05-10 RX ORDER — ACETAMINOPHEN 325 MG/1
975 TABLET ORAL ONCE
Status: COMPLETED | OUTPATIENT
Start: 2021-05-10 | End: 2021-05-10

## 2021-05-10 RX ORDER — PROPOFOL 10 MG/ML
INJECTION, EMULSION INTRAVENOUS PRN
Status: DISCONTINUED | OUTPATIENT
Start: 2021-05-10 | End: 2021-05-10

## 2021-05-10 RX ORDER — GABAPENTIN 300 MG/1
300 CAPSULE ORAL ONCE
Status: COMPLETED | OUTPATIENT
Start: 2021-05-10 | End: 2021-05-10

## 2021-05-10 RX ORDER — FENTANYL CITRATE 50 UG/ML
25-50 INJECTION, SOLUTION INTRAMUSCULAR; INTRAVENOUS
Status: DISCONTINUED | OUTPATIENT
Start: 2021-05-10 | End: 2021-05-11 | Stop reason: HOSPADM

## 2021-05-10 RX ORDER — FENTANYL CITRATE 50 UG/ML
INJECTION, SOLUTION INTRAMUSCULAR; INTRAVENOUS PRN
Status: DISCONTINUED | OUTPATIENT
Start: 2021-05-10 | End: 2021-05-10

## 2021-05-10 RX ORDER — ONDANSETRON 4 MG/1
4 TABLET, ORALLY DISINTEGRATING ORAL EVERY 30 MIN PRN
Status: DISCONTINUED | OUTPATIENT
Start: 2021-05-10 | End: 2021-05-11 | Stop reason: HOSPADM

## 2021-05-10 RX ORDER — OXYCODONE HYDROCHLORIDE 5 MG/1
5 TABLET ORAL EVERY 4 HOURS PRN
Status: DISCONTINUED | OUTPATIENT
Start: 2021-05-10 | End: 2021-05-11 | Stop reason: DRUGHIGH

## 2021-05-10 RX ORDER — ONDANSETRON 2 MG/ML
4 INJECTION INTRAMUSCULAR; INTRAVENOUS EVERY 30 MIN PRN
Status: DISCONTINUED | OUTPATIENT
Start: 2021-05-10 | End: 2021-05-11 | Stop reason: HOSPADM

## 2021-05-10 RX ORDER — CHLORHEXIDINE GLUCONATE ORAL RINSE 1.2 MG/ML
15 SOLUTION DENTAL ONCE
Status: COMPLETED | OUTPATIENT
Start: 2021-05-10 | End: 2021-05-10

## 2021-05-10 RX ORDER — LIDOCAINE 40 MG/G
CREAM TOPICAL
Status: DISCONTINUED | OUTPATIENT
Start: 2021-05-10 | End: 2021-05-11 | Stop reason: HOSPADM

## 2021-05-10 RX ORDER — HYDROMORPHONE HYDROCHLORIDE 1 MG/ML
.3-.5 INJECTION, SOLUTION INTRAMUSCULAR; INTRAVENOUS; SUBCUTANEOUS EVERY 5 MIN PRN
Status: DISCONTINUED | OUTPATIENT
Start: 2021-05-10 | End: 2021-05-11 | Stop reason: HOSPADM

## 2021-05-10 RX ADMIN — HYDROMORPHONE HYDROCHLORIDE 0.3 MG: 1 INJECTION, SOLUTION INTRAMUSCULAR; INTRAVENOUS; SUBCUTANEOUS at 22:43

## 2021-05-10 RX ADMIN — FENTANYL CITRATE 50 MCG: 50 INJECTION, SOLUTION INTRAMUSCULAR; INTRAVENOUS at 10:47

## 2021-05-10 RX ADMIN — FENTANYL CITRATE 50 MCG: 50 INJECTION, SOLUTION INTRAMUSCULAR; INTRAVENOUS at 17:26

## 2021-05-10 RX ADMIN — LIDOCAINE HYDROCHLORIDE 100 MG: 20 INJECTION, SOLUTION INFILTRATION; PERINEURAL at 10:59

## 2021-05-10 RX ADMIN — PHENYLEPHRINE HYDROCHLORIDE 0.5 MCG/KG/MIN: 10 INJECTION INTRAVENOUS at 12:45

## 2021-05-10 RX ADMIN — ACETAMINOPHEN 975 MG: 325 TABLET, FILM COATED ORAL at 09:50

## 2021-05-10 RX ADMIN — GABAPENTIN 300 MG: 300 CAPSULE ORAL at 09:51

## 2021-05-10 RX ADMIN — PHENYLEPHRINE HYDROCHLORIDE 100 MCG: 10 INJECTION INTRAVENOUS at 12:56

## 2021-05-10 RX ADMIN — CELECOXIB 200 MG: 200 CAPSULE ORAL at 09:51

## 2021-05-10 RX ADMIN — FENTANYL CITRATE 50 MCG: 50 INJECTION, SOLUTION INTRAMUSCULAR; INTRAVENOUS at 17:25

## 2021-05-10 RX ADMIN — OXYCODONE HYDROCHLORIDE 5 MG: 5 TABLET ORAL at 22:46

## 2021-05-10 RX ADMIN — PHENYLEPHRINE HYDROCHLORIDE 100 MCG: 10 INJECTION INTRAVENOUS at 11:25

## 2021-05-10 RX ADMIN — SODIUM CHLORIDE, POTASSIUM CHLORIDE, SODIUM LACTATE AND CALCIUM CHLORIDE: 600; 310; 30; 20 INJECTION, SOLUTION INTRAVENOUS at 10:47

## 2021-05-10 RX ADMIN — Medication 2 G: at 12:07

## 2021-05-10 RX ADMIN — ROCURONIUM BROMIDE 30 MG: 10 INJECTION INTRAVENOUS at 13:19

## 2021-05-10 RX ADMIN — ROCURONIUM BROMIDE 10 MG: 10 INJECTION INTRAVENOUS at 16:33

## 2021-05-10 RX ADMIN — FENTANYL CITRATE 50 MCG: 50 INJECTION, SOLUTION INTRAMUSCULAR; INTRAVENOUS at 10:59

## 2021-05-10 RX ADMIN — CHLORHEXIDINE GLUCONATE 15 ML: 1.2 SOLUTION ORAL at 09:51

## 2021-05-10 RX ADMIN — ROCURONIUM BROMIDE 20 MG: 10 INJECTION INTRAVENOUS at 14:34

## 2021-05-10 RX ADMIN — Medication 0.2 MCG/KG/MIN: at 19:06

## 2021-05-10 RX ADMIN — ONDANSETRON 4 MG: 2 INJECTION INTRAMUSCULAR; INTRAVENOUS at 17:31

## 2021-05-10 RX ADMIN — ROCURONIUM BROMIDE 20 MG: 10 INJECTION INTRAVENOUS at 12:56

## 2021-05-10 RX ADMIN — HYDROMORPHONE HYDROCHLORIDE 0.5 MG: 1 INJECTION, SOLUTION INTRAMUSCULAR; INTRAVENOUS; SUBCUTANEOUS at 19:49

## 2021-05-10 RX ADMIN — ONDANSETRON 4 MG: 2 INJECTION INTRAMUSCULAR; INTRAVENOUS at 18:40

## 2021-05-10 RX ADMIN — PHENYLEPHRINE HYDROCHLORIDE 100 MCG: 10 INJECTION INTRAVENOUS at 12:26

## 2021-05-10 RX ADMIN — ROCURONIUM BROMIDE 50 MG: 10 INJECTION INTRAVENOUS at 10:59

## 2021-05-10 RX ADMIN — FENTANYL CITRATE 25 MCG: 50 INJECTION, SOLUTION INTRAMUSCULAR; INTRAVENOUS at 18:46

## 2021-05-10 RX ADMIN — Medication 2 G: at 16:00

## 2021-05-10 RX ADMIN — SUGAMMADEX 200 MG: 100 INJECTION, SOLUTION INTRAVENOUS at 17:26

## 2021-05-10 RX ADMIN — Medication: at 23:06

## 2021-05-10 RX ADMIN — HYDROMORPHONE HYDROCHLORIDE 0.5 MG: 1 INJECTION, SOLUTION INTRAMUSCULAR; INTRAVENOUS; SUBCUTANEOUS at 16:48

## 2021-05-10 RX ADMIN — ALBUMIN HUMAN 250 ML: 0.05 INJECTION, SOLUTION INTRAVENOUS at 18:10

## 2021-05-10 RX ADMIN — ROCURONIUM BROMIDE 20 MG: 10 INJECTION INTRAVENOUS at 15:32

## 2021-05-10 RX ADMIN — FENTANYL CITRATE 25 MCG: 50 INJECTION, SOLUTION INTRAMUSCULAR; INTRAVENOUS at 18:42

## 2021-05-10 RX ADMIN — FENTANYL CITRATE 50 MCG: 50 INJECTION, SOLUTION INTRAMUSCULAR; INTRAVENOUS at 17:33

## 2021-05-10 RX ADMIN — HYDROMORPHONE HYDROCHLORIDE 0.5 MG: 1 INJECTION, SOLUTION INTRAMUSCULAR; INTRAVENOUS; SUBCUTANEOUS at 17:25

## 2021-05-10 RX ADMIN — ROCURONIUM BROMIDE 30 MG: 10 INJECTION INTRAVENOUS at 11:37

## 2021-05-10 RX ADMIN — PHENYLEPHRINE HYDROCHLORIDE 200 MCG: 10 INJECTION INTRAVENOUS at 11:54

## 2021-05-10 RX ADMIN — ENOXAPARIN SODIUM 40 MG: 100 INJECTION SUBCUTANEOUS at 09:58

## 2021-05-10 RX ADMIN — PHENYLEPHRINE HYDROCHLORIDE 100 MCG: 10 INJECTION INTRAVENOUS at 12:36

## 2021-05-10 RX ADMIN — PROPOFOL 100 MG: 10 INJECTION, EMULSION INTRAVENOUS at 10:59

## 2021-05-10 RX ADMIN — SODIUM CHLORIDE, POTASSIUM CHLORIDE, SODIUM LACTATE AND CALCIUM CHLORIDE 250 ML: 600; 310; 30; 20 INJECTION, SOLUTION INTRAVENOUS at 21:11

## 2021-05-10 RX ADMIN — PHENYLEPHRINE HYDROCHLORIDE 100 MCG: 10 INJECTION INTRAVENOUS at 15:38

## 2021-05-10 RX ADMIN — PHENYLEPHRINE HYDROCHLORIDE 100 MCG: 10 INJECTION INTRAVENOUS at 11:56

## 2021-05-10 ASSESSMENT — MIFFLIN-ST. JEOR: SCORE: 1114.13

## 2021-05-10 NOTE — BRIEF OP NOTE
[facility]    Brief Operative Note    Pre-operative diagnosis: Lung nodule [R91.1]  Post-operative diagnosis Same as pre-operative diagnosis    Procedure: Procedure(s):  Uniportal thoracoscopic right lower lobe wedge segmentectomy, completion bilobectomy middle and lower, intercostal nerve cryoablation, converstion to thoracotomy, mediastinal lymphadenectomy  Surgeon: Surgeon(s) and Role:     * Palomo Castro MD - Primary     * Maury Leblanc MD - Assisting     * Sydney Douglas MD - Assisting     * Monae Alicea MD - Resident - Assisting  Anesthesia: General   Estimated blood loss: Less than 50 ml  Drains: R pleural chest tube   Specimens:   ID Type Source Tests Collected by Time Destination   A : 11R Tissue Lymph Node SURGICAL PATHOLOGY EXAM Palomo Castro MD 5/10/2021  1:07 PM    B : station7 Tissue Lymph Node SURGICAL PATHOLOGY EXAM Palomo Castro MD 5/10/2021  1:17 PM    C : station 7 Tissue Lymph Node SURGICAL PATHOLOGY EXAM Palomo Castro MD 5/10/2021  1:27 PM    D : 11R Tissue Lymph Node SURGICAL PATHOLOGY EXAM Palomo Castro MD 5/10/2021  3:05 PM    E : right lower lobe wedge Tissue Other SURGICAL PATHOLOGY EXAM Palomo Castro MD 5/10/2021  3:20 PM    F : 4R Tissue Lymph Node SURGICAL PATHOLOGY EXAM Palomo Castro MD 5/10/2021  3:45 PM    G : right middle and lower bilobectomy Tissue Other SURGICAL PATHOLOGY EXAM Palomo Castro MD 5/10/2021  4:10 PM      Findings:   Right lower and middle lobectomy, Frozen LN negative. Therapeutic pneumoperitoneum.  Complications: None.  Implants:   Implant Name Type Inv. Item Serial No.  Lot No. LRB No. Used Action   SEALANT PLEURAL AIRLEAK PROGEL 4ML [id number] Other SEALANT PLEURAL AIRLEAK PROGEL 4ML [id number]  [employer] [id number] Right 1 Implanted       Plan:   6B negative pressure room   Daily bronchoscopy   PCA    Tor   ADAT  Intraoperative therapeutic pneumoperitoneum - air under diaphragm is expected   CT to suction    CXR in PACU   Lovenox in AM     [facility]   Surgery Resident   [id number]

## 2021-05-10 NOTE — ANESTHESIA PROCEDURE NOTES
Airway         Procedure Start/Stop Times: 5/10/2021 12:09 PM and 5/10/2021 11:41 AM  Staff -        Anesthesiologist:  Nayla Cohen MD       Performed By: anesthesiologist    Final Airway Details       Final airway type: endotracheal airway       Successful airway: ETT - single and Bronchial blocker (EZ blocker)  Endotracheal Airway Details        ETT size (mm): 7.0       Successful intubation technique: video laryngoscopy       VL Blade Size: MAC 3       Grade View of Cords: 1       Adjucts: stylet       Position: Right       Measured from: lips       Bite block used: None    Post intubation assessment        Placement verified by: capnometry, equal breath sounds and chest rise        Number of attempts at approach: 1       Secured with: plastic tape       Ease of procedure: easy       Dentition: Intact and Unchanged    Medication(s) Administered   Medication Administration Time: 5/10/2021 11:00 AM

## 2021-05-10 NOTE — ANESTHESIA PROCEDURE NOTES
Arterial Line Procedure Note  Pre-Procedure   Staff -        Anesthesiologist:  Nayla Cohen MD       Performed By: anesthesiologist       Location: OR  Procedure   Procedure: arterial line       Laterality: left       Insertion Site: radial.  Sterile Prep        Standard elements of sterile barrier followed       Skin prep: Chloraprep  Insertion/Injection        Technique: Seldinger Technique        Catheter Type/Size: 20 G, 1.75 in/4.5 cm quick cath (integral wire)  Narrative        Tegaderm dressing used.       Complications: None apparent,        Arterial waveform: Yes        IBP within 10% of NIBP: Yes

## 2021-05-10 NOTE — ANESTHESIA PROCEDURE NOTES
Airway       Patient location during procedure: OR  Staff -        CRNA: Ro Thomas APRN CRNA       Performed By: CRNAIndications and Patient Condition       Indications for airway management: mega-procedural       Induction type:intravenous       Mask difficulty assessment: 1 - vent by mask    Final Airway Details       Final airway type: endotracheal airway       Successful airway: ETT - double lumen left  Endotracheal Airway Details        Cuffed: yes       Successful intubation technique: video laryngoscopy       VL Blade Size: MAC 3       Grade View of Cords: 1       Adjucts: stylet       Position: Right       Measured from: lips       Bite block used: None       ETT Double lumen (fr): 37    Post intubation assessment        Placement verified by: capnometry, equal breath sounds and chest rise        Number of attempts at approach: 1       Secured with: pink tape       Ease of procedure: easy       Dentition: Intact and Unchanged    Medication(s) Administered   Medication Administration Time: 5/10/2021 11:00 AM

## 2021-05-10 NOTE — PROGRESS NOTES
PACU RN NOTE  Assigned as pt nurse while pt in PACU post procedure   Pt ARRIVE to PACU @ 1800  Dr. Little with Anesthesia @ pt bedside orders 250 albumin bolus and with Thoracic surgery approval a 500 ml LR bolus. (see orders and MAR for details)  Thoracic surgery aware of pt SBP 70s-80s  Anesthesia states goal is for MAP 65 and greater   Dr. Langston @ pt bedside @ 1820. Ask that pt CT be placed to suction. XR preformed and has been reviewed nu Dr. Langston.   Dr. Langston states that pt has pneumothorax and air leak pt CT should remain to suction at this time.   Tele strip printed and placed in written chart   Blood drawn for labs and sent to lab @ 1850. Results pending   @ 1900 Dr. Little called and notified that pt BP is decreasing after albumin and 500 ml LR bolus have finished MAP is 57-58 (less than goal of 65) SBP 80s and DBP 40s. Nader gtt ordered and order released. Pharmacy notified of need for nader gtt @ 1903  Deven RN provided report @ pt bedside @ 1910  In pt chart after handoff to complete documentation

## 2021-05-10 NOTE — ANESTHESIA CARE TRANSFER NOTE
Patient: Reanna uKmar    Procedure(s):  Uniportal thoracoscopic right lower lobe wedge segmentectomy, completion bilobectomy middle and lower, intercostal nerve cryoablation, converstion to thoracotomy, mediastinal lymphadenectomy    Diagnosis: Lung nodule [R91.1]  Diagnosis Additional Information: No value filed.    Anesthesia Type:   General     Note:    Oropharynx: oropharynx clear of all foreign objects  Level of Consciousness: awake  Oxygen Supplementation: face mask    Independent Airway: airway patency satisfactory and stable  Dentition: dentition unchanged  Vital Signs Stable: post-procedure vital signs reviewed and stable  Report to RN Given: handoff report given  Patient transferred to: PACU    Handoff Report: Identifed the Patient, Identified the Reponsible Provider, Reviewed the pertinent medical history, Discussed the surgical course, Reviewed Intra-OP anesthesia mangement and issues during anesthesia, Set expectations for post-procedure period and Allowed opportunity for questions and acknowledgement of understanding      Vitals: (Last set prior to Anesthesia Care Transfer)  CRNA VITALS  5/10/2021 1723 - 5/10/2021 1754      5/10/2021             NIBP:  95/43    Resp Rate (observed):  16        Electronically Signed By: RENETTA Dye CRNA  May 10, 2021  5:54 PM

## 2021-05-11 ENCOUNTER — APPOINTMENT (OUTPATIENT)
Dept: PHYSICAL THERAPY | Facility: CLINIC | Age: 71
DRG: 164 | End: 2021-05-11
Attending: THORACIC SURGERY (CARDIOTHORACIC VASCULAR SURGERY)
Payer: COMMERCIAL

## 2021-05-11 ENCOUNTER — APPOINTMENT (OUTPATIENT)
Dept: GENERAL RADIOLOGY | Facility: CLINIC | Age: 71
DRG: 164 | End: 2021-05-11
Attending: THORACIC SURGERY (CARDIOTHORACIC VASCULAR SURGERY)
Payer: COMMERCIAL

## 2021-05-11 ENCOUNTER — APPOINTMENT (OUTPATIENT)
Dept: GENERAL RADIOLOGY | Facility: CLINIC | Age: 71
DRG: 164 | End: 2021-05-11
Attending: STUDENT IN AN ORGANIZED HEALTH CARE EDUCATION/TRAINING PROGRAM
Payer: COMMERCIAL

## 2021-05-11 LAB
ANION GAP SERPL CALCULATED.3IONS-SCNC: 6 MMOL/L (ref 3–14)
BUN SERPL-MCNC: 12 MG/DL (ref 7–30)
CALCIUM SERPL-MCNC: 8.8 MG/DL (ref 8.5–10.1)
CHLORIDE SERPL-SCNC: 105 MMOL/L (ref 94–109)
CO2 SERPL-SCNC: 26 MMOL/L (ref 20–32)
CORTIS SERPL-MCNC: 18.9 UG/DL (ref 4–22)
CORTIS SERPL-MCNC: 7.8 UG/DL (ref 4–22)
CREAT SERPL-MCNC: 0.55 MG/DL (ref 0.52–1.04)
ERYTHROCYTE [DISTWIDTH] IN BLOOD BY AUTOMATED COUNT: 12.8 % (ref 10–15)
GFR SERPL CREATININE-BSD FRML MDRD: >90 ML/MIN/{1.73_M2}
GLUCOSE BLDC GLUCOMTR-MCNC: 133 MG/DL (ref 70–99)
GLUCOSE BLDC GLUCOMTR-MCNC: 139 MG/DL (ref 70–99)
GLUCOSE SERPL-MCNC: 121 MG/DL (ref 70–99)
HCT VFR BLD AUTO: 39.7 % (ref 35–47)
HGB BLD-MCNC: 12.4 G/DL (ref 11.7–15.7)
MAGNESIUM SERPL-MCNC: 1.5 MG/DL (ref 1.6–2.3)
MAGNESIUM SERPL-MCNC: 1.6 MG/DL (ref 1.6–2.3)
MCH RBC QN AUTO: 32.5 PG (ref 26.5–33)
MCHC RBC AUTO-ENTMCNC: 31.2 G/DL (ref 31.5–36.5)
MCV RBC AUTO: 104 FL (ref 78–100)
PHOSPHATE SERPL-MCNC: 2.8 MG/DL (ref 2.5–4.5)
PLATELET # BLD AUTO: 310 10E9/L (ref 150–450)
POTASSIUM SERPL-SCNC: 4.8 MMOL/L (ref 3.4–5.3)
POTASSIUM SERPL-SCNC: 4.9 MMOL/L (ref 3.4–5.3)
RBC # BLD AUTO: 3.82 10E12/L (ref 3.8–5.2)
SODIUM SERPL-SCNC: 137 MMOL/L (ref 133–144)
WBC # BLD AUTO: 9.6 10E9/L (ref 4–11)

## 2021-05-11 PROCEDURE — 250N000011 HC RX IP 250 OP 636: Performed by: THORACIC SURGERY (CARDIOTHORACIC VASCULAR SURGERY)

## 2021-05-11 PROCEDURE — 82533 TOTAL CORTISOL: CPT | Performed by: NURSE PRACTITIONER

## 2021-05-11 PROCEDURE — 82533 TOTAL CORTISOL: CPT | Performed by: SURGERY

## 2021-05-11 PROCEDURE — 36415 COLL VENOUS BLD VENIPUNCTURE: CPT | Performed by: STUDENT IN AN ORGANIZED HEALTH CARE EDUCATION/TRAINING PROGRAM

## 2021-05-11 PROCEDURE — 71045 X-RAY EXAM CHEST 1 VIEW: CPT | Mod: 77

## 2021-05-11 PROCEDURE — 258N000003 HC RX IP 258 OP 636: Performed by: THORACIC SURGERY (CARDIOTHORACIC VASCULAR SURGERY)

## 2021-05-11 PROCEDURE — 200N000002 HC R&B ICU UMMC

## 2021-05-11 PROCEDURE — 250N000011 HC RX IP 250 OP 636: Performed by: STUDENT IN AN ORGANIZED HEALTH CARE EDUCATION/TRAINING PROGRAM

## 2021-05-11 PROCEDURE — 999N000157 HC STATISTIC RCP TIME EA 10 MIN

## 2021-05-11 PROCEDURE — 999N001017 HC STATISTIC GLUCOSE BY METER IP

## 2021-05-11 PROCEDURE — 84100 ASSAY OF PHOSPHORUS: CPT | Performed by: STUDENT IN AN ORGANIZED HEALTH CARE EDUCATION/TRAINING PROGRAM

## 2021-05-11 PROCEDURE — 71045 X-RAY EXAM CHEST 1 VIEW: CPT | Mod: 26 | Performed by: RADIOLOGY

## 2021-05-11 PROCEDURE — 80048 BASIC METABOLIC PNL TOTAL CA: CPT | Performed by: STUDENT IN AN ORGANIZED HEALTH CARE EDUCATION/TRAINING PROGRAM

## 2021-05-11 PROCEDURE — 84132 ASSAY OF SERUM POTASSIUM: CPT | Performed by: STUDENT IN AN ORGANIZED HEALTH CARE EDUCATION/TRAINING PROGRAM

## 2021-05-11 PROCEDURE — 250N000013 HC RX MED GY IP 250 OP 250 PS 637: Performed by: THORACIC SURGERY (CARDIOTHORACIC VASCULAR SURGERY)

## 2021-05-11 PROCEDURE — 999N000015 HC STATISTIC ARTERIAL MONITORING DAILY

## 2021-05-11 PROCEDURE — 99291 CRITICAL CARE FIRST HOUR: CPT | Mod: 24 | Performed by: SURGERY

## 2021-05-11 PROCEDURE — 97530 THERAPEUTIC ACTIVITIES: CPT | Mod: GP

## 2021-05-11 PROCEDURE — 85027 COMPLETE CBC AUTOMATED: CPT | Performed by: STUDENT IN AN ORGANIZED HEALTH CARE EDUCATION/TRAINING PROGRAM

## 2021-05-11 PROCEDURE — 250N000009 HC RX 250: Performed by: STUDENT IN AN ORGANIZED HEALTH CARE EDUCATION/TRAINING PROGRAM

## 2021-05-11 PROCEDURE — 97161 PT EVAL LOW COMPLEX 20 MIN: CPT | Mod: GP

## 2021-05-11 PROCEDURE — 250N000013 HC RX MED GY IP 250 OP 250 PS 637: Performed by: STUDENT IN AN ORGANIZED HEALTH CARE EDUCATION/TRAINING PROGRAM

## 2021-05-11 PROCEDURE — 36415 COLL VENOUS BLD VENIPUNCTURE: CPT | Performed by: SURGERY

## 2021-05-11 PROCEDURE — 83735 ASSAY OF MAGNESIUM: CPT | Performed by: STUDENT IN AN ORGANIZED HEALTH CARE EDUCATION/TRAINING PROGRAM

## 2021-05-11 PROCEDURE — 71045 X-RAY EXAM CHEST 1 VIEW: CPT

## 2021-05-11 PROCEDURE — 94640 AIRWAY INHALATION TREATMENT: CPT | Mod: 76

## 2021-05-11 PROCEDURE — P9041 ALBUMIN (HUMAN),5%, 50ML: HCPCS | Performed by: STUDENT IN AN ORGANIZED HEALTH CARE EDUCATION/TRAINING PROGRAM

## 2021-05-11 PROCEDURE — 94640 AIRWAY INHALATION TREATMENT: CPT

## 2021-05-11 PROCEDURE — 258N000003 HC RX IP 258 OP 636: Performed by: STUDENT IN AN ORGANIZED HEALTH CARE EDUCATION/TRAINING PROGRAM

## 2021-05-11 RX ORDER — DEXTROSE MONOHYDRATE 25 G/50ML
25-50 INJECTION, SOLUTION INTRAVENOUS
Status: DISCONTINUED | OUTPATIENT
Start: 2021-05-11 | End: 2021-05-19 | Stop reason: HOSPADM

## 2021-05-11 RX ORDER — ONDANSETRON 2 MG/ML
4 INJECTION INTRAMUSCULAR; INTRAVENOUS EVERY 6 HOURS PRN
Status: DISCONTINUED | OUTPATIENT
Start: 2021-05-11 | End: 2021-05-19 | Stop reason: HOSPADM

## 2021-05-11 RX ORDER — ACETAMINOPHEN 325 MG/1
975 TABLET ORAL 3 TIMES DAILY
Status: DISCONTINUED | OUTPATIENT
Start: 2021-05-11 | End: 2021-05-19 | Stop reason: HOSPADM

## 2021-05-11 RX ORDER — PROCHLORPERAZINE 25 MG
12.5 SUPPOSITORY, RECTAL RECTAL EVERY 12 HOURS PRN
Status: DISCONTINUED | OUTPATIENT
Start: 2021-05-11 | End: 2021-05-19 | Stop reason: HOSPADM

## 2021-05-11 RX ORDER — NICOTINE 21 MG/24HR
1 PATCH, TRANSDERMAL 24 HOURS TRANSDERMAL EVERY 24 HOURS
Status: ON HOLD | COMMUNITY
End: 2021-05-11

## 2021-05-11 RX ORDER — LIDOCAINE 4 G/G
1 PATCH TOPICAL
Status: DISCONTINUED | OUTPATIENT
Start: 2021-05-11 | End: 2021-05-19 | Stop reason: HOSPADM

## 2021-05-11 RX ORDER — AMOXICILLIN 250 MG
2 CAPSULE ORAL 2 TIMES DAILY
Status: DISCONTINUED | OUTPATIENT
Start: 2021-05-11 | End: 2021-05-19 | Stop reason: HOSPADM

## 2021-05-11 RX ORDER — PHENYLEPHRINE HCL IN 0.9% NACL 50MG/250ML
0.5-6 PLASTIC BAG, INJECTION (ML) INTRAVENOUS CONTINUOUS
Status: DISCONTINUED | OUTPATIENT
Start: 2021-05-11 | End: 2021-05-11

## 2021-05-11 RX ORDER — NICOTINE 21 MG/24HR
1 PATCH, TRANSDERMAL 24 HOURS TRANSDERMAL EVERY 24 HOURS
COMMUNITY
End: 2021-10-18

## 2021-05-11 RX ORDER — ROSUVASTATIN CALCIUM 10 MG/1
20 TABLET, COATED ORAL DAILY
Status: DISCONTINUED | OUTPATIENT
Start: 2021-05-11 | End: 2021-05-19 | Stop reason: HOSPADM

## 2021-05-11 RX ORDER — POLYETHYLENE GLYCOL 3350 17 G/17G
17 POWDER, FOR SOLUTION ORAL DAILY
Status: DISCONTINUED | OUTPATIENT
Start: 2021-05-11 | End: 2021-05-19 | Stop reason: HOSPADM

## 2021-05-11 RX ORDER — MAGNESIUM SULFATE HEPTAHYDRATE 40 MG/ML
2 INJECTION, SOLUTION INTRAVENOUS ONCE
Status: COMPLETED | OUTPATIENT
Start: 2021-05-11 | End: 2021-05-11

## 2021-05-11 RX ORDER — ONDANSETRON 4 MG/1
4 TABLET, ORALLY DISINTEGRATING ORAL EVERY 6 HOURS PRN
Status: DISCONTINUED | OUTPATIENT
Start: 2021-05-11 | End: 2021-05-19 | Stop reason: HOSPADM

## 2021-05-11 RX ORDER — PROCHLORPERAZINE MALEATE 5 MG
5 TABLET ORAL EVERY 6 HOURS PRN
Status: DISCONTINUED | OUTPATIENT
Start: 2021-05-11 | End: 2021-05-19 | Stop reason: HOSPADM

## 2021-05-11 RX ORDER — IPRATROPIUM BROMIDE AND ALBUTEROL SULFATE 2.5; .5 MG/3ML; MG/3ML
3 SOLUTION RESPIRATORY (INHALATION)
Status: DISCONTINUED | OUTPATIENT
Start: 2021-05-11 | End: 2021-05-19 | Stop reason: HOSPADM

## 2021-05-11 RX ORDER — PHENYLEPHRINE HCL IN 0.9% NACL 50MG/250ML
.5-1.25 PLASTIC BAG, INJECTION (ML) INTRAVENOUS CONTINUOUS
Status: DISCONTINUED | OUTPATIENT
Start: 2021-05-11 | End: 2021-05-12

## 2021-05-11 RX ORDER — ALBUMIN, HUMAN INJ 5% 5 %
250 SOLUTION INTRAVENOUS ONCE
Status: COMPLETED | OUTPATIENT
Start: 2021-05-11 | End: 2021-05-11

## 2021-05-11 RX ORDER — NICOTINE 21 MG/24HR
1 PATCH, TRANSDERMAL 24 HOURS TRANSDERMAL DAILY
Status: DISCONTINUED | OUTPATIENT
Start: 2021-05-11 | End: 2021-05-18

## 2021-05-11 RX ORDER — METHOCARBAMOL 500 MG/1
500 TABLET, FILM COATED ORAL 3 TIMES DAILY
Status: DISCONTINUED | OUTPATIENT
Start: 2021-05-11 | End: 2021-05-19 | Stop reason: HOSPADM

## 2021-05-11 RX ORDER — QUETIAPINE FUMARATE 25 MG/1
25-50 TABLET, FILM COATED ORAL AT BEDTIME
Status: DISCONTINUED | OUTPATIENT
Start: 2021-05-11 | End: 2021-05-19 | Stop reason: HOSPADM

## 2021-05-11 RX ORDER — OXYCODONE HCL 5 MG/5 ML
5 SOLUTION, ORAL ORAL EVERY 6 HOURS PRN
Status: DISCONTINUED | OUTPATIENT
Start: 2021-05-11 | End: 2021-05-12

## 2021-05-11 RX ORDER — AMOXICILLIN 250 MG
1 CAPSULE ORAL 2 TIMES DAILY
Status: DISCONTINUED | OUTPATIENT
Start: 2021-05-11 | End: 2021-05-19 | Stop reason: HOSPADM

## 2021-05-11 RX ORDER — NICOTINE POLACRILEX 4 MG
15-30 LOZENGE BUCCAL
Status: DISCONTINUED | OUTPATIENT
Start: 2021-05-11 | End: 2021-05-19 | Stop reason: HOSPADM

## 2021-05-11 RX ORDER — ALBUTEROL SULFATE 0.83 MG/ML
2.5 SOLUTION RESPIRATORY (INHALATION)
Status: DISCONTINUED | OUTPATIENT
Start: 2021-05-11 | End: 2021-05-19 | Stop reason: HOSPADM

## 2021-05-11 RX ORDER — IPRATROPIUM BROMIDE AND ALBUTEROL SULFATE 2.5; .5 MG/3ML; MG/3ML
3 SOLUTION RESPIRATORY (INHALATION) EVERY 4 HOURS PRN
Status: DISCONTINUED | OUTPATIENT
Start: 2021-05-11 | End: 2021-05-19 | Stop reason: HOSPADM

## 2021-05-11 RX ADMIN — Medication 1 PATCH: at 07:45

## 2021-05-11 RX ADMIN — ENOXAPARIN SODIUM 40 MG: 100 INJECTION SUBCUTANEOUS at 11:32

## 2021-05-11 RX ADMIN — IPRATROPIUM BROMIDE AND ALBUTEROL SULFATE 3 ML: .5; 3 SOLUTION RESPIRATORY (INHALATION) at 12:52

## 2021-05-11 RX ADMIN — OXYCODONE HYDROCHLORIDE 5 MG: 5 SOLUTION ORAL at 06:24

## 2021-05-11 RX ADMIN — GABAPENTIN 100 MG: 100 CAPSULE ORAL at 19:44

## 2021-05-11 RX ADMIN — GABAPENTIN 100 MG: 100 CAPSULE ORAL at 07:45

## 2021-05-11 RX ADMIN — ALBUMIN HUMAN 250 ML: 0.05 INJECTION, SOLUTION INTRAVENOUS at 17:29

## 2021-05-11 RX ADMIN — METHOCARBAMOL 500 MG: 500 TABLET, FILM COATED ORAL at 07:45

## 2021-05-11 RX ADMIN — LIDOCAINE 1 PATCH: 560 PATCH PERCUTANEOUS; TOPICAL; TRANSDERMAL at 07:47

## 2021-05-11 RX ADMIN — ACETAMINOPHEN 975 MG: 325 TABLET, FILM COATED ORAL at 19:43

## 2021-05-11 RX ADMIN — OXYCODONE HYDROCHLORIDE 5 MG: 5 SOLUTION ORAL at 21:38

## 2021-05-11 RX ADMIN — SODIUM CHLORIDE, POTASSIUM CHLORIDE, SODIUM LACTATE AND CALCIUM CHLORIDE 500 ML: 600; 310; 30; 20 INJECTION, SOLUTION INTRAVENOUS at 23:02

## 2021-05-11 RX ADMIN — QUETIAPINE FUMARATE 25 MG: 25 TABLET ORAL at 21:17

## 2021-05-11 RX ADMIN — ROSUVASTATIN CALCIUM 20 MG: 10 TABLET, FILM COATED ORAL at 07:46

## 2021-05-11 RX ADMIN — IPRATROPIUM BROMIDE AND ALBUTEROL SULFATE 3 ML: .5; 3 SOLUTION RESPIRATORY (INHALATION) at 17:31

## 2021-05-11 RX ADMIN — QUETIAPINE FUMARATE 25 MG: 25 TABLET ORAL at 01:26

## 2021-05-11 RX ADMIN — HYDROCORTISONE SODIUM SUCCINATE 100 MG: 100 INJECTION, POWDER, FOR SOLUTION INTRAMUSCULAR; INTRAVENOUS at 21:16

## 2021-05-11 RX ADMIN — MAGNESIUM SULFATE IN WATER 2 G: 40 INJECTION, SOLUTION INTRAVENOUS at 11:32

## 2021-05-11 RX ADMIN — DOCUSATE SODIUM 50 MG AND SENNOSIDES 8.6 MG 1 TABLET: 8.6; 5 TABLET, FILM COATED ORAL at 07:46

## 2021-05-11 RX ADMIN — ACETAMINOPHEN 975 MG: 325 TABLET, FILM COATED ORAL at 14:18

## 2021-05-11 RX ADMIN — METHOCARBAMOL 500 MG: 500 TABLET, FILM COATED ORAL at 19:43

## 2021-05-11 RX ADMIN — IPRATROPIUM BROMIDE AND ALBUTEROL SULFATE 3 ML: .5; 3 SOLUTION RESPIRATORY (INHALATION) at 19:51

## 2021-05-11 RX ADMIN — DOCUSATE SODIUM 50 MG AND SENNOSIDES 8.6 MG 1 TABLET: 8.6; 5 TABLET, FILM COATED ORAL at 19:43

## 2021-05-11 RX ADMIN — METHOCARBAMOL 500 MG: 500 TABLET, FILM COATED ORAL at 14:18

## 2021-05-11 RX ADMIN — GABAPENTIN 100 MG: 100 CAPSULE ORAL at 14:18

## 2021-05-11 RX ADMIN — ACETAMINOPHEN 975 MG: 325 TABLET, FILM COATED ORAL at 07:45

## 2021-05-11 RX ADMIN — IPRATROPIUM BROMIDE AND ALBUTEROL SULFATE 3 ML: .5; 3 SOLUTION RESPIRATORY (INHALATION) at 09:20

## 2021-05-11 RX ADMIN — SODIUM CHLORIDE, POTASSIUM CHLORIDE, SODIUM LACTATE AND CALCIUM CHLORIDE 250 ML: 600; 310; 30; 20 INJECTION, SOLUTION INTRAVENOUS at 01:26

## 2021-05-11 RX ADMIN — Medication 0.5 MCG/KG/MIN: at 16:06

## 2021-05-11 ASSESSMENT — ACTIVITIES OF DAILY LIVING (ADL)
ADLS_ACUITY_SCORE: 16

## 2021-05-11 ASSESSMENT — MIFFLIN-ST. JEOR: SCORE: 1114.13

## 2021-05-11 NOTE — OP NOTE
Procedure Date: 05/10/2021    PREOPERATIVE DIAGNOSIS:  Right lower lobe nodule.    POSTOPERATIVE DIAGNOSIS:  Right lower lobe non-small cell lung cancer.    PROCEDURE PERFORMED:    1.  Completion bilobectomy.  2.  Bronchoplasty.  3.  Therapeutic pneumoperitoneum.  4.  Flexible bronchoscopy.    SURGEON:  Dr. Maury Leblanc (I am dictating as cosurgeon with Dr. Langston.  Please refer to his operative report.)    DESCRIPTION OF PROCEDURE:  I scrubbed in with Dr. Langston to help him with the evaluation of the bronchus intermedius after stapling it.  I removed the staples from the proximal staple line, and we realized that we could not salvage the middle lobe.  For this reason, we completed the bilobectomy stapling across the lower lobe vein, the pulmonary artery in the fissure, and then completing the fissure between the upper lobe and middle lobes.  Once we handed off the specimen, since we had already removed the staples from the proximal end of the bronchus intermedius, we then closed it primarily with interrupted 3-0 Vicryl stitches.  I performed a flexible bronchoscopy to verify that the upper lobe bronchus was patent.  We then placed a double pursestring in the diaphragm, made a small opening and placed a catheter into the peritoneal cavity under direct vision, and insufflated with a total of about 2 liters of air.  We measured the intraperitoneal pressure to be around 9 mmHg.  The patient tolerated this for a period of about 20-30 minutes without major difficulty.  We then removed the catheter and tied the pursestrings.    Please refer to Dr. aLngston's operative report for further details.    Maury Leblanc MD        D: 05/10/2021   T: 05/10/2021   MT: LUIS EDUARDO    Name:     MACHO PETERSEN  MRN:      [id number]        Account:        [id number]   :      1950           Procedure Date: 05/10/2021     Document: [id number]

## 2021-05-11 NOTE — PLAN OF CARE
Major shift events: Pt is alert and oriented x4, SR on tele. Keep systolics<140, maintaining MAPs above 65 with phenylephrine. On 0.5, unable to wean. Chest tube has air leak, team aware. Nicotine and lidocaine patches in place. On full liquid diet, tolerating well. Pain well controlled.   Continue to monitor, notify MD with any concerns.

## 2021-05-11 NOTE — OP NOTE
Procedure Date: 05/10/2021    PREOPERATIVE DIAGNOSES:    1.  Right lower lobe indeterminate pulmonary nodule.  2.  Current smoking status.    POSTOPERATIVE DIAGNOSES:  1.  Non-small cell lung cancer.  2.  Current smoking status.    PROCEDURES PERFORMED:    1.  Flexible bronchoscopy.  2.  Uniportal thoracoscopic right lower lobe wedge.    3.  Conversion to right posterolateral thoracotomy.  4.  Completion middle and lower bilobectomy.  5.  Therapeutic pneumoperitoneum.  6.  Bronchus intermedius bronchoplasty with intercostal muscle buttress.  7.  Intercostal nerve cryoablation, spaces 3-9.  8.  Mediastinal and hilar lymphadenectomy.  9.  Thoracoscopic pneumolysis.      COMPLICATIONS:  Bronchus intermedius injury.      SURGEON:  Palomo Thomas MD    ASSISTANTS:  Maury Leblanc MD, Sydney Douglas MD and Monae Alicea MD.    DRAINS/TUBES:  A 28-Kiswahili straight [place] tube to right pleural space directed posterior apical.    SPECIMENS:    1.  Right lower lobe wedge.   2.  Completion middle and lower bilobectomy.  3.  Mediastinal lymph node stations 4R and 7.  4.  Hilar lymph node station 11R.    ANESTHESIA:  General endotracheal anesthesia with single-lumen tube and a bronchial blocker.    OPERATIVE FINDINGS:    1.  Flexible bronchoscopy:  The patient had a somewhat narrow or small-sized left main stem bronchus, which precluded placement of a double-lumen endotracheal tube.  We had to place a single-lumen tube with an EZ-Blocker.  2.  Thoracoscopy:  The patient had moderate pulmonary adhesions from the right middle lobe to the chest wall as well as the right lower lobe.  After the adhesions were taken down, we examined the patient's chest.  There was no pleural effusion, no mediastinal adenopathy.  The fissures were incomplete, which significantly increased the complexity of the procedure.  While dividing the parenchyma between the lower and the upper lobe, we had an injury of the bronchus intermedius with a  stapler.  For this reason, we converted to a thoracotomy.  I called one of my senior partners, Dr. Leblanc and Dr. Douglas, to help with the rest of the case.  After a long discussion about repairing the injury with a bronchoplasty and reimplanting the bronchus intermedius, we were concerned about devascularizing the rest of the bronchus intermedius and having further complications.  Given the patient's adequate pulmonary reserve, we decided to perform a middle and lower bilobectomy.   Prior to completion of the middle and lower bilobectomy, we had confirmation of the nodule being lung cancer.  The mediastinal lymph nodes frozen were negative for malignancy.  We performed a bilobectomy.  The staple line in the bronchus intermedius was cut out, and the bronchoplasty was then closed with interrupted 4-0 Vicryl.  We reinforced the bronchoplasty with intercostal muscle buttress.  We performed a therapeutic pneumoperitoneum to manage the space, and we left a pleural tube.  The patient had a small air leak at the end.    DESCRIPTION OF PROCEDURE IN DETAIL:  The patient was taken to the operating room, laid supine.  General anesthesia was induced.  A double-lumen endotracheal tube was placed and confirmed by bronchoscopy; however, the left-sided double-lumen tube was unable to be advanced into the left main stem bronchus.  For this reason, we exchanged the tube for a single-lumen tube with an EZ-Blocker.  She was then positioned with the right side up.  The right chest was prepped with ChloraPrep and draped in normal sterile fashion.  A formal timeout was carried out, confirming the identity of the patient and correct procedure.  She had SCDs in place and functioning prior to induction of anesthesia.  She received antibiotics within 30 minutes of incision.    After the timeout was completed, we started by making a 4 cm skin incision in the 5th intercostal space.  We split the serratus.  We divided intercostal muscles, placed  an [place] wound protector.  The patient had significant pulmonary adhesions from the middle and lower lobes to the chest wall that were taken down after the pneumolysis was completed.  We inspected the patient's fissures that were incomplete.  This significantly increased the complexity of the case.    We first took down the inferior pulmonary ligament.  Next, we harvested station 7 lymph nodes and sent them for pathology for frozen exam, which was negative.  Next, we worked on the fissure, and we identified the ongoing pulmonary artery.  Staying on top of the ongoing pulmonary artery, we continued the dissection posteriorly until we connected the plane of dissection from the posterior mediastinal pleura that was opened.  We placed a tie around the parenchyma, and we used multiple purple loads of the ArQule stapler to divide the parenchyma between the upper and the right lower lobe.  After this was divided, we noted that bronchus had been divided with a staple line.  At this point, I was concerned there was an injury to the bronchus intermedius, and I called my partner, Dr. Douglas, who performed a bronchoscopy.  This confirmed the injury.  We then decided to perform a posterolateral thoracotomy to carefully inspect the anatomy and repair the injury.    We completed a thoracotomy and had excellent exposure.  We shingled the fifth rib.  We realized that the bronchus intermedius had been divided.  At this point, prior to proceeding to repair, we performed a wedge of the right lower lobe, including the nodule.  It was sent to Pathology with confirmation that this was non-small cell lung cancer.    We then had an extensive discussion about how to proceed.  One option was to excise the staple line and reimplant, in an end-to-end fashion, the bronchus intermedius in order to save the right middle lobe.  We were concerned that by doing this, we would devascularize the bronchus intermedius and have further complications.   Given the patient's adequate pulmonary reserve, we decided to perform a middle and lower bilobectomy.  We circumferentially dissected out the ongoing pulmonary artery.  We clearly identified the branches going to the superior segment as well as the right middle lobe.  We divided both.  Next, we divided the ongoing pulmonary artery with a vascular load of the stapler.  Next, the right inferior pulmonary vein was divided, including the right middle lobe vein.  Care was taken to ensure that the right superior pulmonary vein was preserved.  Lastly, the specimen was then excised and sent to Pathology as a middle and lower bilobectomy.  We then excised the staple line on the proximal end of the bronchus intermedius.  We inspected the airway, and we decided that performing a primary bronchoplasty would suffice to repair the bronchus intermedius.  We performed this with interrupted 4-0 Vicryl suture.  An intercostal muscle flap was then fashioned to buttress the repair.  A leak test intraoperatively was negative.  Hemostasis was then confirmed.    In order to manage the space, we performed therapeutic pneumoperitoneum.  A pursestring stitch was placed in the diaphragm.  We then instilled approximately 2.5 liters of air.  The pressure was then measured with the insufflation device, and it was approximately 15 mmHg.  We carefully watched the peak pressures in the patient's blood pressure, and she was able to tolerate this very well.  We then tied the pursestring stitches for the pneumoperitoneum.    Lastly, in order to manage postoperative pain, we performed an intercostal nerve cryoablation for spaces 3-9.  A right pleural tube was then placed and brought through a separate stab incision.  The thoracotomy was closed with pericostal stitches.  We used 0 Vicryl for the latissimus muscle which was divided and 2-0 Vicryl for the deep dermis.  The skin was closed with 4-0 Vicryl.  We applied Exofin.    At the end of the case the  patient had a moderate to large air leak while she was on positive pressure ventilation.  Chest tube output was less than 10 mL.  She tolerated the procedure well.  All instrument, sponge counts were correct at the end of the case.      ESTIMATED BLOOD LOSS:  50 mL.    Palomo Thomas MD        D: 05/10/2021   T: 2021   MT: LUIS EDUARDO    Name:     MACHO PETERSEN  MRN:      [id number]        Account:        [id number]   :      1950           Procedure Date: 05/10/2021     Document: [id number]

## 2021-05-11 NOTE — PROGRESS NOTES
THORACIC & FOREGUT SURGERY    S:  No acute overnight events.  Pt seen at bedside resting comfortably.     O:  Vitals:    05/11/21 0900 05/11/21 1000 05/11/21 1100 05/11/21 1200   BP: (!) 85/56      BP Location:       Cuff Size: Adult Regular      Pulse: 66 79 77 70   Resp:    24   Temp: 97.9  F (36.6  C)   98  F (36.7  C)   TempSrc: Oral   Oral   SpO2: 96% 100% 99% 100%   Weight:       Height:           A&O, NAD  Breathing non-labored on NC  RRR per tele, good color  Soft, NDNT  Distal extremities warm    +air leak, serosang CT output    A/P: Reanna Kumar is a 71 year old female POD#1 s/p uniportal converted to open RLL segmentectomy, completion bilobectomy (middle and lower lobes) and therapeutic pneumoperitoneum.  Doing well but requiring pressor in immediate postop period, likely due to volume status.    -Continue chest tube to suction  -Wean pressor as able  -OK for diet, encourage PO fluids  -Likely 6B tomorrow if doing well and off phenylephrine   -Lovenox    Maurizio Garcia PASonaC  Thoracic Surgery

## 2021-05-11 NOTE — PROGRESS NOTES
1900 Assumed care of Patient; continued titrating phenylephrine drip to achieve MAP > 65.    1935 Dr. Langston paged with update that Patient is currently on phenylephrine drip at 1 mcg/kg/min; will continue to monitor.    1940 Dr. Langston returned page and stated Transfer order would be updated to ICU level of care; will continue to monitor.

## 2021-05-11 NOTE — PHARMACY-ADMISSION MEDICATION HISTORY
Admission Medication History Completed by Pharmacy    See [facility] Admission Navigator for allergy information, preferred outpatient pharmacy, prior to admission medications and immunization status.     Medication History Sources: Patient     Additional Information: None     Medication Sig Last Dose   nicotine (NICODERM CQ) 21 MG/24HR 24 hr patch Place 1 patch onto the skin every 24 hours 5/10/2021   QUEtiapine (SEROQUEL) 50 MG tablet Take 0.5-1 tablets (25-50 mg) by mouth At Bedtime  5/9/2021   rosuvastatin (CRESTOR) 20 MG tablet Take 1 tablet (20 mg) by mouth daily 5/10/2021     Date completed: 05/11/21    Medication history completed by: Lori Recio, PharmD, BCPS

## 2021-05-11 NOTE — CONSULTS
SURGICAL ICU ADMISSION NOTE  5/10/2021    PRIMARY TEAM: Thoracic   PRIMARY PHYSICIAN: Kian    REASON FOR CRITICAL CARE ADMISSION: Hemodynamic instabliity   ADMITTING PHYSICIAN: Santana    ASSESSMENT: 70 yo woman with indeterminate RLL pulmonary nodule for which she underwent uniportal thoracoscopic right lower lobe wedge segmentectomy, completion bilobectomy of middle and lower lobes, intercostal nerve cryoablation, conversion to thoracotomy, and mediastinal lymphadenectomy. Postoperatively, she has an ongoing vasopressor requirement and requires ICU admission. In discussion with thoracic surgery, plan for judicious fluid resuscitation.      PLAN:   Neuro/ pain/ sedation:  -Monitor neurological status. Notify the MD for any acute changes in exam.  - APAP / oxycodone /dilaudid for pain.     Pulmonary care:   -Supplemental oxygen to keep saturation above 92 %.  -Incentive spirometer every 15- 30 minutes when awake.  -Chest tube to suction     Cardiovascular:    -Monitor hemodynamic status.      GI care:   -CLD ok     Fluids/ Electrolytes/ Nutrition:   -LR @50cc/hr for IV fluid hydration with 250 ml bolus, another bolus ok per thoracic  -ICU electrolyte replacement protocol  -No indication for parenteral nutrition.  -Nutrition consulted. Appreciate recs  -Will obtain BMP and lactate     Renal/ Fluid Balance:    -Urine output is 350 ml/16 hours - inadequate so far.  -Will continue to monitor intake and output.      Endocrine:    -No management indication.       ID/ Antibiotics:  -No indication for antibiotics.      Heme:     -Repeat Hgb on admission     Prophylaxis:    -Mechanical prophylaxis for DVT.   -No chemical DVT prophylaxis due to high risk of bleeding. Lovenox in AM     MSK:    -PT and OT consulted. Appreciate recs.     Lines/ tubes/ drains:  -Chest tube, right side  -Lentz     Disposition:  -Surgical ICU.     Patient seen, findings and plan discussed with surgical ICU staff, Dr. Santana Ascencio,  MD  Surgery Resident PGY3    - - - - - - - - - - - - - - - - - - - - - - - - - - - - - - - - - - - - - - - - - - - - - - - - - - - - - - - - - - - - - - - - - - - - - - - -     HISTORY PRESENTING ILLNESS:: 72 yo woman with indeterminate RLL pulmonary nodule for which she underwent uniportal thoracoscopic right lower lobe wedge segmentectomy, converted to completion bilobectomy of middle and lower lobes, intercostal nerve cryoablation, conversion to thoracotomy, and mediastinal lymphadenectomy. She has been unable to wean from phenylephrine in the PACU. ICU is consulted for admission.       REVIEW OF SYSTEMS: 10 point ROS neg other than the symptoms noted above in the HPI.    PAST MEDICAL HISTORY:    has a past medical history of Hyperlipidemia, Insomnia (2015), and Pulmonary nodules.    SURGICAL HISTORY:    has a past surgical history that includes hip surgery; joint replacement (Left, 2008); and Arthroplasty hip (Right, 6/2/2020).    SOCIAL HISTORY:    reports that she has been smoking cigarettes. She has a 50.00 pack-year smoking history. She has never used smokeless tobacco. She reports current alcohol use. She reports that she does not use drugs.    FAMILY HISTORY: No bleeding/clotting disorders nor problems with anesthesia.     ALLERGIES:      Allergies   Allergen Reactions     Zyban [Bupropion Hydrobromide]      Increased anxiety       MEDICATIONS:  No current facility-administered medications on file prior to encounter.   QUEtiapine (SEROQUEL) 50 MG tablet, Take 0.5-1 tablets (25-50 mg) by mouth At Bedtime (takes 0.5 x 50mg)  rosuvastatin (CRESTOR) 20 MG tablet, Take 1 tablet (20 mg) by mouth daily        PHYSICAL EXAMINATION:  Temp:  [96.3  F (35.7  C)-98  F (36.7  C)] 97.2  F (36.2  C)  Pulse:  [63-96] 65  Resp:  [8-29] 12  BP: ()/(46-86) 94/46  MAP:  [56 mmHg-80 mmHg] 61 mmHg  Arterial Line BP: ()/(40-56) 79/46  SpO2:  [93 %-100 %] 98 %    Thin adult woman, resting in bed, NAD  Respirations  non-labored on nasal canula. Thoracotomy incision intact with no drainage or bruising  Abdomen soft, non-tender   Extremities warm, well perfused  Lentz with clear urine     LABS: Reviewed.   Arterial Blood Gases   Recent Labs   Lab 05/10/21  1536   PH 7.33*   PCO2 44   PO2 127*   HCO3 23     Complete Blood Count   Recent Labs   Lab 05/10/21  1850 05/10/21  1536   WBC 9.6  --    HGB 11.9 13.3     --      Basic Metabolic Panel  Recent Labs   Lab 05/10/21  1850 05/10/21  1536    136   POTASSIUM 4.0 4.1   CHLORIDE 106  --    CO2 25  --    BUN 15  --    CR 0.63  --    * 140*     Liver Function Tests  No lab results found in last 7 days.  Pancreatic Enzymes  No lab results found in last 7 days.  Coagulation Profile  No lab results found in last 7 days.  Lactate  Invalid input(s): LACTATE    IMAGING:  Recent Results (from the past 24 hour(s))   XR Chest Port 1 View    Narrative    Exam:  Chest X-ray 5/10/2021 6:41 PM    History: s/p R bilobectomy    Comparison: CT 5/5/2021    Findings: Frontal radiograph of the chest. Right apical directed chest  tube. Midline trachea. The cardiomediastinal silhouette is within  normal limits. Postictal changes of right middle and lower lobectomy.  Small to moderate right-sided pneumothorax. Free air under the right  hemidiaphragm. The left lung is clear. Subcutaneous emphysema along  the right lateral chest wall.      Impression    Impression:   1. Post surgical changes of right sided bilobectomy with a small to  moderate right-sided pneumothorax. Right-sided apical directed chest  tube is in place.  2. Pneumoperitoneum is seen under the right hemidiaphragm.    I have personally reviewed the examination and initial interpretation  and I agree with the findings.    KWAME LOTT, DO

## 2021-05-11 NOTE — PROGRESS NOTES
SURGICAL ICU PROGRESS NOTE  May 11, 2021        Critical Care Services Progress Note:     Reanna Kumar remains critically ill with continuing requirement for vasoactive drugs after a thoracotomy for right-sided bilobectomy.  Her pain control appears good.  Her blood pressures continue to be soft.   I personally examined and evaluated the patient today.   The patient s prognosis today is clearly improved as we await weaning of vasoactive drugs.  I have evaluated all laboratory values and imaging studies from the past 24 hours.  Key findings and decisions made today included patient has been cleared for liquid diet.  Increase the activity of therapies to enhance postoperative support.  Consider discontinuation of IV.  I personally managed the fluids and nutrition.   Consults ongoing and ordered are Pharmacy, Nutrition and Therapies.  Procedures that will happen today are weaning of vasoactive drugs and transitioning the patient to an oral liquid diet.  All treatments were placed at my direction.  I formulated today s plan with Dr. Morel and the house staff team or resident(s) and agree with the findings and plan in the associated note.       The above plans and care have been discussed with available family and all questions and concerns were addressed.     I spent a total of 30 minutes (excluding procedure time) personally providing and directing critical care services at the bedside and on the critical care unit for Reanna Kumar.        Augie Green MD          ASSESSMENT: Reanna Kumar is a 72 yo woman PMHx HLD, tobacco use disorder, insomnia, and indeterminate RLL pulmonary nodule now POD#1 s/p uniportal thoracoscopic RLL wedge segmentectomy with intraoperative damage to bronchus intermedius requiring conversion to thoracotomy and bilobectomy of middle and lower lobes, intercostal nerve cryoablation, and mediastinal lymphadenectomy. Pulmonary nodules confirmed to be NSC lung cancer  intraoperatively.  Postoperatively, difficulty weaning from vasopressors and required ICU admission.     TODAY'S PROGRESS/PLANS:   - Serum cortisol  - Start lovenox  - Wean from pressors as tolerated     PLAN:  Neuro/ pain/ sedation:  - Monitor neurological status. Notify the MD/DO for any acute changes in exam  - Dilaudid PCA   - APAP / oxycodone / robaxin / gabapentin / lidocaine patch for pain.  - PTA Seroquel 50mg nightly    Pulmonary care:   - Supplemental oxygen; SpO2 goal >92%  - Incentive spirometer every 15-30 minutes when awake  - Scheduled and prn duonebs  - Chest tube to suction (/550 last 2 shift s)  - CXR w/small R apical PTX, small R pleural effusion, and pneumoperitoneum  - Benign abdominal exam, air likely from mediastinal dissection allowing air to go through diaphragmatic hiatus; no s/s of bowel injury or pathology  - Surgical specimen positive for NSC lung cancer    Cardiovascular:    - Monitor hemodynamic status.  - Titrating phenylephrine gtt to achieve MAP > 65.  - Wean from pressors as tolerated (currently requiring 0.5 mcg/kg/min phenylephrine)  - Random serum cortisol for persistent hypotension    GI care:   - Clear liquid diet    FEN/Renal:  - /210/205 last 3 shifts (Net since admission +700mL)  - Will stop maintenance fluids now that patient tolerating PO and has diet  - ICU electrolyte replacement protocol  - Nutrition consulted. Appreciate recs  - Will continue to monitor intake and output.     Endocrine:    - No management indication    ID/ Antibiotics:  - No indication for antibiotics     Heme:     - Hemoglobin 12.4 (13.3 preop)    MSK:  -PT and OT consulted. Appreciate recs.    Prophylaxis:    - Mechanical prophylaxis for DVT   - Lovenox    Lines/ tubes/ drains:  - PIVs  - Art line  - Chest tube, right side    Disposition:  - Surgical ICU until able to wean off pressors    ====================================    SUBJECTIVE:   - No acute events overnight after arriving  from PACU. Persistent pressor needs. Pain controlled with current regimen.    OBJECTIVE:   1. VITAL SIGNS:   Temp:  [96.3  F (35.7  C)-98  F (36.7  C)] 97.7  F (36.5  C)  Pulse:  [63-96] 65  Resp:  [8-29] 27  BP: ()/(46-86) 92/64  MAP:  [56 mmHg-91 mmHg] 91 mmHg  Arterial Line BP: ()/(40-68) 134/68  SpO2:  [93 %-100 %] 99 %  Resp: 27      2. INTAKE/ OUTPUT:   I/O last 3 completed shifts:  In: 2150 [I.V.:1400; IV Piggyback:750]  Out: 710 [Urine:410; Blood:50; Chest Tube:250]    3. PHYSICAL EXAMINATION:   General: NAD  Neuro: A&Ox3  Resp: Breathing non-labored on NC, thoracotomy incision intact with no drainage or bruising  CV: RRR  Abdomen: Soft, non-distended, non-tender, no rebound  Incisions: CDI  Extremities: Warm and well perfused    4. INVESTIGATIONS:   Arterial Blood Gases   Recent Labs   Lab 05/10/21  1536   PH 7.33*   PCO2 44   PO2 127*   HCO3 23     Complete Blood Count   Recent Labs   Lab 05/11/21  0435 05/10/21  2250 05/10/21  1850 05/10/21  1536   WBC 9.6 9.9 9.6  --    HGB 12.4 12.3 11.9 13.3    279 241  --      Basic Metabolic Panel  Recent Labs   Lab 05/11/21  0435 05/10/21  2250 05/10/21  1850 05/10/21  1536    138 138 136   POTASSIUM 4.9 4.8 4.0 4.1   CHLORIDE 105 106 106  --    CO2 26 25 25  --    BUN 12 15 15  --    CR 0.55 0.53 0.63  --    * 143* 149* 140*     Liver Function Tests  No lab results found in last 7 days.  Pancreatic Enzymes  No lab results found in last 7 days.  Coagulation Profile  No lab results found in last 7 days.  Lactate  Invalid input(s): LACTATE    5. RADIOLOGY:   Recent Results (from the past 24 hour(s))   XR Chest Port 1 View    Narrative    Exam:  Chest X-ray 5/10/2021 6:41 PM    History: s/p R bilobectomy    Comparison: CT 5/5/2021    Findings: Frontal radiograph of the chest. Right apical directed chest  tube. Midline trachea. The cardiomediastinal silhouette is within  normal limits. Postictal changes of right middle and lower  lobectomy.  Small to moderate right-sided pneumothorax. Free air under the right  hemidiaphragm. The left lung is clear. Subcutaneous emphysema along  the right lateral chest wall.      Impression    Impression:   1. Post surgical changes of right sided bilobectomy with a small to  moderate right-sided pneumothorax. Right-sided apical directed chest  tube is in place.  2. Pneumoperitoneum is seen under the right hemidiaphragm.    I have personally reviewed the examination and initial interpretation  and I agree with the findings.    KWAME LOTT, DO       =========================================    Patient seen, findings and plan discussed with surgical ICU staff.    Sky Morel MD  Orthopaedic Surgery, PGY-1  Pager: [phone]'

## 2021-05-11 NOTE — PROGRESS NOTES
"   05/11/21 0845   Quick Adds   Type of Visit Initial PT Evaluation   Living Environment   People in home spouse   Current Living Arrangements house   Home Accessibility stairs to enter home   Number of Stairs, Main Entrance 1   Stair Railings, Main Entrance none   Transportation Anticipated family or friend will provide   Living Environment Comments Pt lives in a single level home with , all needs met on first floor   Self-Care   Usual Activity Tolerance good   Current Activity Tolerance fair   Regular Exercise Yes   Activity/Exercise Type walking   Exercise Amount/Frequency 3-5 times/wk   Equipment Currently Used at Home none   Activity/Exercise/Self-Care Comment Pt is IND with functional mobility at baseline; works part-time in a high school   Disability/Function   Hearing Difficulty or Deaf no   Wear Glasses or Blind yes   Vision Management glasses   Fall history within last six months no   Change in Functional Status Since Onset of Current Illness/Injury yes   General Information   Onset of Illness/Injury or Date of Surgery 05/10/21   Referring Physician Palomo Castro MD   Patient/Family Therapy Goals Statement (PT) To get home, return to PLOF   Pertinent History of Current Problem (include personal factors and/or comorbidities that impact the POC) Per chart: \"70 yo woman with indeterminate RLL pulmonary nodule for which she underwent uniportal thoracoscopic right lower lobe wedge segmentectomy, converted to completion bilobectomy of middle and lower lobes, intercostal nerve cryoablation, conversion to thoracotomy, and mediastinal lymphadenectomy. She has been unable to wean from phenylephrine in the PACU. ICU is consulted for admission\"   Existing Precautions/Restrictions thoracotomy   Cognition   Orientation Status (Cognition) oriented x 4   Affect/Mental Status (Cognition) WFL   Follows Commands (Cognition) WFL   Cognitive Status Comments Pt is alert, oriented and pleasant   Pain Assessment "   Patient Currently in Pain No   Range of Motion (ROM)   ROM Comment B UEs and LEs grossly WFL   Strength   Strength Comments B UEs and LEs >3+/5 per functional observation   Bed Mobility   Comment (Bed Mobility) Supine>sit, bridging, scooting IND   Transfers   Transfer Safety Comments Sit<>stand IND; CGA stand-pivot   Gait/Stairs (Locomotion)   Comment (Gait/Stairs) Pt took 4 steps in room with CGA no AD - gait parameters WFL; stairs not assessed today   Balance   Balance Comments Mild impairment; hand-hold assist during marching   Clinical Impression   Criteria for Skilled Therapeutic Intervention yes, treatment indicated   PT Diagnosis (PT) impaired functional mobility   Influenced by the following impairments low endurance, generalized weakness, major surgery   Functional limitations due to impairments household and community distance ambulation, stairs   Clinical Presentation Stable/Uncomplicated   Clinical Presentation Rationale clinical judgement, pt presentation consistent with s/p thoracic surgery   Clinical Decision Making (Complexity) low complexity   Therapy Frequency (PT) 5x/week   Predicted Duration of Therapy Intervention (days/wks) 1 week   Risk & Benefits of therapy have been explained patient   Clinical Impression Comments Mobility limited today by possible CT leak and labile BP   PT Discharge Planning    PT Discharge Recommendation (DC Rec) home with assist;home with outpatient pulmonary rehab   PT Rationale for DC Rec Pt mobilizing well post-op, IND with functional mobility currently limited by medical status; pending medical course would be appropriate for home with spouse support   PT Brief overview of current status  Supervision sit<>stand, CGA pivot bed-chair   Total Evaluation Time   Total Evaluation Time (Minutes) 10

## 2021-05-11 NOTE — ANESTHESIA POSTPROCEDURE EVALUATION
Patient: Reanna Kumar    Procedure(s):  Uniportal thoracoscopic right lower lobe wedge segmentectomy, completion bilobectomy middle and lower, intercostal nerve cryoablation, converstion to thoracotomy, mediastinal lymphadenectomy    Diagnosis:Lung nodule [R91.1]  Diagnosis Additional Information: No value filed.    Anesthesia Type:  General    Note:  Disposition: ICU   Postop Pain Control: Uneventful            Sign Out: Well controlled pain   PONV: No   Neuro/Psych: Uneventful            Sign Out: Acceptable/Baseline neuro status   Airway/Respiratory: Uneventful            Sign Out: Acceptable/Baseline resp. status (Right pneumothorax and pneumoperitoneum. Chest tube on suction)   CV/Hemodynamics: Uneventful            Sign Out: Detailed CV status               Blood Pressure: HypOtension; Pressors               Rate/Rhythm: Normal HR               Perfusion:  Hypovolemia (Stable on phenylephrine. Conservative fluid rescuscitation due to relatively large chest tube leak)   Other NRE: NONE   DID A NON-ROUTINE EVENT OCCUR? No           Last vitals:  Vitals:    05/10/21 1915 05/10/21 1930 05/10/21 1945   BP: (!) 86/53 (!) 79/47 111/58   Pulse: 65 64 65   Resp: 22 (!) 7 (!) 0   Temp: 35.9  C (96.6  F) 35.9  C (96.6  F) 36.1  C (97  F)   SpO2: 100% 98% 98%       Last vitals prior to Anesthesia Care Transfer:  CRNA VITALS  5/10/2021 1723 - 5/10/2021 1823      5/10/2021             NIBP:  95/43    Resp Rate (observed):  16          Electronically Signed By: Jose J Little MD  May 10, 2021  7:58 PM

## 2021-05-11 NOTE — PROGRESS NOTES
Major Shift Events:  Pt AAOx4. SR on tele. Keep systolic's <140. 1L NC @night. Chest tube has leaking, team aware. Nicotine patch in place. X2 PIV. PCA@o.2 and Phenylephrine@ 0.5. LR @50.  Plan: Central line for phenylephrine? Monitor   For vital signs and complete assessments, please see documentation flowsheets.

## 2021-05-12 ENCOUNTER — APPOINTMENT (OUTPATIENT)
Dept: GENERAL RADIOLOGY | Facility: CLINIC | Age: 71
DRG: 164 | End: 2021-05-12
Attending: THORACIC SURGERY (CARDIOTHORACIC VASCULAR SURGERY)
Payer: COMMERCIAL

## 2021-05-12 ENCOUNTER — APPOINTMENT (OUTPATIENT)
Dept: PHYSICAL THERAPY | Facility: CLINIC | Age: 71
DRG: 164 | End: 2021-05-12
Attending: THORACIC SURGERY (CARDIOTHORACIC VASCULAR SURGERY)
Payer: COMMERCIAL

## 2021-05-12 LAB
ANION GAP SERPL CALCULATED.3IONS-SCNC: 6 MMOL/L (ref 3–14)
BUN SERPL-MCNC: 7 MG/DL (ref 7–30)
CALCIUM SERPL-MCNC: 8.9 MG/DL (ref 8.5–10.1)
CHLORIDE SERPL-SCNC: 105 MMOL/L (ref 94–109)
CO2 SERPL-SCNC: 22 MMOL/L (ref 20–32)
CREAT SERPL-MCNC: 0.36 MG/DL (ref 0.52–1.04)
ERYTHROCYTE [DISTWIDTH] IN BLOOD BY AUTOMATED COUNT: 12.6 % (ref 10–15)
GFR SERPL CREATININE-BSD FRML MDRD: >90 ML/MIN/{1.73_M2}
GLUCOSE SERPL-MCNC: 136 MG/DL (ref 70–99)
HCT VFR BLD AUTO: 33.8 % (ref 35–47)
HGB BLD-MCNC: 11 G/DL (ref 11.7–15.7)
HGB BLD-MCNC: 11.1 G/DL (ref 11.7–15.7)
INTERPRETATION ECG - MUSE: NORMAL
MAGNESIUM SERPL-MCNC: 1.9 MG/DL (ref 1.6–2.3)
MCH RBC QN AUTO: 33.6 PG (ref 26.5–33)
MCHC RBC AUTO-ENTMCNC: 32.5 G/DL (ref 31.5–36.5)
MCV RBC AUTO: 103 FL (ref 78–100)
PLATELET # BLD AUTO: 189 10E9/L (ref 150–450)
POTASSIUM SERPL-SCNC: 4.6 MMOL/L (ref 3.4–5.3)
RADIOLOGIST FLAGS: ABNORMAL
RBC # BLD AUTO: 3.27 10E12/L (ref 3.8–5.2)
SODIUM SERPL-SCNC: 133 MMOL/L (ref 133–144)
WBC # BLD AUTO: 7.3 10E9/L (ref 4–11)

## 2021-05-12 PROCEDURE — 97530 THERAPEUTIC ACTIVITIES: CPT | Mod: GP

## 2021-05-12 PROCEDURE — 250N000013 HC RX MED GY IP 250 OP 250 PS 637: Performed by: THORACIC SURGERY (CARDIOTHORACIC VASCULAR SURGERY)

## 2021-05-12 PROCEDURE — 999N000157 HC STATISTIC RCP TIME EA 10 MIN

## 2021-05-12 PROCEDURE — 250N000011 HC RX IP 250 OP 636: Performed by: STUDENT IN AN ORGANIZED HEALTH CARE EDUCATION/TRAINING PROGRAM

## 2021-05-12 PROCEDURE — 97116 GAIT TRAINING THERAPY: CPT | Mod: GP

## 2021-05-12 PROCEDURE — 3E043XZ INTRODUCTION OF VASOPRESSOR INTO CENTRAL VEIN, PERCUTANEOUS APPROACH: ICD-10-PCS | Performed by: SURGERY

## 2021-05-12 PROCEDURE — 999N000015 HC STATISTIC ARTERIAL MONITORING DAILY

## 2021-05-12 PROCEDURE — 80048 BASIC METABOLIC PNL TOTAL CA: CPT | Performed by: STUDENT IN AN ORGANIZED HEALTH CARE EDUCATION/TRAINING PROGRAM

## 2021-05-12 PROCEDURE — 250N000013 HC RX MED GY IP 250 OP 250 PS 637: Performed by: STUDENT IN AN ORGANIZED HEALTH CARE EDUCATION/TRAINING PROGRAM

## 2021-05-12 PROCEDURE — 71045 X-RAY EXAM CHEST 1 VIEW: CPT | Mod: 26 | Performed by: RADIOLOGY

## 2021-05-12 PROCEDURE — 85018 HEMOGLOBIN: CPT | Performed by: THORACIC SURGERY (CARDIOTHORACIC VASCULAR SURGERY)

## 2021-05-12 PROCEDURE — 250N000009 HC RX 250: Performed by: STUDENT IN AN ORGANIZED HEALTH CARE EDUCATION/TRAINING PROGRAM

## 2021-05-12 PROCEDURE — 80048 BASIC METABOLIC PNL TOTAL CA: CPT | Performed by: THORACIC SURGERY (CARDIOTHORACIC VASCULAR SURGERY)

## 2021-05-12 PROCEDURE — 94640 AIRWAY INHALATION TREATMENT: CPT | Mod: 76

## 2021-05-12 PROCEDURE — 85027 COMPLETE CBC AUTOMATED: CPT | Performed by: THORACIC SURGERY (CARDIOTHORACIC VASCULAR SURGERY)

## 2021-05-12 PROCEDURE — 99233 SBSQ HOSP IP/OBS HIGH 50: CPT | Mod: 24 | Performed by: SURGERY

## 2021-05-12 PROCEDURE — 36556 INSERT NON-TUNNEL CV CATH: CPT | Mod: GC | Performed by: SURGERY

## 2021-05-12 PROCEDURE — 83735 ASSAY OF MAGNESIUM: CPT | Performed by: THORACIC SURGERY (CARDIOTHORACIC VASCULAR SURGERY)

## 2021-05-12 PROCEDURE — 71045 X-RAY EXAM CHEST 1 VIEW: CPT

## 2021-05-12 PROCEDURE — 200N000002 HC R&B ICU UMMC

## 2021-05-12 PROCEDURE — 94640 AIRWAY INHALATION TREATMENT: CPT

## 2021-05-12 RX ORDER — OXYCODONE HYDROCHLORIDE 5 MG/1
5 TABLET ORAL ONCE
Status: COMPLETED | OUTPATIENT
Start: 2021-05-12 | End: 2021-05-12

## 2021-05-12 RX ORDER — FUROSEMIDE 20 MG
20 TABLET ORAL ONCE
Status: DISCONTINUED | OUTPATIENT
Start: 2021-05-12 | End: 2021-05-12

## 2021-05-12 RX ORDER — LIDOCAINE HYDROCHLORIDE 10 MG/ML
INJECTION, SOLUTION EPIDURAL; INFILTRATION; INTRACAUDAL; PERINEURAL
Status: DISCONTINUED
Start: 2021-05-12 | End: 2021-05-12 | Stop reason: HOSPADM

## 2021-05-12 RX ORDER — OXYCODONE HCL 5 MG/5 ML
5-10 SOLUTION, ORAL ORAL EVERY 6 HOURS PRN
Status: DISCONTINUED | OUTPATIENT
Start: 2021-05-12 | End: 2021-05-12

## 2021-05-12 RX ORDER — OXYCODONE HYDROCHLORIDE 5 MG/1
5-10 TABLET ORAL EVERY 6 HOURS PRN
Status: DISCONTINUED | OUTPATIENT
Start: 2021-05-12 | End: 2021-05-19 | Stop reason: HOSPADM

## 2021-05-12 RX ORDER — HYDROMORPHONE HCL IN WATER/PF 6 MG/30 ML
0.2 PATIENT CONTROLLED ANALGESIA SYRINGE INTRAVENOUS
Status: DISCONTINUED | OUTPATIENT
Start: 2021-05-12 | End: 2021-05-17

## 2021-05-12 RX ORDER — PHENYLEPHRINE HCL IN 0.9% NACL 50MG/250ML
0.5-6 PLASTIC BAG, INJECTION (ML) INTRAVENOUS CONTINUOUS
Status: DISCONTINUED | OUTPATIENT
Start: 2021-05-12 | End: 2021-05-12

## 2021-05-12 RX ADMIN — ENOXAPARIN SODIUM 40 MG: 100 INJECTION SUBCUTANEOUS at 12:12

## 2021-05-12 RX ADMIN — GABAPENTIN 100 MG: 100 CAPSULE ORAL at 20:05

## 2021-05-12 RX ADMIN — HYDROCORTISONE SODIUM SUCCINATE 100 MG: 100 INJECTION, POWDER, FOR SOLUTION INTRAMUSCULAR; INTRAVENOUS at 06:09

## 2021-05-12 RX ADMIN — OXYCODONE HYDROCHLORIDE 5 MG: 5 TABLET ORAL at 02:09

## 2021-05-12 RX ADMIN — IPRATROPIUM BROMIDE AND ALBUTEROL SULFATE 3 ML: .5; 3 SOLUTION RESPIRATORY (INHALATION) at 21:04

## 2021-05-12 RX ADMIN — OXYCODONE HYDROCHLORIDE 5 MG: 5 TABLET ORAL at 12:17

## 2021-05-12 RX ADMIN — IPRATROPIUM BROMIDE AND ALBUTEROL SULFATE 3 ML: .5; 3 SOLUTION RESPIRATORY (INHALATION) at 08:22

## 2021-05-12 RX ADMIN — LIDOCAINE 1 PATCH: 560 PATCH PERCUTANEOUS; TOPICAL; TRANSDERMAL at 07:49

## 2021-05-12 RX ADMIN — QUETIAPINE FUMARATE 25 MG: 25 TABLET ORAL at 21:23

## 2021-05-12 RX ADMIN — ACETAMINOPHEN 975 MG: 325 TABLET, FILM COATED ORAL at 14:40

## 2021-05-12 RX ADMIN — Medication 1 PATCH: at 07:48

## 2021-05-12 RX ADMIN — ROSUVASTATIN CALCIUM 20 MG: 10 TABLET, FILM COATED ORAL at 07:48

## 2021-05-12 RX ADMIN — OXYCODONE HYDROCHLORIDE 5 MG: 5 SOLUTION ORAL at 07:50

## 2021-05-12 RX ADMIN — POLYETHYLENE GLYCOL 3350 17 G: 17 POWDER, FOR SOLUTION ORAL at 07:50

## 2021-05-12 RX ADMIN — METHOCARBAMOL 500 MG: 500 TABLET, FILM COATED ORAL at 20:04

## 2021-05-12 RX ADMIN — ACETAMINOPHEN 975 MG: 325 TABLET, FILM COATED ORAL at 07:49

## 2021-05-12 RX ADMIN — ACETAMINOPHEN 975 MG: 325 TABLET, FILM COATED ORAL at 20:04

## 2021-05-12 RX ADMIN — IPRATROPIUM BROMIDE AND ALBUTEROL SULFATE 3 ML: .5; 3 SOLUTION RESPIRATORY (INHALATION) at 16:43

## 2021-05-12 RX ADMIN — IPRATROPIUM BROMIDE AND ALBUTEROL SULFATE 3 ML: .5; 3 SOLUTION RESPIRATORY (INHALATION) at 12:20

## 2021-05-12 RX ADMIN — DOCUSATE SODIUM 50 MG AND SENNOSIDES 8.6 MG 2 TABLET: 8.6; 5 TABLET, FILM COATED ORAL at 20:05

## 2021-05-12 RX ADMIN — GABAPENTIN 100 MG: 100 CAPSULE ORAL at 07:49

## 2021-05-12 RX ADMIN — DOCUSATE SODIUM 50 MG AND SENNOSIDES 8.6 MG 2 TABLET: 8.6; 5 TABLET, FILM COATED ORAL at 07:53

## 2021-05-12 RX ADMIN — GABAPENTIN 100 MG: 100 CAPSULE ORAL at 14:40

## 2021-05-12 RX ADMIN — METHOCARBAMOL 500 MG: 500 TABLET, FILM COATED ORAL at 07:48

## 2021-05-12 RX ADMIN — METHOCARBAMOL 500 MG: 500 TABLET, FILM COATED ORAL at 14:40

## 2021-05-12 ASSESSMENT — ACTIVITIES OF DAILY LIVING (ADL)
ADLS_ACUITY_SCORE: 15
ADLS_ACUITY_SCORE: 16

## 2021-05-12 ASSESSMENT — MIFFLIN-ST. JEOR: SCORE: 1111.13

## 2021-05-12 NOTE — PLAN OF CARE
Major Shift Events: PCA pump discontinued, pain well managed. Chest tube drainage system changed, suction changed to -10cm. Placed on 1L O2 via NC. Ambulated in [place] with PT. Voiding, no BM, states she is passing gas. Transfer orders placed.     Plan: Transfer to  when bed becomes available.   For vital signs and complete assessments, please see documentation flowsheets.

## 2021-05-12 NOTE — PROGRESS NOTES
SURGICAL ICU PROGRESS NOTE  May 11, 2021    Critical Care Services Progress Note:     Reanna Kumar remains seriously ill after completing her bilobectomy.  She has been weaned from Nader-Synephrine overnight.  We hope to advance her diet and activity today.   I personally examined and evaluated the patient today.   The patient s prognosis today is improved as she weans from vasoactive drugs I have evaluated all laboratory values and imaging studies from the past 24 hours.  Key findings and decisions made today included with the patient off catecholamines, her arterial line may be discontinued and she transfers to the floor.  I personally managed the blood pressure and monitoring modalities.  Consults ongoing and ordered are Pharmacy and Nutrition and Pain Service with Therapies.  Procedures that will happen today are discontinued arterial line with advance of diet.  Complete steroid therapy today.  All treatments were placed at my direction.  I formulated today s plan with Dr. Morel and the house staff team or resident(s) and agree with the findings and plan in the associated note.    The above plans and care have been discussed with available family members and all questions and concerns were addressed.   I spent a total of 35 minutes (excluding procedure time) personally providing and directing medical care for Reanna Kumar.        Augie Green MD        ASSESSMENT: Reanna Kumar is a 72 yo woman PMHx HLD, tobacco use disorder, insomnia, and indeterminate RLL pulmonary nodule now POD#1 s/p uniportal thoracoscopic RLL wedge segmentectomy with intraoperative damage to bronchus intermedius requiring conversion to thoracotomy and bilobectomy of middle and lower lobes, intercostal nerve cryoablation, therapeutic pneumomeidastinum, and mediastinal lymphadenectomy. Pulmonary nodules confirmed to be NSC lung cancer intraoperatively.  Postoperatively, difficulty weaning from vasopressors and required ICU  admission.     TODAY'S PROGRESS/PLANS:   Dc arterial line  Transfer to floor      PLAN:  Neuro/ pain/ sedation:  - Monitor neurological status. Notify the MD/DO for any acute changes in exam  - Dilaudid PCA   - APAP / oxycodone / robaxin / gabapentin / lidocaine patch for pain.  - PTA Seroquel 50mg nightly    Pulmonary care:   - Supplemental oxygen; SpO2 goal >92%  - Incentive spirometer every 15-30 minutes when awake  - Scheduled and prn duonebs  - Chest tube to suction (/560/160 last 3 shifts)  - CXR w/Small right apical pneumothorax, increased from prior exam, Stable pneumoperitoneum.  - Surgical specimen positive for NSC lung cancer    Cardiovascular:    - Monitor hemodynamic status.  - Titrated off phenylephrine gtt overnight with adequate MAP    GI care:   - Regular adult diet  - Nutrition consulted. Appreciate recs  - Therapeutic pneumoperitoneum, stable    FEN/Renal:  - Adequate UOP (1130 last 24hrs)  - ICU electrolyte replacement protocol  - Will continue to monitor intake and output.     Endocrine:    - Random serum cortisol for persistent hypotension 5/11 7.8 (from 18.9)   - Started stress dose steroids yesterday, will stop today given improvement in BPs    ID/ Antibiotics:  - No indication for antibiotics     Heme:     - Hemoglobin 11.1 (13.3 preop)    MSK:  -PT and OT consulted. Appreciate recs.    Prophylaxis:    - Mechanical prophylaxis for DVT   - Lovenox    Lines/ tubes/ drains:  - Art line (dc)  - IJ   - Chest tube, right side    Disposition:  - Surgical ICU  - Transfer today    ====================================    SUBJECTIVE:   - No acute events overnight. Pain well controlled. Remained pressors all of yesterday so central line placed, shortly thereafter pressor needs weaned.    OBJECTIVE:   1. VITAL SIGNS:   Temp:  [97.8  F (36.6  C)-98.7  F (37.1  C)] 98.7  F (37.1  C)  Pulse:  [66-90] 78  Resp:  [21-24] 21  BP: ()/(51-74) 99/62  MAP:  [58 mmHg-93 mmHg] 72 mmHg  Arterial Line BP:  ()/(37-68) 131/50  SpO2:  [87 %-100 %] 94 %  Resp: 21      2. INTAKE/ OUTPUT:   I/O last 3 completed shifts:  In: 1884.69 [P.O.:1050; I.V.:334.69; IV Piggyback:500]  Out: 2155 [Urine:1325; Chest Tube:830]    3. PHYSICAL EXAMINATION:   General: NAD  Neuro: A&Ox3  Resp: Breathing non-labored on NC, thoracotomy incision intact with no drainage or bruising  CV: RRR  Abdomen: Soft, non-distended, non-tender, no rebound  Incisions: CDI  Extremities: Warm and well perfused    4. INVESTIGATIONS:   Arterial Blood Gases   Recent Labs   Lab 05/10/21  1536   PH 7.33*   PCO2 44   PO2 127*   HCO3 23     Complete Blood Count   Recent Labs   Lab 05/12/21  0448 05/12/21  0406 05/11/21  0435 05/10/21  2250 05/10/21  1850   WBC  --  7.3 9.6 9.9 9.6   HGB 11.1* 11.0* 12.4 12.3 11.9   PLT  --  189 310 279 241     Basic Metabolic Panel  Recent Labs   Lab 05/12/21  0406 05/11/21  0730 05/11/21  0435 05/10/21  2250 05/10/21  1850     --  137 138 138   POTASSIUM 4.6 4.8 4.9 4.8 4.0   CHLORIDE 105  --  105 106 106   CO2 22  --  26 25 25   BUN 7  --  12 15 15   CR 0.36*  --  0.55 0.53 0.63   *  --  121* 143* 149*     Liver Function Tests  No lab results found in last 7 days.  Pancreatic Enzymes  No lab results found in last 7 days.  Coagulation Profile  No lab results found in last 7 days.  Lactate  Invalid input(s): LACTATE    5. RADIOLOGY:   Recent Results (from the past 24 hour(s))   XR Chest Port 1 View    Narrative    Exam:  Chest X-ray 5/10/2021 6:41 PM    History: s/p R bilobectomy    Comparison: CT 5/5/2021    Findings: Frontal radiograph of the chest. Right apical directed chest  tube. Midline trachea. The cardiomediastinal silhouette is within  normal limits. Postictal changes of right middle and lower lobectomy.  Small to moderate right-sided pneumothorax. Free air under the right  hemidiaphragm. The left lung is clear. Subcutaneous emphysema along  the right lateral chest wall.      Impression    Impression:    1. Post surgical changes of right sided bilobectomy with a small to  moderate right-sided pneumothorax. Right-sided apical directed chest  tube is in place.  2. Pneumoperitoneum is seen under the right hemidiaphragm.    I have personally reviewed the examination and initial interpretation  and I agree with the findings.    KWAME LOTT, DO       =========================================    Patient seen, findings and plan discussed with surgical ICU staff.    Sky Morel MD  Orthopaedic Surgery, PGY-1  Pager: [phone]'

## 2021-05-12 NOTE — PROGRESS NOTES
"Thoracic Surgery Note    S: CVC placed overnight, since weaned off phenylephrine. Pain controlled with PCA. Up to chair yesterday. Tolerating full liquids.    O: BP 99/53   Pulse 77   Temp 98.7  F (37.1  C) (Oral)   Resp 21   Ht 1.6 m (5' 3\")   Wt 63.2 kg (139 lb 5.3 oz)   SpO2 94%   BMI 24.68 kg/m      NAD, resting comfortably in bed  Nonlabored breathing on room air  Noncyanotic  R chest tube in place with serosang output, +airleak   Abd soft, nontender  Extremities with mild edema    Labs  Wbc 7.3, hgb 11.1 (12.4), plts 189 (310)  BMP unremarkable    CXR with small R apical ptx and small b/l pleural effusions  Repeat cxr pending this AM    A/P: Reanna Kumar is a 72 yo female s/p uniportal converted to open RLL segmentectomy , completion bilobectomy (middle & lower lobes), and therapeutic pneumoperitoneum on 5/10 with Dr. Langston. Admitted to SICU post-operatively for pressor needs. Doing well, off pressors now.    - Continue chest tube to suction  - Daily CXR   - Continue PCA   - Keep CVC for today   - Regular diet  - Lasix 20mg  - PT/OT, ambulate  - Lovenox    Dispo: SICU, appropriate for transfer to     Delroy Early MD  Surgery resident  [id number]    Be[address]   Surgery Resident   [id number]    "

## 2021-05-12 NOTE — PROCEDURES
SICU Bedside Procedure Note    Procedure: central venous catheter placement    Indication: > 24 hours of vasopressor requirements    Complications: none    Consent: obtained from patient    Anesthetic: local infiltration 1% lidocaine    Location: Right IJ    Prosthetic: 7 Fr triple lumen 20 cm CVC    Procedure description: A time out was performed. The area was prepped and draped in usual sterile fashion. Under ultrasound visualization, a needle was inserted into the right internal jugular vein until blood regina back easily into the syringe. A wire was threaded easily through the needle into the vein. US visualization confirmed the wire in the vein. A dilator and then the catheter were inserted over the wire. The catheter was inserted to 15 cm and then sutured in place. All three ports regina blood and flushed easily. There were no immediate complications. Dr. Dillon was present for the entirety of the procedure.    Torito Bull MD, PhD, PGY-2  General Surgery

## 2021-05-13 ENCOUNTER — APPOINTMENT (OUTPATIENT)
Dept: PHYSICAL THERAPY | Facility: CLINIC | Age: 71
DRG: 164 | End: 2021-05-13
Attending: THORACIC SURGERY (CARDIOTHORACIC VASCULAR SURGERY)
Payer: COMMERCIAL

## 2021-05-13 ENCOUNTER — APPOINTMENT (OUTPATIENT)
Dept: GENERAL RADIOLOGY | Facility: CLINIC | Age: 71
DRG: 164 | End: 2021-05-13
Attending: THORACIC SURGERY (CARDIOTHORACIC VASCULAR SURGERY)
Payer: COMMERCIAL

## 2021-05-13 LAB
ANION GAP SERPL CALCULATED.3IONS-SCNC: 1 MMOL/L (ref 3–14)
BUN SERPL-MCNC: 11 MG/DL (ref 7–30)
CALCIUM SERPL-MCNC: 9.3 MG/DL (ref 8.5–10.1)
CHLORIDE SERPL-SCNC: 104 MMOL/L (ref 94–109)
CO2 SERPL-SCNC: 30 MMOL/L (ref 20–32)
CREAT SERPL-MCNC: 0.46 MG/DL (ref 0.52–1.04)
ERYTHROCYTE [DISTWIDTH] IN BLOOD BY AUTOMATED COUNT: 12.4 % (ref 10–15)
GFR SERPL CREATININE-BSD FRML MDRD: >90 ML/MIN/{1.73_M2}
GLUCOSE BLDC GLUCOMTR-MCNC: 140 MG/DL (ref 70–99)
GLUCOSE SERPL-MCNC: 113 MG/DL (ref 70–99)
HCT VFR BLD AUTO: 32.1 % (ref 35–47)
HGB BLD-MCNC: 10.7 G/DL (ref 11.7–15.7)
MAGNESIUM SERPL-MCNC: 1.8 MG/DL (ref 1.6–2.3)
MCH RBC QN AUTO: 34.1 PG (ref 26.5–33)
MCHC RBC AUTO-ENTMCNC: 33.3 G/DL (ref 31.5–36.5)
MCV RBC AUTO: 102 FL (ref 78–100)
PHOSPHATE SERPL-MCNC: 2.2 MG/DL (ref 2.5–4.5)
PLATELET # BLD AUTO: 199 10E9/L (ref 150–450)
POTASSIUM SERPL-SCNC: 4.2 MMOL/L (ref 3.4–5.3)
RBC # BLD AUTO: 3.14 10E12/L (ref 3.8–5.2)
SODIUM SERPL-SCNC: 135 MMOL/L (ref 133–144)
WBC # BLD AUTO: 7 10E9/L (ref 4–11)

## 2021-05-13 PROCEDURE — 99407 BEHAV CHNG SMOKING > 10 MIN: CPT

## 2021-05-13 PROCEDURE — 999N000157 HC STATISTIC RCP TIME EA 10 MIN

## 2021-05-13 PROCEDURE — 250N000011 HC RX IP 250 OP 636: Performed by: STUDENT IN AN ORGANIZED HEALTH CARE EDUCATION/TRAINING PROGRAM

## 2021-05-13 PROCEDURE — 250N000009 HC RX 250: Performed by: STUDENT IN AN ORGANIZED HEALTH CARE EDUCATION/TRAINING PROGRAM

## 2021-05-13 PROCEDURE — 71045 X-RAY EXAM CHEST 1 VIEW: CPT | Mod: 26 | Performed by: RADIOLOGY

## 2021-05-13 PROCEDURE — 97530 THERAPEUTIC ACTIVITIES: CPT | Mod: GP

## 2021-05-13 PROCEDURE — 250N000009 HC RX 250: Performed by: THORACIC SURGERY (CARDIOTHORACIC VASCULAR SURGERY)

## 2021-05-13 PROCEDURE — 250N000013 HC RX MED GY IP 250 OP 250 PS 637: Performed by: THORACIC SURGERY (CARDIOTHORACIC VASCULAR SURGERY)

## 2021-05-13 PROCEDURE — 71045 X-RAY EXAM CHEST 1 VIEW: CPT

## 2021-05-13 PROCEDURE — 999N000033 HC STATISTIC CHRONIC PULMONARY DISEASE SPECIALIST

## 2021-05-13 PROCEDURE — 94640 AIRWAY INHALATION TREATMENT: CPT | Mod: 76

## 2021-05-13 PROCEDURE — 85027 COMPLETE CBC AUTOMATED: CPT | Performed by: THORACIC SURGERY (CARDIOTHORACIC VASCULAR SURGERY)

## 2021-05-13 PROCEDURE — 80048 BASIC METABOLIC PNL TOTAL CA: CPT | Performed by: THORACIC SURGERY (CARDIOTHORACIC VASCULAR SURGERY)

## 2021-05-13 PROCEDURE — 258N000003 HC RX IP 258 OP 636: Performed by: THORACIC SURGERY (CARDIOTHORACIC VASCULAR SURGERY)

## 2021-05-13 PROCEDURE — 999N000128 HC STATISTIC PERIPHERAL IV START W/O US GUIDANCE

## 2021-05-13 PROCEDURE — 250N000013 HC RX MED GY IP 250 OP 250 PS 637: Performed by: STUDENT IN AN ORGANIZED HEALTH CARE EDUCATION/TRAINING PROGRAM

## 2021-05-13 PROCEDURE — 83735 ASSAY OF MAGNESIUM: CPT | Performed by: THORACIC SURGERY (CARDIOTHORACIC VASCULAR SURGERY)

## 2021-05-13 PROCEDURE — 999N001017 HC STATISTIC GLUCOSE BY METER IP

## 2021-05-13 PROCEDURE — 120N000002 HC R&B MED SURG/OB UMMC

## 2021-05-13 PROCEDURE — 94640 AIRWAY INHALATION TREATMENT: CPT

## 2021-05-13 PROCEDURE — 94664 DEMO&/EVAL PT USE INHALER: CPT

## 2021-05-13 PROCEDURE — 84100 ASSAY OF PHOSPHORUS: CPT | Performed by: THORACIC SURGERY (CARDIOTHORACIC VASCULAR SURGERY)

## 2021-05-13 RX ORDER — LIDOCAINE 40 MG/G
CREAM TOPICAL
Status: DISCONTINUED | OUTPATIENT
Start: 2021-05-13 | End: 2021-05-19 | Stop reason: HOSPADM

## 2021-05-13 RX ADMIN — QUETIAPINE FUMARATE 25 MG: 25 TABLET ORAL at 23:22

## 2021-05-13 RX ADMIN — SODIUM PHOSPHATE, MONOBASIC, MONOHYDRATE 9 MMOL: 276; 142 INJECTION, SOLUTION INTRAVENOUS at 05:50

## 2021-05-13 RX ADMIN — Medication 1 PATCH: at 08:07

## 2021-05-13 RX ADMIN — ACETAMINOPHEN 975 MG: 325 TABLET, FILM COATED ORAL at 19:52

## 2021-05-13 RX ADMIN — POLYETHYLENE GLYCOL 3350 17 G: 17 POWDER, FOR SOLUTION ORAL at 08:07

## 2021-05-13 RX ADMIN — ACETAMINOPHEN 975 MG: 325 TABLET, FILM COATED ORAL at 14:07

## 2021-05-13 RX ADMIN — IPRATROPIUM BROMIDE AND ALBUTEROL SULFATE 3 ML: .5; 3 SOLUTION RESPIRATORY (INHALATION) at 07:53

## 2021-05-13 RX ADMIN — IPRATROPIUM BROMIDE AND ALBUTEROL SULFATE 3 ML: .5; 3 SOLUTION RESPIRATORY (INHALATION) at 11:13

## 2021-05-13 RX ADMIN — LIDOCAINE 1 PATCH: 560 PATCH PERCUTANEOUS; TOPICAL; TRANSDERMAL at 08:06

## 2021-05-13 RX ADMIN — METHOCARBAMOL 500 MG: 500 TABLET, FILM COATED ORAL at 14:06

## 2021-05-13 RX ADMIN — OXYCODONE HYDROCHLORIDE 5 MG: 5 TABLET ORAL at 18:17

## 2021-05-13 RX ADMIN — OXYCODONE HYDROCHLORIDE 5 MG: 5 TABLET ORAL at 12:28

## 2021-05-13 RX ADMIN — METHOCARBAMOL 500 MG: 500 TABLET, FILM COATED ORAL at 08:06

## 2021-05-13 RX ADMIN — DOCUSATE SODIUM 50 MG AND SENNOSIDES 8.6 MG 2 TABLET: 8.6; 5 TABLET, FILM COATED ORAL at 19:52

## 2021-05-13 RX ADMIN — GABAPENTIN 100 MG: 100 CAPSULE ORAL at 19:52

## 2021-05-13 RX ADMIN — ACETAMINOPHEN 975 MG: 325 TABLET, FILM COATED ORAL at 08:05

## 2021-05-13 RX ADMIN — GABAPENTIN 100 MG: 100 CAPSULE ORAL at 14:07

## 2021-05-13 RX ADMIN — DOCUSATE SODIUM 50 MG AND SENNOSIDES 8.6 MG 2 TABLET: 8.6; 5 TABLET, FILM COATED ORAL at 08:07

## 2021-05-13 RX ADMIN — ENOXAPARIN SODIUM 40 MG: 100 INJECTION SUBCUTANEOUS at 12:29

## 2021-05-13 RX ADMIN — IPRATROPIUM BROMIDE AND ALBUTEROL SULFATE 3 ML: .5; 3 SOLUTION RESPIRATORY (INHALATION) at 15:49

## 2021-05-13 RX ADMIN — ROSUVASTATIN CALCIUM 20 MG: 10 TABLET, FILM COATED ORAL at 08:08

## 2021-05-13 RX ADMIN — METHOCARBAMOL 500 MG: 500 TABLET, FILM COATED ORAL at 19:52

## 2021-05-13 RX ADMIN — GABAPENTIN 100 MG: 100 CAPSULE ORAL at 08:05

## 2021-05-13 RX ADMIN — IPRATROPIUM BROMIDE AND ALBUTEROL SULFATE 3 ML: .5; 3 SOLUTION RESPIRATORY (INHALATION) at 21:38

## 2021-05-13 ASSESSMENT — ACTIVITIES OF DAILY LIVING (ADL)
ADLS_ACUITY_SCORE: 15

## 2021-05-13 ASSESSMENT — MIFFLIN-ST. JEOR: SCORE: 1126.13

## 2021-05-13 ASSESSMENT — VISUAL ACUITY
OU: GLASSES;NORMAL ACUITY
OU: NORMAL ACUITY;BASELINE

## 2021-05-13 NOTE — PLAN OF CARE
Major Shift Events: A&Ox4. SR, BP within range, afeb. 1 LPM NC, right chest tube w/ 150 ml serosang output, course LS on the right. Pt ate part of dinner. Up via SBA to commode, voiding without any complication. R internal jugular-SL. Pain managed with scheduled oral medications. Nicotine patch on left arm. Phos 2.2, sod phos infusing, recheck in AM.    Plan: Transfer to  once bed availability. Plan to obtain PIV and remove internal jugular prior to transfer.  For vital signs and complete assessments, please see documentation flowsheets.

## 2021-05-13 NOTE — PLAN OF CARE
Major Shift Events:    Neuro: Pt reports increasing pain more than the previous day. Treated with PRN oxy 5mg 2x during shift. Up with stand by assist. Ambulated to bathroom multiple times. Walked the halls with PT. See flowsheets for detailed neuro exam.   CV: WDL  Resp: Attempted without nasal cannula and pt hovered around 88% SpO2. Remained on NC 1.5-2L most of shift with SpO2>92%.   Left lung clear, right lung coarse with expiratory wheezes/crackles and diminished lung sounds in the bases. Chest tube in place, changed from -10 suction to water seal per orders. Output changed from serosanguinous to serous with volume of 120ml out during shift.   Plan: Orders for 7B. Waiting for bed availability.   For vital signs and complete assessments, please see documentation flowsheets.   Sharee Garcia RN on 5/13/2021 at 5:51 PM

## 2021-05-13 NOTE — PLAN OF CARE
OT: after conversation with PT post PT eval it is concluded that this pt has no acute OT needs. Pt up and I with basic ADL. PT to continue to follow for conditioning.

## 2021-05-13 NOTE — PROGRESS NOTES
"Thoracic Surgery Note    S: Had a good night. Feeling well. Off of phenylephrine. Ambulating. Voiding on own.     O: /52 (BP Location: Left arm, Cuff Size: Adult Regular)   Pulse 75   Temp 97.8  F (36.6  C) (Oral)   Resp 16   Ht 1.6 m (5' 3\")   Wt 63.2 kg (139 lb 5.3 oz)   SpO2 93%   BMI 24.68 kg/m      NAD, resting comfortably in bed  Nonlabored breathing on room air  Noncyanotic  R chest tube in place with serosang output, +airleak on expiration   Abd soft, nontender  Extremities with mild edema    Labs  Cr 0.46  WBC 7  Hgb 10.7     AM CXR reviewed   Small apical PTX   Sub q emphysema       A/P: Reanna Kumar is a 72 yo female s/p uniportal converted to open RLL segmentectomy , completion bilobectomy (middle & lower lobes), and therapeutic pneumoperitoneum on 5/10 with Dr. Langston. Admitted to SICU post-operatively for pressor needs. Doing well, off pressors now.    - Continue chest tube to suction. May  Change to WS pending AM CXR.   - Daily CXR   - PO/IV pain medications   - Remove CVC   - Regular diet  - PT/OT, ambulate  - Lovenox    Dispo: SICU, appropriate for transfer to [facility]   Surgery Resident   [id number]      "

## 2021-05-13 NOTE — CONSULTS
Smoking Cessation Consult   2021    Patient: Reanna Kumar      :  1950                    MRN:[id number]      Lung nodule [R91.1] @HX    History of Present Illness:  Reanna Kumar is a 70 yo female s/p uniportal converted to open RLL segmentectomy , completion bilobectomy (middle & lower lobes), and therapeutic pneumoperitoneum on 5/10 with Dr. Langston. Admitted to SICU post-operatively for pressor needs    Reason for Consult: Patient identified as current everyday smoker per patient chart.     Patient open to sharing about her tobacco use and in learning about more options and resources for quitting, staying quit, and in developing a relapse prevention plan.       Symptoms of craving or withdrawal: Patient is currently not experiencing symptoms of withdrawal such as sleeplessness, headache, anxiety, irritability, and intense cravings to smoke.     Motivational Interviewing:     MI Intervention: Expressed Empathy/Understanding, Supported Autonomy, Collaboration, Evocation, Permission to raise concern or advise, Open-ended questions, Reflections: simple and complex, Change talk (evoked) and Reframe    Patients last cigarette: 5/10/2021      Patients main reason for smoking include: Social, physical, emotional, other  Manda stated that smoking helps her to relax and that she likes the physical habit.    Top Reasons to quit smoking:   Manda wants to live to see her grandchildren graduate. She also realizes that she needs to care of her health.    Quit Attempts:   1996 for 1 month. She realizes that she relapsed because she was only quitting for her daughter, not for her.    Triggers: first of the day cup of coffee and driving in the car    Types of Tobacco and Amount   Cigarettes       X   E-Cigs    Smokeless Tobacco    Cigars    Pipes    Waterpipes      Fagerstrom Test for Nicotine Dependence   How soon after waking do you smoke your first cigarette Within 5 minutes=3  5-30 minutes=2  31-60 minute=1  >61  "minutes=0 3             Do you find it difficult to refrain from smoking in places where it is forbidden? e.g. Conf[employer]ian[employer], restaurants, etc? Yes=1  No=0         1        Which cigarette would you hate to give up? The first in the morning=1  Any other=0        1        How many cigarettes a day do you smoke? 10 or less=0  11-20=1  21-30=2  31 or more=3        0   Do you smoke more frequently in the morning?   Yes=1  No=0       0   Do you smoke even if you are sick in bed most of the day? Yes=1  No=0                     0                                                                                                                                         Total Score                    5   SCORE 1-2 = Low Dependence          3-4 = Low to Mod Dependence     5-7 = Moderate Dependence       8+ = High Dependence     Stage of Behavior Change:   Pre-contemplation - No intention    Contemplation - Change on the horizon    Preparation - Getting Ready        X   Action - Consistently changed (within 6 months)    Maintenance - Staying quit (more than 6 months)    Relapse - Recycling      Patients Motivation to Quit Scale    Importance (1-10):         10   Confidence  (1-10):           8   Can Patient Imagine a Future without Smoking:                       YES   Quit Attempt Date:  5/10/2021   Final Quit Date:       Education/Recommendations    Education:    -Provided educational workbook, \"Quitting for Good with Treatment and Support.\" Discussed health risks of continued smoking.   -Provided motivation and encouragement.   -Shared that it's never too late to quit and that the benefits are immedate and profoundly impactful. Shared that slipping up here and there doesn't necessarily mean she failed; rather, it's an opportunity to reflect and modify her behaviors and habits and to employ alternate strategies to deal with strong triggers.    Recommendations:   -Encouraged patient to use workbook to help her understand why he/she " smokes, come up with 5 reasons to quit, and to imagine what her future looks like without smoking.  -Encouraged him/her to develop strategies to distract himself/herself to get past cravings.     Developed Smoking Cessation Relapse Prevention Plan that includes recommendations of:     -Use 14 mg patch daily, continue the initial patch for 4-6 weeks of smoking abstinence based on withdrawal symptoms.Taper every 4-6 weeks in 7 mg steps as tolerated.     -Use 4 mg lozenges, take first thing in the morning and as needed for cravings and urges to smoke. May be used every 1-2 hours.     -Please give prescription at discharge for starter pack of 14 mg nicotine patch and 4 mg nicotine lozenges. Manda is extremely motivated to quit and having this available will increase her success.    -Agrees to participate in our post-hosp smoking cessation follow-up calls for treatment and support, starting at 48 hrs post discharge, then at 14 days, 1 month, and at 6 months.     Time Spent: I spent 60 minutes with patient. Will notify provider of recommendations.    Gave contact information and will call 48 hours after discharge to provide support.    Jocelyn Briceno, MONICA, RRT, CTTS  Chronic Pulmonary Disease Specialist  Office: [phone]   Pager: [phone]

## 2021-05-14 ENCOUNTER — APPOINTMENT (OUTPATIENT)
Dept: PHYSICAL THERAPY | Facility: CLINIC | Age: 71
DRG: 164 | End: 2021-05-14
Attending: THORACIC SURGERY (CARDIOTHORACIC VASCULAR SURGERY)
Payer: COMMERCIAL

## 2021-05-14 ENCOUNTER — APPOINTMENT (OUTPATIENT)
Dept: GENERAL RADIOLOGY | Facility: CLINIC | Age: 71
DRG: 164 | End: 2021-05-14
Attending: THORACIC SURGERY (CARDIOTHORACIC VASCULAR SURGERY)
Payer: COMMERCIAL

## 2021-05-14 LAB
ANION GAP SERPL CALCULATED.3IONS-SCNC: 4 MMOL/L (ref 3–14)
BUN SERPL-MCNC: 13 MG/DL (ref 7–30)
CALCIUM SERPL-MCNC: 9.3 MG/DL (ref 8.5–10.1)
CHLORIDE SERPL-SCNC: 104 MMOL/L (ref 94–109)
CO2 SERPL-SCNC: 29 MMOL/L (ref 20–32)
CREAT SERPL-MCNC: 0.48 MG/DL (ref 0.52–1.04)
ERYTHROCYTE [DISTWIDTH] IN BLOOD BY AUTOMATED COUNT: 12.6 % (ref 10–15)
GFR SERPL CREATININE-BSD FRML MDRD: >90 ML/MIN/{1.73_M2}
GLUCOSE SERPL-MCNC: 114 MG/DL (ref 70–99)
GRAM STN SPEC: NORMAL
GRAM STN SPEC: NORMAL
HCT VFR BLD AUTO: 32.3 % (ref 35–47)
HGB BLD-MCNC: 10.4 G/DL (ref 11.7–15.7)
LACTATE BLD-SCNC: 1 MMOL/L (ref 0.7–2)
MAGNESIUM SERPL-MCNC: 1.8 MG/DL (ref 1.6–2.3)
MCH RBC QN AUTO: 32.7 PG (ref 26.5–33)
MCHC RBC AUTO-ENTMCNC: 32.2 G/DL (ref 31.5–36.5)
MCV RBC AUTO: 102 FL (ref 78–100)
PHOSPHATE SERPL-MCNC: 3.4 MG/DL (ref 2.5–4.5)
PLATELET # BLD AUTO: 223 10E9/L (ref 150–450)
POTASSIUM SERPL-SCNC: 4.3 MMOL/L (ref 3.4–5.3)
RBC # BLD AUTO: 3.18 10E12/L (ref 3.8–5.2)
SODIUM SERPL-SCNC: 136 MMOL/L (ref 133–144)
SPECIMEN SOURCE: NORMAL
WBC # BLD AUTO: 6.8 10E9/L (ref 4–11)

## 2021-05-14 PROCEDURE — 87070 CULTURE OTHR SPECIMN AEROBIC: CPT | Performed by: STUDENT IN AN ORGANIZED HEALTH CARE EDUCATION/TRAINING PROGRAM

## 2021-05-14 PROCEDURE — 87106 FUNGI IDENTIFICATION YEAST: CPT | Performed by: STUDENT IN AN ORGANIZED HEALTH CARE EDUCATION/TRAINING PROGRAM

## 2021-05-14 PROCEDURE — 94640 AIRWAY INHALATION TREATMENT: CPT | Mod: 76

## 2021-05-14 PROCEDURE — 250N000013 HC RX MED GY IP 250 OP 250 PS 637: Performed by: STUDENT IN AN ORGANIZED HEALTH CARE EDUCATION/TRAINING PROGRAM

## 2021-05-14 PROCEDURE — 87102 FUNGUS ISOLATION CULTURE: CPT | Performed by: STUDENT IN AN ORGANIZED HEALTH CARE EDUCATION/TRAINING PROGRAM

## 2021-05-14 PROCEDURE — 250N000013 HC RX MED GY IP 250 OP 250 PS 637: Performed by: THORACIC SURGERY (CARDIOTHORACIC VASCULAR SURGERY)

## 2021-05-14 PROCEDURE — 87206 SMEAR FLUORESCENT/ACID STAI: CPT | Performed by: STUDENT IN AN ORGANIZED HEALTH CARE EDUCATION/TRAINING PROGRAM

## 2021-05-14 PROCEDURE — 120N000002 HC R&B MED SURG/OB UMMC

## 2021-05-14 PROCEDURE — 71045 X-RAY EXAM CHEST 1 VIEW: CPT | Mod: 26 | Performed by: RADIOLOGY

## 2021-05-14 PROCEDURE — 250N000011 HC RX IP 250 OP 636: Performed by: STUDENT IN AN ORGANIZED HEALTH CARE EDUCATION/TRAINING PROGRAM

## 2021-05-14 PROCEDURE — 250N000009 HC RX 250

## 2021-05-14 PROCEDURE — 250N000009 HC RX 250: Performed by: STUDENT IN AN ORGANIZED HEALTH CARE EDUCATION/TRAINING PROGRAM

## 2021-05-14 PROCEDURE — 85027 COMPLETE CBC AUTOMATED: CPT | Performed by: THORACIC SURGERY (CARDIOTHORACIC VASCULAR SURGERY)

## 2021-05-14 PROCEDURE — 83735 ASSAY OF MAGNESIUM: CPT | Performed by: THORACIC SURGERY (CARDIOTHORACIC VASCULAR SURGERY)

## 2021-05-14 PROCEDURE — 36415 COLL VENOUS BLD VENIPUNCTURE: CPT | Performed by: THORACIC SURGERY (CARDIOTHORACIC VASCULAR SURGERY)

## 2021-05-14 PROCEDURE — 999N000157 HC STATISTIC RCP TIME EA 10 MIN

## 2021-05-14 PROCEDURE — 94640 AIRWAY INHALATION TREATMENT: CPT

## 2021-05-14 PROCEDURE — 83605 ASSAY OF LACTIC ACID: CPT | Performed by: THORACIC SURGERY (CARDIOTHORACIC VASCULAR SURGERY)

## 2021-05-14 PROCEDURE — 71045 X-RAY EXAM CHEST 1 VIEW: CPT | Mod: 77

## 2021-05-14 PROCEDURE — 31624 DX BRONCHOSCOPE/LAVAGE: CPT

## 2021-05-14 PROCEDURE — 87116 MYCOBACTERIA CULTURE: CPT | Performed by: STUDENT IN AN ORGANIZED HEALTH CARE EDUCATION/TRAINING PROGRAM

## 2021-05-14 PROCEDURE — 80048 BASIC METABOLIC PNL TOTAL CA: CPT | Performed by: THORACIC SURGERY (CARDIOTHORACIC VASCULAR SURGERY)

## 2021-05-14 PROCEDURE — 87015 SPECIMEN INFECT AGNT CONCNTJ: CPT | Performed by: STUDENT IN AN ORGANIZED HEALTH CARE EDUCATION/TRAINING PROGRAM

## 2021-05-14 PROCEDURE — 0B9C8ZX DRAINAGE OF RIGHT UPPER LUNG LOBE, VIA NATURAL OR ARTIFICIAL OPENING ENDOSCOPIC, DIAGNOSTIC: ICD-10-PCS | Performed by: THORACIC SURGERY (CARDIOTHORACIC VASCULAR SURGERY)

## 2021-05-14 PROCEDURE — 71045 X-RAY EXAM CHEST 1 VIEW: CPT

## 2021-05-14 PROCEDURE — 87205 SMEAR GRAM STAIN: CPT | Performed by: STUDENT IN AN ORGANIZED HEALTH CARE EDUCATION/TRAINING PROGRAM

## 2021-05-14 PROCEDURE — 97530 THERAPEUTIC ACTIVITIES: CPT | Mod: GP

## 2021-05-14 PROCEDURE — 84100 ASSAY OF PHOSPHORUS: CPT | Performed by: THORACIC SURGERY (CARDIOTHORACIC VASCULAR SURGERY)

## 2021-05-14 PROCEDURE — 250N000011 HC RX IP 250 OP 636

## 2021-05-14 RX ORDER — NALOXONE HYDROCHLORIDE 0.4 MG/ML
0.2 INJECTION, SOLUTION INTRAMUSCULAR; INTRAVENOUS; SUBCUTANEOUS
Status: DISCONTINUED | OUTPATIENT
Start: 2021-05-14 | End: 2021-05-19 | Stop reason: HOSPADM

## 2021-05-14 RX ORDER — LIDOCAINE HYDROCHLORIDE 40 MG/ML
SOLUTION TOPICAL
Status: COMPLETED
Start: 2021-05-14 | End: 2021-05-14

## 2021-05-14 RX ORDER — HYDROMORPHONE HYDROCHLORIDE 1 MG/ML
INJECTION, SOLUTION INTRAMUSCULAR; INTRAVENOUS; SUBCUTANEOUS
Status: COMPLETED
Start: 2021-05-14 | End: 2021-05-14

## 2021-05-14 RX ORDER — NALOXONE HYDROCHLORIDE 0.4 MG/ML
0.4 INJECTION, SOLUTION INTRAMUSCULAR; INTRAVENOUS; SUBCUTANEOUS
Status: DISCONTINUED | OUTPATIENT
Start: 2021-05-14 | End: 2021-05-19 | Stop reason: HOSPADM

## 2021-05-14 RX ORDER — LIDOCAINE HYDROCHLORIDE 20 MG/ML
SOLUTION OROPHARYNGEAL
Status: DISCONTINUED
Start: 2021-05-14 | End: 2021-05-14 | Stop reason: HOSPADM

## 2021-05-14 RX ORDER — LIDOCAINE HYDROCHLORIDE 10 MG/ML
INJECTION, SOLUTION EPIDURAL; INFILTRATION; INTRACAUDAL; PERINEURAL
Status: COMPLETED
Start: 2021-05-14 | End: 2021-05-14

## 2021-05-14 RX ADMIN — GABAPENTIN 100 MG: 100 CAPSULE ORAL at 08:50

## 2021-05-14 RX ADMIN — ACETAMINOPHEN 975 MG: 325 TABLET, FILM COATED ORAL at 08:51

## 2021-05-14 RX ADMIN — OXYCODONE HYDROCHLORIDE 5 MG: 5 TABLET ORAL at 03:13

## 2021-05-14 RX ADMIN — HYDROMORPHONE HYDROCHLORIDE 0.5 MG: 1 INJECTION, SOLUTION INTRAMUSCULAR; INTRAVENOUS; SUBCUTANEOUS at 08:35

## 2021-05-14 RX ADMIN — METHOCARBAMOL 500 MG: 500 TABLET, FILM COATED ORAL at 14:27

## 2021-05-14 RX ADMIN — DOCUSATE SODIUM 50 MG AND SENNOSIDES 8.6 MG 2 TABLET: 8.6; 5 TABLET, FILM COATED ORAL at 19:43

## 2021-05-14 RX ADMIN — METHOCARBAMOL 500 MG: 500 TABLET, FILM COATED ORAL at 08:50

## 2021-05-14 RX ADMIN — LIDOCAINE 1 PATCH: 560 PATCH PERCUTANEOUS; TOPICAL; TRANSDERMAL at 08:50

## 2021-05-14 RX ADMIN — OXYCODONE HYDROCHLORIDE 5 MG: 5 TABLET ORAL at 21:05

## 2021-05-14 RX ADMIN — LIDOCAINE HYDROCHLORIDE 30 MG: 10 INJECTION, SOLUTION EPIDURAL; INFILTRATION; INTRACAUDAL; PERINEURAL at 08:34

## 2021-05-14 RX ADMIN — ROSUVASTATIN CALCIUM 20 MG: 10 TABLET, FILM COATED ORAL at 08:50

## 2021-05-14 RX ADMIN — OXYCODONE HYDROCHLORIDE 5 MG: 5 TABLET ORAL at 01:45

## 2021-05-14 RX ADMIN — GABAPENTIN 100 MG: 100 CAPSULE ORAL at 19:43

## 2021-05-14 RX ADMIN — Medication 1 PATCH: at 08:51

## 2021-05-14 RX ADMIN — ENOXAPARIN SODIUM 40 MG: 100 INJECTION SUBCUTANEOUS at 11:59

## 2021-05-14 RX ADMIN — QUETIAPINE FUMARATE 25 MG: 25 TABLET ORAL at 21:05

## 2021-05-14 RX ADMIN — IPRATROPIUM BROMIDE AND ALBUTEROL SULFATE 3 ML: .5; 3 SOLUTION RESPIRATORY (INHALATION) at 12:31

## 2021-05-14 RX ADMIN — LIDOCAINE HYDROCHLORIDE 9 ML: 40 SOLUTION TOPICAL at 08:35

## 2021-05-14 RX ADMIN — METHOCARBAMOL 500 MG: 500 TABLET, FILM COATED ORAL at 19:43

## 2021-05-14 RX ADMIN — ACETAMINOPHEN 975 MG: 325 TABLET, FILM COATED ORAL at 14:27

## 2021-05-14 RX ADMIN — GABAPENTIN 100 MG: 100 CAPSULE ORAL at 14:27

## 2021-05-14 RX ADMIN — ACETAMINOPHEN 975 MG: 325 TABLET, FILM COATED ORAL at 19:42

## 2021-05-14 RX ADMIN — OXYCODONE HYDROCHLORIDE 5 MG: 5 TABLET ORAL at 16:03

## 2021-05-14 RX ADMIN — IPRATROPIUM BROMIDE AND ALBUTEROL SULFATE 3 ML: .5; 3 SOLUTION RESPIRATORY (INHALATION) at 19:52

## 2021-05-14 RX ADMIN — IPRATROPIUM BROMIDE AND ALBUTEROL SULFATE 3 ML: .5; 3 SOLUTION RESPIRATORY (INHALATION) at 15:46

## 2021-05-14 ASSESSMENT — ACTIVITIES OF DAILY LIVING (ADL)
ADLS_ACUITY_SCORE: 15

## 2021-05-14 ASSESSMENT — VISUAL ACUITY
OU: GLASSES;NORMAL ACUITY
OU: GLASSES;NORMAL ACUITY

## 2021-05-14 NOTE — PLAN OF CARE
CV: normal sinus rhythm to sinus roland. BP stable. WNL. Afebrile.   Neuro: Intact. SBA. Pupils equal reactive. No deficits.  Resp: Bronchoscopy done this am with significant secretion removal. Pt. Stated breathing much more comfortable following. Sent for cultures. LS course diminished. R-chest tube remains in place.   GI: Regular diet. Passing gas. Refused bowel meds. No BM today.  : voiding no issues.  Skin: preventative mepilex on coccyx.    Continue to monitor for large output from chest tube or changes in color.Continue to offer pain medication prior to or following activity.

## 2021-05-14 NOTE — PROGRESS NOTES
"Bronchoscopy Risk Assessment Guidelines      A. Patient symptoms to consider when assessing pulmonary TB risk are:    I. Cough greater than 3 weeks; and fever, hemoptysis, pleuritic chest    pain, weight loss greater than 10 lbs, night sweats, fatigue, infiltrates on    upper lobes or superior segments of lower lobes, cavitation on chest    x-ray.   B. Patient risk factors to consider when assessing pulmonary TB risk are:    I. Exposure to known TB case, foreign-born persons (within 5 years of    arrival to [place]), residence in a crowded setting (correctional facility,     long-term care center, etc.), persons with HIV or immunosuppression.    Patients with symptoms and risk factors should generally be considered \"suspect risk\" and bronchoscopies should be performed in airborne precautions.    This patient has NO KNOWN RISK of Tuberculosis (proceed with bronchoscopy)    Specimens sent: yes  Complications: None  Scope used: #[id number] Slim  Attending Physician: Dr. Kian Lambert, RT on 5/14/2021 at 9:51 AM  "

## 2021-05-14 NOTE — PROGRESS NOTES
"Thoracic Surgery Note    S: Busy night, feeling somewhat short of breath. Afebrile. Did ambulate yesterday. Voiding on own. Good appetite.      O: /51 (BP Location: Left arm)   Pulse 78   Temp 98.3  F (36.8  C) (Oral)   Resp 16   Ht 1.6 m (5' 3\")   Wt 64.7 kg (142 lb 10.2 oz)   SpO2 97%   BMI 25.27 kg/m      NAD, resting comfortably in bed  Nonlabored breathing on 2L  Noncyanotic  R chest tube in place with serosang output, no air leak   Abd soft, nontender  Extremities with mild edema    Labs  Cr 0.48  WBC 6.8  Hgb 10.4     AM CXR reviewed   Increased right lung atelectasis with associated rightward shift of the mediastinum    A/P: Reanna Kumar is a 70 yo female s/p uniportal converted to open RLL segmentectomy , completion bilobectomy (middle & lower lobes), and therapeutic pneumoperitoneum on 5/10 with Dr. Langston. Admitted to SICU post-operatively for pressor needs. Has now weaned from pressors.     - Continue chest tube to WS   - Planning to bronch this am  - Daily CXR   - PO/IV pain medications   - Regular diet  - PT/OT, ambulate  - Lovenox    Dispo: Transfer to  after bronchoscopy     [facility] [place]   Surgery Resident   [id number]      "

## 2021-05-14 NOTE — PROCEDURES
THORACIC SURGERY BEDSIDE PROCEDURE   NAME: Reanna Kumar   MRN: [id number]  : 1950    Diagnosis:  Failure to manage secretions, Hypoxia    Procedure: Flexible bronchoscopy     Specimen: BAL      Premedication: 0.5mg IV dilaudid  Procedure Meds:  4% topical lidocaine solution at the vocal folds, 1% topical lidocaine solution at the sorcates    Procedure: A timeout was called to review the case and patient information. The patient was positioned Semi-Kirkland. The vocal folds were passed without difficulty appeared to be achieving appropriate apposition.  Bilateral tracheobronchial trees were inspected closely to the level of the subsegmental bronchi.  Secretions were found to be thick and were nearly completely filling the RUL airways.  These were lavaged and evacuated without difficulty. The samples were sent for BAL. The procedure was completed and the patient tolerated the procedure well and without complications.      Plan: CXR in AM, or PRN for respiratory decompensation, no plan to repeat bronch at this time    Dr. Langston was available for assistance throughout the entire procedure.      Maurizio Garcia PA-C

## 2021-05-14 NOTE — PLAN OF CARE
Major Shift Events: Neuros intact. CV WNL. 1 LPM NC, chest tube to -20 mmHg suction ~ 0300 d/t worsening chest XR, changed in chest tube output and increased pain, lung sounds diminished on right/clear on left. Up SBA, voiding spontaneously. New chest tube drsg. R internal jugular removed and new R PIV placed. Chest pain managed with scheduled pain meds and prn oxycodone.     Plan: Bronch @ 0800.   For vital signs and complete assessments, please see documentation flowsheets.

## 2021-05-15 ENCOUNTER — APPOINTMENT (OUTPATIENT)
Dept: GENERAL RADIOLOGY | Facility: CLINIC | Age: 71
DRG: 164 | End: 2021-05-15
Attending: THORACIC SURGERY (CARDIOTHORACIC VASCULAR SURGERY)
Payer: COMMERCIAL

## 2021-05-15 LAB
ANION GAP SERPL CALCULATED.3IONS-SCNC: 3 MMOL/L (ref 3–14)
BUN SERPL-MCNC: 16 MG/DL (ref 7–30)
CALCIUM SERPL-MCNC: 9.7 MG/DL (ref 8.5–10.1)
CHLORIDE SERPL-SCNC: 103 MMOL/L (ref 94–109)
CO2 SERPL-SCNC: 30 MMOL/L (ref 20–32)
CREAT SERPL-MCNC: 0.54 MG/DL (ref 0.52–1.04)
ERYTHROCYTE [DISTWIDTH] IN BLOOD BY AUTOMATED COUNT: 12.9 % (ref 10–15)
GFR SERPL CREATININE-BSD FRML MDRD: >90 ML/MIN/{1.73_M2}
GLUCOSE SERPL-MCNC: 106 MG/DL (ref 70–99)
HCT VFR BLD AUTO: 33.1 % (ref 35–47)
HGB BLD-MCNC: 10.4 G/DL (ref 11.7–15.7)
MAGNESIUM SERPL-MCNC: 1.9 MG/DL (ref 1.6–2.3)
MCH RBC QN AUTO: 32.3 PG (ref 26.5–33)
MCHC RBC AUTO-ENTMCNC: 31.4 G/DL (ref 31.5–36.5)
MCV RBC AUTO: 103 FL (ref 78–100)
PHOSPHATE SERPL-MCNC: 4.1 MG/DL (ref 2.5–4.5)
PLATELET # BLD AUTO: 260 10E9/L (ref 150–450)
POTASSIUM SERPL-SCNC: 4.4 MMOL/L (ref 3.4–5.3)
RBC # BLD AUTO: 3.22 10E12/L (ref 3.8–5.2)
SODIUM SERPL-SCNC: 137 MMOL/L (ref 133–144)
WBC # BLD AUTO: 6.3 10E9/L (ref 4–11)

## 2021-05-15 PROCEDURE — 120N000003 HC R&B IMCU UMMC

## 2021-05-15 PROCEDURE — 80048 BASIC METABOLIC PNL TOTAL CA: CPT | Performed by: THORACIC SURGERY (CARDIOTHORACIC VASCULAR SURGERY)

## 2021-05-15 PROCEDURE — 999N000157 HC STATISTIC RCP TIME EA 10 MIN

## 2021-05-15 PROCEDURE — 71045 X-RAY EXAM CHEST 1 VIEW: CPT

## 2021-05-15 PROCEDURE — 71045 X-RAY EXAM CHEST 1 VIEW: CPT | Mod: 26 | Performed by: RADIOLOGY

## 2021-05-15 PROCEDURE — 36415 COLL VENOUS BLD VENIPUNCTURE: CPT | Performed by: THORACIC SURGERY (CARDIOTHORACIC VASCULAR SURGERY)

## 2021-05-15 PROCEDURE — 84100 ASSAY OF PHOSPHORUS: CPT | Performed by: THORACIC SURGERY (CARDIOTHORACIC VASCULAR SURGERY)

## 2021-05-15 PROCEDURE — 83735 ASSAY OF MAGNESIUM: CPT | Performed by: THORACIC SURGERY (CARDIOTHORACIC VASCULAR SURGERY)

## 2021-05-15 PROCEDURE — 85027 COMPLETE CBC AUTOMATED: CPT | Performed by: THORACIC SURGERY (CARDIOTHORACIC VASCULAR SURGERY)

## 2021-05-15 PROCEDURE — 250N000013 HC RX MED GY IP 250 OP 250 PS 637: Performed by: STUDENT IN AN ORGANIZED HEALTH CARE EDUCATION/TRAINING PROGRAM

## 2021-05-15 PROCEDURE — 250N000011 HC RX IP 250 OP 636: Performed by: STUDENT IN AN ORGANIZED HEALTH CARE EDUCATION/TRAINING PROGRAM

## 2021-05-15 PROCEDURE — 250N000009 HC RX 250: Performed by: STUDENT IN AN ORGANIZED HEALTH CARE EDUCATION/TRAINING PROGRAM

## 2021-05-15 PROCEDURE — 94640 AIRWAY INHALATION TREATMENT: CPT | Mod: 76

## 2021-05-15 PROCEDURE — 94640 AIRWAY INHALATION TREATMENT: CPT

## 2021-05-15 PROCEDURE — 250N000013 HC RX MED GY IP 250 OP 250 PS 637: Performed by: THORACIC SURGERY (CARDIOTHORACIC VASCULAR SURGERY)

## 2021-05-15 RX ORDER — FUROSEMIDE 20 MG
20 TABLET ORAL ONCE
Status: COMPLETED | OUTPATIENT
Start: 2021-05-15 | End: 2021-05-15

## 2021-05-15 RX ADMIN — GABAPENTIN 100 MG: 100 CAPSULE ORAL at 21:01

## 2021-05-15 RX ADMIN — IPRATROPIUM BROMIDE AND ALBUTEROL SULFATE 3 ML: .5; 3 SOLUTION RESPIRATORY (INHALATION) at 12:38

## 2021-05-15 RX ADMIN — FUROSEMIDE 20 MG: 20 TABLET ORAL at 12:23

## 2021-05-15 RX ADMIN — ROSUVASTATIN CALCIUM 20 MG: 10 TABLET, FILM COATED ORAL at 07:57

## 2021-05-15 RX ADMIN — OXYCODONE HYDROCHLORIDE 5 MG: 5 TABLET ORAL at 21:02

## 2021-05-15 RX ADMIN — LIDOCAINE 1 PATCH: 560 PATCH PERCUTANEOUS; TOPICAL; TRANSDERMAL at 07:57

## 2021-05-15 RX ADMIN — ACETAMINOPHEN 975 MG: 325 TABLET, FILM COATED ORAL at 07:57

## 2021-05-15 RX ADMIN — DOCUSATE SODIUM 50 MG AND SENNOSIDES 8.6 MG 1 TABLET: 8.6; 5 TABLET, FILM COATED ORAL at 07:57

## 2021-05-15 RX ADMIN — GABAPENTIN 100 MG: 100 CAPSULE ORAL at 14:06

## 2021-05-15 RX ADMIN — METHOCARBAMOL 500 MG: 500 TABLET, FILM COATED ORAL at 07:57

## 2021-05-15 RX ADMIN — QUETIAPINE FUMARATE 25 MG: 25 TABLET ORAL at 22:38

## 2021-05-15 RX ADMIN — ACETAMINOPHEN 975 MG: 325 TABLET, FILM COATED ORAL at 14:06

## 2021-05-15 RX ADMIN — ACETAMINOPHEN 975 MG: 325 TABLET, FILM COATED ORAL at 21:01

## 2021-05-15 RX ADMIN — OXYCODONE HYDROCHLORIDE 5 MG: 5 TABLET ORAL at 04:16

## 2021-05-15 RX ADMIN — GABAPENTIN 100 MG: 100 CAPSULE ORAL at 07:57

## 2021-05-15 RX ADMIN — DOCUSATE SODIUM 50 MG AND SENNOSIDES 8.6 MG 2 TABLET: 8.6; 5 TABLET, FILM COATED ORAL at 21:01

## 2021-05-15 RX ADMIN — ENOXAPARIN SODIUM 40 MG: 100 INJECTION SUBCUTANEOUS at 12:24

## 2021-05-15 RX ADMIN — POLYETHYLENE GLYCOL 3350 17 G: 17 POWDER, FOR SOLUTION ORAL at 07:57

## 2021-05-15 RX ADMIN — Medication 1 PATCH: at 07:57

## 2021-05-15 RX ADMIN — METHOCARBAMOL 500 MG: 500 TABLET, FILM COATED ORAL at 21:01

## 2021-05-15 RX ADMIN — IPRATROPIUM BROMIDE AND ALBUTEROL SULFATE 3 ML: .5; 3 SOLUTION RESPIRATORY (INHALATION) at 09:10

## 2021-05-15 RX ADMIN — IPRATROPIUM BROMIDE AND ALBUTEROL SULFATE 3 ML: .5; 3 SOLUTION RESPIRATORY (INHALATION) at 16:46

## 2021-05-15 RX ADMIN — METHOCARBAMOL 500 MG: 500 TABLET, FILM COATED ORAL at 14:06

## 2021-05-15 ASSESSMENT — ACTIVITIES OF DAILY LIVING (ADL)
ADLS_ACUITY_SCORE: 13

## 2021-05-15 ASSESSMENT — MIFFLIN-ST. JEOR: SCORE: 1113.13

## 2021-05-15 NOTE — PLAN OF CARE
CV: normal sinus rhythm to sinus roland. BP stable. WNL. Afebrile.   Neuro: Intact. SBA. Pupils equal reactive. No deficits.  Resp: LS course diminished. R-chest tube remains in place. Continue to encourage pulmonary toilet and IS use.  GI: Regular diet. Passing gas. No BM today. Continue to give bowel meds.  : voiding no issues.  Skin: preventative mepilex on coccyx. Chest tube site leaking. Dressing re-enforced.     Continue to monitor for large output from chest tube or changes in color.Continue to offer pain medication prior to or following activity.

## 2021-05-15 NOTE — PLAN OF CARE
D:Manda has 6 B orders for negative air flow room for future bronchoscopy. She has her belongings within reach, her cell phone,( plugged in by sink), she is able to communicate with her .   Neuro: Alert and oriented x 4, pupils 3 brisk/tracks/conjugate. Moves all extremities strongly, all warm, normal pulses, normal sensation.   VS: Afeb, respiratory rate 12-18, heart rate 72-86, BP L UE /50-60 maps 66-83.   CV: SR   Pulm: 2 L nasal cannula. Infrequent, dry, non-productive cough. Lung sounds coarse and diminished. Sats O2 mid 90's. R chest tube to water seal with 30 ml serous fluid, air leak present.   GI: BS positive . Regular diet.   : Voids spontaneously in bathroom.   Lines/Gtts:  R piv saline locked.   Skin:   bruising L forearm. R thoracotomy site liquid dressing clean and dry. Chest tube dressing changed at start of shift, clean and dry.   Drains: CT.   Labs: pending .   P: Pulmonary toilet(which Manda is doing independently). Maintain safety with increased activity. Medicate to maximize pulmonary effort. Plan for probable future bronchs.

## 2021-05-15 NOTE — PROGRESS NOTES
"Thoracic Surgery Note    S: No acute events overnight. Walking and voiding independently. Feels more \"winded\" than yesterday. Passing gas, no BM yet.      O: /72 (Cuff Size: Adult Regular)   Pulse 73   Temp 98.2  F (36.8  C) (Oral)   Resp 20   Ht 1.6 m (5' 3\")   Wt 63.4 kg (139 lb 12.4 oz)   SpO2 97%   BMI 24.76 kg/m      NAD resting comfortably in bed  Nonlabored breathing on 2 L NC  Chest incisions c/d/i  Chest tube to water seal, small air leak  Noncyanotic  Abd soft, nontender, nondistended    Labs reviewed  BMP unremarkable  WBC 6.3, Hgb 10.4, plt 260    Imaging reviewed  CXR with small b/l effusions    A/P: Reanna Kumar is a 70 yo female s/p uniportal converted to open RLL segmentectomy , completion bilobectomy (middle & lower lobes), and therapeutic pneumoperitoneum on 5/10 with Dr. Langston. Admitted to SICU post-operatively for pressor needs. Has now weaned from pressors and is floor status    - Continue chest tube to WS  - Daily CXR  - PO pain medications  - Regular diet  - PT/OT  - Lasix 20mg PO x1  - Lovenox    Dispo: 6B transfer order placed, awaiting bed    Delroy Early  Surgery resident  [phone]          "

## 2021-05-16 ENCOUNTER — APPOINTMENT (OUTPATIENT)
Dept: GENERAL RADIOLOGY | Facility: CLINIC | Age: 71
DRG: 164 | End: 2021-05-16
Attending: THORACIC SURGERY (CARDIOTHORACIC VASCULAR SURGERY)
Payer: COMMERCIAL

## 2021-05-16 ENCOUNTER — APPOINTMENT (OUTPATIENT)
Dept: GENERAL RADIOLOGY | Facility: CLINIC | Age: 71
DRG: 164 | End: 2021-05-16
Attending: STUDENT IN AN ORGANIZED HEALTH CARE EDUCATION/TRAINING PROGRAM
Payer: COMMERCIAL

## 2021-05-16 LAB
ANION GAP SERPL CALCULATED.3IONS-SCNC: 1 MMOL/L (ref 3–14)
BACTERIA SPEC CULT: NORMAL
BUN SERPL-MCNC: 17 MG/DL (ref 7–30)
CALCIUM SERPL-MCNC: 9.8 MG/DL (ref 8.5–10.1)
CHLORIDE SERPL-SCNC: 105 MMOL/L (ref 94–109)
CO2 SERPL-SCNC: 32 MMOL/L (ref 20–32)
CREAT SERPL-MCNC: 0.53 MG/DL (ref 0.52–1.04)
ERYTHROCYTE [DISTWIDTH] IN BLOOD BY AUTOMATED COUNT: 12.9 % (ref 10–15)
GFR SERPL CREATININE-BSD FRML MDRD: >90 ML/MIN/{1.73_M2}
GLUCOSE SERPL-MCNC: 110 MG/DL (ref 70–99)
HCT VFR BLD AUTO: 33.3 % (ref 35–47)
HGB BLD-MCNC: 10.5 G/DL (ref 11.7–15.7)
MAGNESIUM SERPL-MCNC: 1.9 MG/DL (ref 1.6–2.3)
MCH RBC QN AUTO: 32.2 PG (ref 26.5–33)
MCHC RBC AUTO-ENTMCNC: 31.5 G/DL (ref 31.5–36.5)
MCV RBC AUTO: 102 FL (ref 78–100)
PHOSPHATE SERPL-MCNC: 3.8 MG/DL (ref 2.5–4.5)
PLATELET # BLD AUTO: 307 10E9/L (ref 150–450)
POTASSIUM SERPL-SCNC: 4.4 MMOL/L (ref 3.4–5.3)
RBC # BLD AUTO: 3.26 10E12/L (ref 3.8–5.2)
SODIUM SERPL-SCNC: 138 MMOL/L (ref 133–144)
SPECIMEN SOURCE: NORMAL
TROPONIN I SERPL-MCNC: 0.02 UG/L (ref 0–0.04)
WBC # BLD AUTO: 6.1 10E9/L (ref 4–11)

## 2021-05-16 PROCEDURE — 36415 COLL VENOUS BLD VENIPUNCTURE: CPT | Performed by: STUDENT IN AN ORGANIZED HEALTH CARE EDUCATION/TRAINING PROGRAM

## 2021-05-16 PROCEDURE — 250N000013 HC RX MED GY IP 250 OP 250 PS 637: Performed by: STUDENT IN AN ORGANIZED HEALTH CARE EDUCATION/TRAINING PROGRAM

## 2021-05-16 PROCEDURE — 80048 BASIC METABOLIC PNL TOTAL CA: CPT | Performed by: THORACIC SURGERY (CARDIOTHORACIC VASCULAR SURGERY)

## 2021-05-16 PROCEDURE — 94640 AIRWAY INHALATION TREATMENT: CPT | Mod: 76

## 2021-05-16 PROCEDURE — 71045 X-RAY EXAM CHEST 1 VIEW: CPT | Mod: 77

## 2021-05-16 PROCEDURE — 83735 ASSAY OF MAGNESIUM: CPT | Performed by: THORACIC SURGERY (CARDIOTHORACIC VASCULAR SURGERY)

## 2021-05-16 PROCEDURE — 71045 X-RAY EXAM CHEST 1 VIEW: CPT

## 2021-05-16 PROCEDURE — 84100 ASSAY OF PHOSPHORUS: CPT | Performed by: THORACIC SURGERY (CARDIOTHORACIC VASCULAR SURGERY)

## 2021-05-16 PROCEDURE — 272N000202 HC AEROBIKA WITH MANOMETER

## 2021-05-16 PROCEDURE — 250N000011 HC RX IP 250 OP 636: Performed by: STUDENT IN AN ORGANIZED HEALTH CARE EDUCATION/TRAINING PROGRAM

## 2021-05-16 PROCEDURE — 93005 ELECTROCARDIOGRAM TRACING: CPT

## 2021-05-16 PROCEDURE — 258N000003 HC RX IP 258 OP 636: Performed by: STUDENT IN AN ORGANIZED HEALTH CARE EDUCATION/TRAINING PROGRAM

## 2021-05-16 PROCEDURE — 120N000003 HC R&B IMCU UMMC

## 2021-05-16 PROCEDURE — 999N000157 HC STATISTIC RCP TIME EA 10 MIN

## 2021-05-16 PROCEDURE — 94799 UNLISTED PULMONARY SVC/PX: CPT

## 2021-05-16 PROCEDURE — 94640 AIRWAY INHALATION TREATMENT: CPT

## 2021-05-16 PROCEDURE — 71045 X-RAY EXAM CHEST 1 VIEW: CPT | Mod: 26 | Performed by: RADIOLOGY

## 2021-05-16 PROCEDURE — 250N000013 HC RX MED GY IP 250 OP 250 PS 637: Performed by: THORACIC SURGERY (CARDIOTHORACIC VASCULAR SURGERY)

## 2021-05-16 PROCEDURE — 250N000009 HC RX 250: Performed by: STUDENT IN AN ORGANIZED HEALTH CARE EDUCATION/TRAINING PROGRAM

## 2021-05-16 PROCEDURE — 36415 COLL VENOUS BLD VENIPUNCTURE: CPT | Performed by: THORACIC SURGERY (CARDIOTHORACIC VASCULAR SURGERY)

## 2021-05-16 PROCEDURE — 93010 ELECTROCARDIOGRAM REPORT: CPT | Performed by: INTERNAL MEDICINE

## 2021-05-16 PROCEDURE — 84484 ASSAY OF TROPONIN QUANT: CPT | Performed by: STUDENT IN AN ORGANIZED HEALTH CARE EDUCATION/TRAINING PROGRAM

## 2021-05-16 PROCEDURE — 85027 COMPLETE CBC AUTOMATED: CPT | Performed by: THORACIC SURGERY (CARDIOTHORACIC VASCULAR SURGERY)

## 2021-05-16 RX ORDER — MAGNESIUM SULFATE HEPTAHYDRATE 40 MG/ML
2 INJECTION, SOLUTION INTRAVENOUS ONCE
Status: DISCONTINUED | OUTPATIENT
Start: 2021-05-16 | End: 2021-05-16

## 2021-05-16 RX ORDER — METOPROLOL TARTRATE 1 MG/ML
5 INJECTION, SOLUTION INTRAVENOUS ONCE
Status: DISCONTINUED | OUTPATIENT
Start: 2021-05-16 | End: 2021-05-16

## 2021-05-16 RX ORDER — BISACODYL 10 MG
10 SUPPOSITORY, RECTAL RECTAL DAILY PRN
Status: DISCONTINUED | OUTPATIENT
Start: 2021-05-16 | End: 2021-05-19 | Stop reason: HOSPADM

## 2021-05-16 RX ORDER — MAGNESIUM SULFATE HEPTAHYDRATE 40 MG/ML
2 INJECTION, SOLUTION INTRAVENOUS ONCE
Status: COMPLETED | OUTPATIENT
Start: 2021-05-16 | End: 2021-05-16

## 2021-05-16 RX ORDER — FUROSEMIDE 20 MG
20 TABLET ORAL ONCE
Status: COMPLETED | OUTPATIENT
Start: 2021-05-16 | End: 2021-05-16

## 2021-05-16 RX ADMIN — OXYCODONE HYDROCHLORIDE 5 MG: 5 TABLET ORAL at 08:13

## 2021-05-16 RX ADMIN — OXYCODONE HYDROCHLORIDE 5 MG: 5 TABLET ORAL at 21:40

## 2021-05-16 RX ADMIN — ACETAMINOPHEN 975 MG: 325 TABLET, FILM COATED ORAL at 14:21

## 2021-05-16 RX ADMIN — METHOCARBAMOL 500 MG: 500 TABLET, FILM COATED ORAL at 08:13

## 2021-05-16 RX ADMIN — QUETIAPINE FUMARATE 50 MG: 25 TABLET ORAL at 21:40

## 2021-05-16 RX ADMIN — ACETAMINOPHEN 975 MG: 325 TABLET, FILM COATED ORAL at 08:10

## 2021-05-16 RX ADMIN — METHOCARBAMOL 500 MG: 500 TABLET, FILM COATED ORAL at 20:37

## 2021-05-16 RX ADMIN — DOCUSATE SODIUM 50 MG AND SENNOSIDES 8.6 MG 2 TABLET: 8.6; 5 TABLET, FILM COATED ORAL at 08:09

## 2021-05-16 RX ADMIN — GABAPENTIN 100 MG: 100 CAPSULE ORAL at 14:20

## 2021-05-16 RX ADMIN — OXYCODONE HYDROCHLORIDE 5 MG: 5 TABLET ORAL at 11:35

## 2021-05-16 RX ADMIN — Medication 1 PATCH: at 08:09

## 2021-05-16 RX ADMIN — IPRATROPIUM BROMIDE AND ALBUTEROL SULFATE 3 ML: .5; 3 SOLUTION RESPIRATORY (INHALATION) at 08:20

## 2021-05-16 RX ADMIN — MAGNESIUM SULFATE IN WATER 2 G: 40 INJECTION, SOLUTION INTRAVENOUS at 17:47

## 2021-05-16 RX ADMIN — ROSUVASTATIN CALCIUM 20 MG: 10 TABLET, FILM COATED ORAL at 08:09

## 2021-05-16 RX ADMIN — ENOXAPARIN SODIUM 40 MG: 100 INJECTION SUBCUTANEOUS at 11:35

## 2021-05-16 RX ADMIN — FUROSEMIDE 20 MG: 20 TABLET ORAL at 14:20

## 2021-05-16 RX ADMIN — AMIODARONE HYDROCHLORIDE 150 MG: 1.5 INJECTION, SOLUTION INTRAVENOUS at 16:38

## 2021-05-16 RX ADMIN — DOCUSATE SODIUM 50 MG AND SENNOSIDES 8.6 MG 1 TABLET: 8.6; 5 TABLET, FILM COATED ORAL at 20:37

## 2021-05-16 RX ADMIN — GABAPENTIN 100 MG: 100 CAPSULE ORAL at 08:09

## 2021-05-16 RX ADMIN — ACETAMINOPHEN 975 MG: 325 TABLET, FILM COATED ORAL at 20:35

## 2021-05-16 RX ADMIN — METHOCARBAMOL 500 MG: 500 TABLET, FILM COATED ORAL at 14:20

## 2021-05-16 RX ADMIN — IPRATROPIUM BROMIDE AND ALBUTEROL SULFATE 3 ML: .5; 3 SOLUTION RESPIRATORY (INHALATION) at 13:04

## 2021-05-16 RX ADMIN — AMIODARONE HYDROCHLORIDE 1 MG/MIN: 50 INJECTION, SOLUTION INTRAVENOUS at 16:48

## 2021-05-16 RX ADMIN — IPRATROPIUM BROMIDE AND ALBUTEROL SULFATE 3 ML: .5; 3 SOLUTION RESPIRATORY (INHALATION) at 21:00

## 2021-05-16 RX ADMIN — POLYETHYLENE GLYCOL 3350 17 G: 17 POWDER, FOR SOLUTION ORAL at 08:09

## 2021-05-16 RX ADMIN — GABAPENTIN 100 MG: 100 CAPSULE ORAL at 20:37

## 2021-05-16 RX ADMIN — SODIUM CHLORIDE, POTASSIUM CHLORIDE, SODIUM LACTATE AND CALCIUM CHLORIDE 500 ML: 600; 310; 30; 20 INJECTION, SOLUTION INTRAVENOUS at 16:27

## 2021-05-16 ASSESSMENT — ACTIVITIES OF DAILY LIVING (ADL)
ADLS_ACUITY_SCORE: 13

## 2021-05-16 ASSESSMENT — MIFFLIN-ST. JEOR: SCORE: 1126.13

## 2021-05-16 NOTE — PLAN OF CARE
[id number]:    Neuro: A/Ox4. Slept between cares.  Cardiac: SR with HR 70s-90s. AVSS, soft BP 90s/40s at times.   Respiratory: O2 sats stable on 1-2L NC  GI/: voiding spontaneously. No BM this shift, last BM 5/13  Diet/appetite: Regular diet, denies nausea.   Activity:  SBA  Pain: CT site pain controlled with PRN oxy    Skin: No new deficits noted. R chest incision approximated.   LDA's: PIV SL. CT to WS, leaking at site and air leak present.     Plan: Continue with POC. Notify primary team with changes.

## 2021-05-16 NOTE — PROGRESS NOTES
1537: Telemetry monitor tech contacted this writer to examine patient r/t to increased HR.  Patient up in chair at this time.  Complaining of pain at right side of back/chest tube/shoulder.  Safely transferred back to bed and felt the pain improved once back in bed.    1539:  Paged Tony Mcfarlane notified that patient's heart rate increased to the 150s-160.    EKG ordered. SBP cycled running in the /70s. Declines chest pain or shortness of breath.  Sating above 90% on RA.    1546:  MD Denys rounding in room and EKG tech in room.

## 2021-05-16 NOTE — PLAN OF CARE
Transferred to: Unit 6B at 2000, report given to GUSTAVO Curtis  Belongings: All belongings sent with patient in one bag. Verified by patient.   Llanos removed? No: No llanos in place  Central line removed? NA  Chart and medications sent with patient Yes, no medications needed to send  Family notified: No: Patient stated she will notify family.

## 2021-05-16 NOTE — PROGRESS NOTES
Transfer  Transferred from: 4A  Via:bed  Reason for transfer: Pt appropriate for 6B- improved patient condition  Family: Pt will contact   Belongings: Received with pt  Chart: Received with pt  Medications: Meds received from old unit with pt  Code Status verified on armband: yes  2 RN Skin Assessment Completed By: Sophia TRACY RN and Rina MARTÍNEZ RN   Med rec completed: yes  Bed surface reassessed with algorithm and charted: yes  New bed surface ordered: yes/no  Suction/Ambu bag/Flowmeter at bedside: yes    Report received from: Saleem LUJAN RN   Pt status: Pt is AOx4, AVSS on 2L NC, and has a CT to .

## 2021-05-16 NOTE — PLAN OF CARE
Neuro: A/Ox4.   Cardiac: SR with HR 70s-90s. VSS.       Respiratory: O2 sats stable on RA, chest tube leaking at site, dressing changed in AM  GI/: voiding spontaneously. Small BM this shift, may need suppository  Diet/appetite: Regular diet, denies nausea.   Activity:  SBA, ambulated in halls x2  Pain: CT site pain controlled with PRN oxy    Skin: No new deficits noted. R chest incision.   LDA's: PIV SL. CT clamped.      Plan: XR in AM and possibly remove chest tube and discharge. Continue with POC. Notify primary team with changes

## 2021-05-16 NOTE — PROGRESS NOTES
"Thoracic Surgery Note    S: NICK. Feels well this morning. Up and walking. Tolerating regular diet, no BM yet.    O: /75   Pulse 152   Temp 98.3  F (36.8  C) (Oral)   Resp 18   Ht 1.6 m (5' 3\")   Wt 64.7 kg (142 lb 10.2 oz)   SpO2 90%   BMI 25.27 kg/m      NAD resting comfortably in bed  Nonlabored breathing on 1 L NC  Chest incisions c/d/i  Chest tube to water seal, no air leak  Noncyanotic  Abd soft, nontender, nondistended    Labs reviewed  BMP unremarkable  WBC 6.1, Hgb 10.5, plt 307    Imaging reviewed  CXR with small b/l effusions    A/P: Reanna Kumar is a 72 yo female s/p uniportal converted to open RLL segmentectomy , completion bilobectomy (middle & lower lobes), and therapeutic pneumoperitoneum on 5/10 with Dr. Langston. Admitted to SICU post-operatively for pressor needs. Has now weaned from pressors and is floor status    - Chest tube clamp trial  - Repeat CXR in PM  - Suppository  - Lasix 20 mg PO x1  - PT/OT  - Lovenox    Dispo: 6B, likely discharge in 1-2 days    Delroy Early  Surgery resident  [phone]          "

## 2021-05-16 NOTE — PROGRESS NOTES
Cross cover brief progress note   5/16 1630     Called to bedside to assess patient with tachycardia in the 150s with EKG and tele showing Afib w/ RVR. Blood pressure initially 120s/70s however when cuff was cycled blood pressure showing 80s/50s. Patient sitting comfortably in bed and responding to questions appropriately without any changes in mentation per RN. Reports blood pressure runs low at baseline. Chest tube was was put back to water seal (clamped earlier today). 2g mag sulfate was ordered given mag of 1.9 today.  Blood pressure back up to 120s/70s after multiple cuff cycles. 500c LR bolus and a 150mg bolus of amio was given. Plan to start gtt after bolus. Patient was placed on supplement oxygen via 3L NC given mild decrease of O2 saturations into the high 80s/low 90s likely due to palpations from RVR and anxiety- denies any subjective sensation of shortness of breath. Plan to wean NC as able. Discussed with RN to page me if increasing oxygen requirements and to consider putting chest tube back to suction. We will continue to monitor her HR, blood pressure, and oxygenation very closely.     Plan was discussed with Thoracic fellow      Tony Mcfarlane MD   Surgery PGY-1

## 2021-05-16 NOTE — PROGRESS NOTES
Cardiothoracic Surgery Progress Note - 05/16/21  Pt: Reanna Kumar  MRN: [id number]     No acute events overnight.      Temp:  [97.9  F (36.6  C)-99.1  F (37.3  C)] 99.1  F (37.3  C)  Pulse:  [73-96] 77  Resp:  [18-20] 18  BP: ()/(48-81) 102/61  SpO2:  [91 %-99 %] 95 %    I/O last 3 completed shifts:  In: 240 [P.O.:240]  Out: 380 [Urine:100; Chest Tube:280]    Resp: 18      Physical Exam:  Gen: Alert and oriented. No acute distress.  PulmRespirations non labored.  CV: Regular rate and rhythm.  Chest: CT output appears serosanguinous.   - Incision:  C/D/I.  Abd: Abdomen soft, non-tender, non-distended    Labs reviewed in full; available in Epic.  Results for orders placed or performed during the hospital encounter of 05/10/21 (from the past 24 hour(s))   Basic metabolic panel   Result Value Ref Range    Sodium 138 133 - 144 mmol/L    Potassium 4.4 3.4 - 5.3 mmol/L    Chloride 105 94 - 109 mmol/L    Carbon Dioxide 32 20 - 32 mmol/L    Anion Gap 1 (L) 3 - 14 mmol/L    Glucose 110 (H) 70 - 99 mg/dL    Urea Nitrogen 17 7 - 30 mg/dL    Creatinine 0.53 0.52 - 1.04 mg/dL    GFR Estimate >90 >60 mL/min/[1.73_m2]    GFR Estimate If Black >90 >60 mL/min/[1.73_m2]    Calcium 9.8 8.5 - 10.1 mg/dL   CBC with platelets   Result Value Ref Range    WBC 6.1 4.0 - 11.0 10e9/L    RBC Count 3.26 (L) 3.8 - 5.2 10e12/L    Hemoglobin 10.5 (L) 11.7 - 15.7 g/dL    Hematocrit 33.3 (L) 35.0 - 47.0 %     (H) 78 - 100 fl    MCH 32.2 26.5 - 33.0 pg    MCHC 31.5 31.5 - 36.5 g/dL    RDW 12.9 10.0 - 15.0 %    Platelet Count 307 150 - 450 10e9/L   Magnesium   Result Value Ref Range    Magnesium 1.9 1.6 - 2.3 mg/dL   Phosphorus   Result Value Ref Range    Phosphorus 3.8 2.5 - 4.5 mg/dL   XR Chest Port 1 View    Narrative    EXAM: XR CHEST PORT 1 VIEW  5/16/2021 8:06 AM     HISTORY:  s/p R bilobectomy, interval change    COMPARISON: Chest radiograph dated 5/15/2021    FINDINGS: A single AP view of the chest is obtained. Stable  right  chest tube. Postoperative changes of right bilobectomy. Slightly  decreased right hydropneumothorax. Improved aeration of the right  lung. Decreased subcutaneous emphysema along the right chest wall.  Biapical scarring. No large left pleural effusion. Stable interstitial  prominence in the left lung. Decreased rightward deviation of the  trachea. Stable cardiac silhouette.      Impression    IMPRESSION:   1. Decreased right hydropneumothorax and improved aeration of the  right lung.  2. Unchanged left interstitial prominence, atelectasis versus edema.    I have personally reviewed the examination and initial interpretation  and I agree with the findings.    CARLOS A CHILDERS MD       A/P  Reanna Kumar is a 71 year old female s/p middle and lower bilobectomy for primary lung cancer, bronchoplasty for BI injury.     -Pain well controlled. Efficient cough.   -No air leak. Clamp trial today. Chest tube output 310 ml. Lasix today.   -Likely home tomorrow.     Palomo Galindo MD

## 2021-05-17 ENCOUNTER — APPOINTMENT (OUTPATIENT)
Dept: GENERAL RADIOLOGY | Facility: CLINIC | Age: 71
DRG: 164 | End: 2021-05-17
Attending: THORACIC SURGERY (CARDIOTHORACIC VASCULAR SURGERY)
Payer: COMMERCIAL

## 2021-05-17 ENCOUNTER — APPOINTMENT (OUTPATIENT)
Dept: GENERAL RADIOLOGY | Facility: CLINIC | Age: 71
DRG: 164 | End: 2021-05-17
Attending: STUDENT IN AN ORGANIZED HEALTH CARE EDUCATION/TRAINING PROGRAM
Payer: COMMERCIAL

## 2021-05-17 LAB
ACID FAST STN SPEC QL: NORMAL
ACID FAST STN SPEC QL: NORMAL
ANION GAP SERPL CALCULATED.3IONS-SCNC: 3 MMOL/L (ref 3–14)
BUN SERPL-MCNC: 14 MG/DL (ref 7–30)
CALCIUM SERPL-MCNC: 9.5 MG/DL (ref 8.5–10.1)
CHLORIDE SERPL-SCNC: 102 MMOL/L (ref 94–109)
CO2 SERPL-SCNC: 30 MMOL/L (ref 20–32)
COPATH REPORT: NORMAL
CREAT SERPL-MCNC: 0.57 MG/DL (ref 0.52–1.04)
ERYTHROCYTE [DISTWIDTH] IN BLOOD BY AUTOMATED COUNT: 12.8 % (ref 10–15)
GFR SERPL CREATININE-BSD FRML MDRD: >90 ML/MIN/{1.73_M2}
GLUCOSE SERPL-MCNC: 107 MG/DL (ref 70–99)
HCT VFR BLD AUTO: 33.6 % (ref 35–47)
HGB BLD-MCNC: 11.1 G/DL (ref 11.7–15.7)
INTERPRETATION ECG - MUSE: NORMAL
MAGNESIUM SERPL-MCNC: 2 MG/DL (ref 1.6–2.3)
MCH RBC QN AUTO: 33.8 PG (ref 26.5–33)
MCHC RBC AUTO-ENTMCNC: 33 G/DL (ref 31.5–36.5)
MCV RBC AUTO: 102 FL (ref 78–100)
PHOSPHATE SERPL-MCNC: 3.6 MG/DL (ref 2.5–4.5)
PLATELET # BLD AUTO: 333 10E9/L (ref 150–450)
POTASSIUM SERPL-SCNC: 4.4 MMOL/L (ref 3.4–5.3)
RBC # BLD AUTO: 3.28 10E12/L (ref 3.8–5.2)
SODIUM SERPL-SCNC: 135 MMOL/L (ref 133–144)
SPECIMEN SOURCE: NORMAL
WBC # BLD AUTO: 7.3 10E9/L (ref 4–11)

## 2021-05-17 PROCEDURE — 250N000009 HC RX 250: Performed by: STUDENT IN AN ORGANIZED HEALTH CARE EDUCATION/TRAINING PROGRAM

## 2021-05-17 PROCEDURE — 36415 COLL VENOUS BLD VENIPUNCTURE: CPT | Performed by: THORACIC SURGERY (CARDIOTHORACIC VASCULAR SURGERY)

## 2021-05-17 PROCEDURE — 250N000011 HC RX IP 250 OP 636: Performed by: STUDENT IN AN ORGANIZED HEALTH CARE EDUCATION/TRAINING PROGRAM

## 2021-05-17 PROCEDURE — 84100 ASSAY OF PHOSPHORUS: CPT | Performed by: THORACIC SURGERY (CARDIOTHORACIC VASCULAR SURGERY)

## 2021-05-17 PROCEDURE — 999N000128 HC STATISTIC PERIPHERAL IV START W/O US GUIDANCE

## 2021-05-17 PROCEDURE — 85027 COMPLETE CBC AUTOMATED: CPT | Performed by: THORACIC SURGERY (CARDIOTHORACIC VASCULAR SURGERY)

## 2021-05-17 PROCEDURE — 71045 X-RAY EXAM CHEST 1 VIEW: CPT | Mod: 26 | Performed by: RADIOLOGY

## 2021-05-17 PROCEDURE — 94640 AIRWAY INHALATION TREATMENT: CPT | Mod: 76

## 2021-05-17 PROCEDURE — 999N000157 HC STATISTIC RCP TIME EA 10 MIN

## 2021-05-17 PROCEDURE — 120N000003 HC R&B IMCU UMMC

## 2021-05-17 PROCEDURE — 250N000013 HC RX MED GY IP 250 OP 250 PS 637: Performed by: STUDENT IN AN ORGANIZED HEALTH CARE EDUCATION/TRAINING PROGRAM

## 2021-05-17 PROCEDURE — 71045 X-RAY EXAM CHEST 1 VIEW: CPT | Mod: 77

## 2021-05-17 PROCEDURE — 250N000013 HC RX MED GY IP 250 OP 250 PS 637: Performed by: THORACIC SURGERY (CARDIOTHORACIC VASCULAR SURGERY)

## 2021-05-17 PROCEDURE — 94640 AIRWAY INHALATION TREATMENT: CPT

## 2021-05-17 PROCEDURE — 83735 ASSAY OF MAGNESIUM: CPT | Performed by: THORACIC SURGERY (CARDIOTHORACIC VASCULAR SURGERY)

## 2021-05-17 PROCEDURE — 80048 BASIC METABOLIC PNL TOTAL CA: CPT | Performed by: THORACIC SURGERY (CARDIOTHORACIC VASCULAR SURGERY)

## 2021-05-17 PROCEDURE — 258N000003 HC RX IP 258 OP 636: Performed by: STUDENT IN AN ORGANIZED HEALTH CARE EDUCATION/TRAINING PROGRAM

## 2021-05-17 PROCEDURE — 71045 X-RAY EXAM CHEST 1 VIEW: CPT

## 2021-05-17 RX ORDER — ACETYLCYSTEINE 100 MG/ML
4 SOLUTION ORAL; RESPIRATORY (INHALATION) EVERY 4 HOURS
Status: DISCONTINUED | OUTPATIENT
Start: 2021-05-17 | End: 2021-05-17

## 2021-05-17 RX ORDER — ACETYLCYSTEINE 100 MG/ML
4 SOLUTION ORAL; RESPIRATORY (INHALATION)
Status: DISCONTINUED | OUTPATIENT
Start: 2021-05-18 | End: 2021-05-19 | Stop reason: HOSPADM

## 2021-05-17 RX ORDER — AMIODARONE HYDROCHLORIDE 200 MG/1
200 TABLET ORAL DAILY
Status: DISCONTINUED | OUTPATIENT
Start: 2021-05-23 | End: 2021-05-19 | Stop reason: HOSPADM

## 2021-05-17 RX ORDER — AMIODARONE HYDROCHLORIDE 200 MG/1
600 TABLET ORAL 2 TIMES DAILY
Status: DISCONTINUED | OUTPATIENT
Start: 2021-05-17 | End: 2021-05-19 | Stop reason: HOSPADM

## 2021-05-17 RX ADMIN — DOCUSATE SODIUM 50 MG AND SENNOSIDES 8.6 MG 2 TABLET: 8.6; 5 TABLET, FILM COATED ORAL at 08:20

## 2021-05-17 RX ADMIN — AMIODARONE HYDROCHLORIDE 600 MG: 200 TABLET ORAL at 09:59

## 2021-05-17 RX ADMIN — AMIODARONE HYDROCHLORIDE 0.5 MG/MIN: 50 INJECTION, SOLUTION INTRAVENOUS at 17:02

## 2021-05-17 RX ADMIN — OXYCODONE HYDROCHLORIDE 5 MG: 5 TABLET ORAL at 14:01

## 2021-05-17 RX ADMIN — ACETYLCYSTEINE 4 ML: 100 SOLUTION ORAL; RESPIRATORY (INHALATION) at 16:52

## 2021-05-17 RX ADMIN — GABAPENTIN 100 MG: 100 CAPSULE ORAL at 19:44

## 2021-05-17 RX ADMIN — ACETAMINOPHEN 975 MG: 325 TABLET, FILM COATED ORAL at 14:01

## 2021-05-17 RX ADMIN — ACETYLCYSTEINE 4 ML: 100 SOLUTION ORAL; RESPIRATORY (INHALATION) at 12:58

## 2021-05-17 RX ADMIN — GABAPENTIN 100 MG: 100 CAPSULE ORAL at 08:20

## 2021-05-17 RX ADMIN — QUETIAPINE FUMARATE 50 MG: 25 TABLET ORAL at 23:13

## 2021-05-17 RX ADMIN — IPRATROPIUM BROMIDE AND ALBUTEROL SULFATE 3 ML: .5; 3 SOLUTION RESPIRATORY (INHALATION) at 09:29

## 2021-05-17 RX ADMIN — IPRATROPIUM BROMIDE AND ALBUTEROL SULFATE 3 ML: .5; 3 SOLUTION RESPIRATORY (INHALATION) at 12:58

## 2021-05-17 RX ADMIN — METHOCARBAMOL 500 MG: 500 TABLET, FILM COATED ORAL at 14:01

## 2021-05-17 RX ADMIN — ACETYLCYSTEINE 4 ML: 100 SOLUTION ORAL; RESPIRATORY (INHALATION) at 09:29

## 2021-05-17 RX ADMIN — IPRATROPIUM BROMIDE AND ALBUTEROL SULFATE 3 ML: .5; 3 SOLUTION RESPIRATORY (INHALATION) at 16:52

## 2021-05-17 RX ADMIN — DOCUSATE SODIUM 50 MG AND SENNOSIDES 8.6 MG 2 TABLET: 8.6; 5 TABLET, FILM COATED ORAL at 19:43

## 2021-05-17 RX ADMIN — POLYETHYLENE GLYCOL 3350 17 G: 17 POWDER, FOR SOLUTION ORAL at 08:21

## 2021-05-17 RX ADMIN — LIDOCAINE 1 PATCH: 560 PATCH PERCUTANEOUS; TOPICAL; TRANSDERMAL at 08:23

## 2021-05-17 RX ADMIN — ROSUVASTATIN CALCIUM 20 MG: 10 TABLET, FILM COATED ORAL at 08:20

## 2021-05-17 RX ADMIN — METHOCARBAMOL 500 MG: 500 TABLET, FILM COATED ORAL at 08:20

## 2021-05-17 RX ADMIN — AMIODARONE HYDROCHLORIDE 600 MG: 200 TABLET ORAL at 19:43

## 2021-05-17 RX ADMIN — OXYCODONE HYDROCHLORIDE 5 MG: 5 TABLET ORAL at 23:13

## 2021-05-17 RX ADMIN — ENOXAPARIN SODIUM 40 MG: 100 INJECTION SUBCUTANEOUS at 11:42

## 2021-05-17 RX ADMIN — IPRATROPIUM BROMIDE AND ALBUTEROL SULFATE 3 ML: .5; 3 SOLUTION RESPIRATORY (INHALATION) at 20:56

## 2021-05-17 RX ADMIN — ACETYLCYSTEINE 4 ML: 100 SOLUTION ORAL; RESPIRATORY (INHALATION) at 20:56

## 2021-05-17 RX ADMIN — GABAPENTIN 100 MG: 100 CAPSULE ORAL at 14:01

## 2021-05-17 RX ADMIN — ACETAMINOPHEN 975 MG: 325 TABLET, FILM COATED ORAL at 08:19

## 2021-05-17 RX ADMIN — OXYCODONE HYDROCHLORIDE 5 MG: 5 TABLET ORAL at 08:20

## 2021-05-17 RX ADMIN — ACETAMINOPHEN 975 MG: 325 TABLET, FILM COATED ORAL at 19:43

## 2021-05-17 RX ADMIN — METHOCARBAMOL 500 MG: 500 TABLET, FILM COATED ORAL at 19:44

## 2021-05-17 RX ADMIN — Medication 1 PATCH: at 08:21

## 2021-05-17 ASSESSMENT — ACTIVITIES OF DAILY LIVING (ADL)
ADLS_ACUITY_SCORE: 13
ADLS_ACUITY_SCORE: 15
ADLS_ACUITY_SCORE: 11
ADLS_ACUITY_SCORE: 13

## 2021-05-17 ASSESSMENT — MIFFLIN-ST. JEOR: SCORE: 1130.13

## 2021-05-17 NOTE — PLAN OF CARE
"BP 98/50 (BP Location: Left arm)   Pulse 70   Temp 98.3  F (36.8  C) (Oral)   Resp 18   Ht 1.6 m (5' 3\")   Wt 65.1 kg (143 lb 8.3 oz)   SpO2 95%   BMI 25.42 kg/m      VSS. Afebrile. Alert and oriented x 4. 2L NC overnight. Patient has remained in SR with rates 70's-80's all shift. Amio drip infusing at 0.5. Right CT to water seal with small output. Pt. Declined need for pain medication. Tolerating diet with no n/v. Up SBA to commode. Adequate UOP. No BM overnight. Continue to monitor.  "

## 2021-05-17 NOTE — PROGRESS NOTES
"Thoracic Surgery Note    S: Afib RVR overnight, asymptomatic, started amio gtt. Chest tube unclamped and put back to water seal overnight. Restarting clamp trial this morning. Mild dry cough, otherwise feels well. Tolerating regular diet. Passing stool. No nausea, vomiting, or fevers. Up walking.    O: /66 (BP Location: Left arm)   Pulse 66   Temp 97.8  F (36.6  C) (Axillary)   Resp 18   Ht 1.6 m (5' 3\")   Wt 65.1 kg (143 lb 8.3 oz)   SpO2 94%   BMI 25.42 kg/m      NAD resting comfortably in bed  Nonlabored breathing on 3 L NC  Chest incisions c/d/i  Chest tube to water seal, no air leak, now clamped  Noncyanotic  Abd soft, nontender, nondistended  Ext WWP    Labs reviewed  Normal electrolytes. K 4.4, Mg 2.0, phos 3.6, Cr 0.57  WBC 7.3, hgb 11.1, stable, plt 333    Imaging reviewed  CXR slight improvement in bilateral airspace opacities, no pneumothorax    A/P: Reanna Kumar is a 72 yo female s/p uniportal converted to open RLL segmentectomy , completion bilobectomy (middle & lower lobes), and therapeutic pneumoperitoneum on 5/10 with Dr. Langston. Admitted to SICU post-operatively for pressor needs. Has now weaned from pressors and is floor status    - Restart chest tube clamp trial  - Repeat CXR in PM  - continue duo nebs, start mucomyst, acapella   - Suppository  - Lasix 20 mg PO x1  - PT/OT  - Lovenox    Dispo: 6B, likely discharge in 1-2 days    Delroy Early  Surgery resident  [phone]          "

## 2021-05-17 NOTE — PLAN OF CARE
Neuro: A/Ox4.   Cardiac: SR with HR 70s-90s. VSS.       Respiratory: O2 sats stable on RA, chest tube leaking at site, dressing changed in AM  GI/: voiding spontaneously. BM 5/17  Diet/appetite: Regular diet, denies nausea.   Activity:  SBA, ambulated in halls  Pain: pain controlled with PRN oxy    Skin: No new deficits noted. R chest incision.   LDA's: PIV. CT clamped.      Plan: Amio gtt off at 10:30pm. XR in AM and possibly remove chest tube. Continue with POC. Notify primary team with changes

## 2021-05-18 ENCOUNTER — APPOINTMENT (OUTPATIENT)
Dept: CT IMAGING | Facility: CLINIC | Age: 71
DRG: 164 | End: 2021-05-18
Attending: PHYSICIAN ASSISTANT
Payer: COMMERCIAL

## 2021-05-18 ENCOUNTER — APPOINTMENT (OUTPATIENT)
Dept: GENERAL RADIOLOGY | Facility: CLINIC | Age: 71
DRG: 164 | End: 2021-05-18
Attending: THORACIC SURGERY (CARDIOTHORACIC VASCULAR SURGERY)
Payer: COMMERCIAL

## 2021-05-18 ENCOUNTER — APPOINTMENT (OUTPATIENT)
Dept: GENERAL RADIOLOGY | Facility: CLINIC | Age: 71
DRG: 164 | End: 2021-05-18
Attending: STUDENT IN AN ORGANIZED HEALTH CARE EDUCATION/TRAINING PROGRAM
Payer: COMMERCIAL

## 2021-05-18 ENCOUNTER — APPOINTMENT (OUTPATIENT)
Dept: PHYSICAL THERAPY | Facility: CLINIC | Age: 71
DRG: 164 | End: 2021-05-18
Attending: THORACIC SURGERY (CARDIOTHORACIC VASCULAR SURGERY)
Payer: COMMERCIAL

## 2021-05-18 LAB
ANION GAP SERPL CALCULATED.3IONS-SCNC: 5 MMOL/L (ref 3–14)
BUN SERPL-MCNC: 13 MG/DL (ref 7–30)
CALCIUM SERPL-MCNC: 9.2 MG/DL (ref 8.5–10.1)
CHLORIDE SERPL-SCNC: 101 MMOL/L (ref 94–109)
CO2 SERPL-SCNC: 28 MMOL/L (ref 20–32)
CREAT SERPL-MCNC: 0.46 MG/DL (ref 0.52–1.04)
ERYTHROCYTE [DISTWIDTH] IN BLOOD BY AUTOMATED COUNT: 12.7 % (ref 10–15)
GFR SERPL CREATININE-BSD FRML MDRD: >90 ML/MIN/{1.73_M2}
GLUCOSE SERPL-MCNC: 117 MG/DL (ref 70–99)
HCT VFR BLD AUTO: 31.9 % (ref 35–47)
HGB BLD-MCNC: 10.3 G/DL (ref 11.7–15.7)
LABORATORY COMMENT REPORT: NORMAL
MAGNESIUM SERPL-MCNC: 1.9 MG/DL (ref 1.6–2.3)
MCH RBC QN AUTO: 32.4 PG (ref 26.5–33)
MCHC RBC AUTO-ENTMCNC: 32.3 G/DL (ref 31.5–36.5)
MCV RBC AUTO: 100 FL (ref 78–100)
PHOSPHATE SERPL-MCNC: 3.4 MG/DL (ref 2.5–4.5)
PLATELET # BLD AUTO: 349 10E9/L (ref 150–450)
POTASSIUM SERPL-SCNC: 4.5 MMOL/L (ref 3.4–5.3)
RBC # BLD AUTO: 3.18 10E12/L (ref 3.8–5.2)
SARS-COV-2 RNA RESP QL NAA+PROBE: NEGATIVE
SODIUM SERPL-SCNC: 134 MMOL/L (ref 133–144)
SPECIMEN SOURCE: NORMAL
WBC # BLD AUTO: 8.8 10E9/L (ref 4–11)

## 2021-05-18 PROCEDURE — 71250 CT THORAX DX C-: CPT | Mod: 26 | Performed by: RADIOLOGY

## 2021-05-18 PROCEDURE — 250N000009 HC RX 250: Performed by: STUDENT IN AN ORGANIZED HEALTH CARE EDUCATION/TRAINING PROGRAM

## 2021-05-18 PROCEDURE — 120N000003 HC R&B IMCU UMMC

## 2021-05-18 PROCEDURE — 94640 AIRWAY INHALATION TREATMENT: CPT

## 2021-05-18 PROCEDURE — 250N000013 HC RX MED GY IP 250 OP 250 PS 637: Performed by: STUDENT IN AN ORGANIZED HEALTH CARE EDUCATION/TRAINING PROGRAM

## 2021-05-18 PROCEDURE — 80048 BASIC METABOLIC PNL TOTAL CA: CPT | Performed by: THORACIC SURGERY (CARDIOTHORACIC VASCULAR SURGERY)

## 2021-05-18 PROCEDURE — U0003 INFECTIOUS AGENT DETECTION BY NUCLEIC ACID (DNA OR RNA); SEVERE ACUTE RESPIRATORY SYNDROME CORONAVIRUS 2 (SARS-COV-2) (CORONAVIRUS DISEASE [COVID-19]), AMPLIFIED PROBE TECHNIQUE, MAKING USE OF HIGH THROUGHPUT TECHNOLOGIES AS DESCRIBED BY CMS-2020-01-R: HCPCS

## 2021-05-18 PROCEDURE — 71045 X-RAY EXAM CHEST 1 VIEW: CPT | Mod: 26 | Performed by: RADIOLOGY

## 2021-05-18 PROCEDURE — 94640 AIRWAY INHALATION TREATMENT: CPT | Mod: 76

## 2021-05-18 PROCEDURE — 31622 DX BRONCHOSCOPE/WASH: CPT | Performed by: THORACIC SURGERY (CARDIOTHORACIC VASCULAR SURGERY)

## 2021-05-18 PROCEDURE — 999N000157 HC STATISTIC RCP TIME EA 10 MIN

## 2021-05-18 PROCEDURE — 71045 X-RAY EXAM CHEST 1 VIEW: CPT

## 2021-05-18 PROCEDURE — 97530 THERAPEUTIC ACTIVITIES: CPT | Mod: GP

## 2021-05-18 PROCEDURE — U0005 INFEC AGEN DETEC AMPLI PROBE: HCPCS

## 2021-05-18 PROCEDURE — 83735 ASSAY OF MAGNESIUM: CPT | Performed by: THORACIC SURGERY (CARDIOTHORACIC VASCULAR SURGERY)

## 2021-05-18 PROCEDURE — 250N000009 HC RX 250

## 2021-05-18 PROCEDURE — 71045 X-RAY EXAM CHEST 1 VIEW: CPT | Mod: 77

## 2021-05-18 PROCEDURE — 84100 ASSAY OF PHOSPHORUS: CPT | Performed by: THORACIC SURGERY (CARDIOTHORACIC VASCULAR SURGERY)

## 2021-05-18 PROCEDURE — 36415 COLL VENOUS BLD VENIPUNCTURE: CPT | Performed by: THORACIC SURGERY (CARDIOTHORACIC VASCULAR SURGERY)

## 2021-05-18 PROCEDURE — 250N000013 HC RX MED GY IP 250 OP 250 PS 637: Performed by: THORACIC SURGERY (CARDIOTHORACIC VASCULAR SURGERY)

## 2021-05-18 PROCEDURE — 0B978ZZ DRAINAGE OF LEFT MAIN BRONCHUS, VIA NATURAL OR ARTIFICIAL OPENING ENDOSCOPIC: ICD-10-PCS | Performed by: THORACIC SURGERY (CARDIOTHORACIC VASCULAR SURGERY)

## 2021-05-18 PROCEDURE — 250N000011 HC RX IP 250 OP 636: Performed by: STUDENT IN AN ORGANIZED HEALTH CARE EDUCATION/TRAINING PROGRAM

## 2021-05-18 PROCEDURE — 85027 COMPLETE CBC AUTOMATED: CPT | Performed by: THORACIC SURGERY (CARDIOTHORACIC VASCULAR SURGERY)

## 2021-05-18 PROCEDURE — 0B938ZZ DRAINAGE OF RIGHT MAIN BRONCHUS, VIA NATURAL OR ARTIFICIAL OPENING ENDOSCOPIC: ICD-10-PCS | Performed by: THORACIC SURGERY (CARDIOTHORACIC VASCULAR SURGERY)

## 2021-05-18 PROCEDURE — 71250 CT THORAX DX C-: CPT

## 2021-05-18 RX ORDER — POLYETHYLENE GLYCOL 3350 17 G
4 POWDER IN PACKET (EA) ORAL
Status: DISCONTINUED | OUTPATIENT
Start: 2021-05-18 | End: 2021-05-19 | Stop reason: HOSPADM

## 2021-05-18 RX ORDER — ACETAMINOPHEN 325 MG/1
975 TABLET ORAL 3 TIMES DAILY
COMMUNITY
Start: 2021-05-18 | End: 2021-08-11

## 2021-05-18 RX ORDER — AMIODARONE HYDROCHLORIDE 200 MG/1
200 TABLET ORAL DAILY
Qty: 21 TABLET | Refills: 0 | Status: SHIPPED | OUTPATIENT
Start: 2021-05-24 | End: 2021-08-11

## 2021-05-18 RX ORDER — LIDOCAINE 4 G/G
1 PATCH TOPICAL EVERY 24 HOURS
COMMUNITY
Start: 2021-05-18 | End: 2021-08-11

## 2021-05-18 RX ORDER — LIDOCAINE HYDROCHLORIDE 10 MG/ML
INJECTION, SOLUTION INFILTRATION; PERINEURAL PRN
Status: COMPLETED | OUTPATIENT
Start: 2021-05-18 | End: 2021-05-18

## 2021-05-18 RX ORDER — LIDOCAINE HYDROCHLORIDE 40 MG/ML
INJECTION, SOLUTION RETROBULBAR PRN
Status: COMPLETED | OUTPATIENT
Start: 2021-05-18 | End: 2021-05-18

## 2021-05-18 RX ORDER — NICOTINE 21 MG/24HR
1 PATCH, TRANSDERMAL 24 HOURS TRANSDERMAL DAILY
Status: DISCONTINUED | OUTPATIENT
Start: 2021-05-18 | End: 2021-05-19 | Stop reason: HOSPADM

## 2021-05-18 RX ORDER — AMIODARONE HYDROCHLORIDE 100 MG/1
300 TABLET ORAL 2 TIMES DAILY
Qty: 30 TABLET | Refills: 0 | Status: SHIPPED | OUTPATIENT
Start: 2021-05-18 | End: 2021-08-11

## 2021-05-18 RX ORDER — AMOXICILLIN 250 MG
2 CAPSULE ORAL 2 TIMES DAILY
Qty: 50 TABLET | Refills: 0 | Status: SHIPPED | OUTPATIENT
Start: 2021-05-18 | End: 2021-08-11

## 2021-05-18 RX ORDER — OXYCODONE HYDROCHLORIDE 5 MG/1
5 TABLET ORAL EVERY 6 HOURS PRN
Qty: 30 TABLET | Refills: 0 | Status: SHIPPED | OUTPATIENT
Start: 2021-05-18 | End: 2021-06-01

## 2021-05-18 RX ADMIN — LIDOCAINE HYDROCHLORIDE 9 ML: 10 INJECTION, SOLUTION INFILTRATION; PERINEURAL at 14:03

## 2021-05-18 RX ADMIN — OXYCODONE HYDROCHLORIDE 5 MG: 5 TABLET ORAL at 14:58

## 2021-05-18 RX ADMIN — ROSUVASTATIN CALCIUM 20 MG: 10 TABLET, FILM COATED ORAL at 08:09

## 2021-05-18 RX ADMIN — IPRATROPIUM BROMIDE AND ALBUTEROL SULFATE 3 ML: .5; 3 SOLUTION RESPIRATORY (INHALATION) at 08:38

## 2021-05-18 RX ADMIN — IPRATROPIUM BROMIDE AND ALBUTEROL SULFATE 3 ML: .5; 3 SOLUTION RESPIRATORY (INHALATION) at 12:46

## 2021-05-18 RX ADMIN — DOCUSATE SODIUM 50 MG AND SENNOSIDES 8.6 MG 2 TABLET: 8.6; 5 TABLET, FILM COATED ORAL at 08:09

## 2021-05-18 RX ADMIN — ACETAMINOPHEN 975 MG: 325 TABLET, FILM COATED ORAL at 08:08

## 2021-05-18 RX ADMIN — QUETIAPINE FUMARATE 50 MG: 25 TABLET ORAL at 22:06

## 2021-05-18 RX ADMIN — LIDOCAINE HYDROCHLORIDE 9 ML: 40 INJECTION, SOLUTION RETROBULBAR; TOPICAL at 14:03

## 2021-05-18 RX ADMIN — TOPICAL ANESTHETIC 1 SPRAY: 200 SPRAY DENTAL; PERIODONTAL at 13:59

## 2021-05-18 RX ADMIN — GABAPENTIN 100 MG: 100 CAPSULE ORAL at 08:09

## 2021-05-18 RX ADMIN — OXYCODONE HYDROCHLORIDE 5 MG: 5 TABLET ORAL at 08:24

## 2021-05-18 RX ADMIN — IPRATROPIUM BROMIDE AND ALBUTEROL SULFATE 3 ML: .5; 3 SOLUTION RESPIRATORY (INHALATION) at 16:36

## 2021-05-18 RX ADMIN — LIDOCAINE HYDROCHLORIDE 3 ML: 40 INJECTION, SOLUTION RETROBULBAR; TOPICAL at 13:55

## 2021-05-18 RX ADMIN — AMIODARONE HYDROCHLORIDE 600 MG: 200 TABLET ORAL at 08:08

## 2021-05-18 RX ADMIN — ACETAMINOPHEN 975 MG: 325 TABLET, FILM COATED ORAL at 14:31

## 2021-05-18 RX ADMIN — GABAPENTIN 100 MG: 100 CAPSULE ORAL at 20:13

## 2021-05-18 RX ADMIN — METHOCARBAMOL 500 MG: 500 TABLET, FILM COATED ORAL at 20:13

## 2021-05-18 RX ADMIN — ACETYLCYSTEINE 4 ML: 100 SOLUTION ORAL; RESPIRATORY (INHALATION) at 12:46

## 2021-05-18 RX ADMIN — ACETYLCYSTEINE 4 ML: 100 SOLUTION ORAL; RESPIRATORY (INHALATION) at 16:36

## 2021-05-18 RX ADMIN — ACETAMINOPHEN 975 MG: 325 TABLET, FILM COATED ORAL at 20:12

## 2021-05-18 RX ADMIN — POLYETHYLENE GLYCOL 3350 17 G: 17 POWDER, FOR SOLUTION ORAL at 08:08

## 2021-05-18 RX ADMIN — IPRATROPIUM BROMIDE AND ALBUTEROL SULFATE 3 ML: .5; 3 SOLUTION RESPIRATORY (INHALATION) at 20:39

## 2021-05-18 RX ADMIN — OXYCODONE HYDROCHLORIDE 5 MG: 5 TABLET ORAL at 22:07

## 2021-05-18 RX ADMIN — ACETYLCYSTEINE 4 ML: 100 SOLUTION ORAL; RESPIRATORY (INHALATION) at 20:39

## 2021-05-18 RX ADMIN — Medication 1 PATCH: at 08:12

## 2021-05-18 RX ADMIN — GABAPENTIN 100 MG: 100 CAPSULE ORAL at 14:31

## 2021-05-18 RX ADMIN — METHOCARBAMOL 500 MG: 500 TABLET, FILM COATED ORAL at 14:31

## 2021-05-18 RX ADMIN — ACETYLCYSTEINE 4 ML: 100 SOLUTION ORAL; RESPIRATORY (INHALATION) at 08:39

## 2021-05-18 RX ADMIN — ENOXAPARIN SODIUM 40 MG: 100 INJECTION SUBCUTANEOUS at 11:18

## 2021-05-18 RX ADMIN — METHOCARBAMOL 500 MG: 500 TABLET, FILM COATED ORAL at 08:09

## 2021-05-18 RX ADMIN — AMIODARONE HYDROCHLORIDE 600 MG: 200 TABLET ORAL at 20:12

## 2021-05-18 ASSESSMENT — MIFFLIN-ST. JEOR: SCORE: 1141.13

## 2021-05-18 ASSESSMENT — ACTIVITIES OF DAILY LIVING (ADL)
ADLS_ACUITY_SCORE: 15

## 2021-05-18 NOTE — PROCEDURES
THORACIC SURGERY BEDSIDE PROCEDURE   NAME: Reanna Kumar   MRN: [id number]  : 1950    Diagnosis:  Atelectasis, Surgical stump/anastomosis inspection    Procedure: Flexible bronchoscopy     Specimen: None sent    Premedication: Lidocaine nebulization,  Procedure Meds:  4% topical lidocaine solution at the vocal folds, 1% topical lidocaine solution at the socrates    Procedure: A timeout was called to review the case and patient information. The patient was positioned Semi-[place]. The vocal folds were passed without difficulty appeared to be achieving appropriate apposition.  Bilateral tracheobronchial trees were inspected closely to the level of the subsegmental bronchi. A scant amount of thin secretions were found bilaterally.  These were evacuated without difficulty. The bronchus intermedius stump was intact and appeared to be healing appropriately. The procedure was completed and the patient tolerated the procedure well and without complications.      Plan: CXR in AM, or PRN for respiratory decompensation, no plan to repeat bronch    Dr. Langston was available for assistance throughout the entire procedure.      Stevo Caballero PA-C

## 2021-05-18 NOTE — PROGRESS NOTES
Notified Dr. Ascencio that patient needing 1L of oxygen at rest and with activity. Pt concerned about discharging today. Dr. Ascencio okay with patient staying another night and will re-assess potential discharge in the morning.     Patient has been assessed for Home Oxygen needs. Oxygen readings:     *Pulse oximetry (SpO2) = 88% on room air at rest while awake.     *SpO2 improved to 95% on 1liters/minute at rest.     *SpO2 = 83% on room air during activity/with exercise.     *SpO2 improved to 91% on 1liters/minute during activity/with exercise.

## 2021-05-18 NOTE — PROGRESS NOTES
Chest tube pulled. Post-pull Xray without re-accumulation of pneumothorax. Ok to discharge.     Kalen Ascencio MD  Surgery Resident PGY3

## 2021-05-18 NOTE — DISCHARGE SUMMARY
NAME: Reanna Kuamr   MRN: [id number]   : 1950     DATE OF ADMISSION: 5/10/2021     PRE/POSTOPERATIVE DIAGNOSES:   1.  Right lower lobe indeterminate pulmonary nodule.   2.  Current smoking status.     PROCEDURES PERFORMED:   1.  Flexible bronchoscopy.  2.  Uniportal thoracoscopic right lower lobe wedge.    3.  Conversion to right posterolateral thoracotomy.  4.  Completion middle and lower bilobectomy.  5.  Therapeutic pneumoperitoneum.  6.  Bronchus intermedius bronchoplasty with intercostal muscle buttress.  7.  Intercostal nerve cryoablation, spaces 3-9.  8.  Mediastinal and hilar lymphadenectomy.  9.  Thoracoscopic pneumolysis.      PATHOLOGY RESULTS:   INVASIVE SQUAMOUS CELL CARCINOMA OF THE RIGHT LOWER LOBE     CULTURE RESULTS: None     INTRAOPERATIVE COMPLICATIONS: Bronchus intermedius injury.      POSTOPERATIVE COMPLICATIONS: None     DRAINS/TUBES PRESENT AT DISCHARGE: None    DATE OF DISCHARGE:  May 18, 2021     HOSPITAL COURSE: Reanna Kumar is a 71 year old female who on 5/10/2021 underwent the above-named procedures.  She tolerated the operation well and postoperatively was transferred to the general post-surgical unit.  The remainder of her course was essentially uncomplicated. Prior to discharge, her pain was controlled well, she was able to perform ADLs and ambulate independently without difficulty, and had full return of bowel and bladder function.  On May 18, 2021, she was discharged to home in stable condition.    DISCHARGE EXAM:   A&O, NAD  Resp non-labored  Distal extremities warm    Incisions healing well     DISCHARGE INSTRUCTIONS:  Discharge Procedure Orders   X-ray Chest 2 vws*   Standing Status: Future Standing Exp. Date: 21     Order Specific Question Answer Comments   Priority Routine      Reason for your hospital stay   Order Comments: Lung surgery     Adult [facility] Follow-up and recommended labs and tests   Order Comments: 1.) Follow up with primary care physician,  Cassandra Gong, in 1-2 weeks.  2.) Follow up with Palomo Langston MD in Thoracic Surgery clinic in 1 month, prior to which a CXR should be performed.     Appointments on [place] and/or [facility] (with [facility] or [facility] provider or service). Call [phone] if you haven't heard regarding these appointments within 7 days of discharge.     Discharge Instructions   Order Comments: THORACIC SURGERY DISCHARGE INSTRUCTIONS    DIET: Regular diet - as prior to admission     If your plans upon discharge include prolonged periods of sitting (i.e a lengthy car or plane ride), it is highly beneficial to get up and walk at least once per hour to help prevent swelling and blood clots.     You may remove chest tube dressing 48 hours after tube removal and bandage the site at your own discretion thereafter.  Small amounts of leakage are normal for 2-3 days after removal.  Feel free to call with questions.    You may get incision wet 2 days after operation. Do not submerge, soak, or scrub incision or swim until seen in follow-up.    Take incentive spirometer home for continued frequent use    Activity as tolerated, no strenous activity until seen in follow-up, no lifting greater than 20 pounds for the next 1-2 weeks.    Stay hydrated. Take over the counter fiber (metamucil or benefiber) and stool softeners (Miralax, docusate or senna) if becoming constipated.     Call for fever greater than 101.5, chills, increased size of incision, red skin around incision, vision changes, muscle strength changes, sensation changes, shortness of breath, or other concerns.    No driving while taking narcotic pain medication.    Transition to ibuprofen or tylenol/acetaminophen for pain control. Do not take tylenol/acetaminophen and acetaminophen containing narcotic (e.g., percocet or vicodin) at the same time. If you have known ulcer problems, or kidney trouble (elevated creatinine) do not take the ibuprofen.    In emergencies, call  "[phone]    For other Questions or Concerns;   A.) During weekday working hours (Monday through Friday 8am to 4:30pm)   call [phone] and ask to speak to a clinical nurse specialist.     B.) At nights (after 4:30pm), on weekends, or if urgent call [phone] and   tell the  \"I would like to page job code [phone], the thoracic surgery   fellow on call, please.\"       DISCHARGE MEDICATIONS:   Current Discharge Medication List      START taking these medications    Details   acetaminophen (TYLENOL) 325 MG tablet Take 3 tablets (975 mg) by mouth 3 times daily  Qty:      Associated Diagnoses: Lung nodule      !! amiodarone (PACERONE) 100 MG TABS tablet 3 tablets (300 mg) by Oral or FT or NG tube route 2 times daily for 5 days  Qty: 30 tablet, Refills: 0    Associated Diagnoses: Lung nodule      !! amiodarone (PACERONE) 200 MG tablet 1 tablet (200 mg) by Oral or FT or NG tube route daily for 21 days  Qty: 21 tablet, Refills: 0    Associated Diagnoses: Lung nodule      Lidocaine (LIDOCARE) 4 % Patch Place 1 patch onto the skin every 24 hours To prevent lidocaine toxicity, patient should be patch free for 12 hrs daily.    Associated Diagnoses: Lung nodule      oxyCODONE (ROXICODONE) 5 MG tablet Take 1 tablet (5 mg) by mouth every 6 hours as needed for moderate to severe pain  Qty: 30 tablet, Refills: 0    Associated Diagnoses: Lung nodule      senna-docusate (SENOKOT-S/PERICOLACE) 8.6-50 MG tablet Take 2 tablets by mouth 2 times daily Reduce dose or temporarily discontinue if having loose stools.  Qty: 50 tablet, Refills: 0    Associated Diagnoses: Lung nodule       !! - Potential duplicate medications found. Please discuss with provider.      CONTINUE these medications which have NOT CHANGED    Details   nicotine (NICODERM CQ) 21 MG/24HR 24 hr patch Place 1 patch onto the skin every 24 hours      QUEtiapine (SEROQUEL) 50 MG tablet Take 0.5-1 tablets (25-50 mg) by mouth At Bedtime (takes 0.5 x 50mg)  Qty: 180 " tablet, Refills: 3    Associated Diagnoses: Insomnia, unspecified type      rosuvastatin (CRESTOR) 20 MG tablet Take 1 tablet (20 mg) by mouth daily  Qty: 90 tablet, Refills: 3    Associated Diagnoses: Hyperlipidemia LDL goal <130; Coronary artery calcification seen on CT scan

## 2021-05-18 NOTE — PROGRESS NOTES
"Thoracic Surgery Note    S: No new complaints. Feels well. Has been in sinus > 24 hours. Slight cough with phlegm, which clears easily. Denies fevers / chills. Tolerating diet    O: /66 (BP Location: Left arm)   Pulse 64   Temp 98.5  F (36.9  C) (Oral)   Resp 16   Ht 1.6 m (5' 3\")   Wt 66.2 kg (145 lb 15.1 oz)   SpO2 95%   BMI 25.85 kg/m      NAD resting comfortably in bed  Nonlabored breathing on room air  Chest incisions c/d/i  Chest tube clamped  Noncyanotic  Abd soft, nontender, nondistended  Ext WWP    Labs reviewed  Normal electrolytes. K 4.5, Mg 1.9, phos 3.4, Cr 0.46  WBC 8.8, hgb 10.3, stable, plt 349    Imaging reviewed  CXR formall read pending. No apical PTX on the right, but on our read possible anterior pneumothorax.     A/P: Reanna Kumar is a 72 yo female s/p uniportal converted to open RLL segmentectomy , completion bilobectomy (middle & lower lobes), and therapeutic pneumoperitoneum on 5/10 with Dr. Langston. Initially admitted to SICU post-operatively for pressor needs. Recovered well, then course complicated by Afib with RVR requiring amiodarone. Now converted to sinus. Will need further workup given unusual mediastinal appearance on CXR    - CT chest and bronch today  - Continue duo nebs, start mucomyst, acapella   - PT/OT  - Lovenox    Dispo: 7b    Kalen Ascencio MD  Surgery Resident PGY3            "

## 2021-05-18 NOTE — OR NURSING
Pt had bronchoscopy, no sedation. Pt tolerated procedure well. Report given to amish Torrez RN on 6B. Pt stable at time of transfer back to unit.

## 2021-05-18 NOTE — PLAN OF CARE
A/O X 4. Able to make needs known, uses call light appropriately. HR SR, VSS; afebrile;  on 1L of O2 per NC with sats maintained above 91%. adequate urine output. Bmx1. Tolerating regular diet. Right chest tube removed. C/o pain at incisional site, PRN oxy given with good relief. Will re-assess discharge plans tomorrow. Will continue to  follow plan of care.

## 2021-05-18 NOTE — PLAN OF CARE
Patient has been assessed for Home Oxygen needs. Oxygen readings:    *Pulse oximetry (SpO2) = 88% on room air at rest while awake.    *SpO2 improved to 95% on 1liters/minute at rest.    *SpO2 = 83% on room air during activity/with exercise.    *SpO2 improved to 91% on 1liters/minute during activity/with exercise.

## 2021-05-18 NOTE — PLAN OF CARE
Neuro: A&Ox4. Pleasant  Cardiac: SR. -110s/50-60s. HR 50-60s  Respiratory: Sating above 95% on RA. Oxygen saturation dropped to 88% on RA while sleeping, Sating over 94% on 2L NC.   GI/: Adequate urine output. Walks to bathroom. LBM 5/17  Diet/appetite: Tolerating Regular diet. Eating well.  Activity:  SBA, up to chair and in halls. Walked halls once, sat in chair for about 1 hour. Activity encouraged as tolerated.  Pain: PRN Oxycodone 5mg x1. Scheduled Tylenol.   Skin: No new deficits noted.  LDA's: PIV x1.     Amiodarone gtt stopped per orders 5/17 @ 0945     Plan: Continue with POC. Notify primary team with changes.

## 2021-05-19 ENCOUNTER — APPOINTMENT (OUTPATIENT)
Dept: GENERAL RADIOLOGY | Facility: CLINIC | Age: 71
DRG: 164 | End: 2021-05-19
Attending: THORACIC SURGERY (CARDIOTHORACIC VASCULAR SURGERY)
Payer: COMMERCIAL

## 2021-05-19 ENCOUNTER — PATIENT OUTREACH (OUTPATIENT)
Dept: CARE COORDINATION | Facility: CLINIC | Age: 71
End: 2021-05-19

## 2021-05-19 VITALS
TEMPERATURE: 97.9 F | HEART RATE: 66 BPM | OXYGEN SATURATION: 96 % | WEIGHT: 143.96 LBS | SYSTOLIC BLOOD PRESSURE: 126 MMHG | RESPIRATION RATE: 18 BRPM | HEIGHT: 63 IN | BODY MASS INDEX: 25.51 KG/M2 | DIASTOLIC BLOOD PRESSURE: 66 MMHG

## 2021-05-19 LAB
ANION GAP SERPL CALCULATED.3IONS-SCNC: 3 MMOL/L (ref 3–14)
BUN SERPL-MCNC: 14 MG/DL (ref 7–30)
CALCIUM SERPL-MCNC: 9.3 MG/DL (ref 8.5–10.1)
CHLORIDE SERPL-SCNC: 100 MMOL/L (ref 94–109)
CO2 SERPL-SCNC: 29 MMOL/L (ref 20–32)
CREAT SERPL-MCNC: 0.53 MG/DL (ref 0.52–1.04)
ERYTHROCYTE [DISTWIDTH] IN BLOOD BY AUTOMATED COUNT: 12.6 % (ref 10–15)
GFR SERPL CREATININE-BSD FRML MDRD: >90 ML/MIN/{1.73_M2}
GLUCOSE SERPL-MCNC: 114 MG/DL (ref 70–99)
HCT VFR BLD AUTO: 32.1 % (ref 35–47)
HGB BLD-MCNC: 10.6 G/DL (ref 11.7–15.7)
MAGNESIUM SERPL-MCNC: 1.8 MG/DL (ref 1.6–2.3)
MCH RBC QN AUTO: 32.5 PG (ref 26.5–33)
MCHC RBC AUTO-ENTMCNC: 33 G/DL (ref 31.5–36.5)
MCV RBC AUTO: 99 FL (ref 78–100)
PLATELET # BLD AUTO: 395 10E9/L (ref 150–450)
POTASSIUM SERPL-SCNC: 4.4 MMOL/L (ref 3.4–5.3)
RBC # BLD AUTO: 3.26 10E12/L (ref 3.8–5.2)
SODIUM SERPL-SCNC: 132 MMOL/L (ref 133–144)
WBC # BLD AUTO: 9.6 10E9/L (ref 4–11)

## 2021-05-19 PROCEDURE — 250N000013 HC RX MED GY IP 250 OP 250 PS 637: Performed by: THORACIC SURGERY (CARDIOTHORACIC VASCULAR SURGERY)

## 2021-05-19 PROCEDURE — 250N000009 HC RX 250

## 2021-05-19 PROCEDURE — 85027 COMPLETE CBC AUTOMATED: CPT | Performed by: THORACIC SURGERY (CARDIOTHORACIC VASCULAR SURGERY)

## 2021-05-19 PROCEDURE — 999N000157 HC STATISTIC RCP TIME EA 10 MIN

## 2021-05-19 PROCEDURE — 36415 COLL VENOUS BLD VENIPUNCTURE: CPT | Performed by: THORACIC SURGERY (CARDIOTHORACIC VASCULAR SURGERY)

## 2021-05-19 PROCEDURE — 250N000013 HC RX MED GY IP 250 OP 250 PS 637: Performed by: STUDENT IN AN ORGANIZED HEALTH CARE EDUCATION/TRAINING PROGRAM

## 2021-05-19 PROCEDURE — 94640 AIRWAY INHALATION TREATMENT: CPT | Mod: 76

## 2021-05-19 PROCEDURE — 250N000011 HC RX IP 250 OP 636: Performed by: STUDENT IN AN ORGANIZED HEALTH CARE EDUCATION/TRAINING PROGRAM

## 2021-05-19 PROCEDURE — 250N000013 HC RX MED GY IP 250 OP 250 PS 637: Performed by: PHYSICIAN ASSISTANT

## 2021-05-19 PROCEDURE — 250N000009 HC RX 250: Performed by: STUDENT IN AN ORGANIZED HEALTH CARE EDUCATION/TRAINING PROGRAM

## 2021-05-19 PROCEDURE — 71045 X-RAY EXAM CHEST 1 VIEW: CPT | Mod: 26 | Performed by: RADIOLOGY

## 2021-05-19 PROCEDURE — 71045 X-RAY EXAM CHEST 1 VIEW: CPT

## 2021-05-19 PROCEDURE — 94640 AIRWAY INHALATION TREATMENT: CPT

## 2021-05-19 PROCEDURE — 80048 BASIC METABOLIC PNL TOTAL CA: CPT | Performed by: THORACIC SURGERY (CARDIOTHORACIC VASCULAR SURGERY)

## 2021-05-19 PROCEDURE — 83735 ASSAY OF MAGNESIUM: CPT | Performed by: THORACIC SURGERY (CARDIOTHORACIC VASCULAR SURGERY)

## 2021-05-19 PROCEDURE — 94799 UNLISTED PULMONARY SVC/PX: CPT

## 2021-05-19 RX ORDER — ALBUTEROL SULFATE 90 UG/1
2 AEROSOL, METERED RESPIRATORY (INHALATION) EVERY 4 HOURS PRN
Qty: 1 G | Refills: 0 | Status: SHIPPED | OUTPATIENT
Start: 2021-05-19 | End: 2021-05-19

## 2021-05-19 RX ORDER — FUROSEMIDE 20 MG
20 TABLET ORAL DAILY
Status: DISCONTINUED | OUTPATIENT
Start: 2021-05-19 | End: 2021-05-19 | Stop reason: HOSPADM

## 2021-05-19 RX ORDER — ALBUTEROL SULFATE 90 UG/1
2 AEROSOL, METERED RESPIRATORY (INHALATION) EVERY 4 HOURS PRN
Qty: 1 G | Refills: 0 | Status: SHIPPED | OUTPATIENT
Start: 2021-05-19 | End: 2022-10-24

## 2021-05-19 RX ADMIN — AMIODARONE HYDROCHLORIDE 600 MG: 200 TABLET ORAL at 08:02

## 2021-05-19 RX ADMIN — ROSUVASTATIN CALCIUM 20 MG: 10 TABLET, FILM COATED ORAL at 08:02

## 2021-05-19 RX ADMIN — ENOXAPARIN SODIUM 40 MG: 100 INJECTION SUBCUTANEOUS at 11:40

## 2021-05-19 RX ADMIN — FUROSEMIDE 20 MG: 20 TABLET ORAL at 08:01

## 2021-05-19 RX ADMIN — METHOCARBAMOL 500 MG: 500 TABLET, FILM COATED ORAL at 08:01

## 2021-05-19 RX ADMIN — IPRATROPIUM BROMIDE AND ALBUTEROL SULFATE 3 ML: .5; 3 SOLUTION RESPIRATORY (INHALATION) at 12:19

## 2021-05-19 RX ADMIN — OXYCODONE HYDROCHLORIDE 5 MG: 5 TABLET ORAL at 08:02

## 2021-05-19 RX ADMIN — ACETAMINOPHEN 975 MG: 325 TABLET, FILM COATED ORAL at 08:02

## 2021-05-19 RX ADMIN — ACETYLCYSTEINE 4 ML: 100 SOLUTION ORAL; RESPIRATORY (INHALATION) at 08:50

## 2021-05-19 RX ADMIN — OXYCODONE HYDROCHLORIDE 5 MG: 5 TABLET ORAL at 11:54

## 2021-05-19 RX ADMIN — ACETYLCYSTEINE 4 ML: 100 SOLUTION ORAL; RESPIRATORY (INHALATION) at 12:19

## 2021-05-19 RX ADMIN — GABAPENTIN 100 MG: 100 CAPSULE ORAL at 08:01

## 2021-05-19 RX ADMIN — IPRATROPIUM BROMIDE AND ALBUTEROL SULFATE 3 ML: .5; 3 SOLUTION RESPIRATORY (INHALATION) at 08:50

## 2021-05-19 RX ADMIN — NICOTINE 1 PATCH: 14 PATCH, EXTENDED RELEASE TRANSDERMAL at 08:03

## 2021-05-19 ASSESSMENT — ACTIVITIES OF DAILY LIVING (ADL)
ADLS_ACUITY_SCORE: 15

## 2021-05-19 ASSESSMENT — MIFFLIN-ST. JEOR: SCORE: 1132.13

## 2021-05-19 NOTE — PROGRESS NOTES
Patient has been assessed for Home Oxygen needs. Oxygen readings:    *Pulse oximetry (SpO2) = 92% on room air at rest while awake.    *SpO2 improved to 98% on 2 liters/minute at rest.    *SpO2 = 89% on room air during activity/with exercise.    *SpO2 improved to 98 % on 2 liters/minute during activity/with exercise.

## 2021-05-19 NOTE — PLAN OF CARE
"/66 (BP Location: Left arm)   Pulse 66   Temp 97.9  F (36.6  C) (Oral)   Resp 18   Ht 1.6 m (5' 3\")   Wt 65.3 kg (143 lb 15.4 oz)   SpO2 99%   BMI 25.50 kg/m      A&OX4.  Lungs diminished in bases.  On room air.  Pt oxygen saturation dropped to 89% while walking with staff.  Pt does not qualify for oxygen.  Active bowel sounds, no BM on shift.  SB when up.  Oxycodone given for incisional pain.  Discharge instructions given to patient.  Medications picked up from pharmacy.  Questions answered.  Continue to monitor.    "

## 2021-05-19 NOTE — PLAN OF CARE
Neuro: A&Ox4.   Cardiac: SR. VSS. B/P: 110/60, T: 98.4, P: 66, R: 16  Respiratory: Sating >92% on 1-2L NC. Desat to 75% with activity requiring 6L NC to recover.  GI/: Adequate urine output. BM X1. Up to bathroom.  Diet/appetite: Tolerating regular diet.  Activity:  Assist of 1  Pain: At acceptable level on current regimen.   Skin: No new deficits noted.  LDA's: PIV    Plan to discharge with home O2 today    Plan: Continue with POC. Notify primary team with changes.

## 2021-05-19 NOTE — DISCHARGE SUMMARY
NAME: Reanna Kumar   MRN: [id number]   : 1950     DATE OF ADMISSION: 5/10/2021     PRE/POSTOPERATIVE DIAGNOSES:   1.  Right lower lobe indeterminate pulmonary nodule.   2.  Current smoking status.     PROCEDURES PERFORMED:   1.  Flexible bronchoscopy.  2.  Uniportal thoracoscopic right lower lobe wedge.    3.  Conversion to right posterolateral thoracotomy.  4.  Completion middle and lower bilobectomy.  5.  Therapeutic pneumoperitoneum.  6.  Bronchus intermedius bronchoplasty with intercostal muscle buttress.  7.  Intercostal nerve cryoablation, spaces 3-9.  8.  Mediastinal and hilar lymphadenectomy.  9.  Thoracoscopic pneumolysis.      PATHOLOGY RESULTS:   INVASIVE SQUAMOUS CELL CARCINOMA OF THE RIGHT LOWER LOBE     CULTURE RESULTS: None     INTRAOPERATIVE COMPLICATIONS: Bronchus intermedius injury.      POSTOPERATIVE COMPLICATIONS: None     DRAINS/TUBES PRESENT AT DISCHARGE: None    DATE OF DISCHARGE:  May 19, 2021     HOSPITAL COURSE: Reanna Kumar is a 71 year old female who on 5/10/2021 underwent the above-named procedures.  She tolerated the operation well and postoperatively was transferred to the general post-surgical unit.  The remainder of her course was essentially uncomplicated. Prior to discharge, her pain was controlled well, she was able to perform ADLs and ambulate independently without difficulty, and had full return of bowel and bladder function.  On May 19, 2021, she was discharged to home in stable condition.    DISCHARGE EXAM:   A&O, NAD  Resp non-labored  Distal extremities warm    Incisions healing well     DISCHARGE INSTRUCTIONS:  Discharge Procedure Orders   X-ray Chest 2 vws*   Standing Status: Future Standing Exp. Date: 21     Order Specific Question Answer Comments   Priority Routine      Reason for your hospital stay   Order Comments: Lung surgery     [facility]ult [facility] Follow-up and recommended labs and tests   Order Comments: 1.) Follow up with primary care physician,  Cassandra Gong, in 1-2 weeks.  2.) Follow up with Palomo Langston MD in Thoracic Surgery clinic in 1 month, prior to which a CXR should be performed.     Appointments on [place] and/or [facility] (with [facility] or [facility] provider or service). Call [phone] if you haven't heard regarding these appointments within 7 days of discharge.     Discharge Instructions   Order Comments: THORACIC SURGERY DISCHARGE INSTRUCTIONS    DIET: Regular diet - as prior to admission     If your plans upon discharge include prolonged periods of sitting (i.e a lengthy car or plane ride), it is highly beneficial to get up and walk at least once per hour to help prevent swelling and blood clots.     You may remove chest tube dressing 48 hours after tube removal and bandage the site at your own discretion thereafter.  Small amounts of leakage are normal for 2-3 days after removal.  Feel free to call with questions.    You may get incision wet 2 days after operation. Do not submerge, soak, or scrub incision or swim until seen in follow-up.    Take incentive spirometer home for continued frequent use    Activity as tolerated, no strenous activity until seen in follow-up, no lifting greater than 20 pounds for the next 1-2 weeks.    Stay hydrated. Take over the counter fiber (metamucil or benefiber) and stool softeners (Miralax, docusate or senna) if becoming constipated.     Call for fever greater than 101.5, chills, increased size of incision, red skin around incision, vision changes, muscle strength changes, sensation changes, shortness of breath, or other concerns.    No driving while taking narcotic pain medication.    Transition to ibuprofen or tylenol/acetaminophen for pain control. Do not take tylenol/acetaminophen and acetaminophen containing narcotic (e.g., percocet or vicodin) at the same time. If you have known ulcer problems, or kidney trouble (elevated creatinine) do not take the ibuprofen.    In emergencies, call  "[phone]    For other Questions or Concerns;   A.) During weekday working hours (Monday through Friday 8am to 4:30pm)   call [phone] and ask to speak to a clinical nurse specialist.     B.) At nights (after 4:30pm), on weekends, or if urgent call [phone] and   tell the  \"I would like to page job code [phone], the thoracic surgery   fellow on call, please.\"       DISCHARGE MEDICATIONS:   Current Discharge Medication List      START taking these medications    Details   acetaminophen (TYLENOL) 325 MG tablet Take 3 tablets (975 mg) by mouth 3 times daily  Qty:      Associated Diagnoses: Lung nodule      !! amiodarone (PACERONE) 100 MG TABS tablet 3 tablets (300 mg) by Oral or FT or NG tube route 2 times daily for 5 days  Qty: 30 tablet, Refills: 0    Associated Diagnoses: Lung nodule      !! amiodarone (PACERONE) 200 MG tablet 1 tablet (200 mg) by Oral or FT or NG tube route daily for 21 days  Qty: 21 tablet, Refills: 0    Associated Diagnoses: Lung nodule      Lidocaine (LIDOCARE) 4 % Patch Place 1 patch onto the skin every 24 hours To prevent lidocaine toxicity, patient should be patch free for 12 hrs daily.    Associated Diagnoses: Lung nodule      oxyCODONE (ROXICODONE) 5 MG tablet Take 1 tablet (5 mg) by mouth every 6 hours as needed for moderate to severe pain  Qty: 30 tablet, Refills: 0    Associated Diagnoses: Lung nodule      senna-docusate (SENOKOT-S/PERICOLACE) 8.6-50 MG tablet Take 2 tablets by mouth 2 times daily Reduce dose or temporarily discontinue if having loose stools.  Qty: 50 tablet, Refills: 0    Associated Diagnoses: Lung nodule       !! - Potential duplicate medications found. Please discuss with provider.      CONTINUE these medications which have NOT CHANGED    Details   nicotine (NICODERM CQ) 21 MG/24HR 24 hr patch Place 1 patch onto the skin every 24 hours      QUEtiapine (SEROQUEL) 50 MG tablet Take 0.5-1 tablets (25-50 mg) by mouth At Bedtime (takes 0.5 x 50mg)  Qty: 180 " tablet, Refills: 3    Associated Diagnoses: Insomnia, unspecified type      rosuvastatin (CRESTOR) 20 MG tablet Take 1 tablet (20 mg) by mouth daily  Qty: 90 tablet, Refills: 3    Associated Diagnoses: Hyperlipidemia LDL goal <130; Coronary artery calcification seen on CT scan

## 2021-05-20 NOTE — PLAN OF CARE
Physical Therapy Discharge Summary    Reason for therapy discharge:    Discharged to home.    Progress towards therapy goal(s). See goals on Care Plan in [facility] electronic health record for goal details.  Goals not met.  Barriers to achieving goals:   discharge from facility.    Therapy recommendation(s):    Continue home exercise program.

## 2021-05-20 NOTE — PROGRESS NOTES
[facility]: Post-Discharge Note  SITUATION                                                      Admission:    Admission Date: 05/10/21   Reason for Admission: Lung nodule  Discharge:   Discharge Date: 05/19/21  Discharge Diagnosis: Lung nodule    BACKGROUND                                                      Reanna Kumar is a 71 year old female who on 5/10/2021 underwent the above-named procedures.  She tolerated the operation well and postoperatively was transferred to the general post-surgical unit.  The remainder of her course was essentially uncomplicated. Prior to discharge, her pain was controlled well, she was able to perform ADLs and ambulate independently without difficulty, and had full return of bowel and bladder function.  On May 19, 2021, she was discharged to home in stable condition.    ASSESSMENT      Discharge Assessment  Patient reports symptoms are: Improved  Does the patient have all of their medications?: Yes  Does patient know what their new medications are for?: Yes  Does patient have a follow-up appointment scheduled?: Yes  Does patient have any other questions or concerns?: No    Post-op  Did the patient have surgery or a procedure: Yes  Fever: No  Chills: No  Eating & Drinking: eating and drinking without complaints/concerns  Bowel Function: normal  Urinary Status: voiding without complaint/concerns        PLAN                                                      Outpatient Plan:     1.) Follow up with primary care physician, Cassandra Gong, in 1-2 weeks.  2.) Follow up with Palomo Langston MD in Thoracic Surgery clinic in 1 month, prior to which a CXR should be performed.          Future Appointments   Date Time Provider Department Center   6/9/2021  1:40 PM Cristiane Rhodes APRN CNS ONS [facility]           Jammie Pozo

## 2021-05-21 ENCOUNTER — TELEPHONE (OUTPATIENT)
Dept: ONCOLOGY | Facility: CLINIC | Age: 71
End: 2021-05-21

## 2021-05-21 ENCOUNTER — TELEPHONE (OUTPATIENT)
Dept: SURGERY | Facility: CLINIC | Age: 71
End: 2021-05-21

## 2021-05-21 NOTE — TELEPHONE ENCOUNTER
I called Manda to discuss her ongoing pain.   She took a shower this AM and said all incisions look fine and are healing well.  She said she has an intermittent shooting pain and wanted to know if this is normal or something to worry about.   She said taking oxycodone doesn't really help much.   She also said, if normal, she will ride things out but wanted assurance.    I explained neuropathic pain and said this sounds like what she is experiencing.  She has some gabapentin at home from last year but has not taken any recently.   I told her to take ibuprofen/ use heating pad and take a gabapentin twice a day, if she wants, to help with this pain.   She understands that nerves are slow to recover.

## 2021-05-21 NOTE — TELEPHONE ENCOUNTER
"Person calling: Manda (pt)    Reason for call: discharge on Wednesday 5/19 from surgery.   Was able to take shower to get pressure bandage off,   \"really sore below site where tube was, piercing sharp pain. Lasts about 1-2minutes. Right side care home between belly button and bottom or rib at pt of \"waist\". Comes and goes, when goes away does not feel.   When occurs rates 9/10 then 0/10 when goes away, feels \"swishing through\".     Continues to cough up sputum but has been swallowing the sputum so unable to describe color but states no bad taste or taste of blood noted. This writer educated to cough up the sputum out rather than swallowing.     Has been using tylenol 2 tablets, twice daily and then occasional use of oxycodone about 2 to 3 daily since return from surgery.     This writer educated on: common occurrences following lung surgery, pain for 4/6wks during active recovery, some SOB with occasional cough, fatigue, scant hemoptysis which can go on for few days.     Denies chest pain, swelling in extremities, sore throat, hemoptysis, concern of infection at surgical sites, inability to swallow/maintain nutrition/hydration. Instructed pt to go to ED if any of emergency symptoms occur.       Action taken: paged the following provider(s):  Cristiane Rhodes NP 12:09pm.     Provider Cristiane called pt at 1:04pm.      "

## 2021-05-24 DIAGNOSIS — C34.31 CANCER OF LOWER LOBE OF RIGHT LUNG (H): ICD-10-CM

## 2021-05-24 DIAGNOSIS — C34.91 PRIMARY CANCER OF RIGHT LUNG (H): Primary | ICD-10-CM

## 2021-05-24 NOTE — PROGRESS NOTES
I called Manda to discuss her final pathology from surgery on 5/10 with Dr. Langston.   I reviewed it all and will arrange for a new baseline chest CT scan in mid-August.   I told her we also may need a brain MRI to be thorough and am waiting for more information from Dr. Langston about this.   She said she would prefer that be done at [place], if necessary.   She is still having considerable neuropathic pain, had taken low dose gabapentin since late last week.   I advised her to increase now to 300 mg tid, take ibuprofen 400 mg q 6 hours and try heat to help with inflammation.   She appreciated the call and verbalized agreement.

## 2021-05-26 ENCOUNTER — TELEPHONE (OUTPATIENT)
Dept: RESPIRATORY THERAPY | Facility: CLINIC | Age: 71
End: 2021-05-26

## 2021-05-26 NOTE — TELEPHONE ENCOUNTER
Spoke with Manda today for ongoing nicotine dependence counseling. She reports that it is going well and she has not smoked. She continues to use the nicotine patch but was very upset that she did not receive the lozenges as promised. Physician never ordered them for her per our recommendation. She really wanted to use them. I apologized to Manda and let her know that she can call her PCP and ask for a script for them or she can just pick some up over the counter. She had no further concerns or questions. I let her know that I will reach out again next week.    MONICA Claire, RRT, CTTS  Chronic Pulmonary Disease Specialist  Office: [phone]   Pager: [phone]

## 2021-05-26 NOTE — OP NOTE
Procedure Date: 05/10/2021    PREOPERATIVE DIAGNOSIS:  Right lower lobe lung nodule.    POSTOPERATIVE DIAGNOSIS:  Right lower lobe lung nodule.    PROCEDURE PERFORMED:  Right thoracotomy and wedge resection    Please note, I am dictating this case as cosurgeon with Dr. Palomo Thomas.  I assisted with the thoracotomy and the right lower lobe wedge  and segmentectomy.    DESCRIPTION OF PROCEDURE:  Please refer to Dr. Kian Thomas's operative report for the full operative procedure.  At the point where he had identified a concern with a transection of the bronchus intermedius, I was called into the room.  I performed a bronchoscopy that did confirm that the stapler had been fired across the bronchus intermedius.  At this point, I assisted with the right posterolateral thoracotomy.  Upon entering the chest cavity, we identified the transected bronchus intermedius.  In order to get a diagnosis, we first performed a right lower lobe wedge resection with the nodule in it up to the superior segmental bronchus and the artery had been transected and this was sent off to frozen section pathology.  Please refer to Dr. Kian Thomas's and Dr. Leblanc's reports for full details.    Sydney Douglas MD        D: 2021   T: 2021   MT: [id number]    Name:     MACHO PETERSEN  MRN:      [id number]        Account:        [id number]   :      1950           Procedure Date: 05/10/2021     Document: [id number]

## 2021-06-01 ENCOUNTER — TELEPHONE (OUTPATIENT)
Dept: SURGERY | Facility: CLINIC | Age: 71
End: 2021-06-01

## 2021-06-01 DIAGNOSIS — R91.1 LUNG NODULE: ICD-10-CM

## 2021-06-01 DIAGNOSIS — M79.2 NEUROPATHIC PAIN: Primary | ICD-10-CM

## 2021-06-01 RX ORDER — OXYCODONE HYDROCHLORIDE 5 MG/1
5 TABLET ORAL EVERY 6 HOURS PRN
Qty: 20 TABLET | Refills: 0 | Status: SHIPPED | OUTPATIENT
Start: 2021-06-01 | End: 2021-06-09

## 2021-06-01 RX ORDER — GABAPENTIN 300 MG/1
300 CAPSULE ORAL 3 TIMES DAILY
Qty: 90 CAPSULE | Refills: 1 | Status: SHIPPED | OUTPATIENT
Start: 2021-06-01 | End: 2021-08-11

## 2021-06-04 ENCOUNTER — ANCILLARY PROCEDURE (OUTPATIENT)
Dept: GENERAL RADIOLOGY | Facility: CLINIC | Age: 71
End: 2021-06-04
Attending: PHYSICIAN ASSISTANT
Payer: COMMERCIAL

## 2021-06-04 DIAGNOSIS — R91.1 LUNG NODULE: ICD-10-CM

## 2021-06-04 PROCEDURE — 71046 X-RAY EXAM CHEST 2 VIEWS: CPT | Performed by: RADIOLOGY

## 2021-06-09 ENCOUNTER — VIRTUAL VISIT (OUTPATIENT)
Dept: SURGERY | Facility: CLINIC | Age: 71
End: 2021-06-09
Attending: CLINICAL NURSE SPECIALIST
Payer: COMMERCIAL

## 2021-06-09 DIAGNOSIS — C34.31 CANCER OF LOWER LOBE OF RIGHT LUNG (H): Primary | ICD-10-CM

## 2021-06-09 DIAGNOSIS — R91.1 LUNG NODULE: ICD-10-CM

## 2021-06-09 PROCEDURE — 999N001193 HC VIDEO/TELEPHONE VISIT; NO CHARGE

## 2021-06-09 PROCEDURE — 99024 POSTOP FOLLOW-UP VISIT: CPT | Mod: 95 | Performed by: CLINICAL NURSE SPECIALIST

## 2021-06-09 RX ORDER — OXYCODONE HYDROCHLORIDE 5 MG/1
5 TABLET ORAL EVERY 6 HOURS PRN
Qty: 20 TABLET | Refills: 0 | Status: SHIPPED | OUTPATIENT
Start: 2021-06-09 | End: 2021-08-11

## 2021-06-09 NOTE — PROGRESS NOTES
"Manda is a 71 year old who is being evaluated via a billable telephone visit.      What phone number would you like to be contacted at? [phone]  How would you like to obtain your AVS? MyChart   Vitals - Patient Reported  Weight (Patient Reported): 657.7 kg (1450 lb)  Height (Patient Reported): 160 cm (5' 3\")  BMI (Based on Pt Reported Ht/Wt): 256.85  Pain Score: Extreme Pain (8)  Pain Loc: Other - see comment(BREAST BONE TO THE RIGHT)      Seda Paris MA    "

## 2021-06-09 NOTE — LETTER
"    6/9/2021         RE: Reanna Kumar  [address] [facility] [address]        Dear Colleague,    Thank you for referring your patient, Reanna Kumar, to the [facility]. Please see a copy of my visit note below.    Manda is a 71 year old who is being evaluated via a billable telephone visit.      What phone number would you like to be contacted at? [phone]  How would you like to obtain your AVS? Victoriano   Vitals - Patient Reported  Weight (Patient Reported): 657.7 kg (1450 lb)  Height (Patient Reported): 160 cm (5' 3\")  BMI (Based on Pt Reported Ht/Wt): 256.85  Pain Score: Extreme Pain (8)  Pain Loc: Other - see comment(BREAST BONE TO THE RIGHT)      Seda Paris MA        Again, thank you for allowing me to participate in the care of your patient.        Sincerely,        RENETTA Lewis CNS    "

## 2021-06-11 LAB
FUNGUS SPEC CULT: ABNORMAL
FUNGUS SPEC CULT: ABNORMAL
SPECIMEN SOURCE: ABNORMAL

## 2021-06-11 NOTE — CONFIDENTIAL NOTE
"THORACIC SURGERY FOLLOW UP VISIT    I spoke by telephone in a virtual visit with Ms. Kumar in follow-up today. The clinical summary follows:     PREOP DIAGNOSIS   1.  Right lower lobe indeterminate pulmonary nodule.     PROCEDURE   1.  Flexible bronchoscopy.  2.  Uniportal thoracoscopic right lower lobe wedge.    3.  Conversion to right posterolateral thoracotomy.  4.  Completion middle and lower bilobectomy.  5.  Therapeutic pneumoperitoneum.  6.  Bronchus intermedius bronchoplasty with intercostal muscle buttress.  7.  Intercostal nerve cryoablation, spaces 3-9.  8.  Mediastinal and hilar lymphadenectomy.  9.  Thoracoscopic pneumolysis.      DATE OF PROCEDURE  5/10/21    HISTOPATHOLOGY  pT1b N0  INVASIVE SQUAMOUS CELL CARCINOMA OF THE RIGHT LOWER LOBE     COMPLICATIONS  None    INTERVAL STUDIES  CXR 6/4/21    SUBJECTIVE   I guess I am slowly improving but still struggling with this nerve pain.  Manda denies fever, chills or cough.  Her incisions are well healed with no erythema or drainage.    IMPRESSION Right lower lung cancer (squamous cell)    Manda is a 72 yo female who is slowly recovering from her recent surgery.   She is taking oxycodone once a day, also using ibuprofen and gabapentin.   She has not been taking the gabapentin on schedule.   She feels better lying and stretching out and said the nerve pain starts as soon as she gets up in the morning.  It then may wax and wane but is never gone.  We discussed her pathology and need for surveillance CT follow-up.   We discussed her CXR from today.    PLAN  I spent 30 min on the date of the encounter in chart review, patient visit, review of tests, documentation and/or discussion with other providers about the issues documented above. I reviewed the plan as follows:  Increase gabapentin to 600 mg tid and take it as scheduled.   You need a therapeutic blood level consistently for this to work.  Try warm pack to this area for 20\" at a time, 4-5 times a day.  1. " Necessary Tests & Appointments: Chest CT scan in 3 months, then every 6 months x 2 years, then annually  All questions were answered and the patient and present family were in agreement with the plan.  I appreciate the opportunity to participate in the care of your patient and will keep you updated.  Sincerely,  RENETTA White, CNS

## 2021-06-18 DIAGNOSIS — M79.2 NEUROPATHIC PAIN: ICD-10-CM

## 2021-06-18 RX ORDER — GABAPENTIN 600 MG/1
600 TABLET ORAL 3 TIMES DAILY
Qty: 90 TABLET | Refills: 1 | Status: SHIPPED | OUTPATIENT
Start: 2021-06-18 | End: 2021-11-11

## 2021-06-18 NOTE — TELEPHONE ENCOUNTER
Jessica Holman is calling in for a refill of gabapentin. States she was increased to 600 mg TID and has run out because the prescription was not changed when she was increased.    The patient is out today and requesting a refill for today.    Cristiane Rhodes is out of the office, encounter routed to [facility] to refill.      
Walk in

## 2021-06-22 ENCOUNTER — TELEPHONE (OUTPATIENT)
Dept: SURGERY | Facility: CLINIC | Age: 71
End: 2021-06-22

## 2021-06-22 NOTE — TELEPHONE ENCOUNTER
"I tried to call Manda to get a follow-up on her neuropathic pain and effectiveness of gabapentin started last week.   I was unable to talk to her, will try again.    Addendum:   I reached Manda who said her neuropathic pain has lessened \"a bit\", is taking gabapentin 600 mg tid.   She is able to do what she wants and said she will just ride out this issue and will call me again if things don't seem to be improving.    "

## 2021-07-10 LAB
MYCOBACTERIUM SPEC CULT: NORMAL
MYCOBACTERIUM SPEC CULT: NORMAL
SPECIMEN SOURCE: NORMAL

## 2021-08-05 ENCOUNTER — ANCILLARY PROCEDURE (OUTPATIENT)
Dept: CT IMAGING | Facility: CLINIC | Age: 71
End: 2021-08-05
Attending: CLINICAL NURSE SPECIALIST
Payer: COMMERCIAL

## 2021-08-05 DIAGNOSIS — C34.31 CANCER OF LOWER LOBE OF RIGHT LUNG (H): ICD-10-CM

## 2021-08-05 PROCEDURE — 71250 CT THORAX DX C-: CPT | Mod: TC | Performed by: RADIOLOGY

## 2021-08-11 ENCOUNTER — VIRTUAL VISIT (OUTPATIENT)
Dept: SURGERY | Facility: CLINIC | Age: 71
End: 2021-08-11
Attending: CLINICAL NURSE SPECIALIST
Payer: COMMERCIAL

## 2021-08-11 DIAGNOSIS — M79.2 NEUROPATHIC PAIN: Primary | ICD-10-CM

## 2021-08-11 PROCEDURE — 999N001193 HC VIDEO/TELEPHONE VISIT; NO CHARGE

## 2021-08-11 PROCEDURE — 99024 POSTOP FOLLOW-UP VISIT: CPT | Mod: 95 | Performed by: CLINICAL NURSE SPECIALIST

## 2021-08-11 RX ORDER — METHOCARBAMOL 750 MG/1
750 TABLET, FILM COATED ORAL 3 TIMES DAILY
Qty: 90 TABLET | Refills: 1 | Status: SHIPPED | OUTPATIENT
Start: 2021-08-11 | End: 2021-08-16

## 2021-08-11 NOTE — LETTER
"    8/11/2021         RE: Reanna Kumar  [address] [address]        Dear Colleague,    Thank you for referring your patient, Reanna Kumar, to the [facility]. Please see a copy of my visit note below.    Manda is a 71 year old who is being evaluated via a billable telephone visit.      What phone number would you like to be contacted at? [phone]    How would you like to obtain your AVS? MyChart     Vitals - Patient Reported  Weight (Patient Reported): 63.5 kg (140 lb)  Height (Patient Reported): 160 cm (5' 2.99\")  BMI (Based on Pt Reported Ht/Wt): 24.81  Pain Score: Severe Pain (7)  Pain Loc: Abdomen    [facility]    THORACIC SURGERY FOLLOW UP VISIT      I spoke to Ms. Kumar in follow-up today. The clinical summary follows:     DATE OF ADMISSION: 5/10/2021      PRE/POSTOPERATIVE DIAGNOSES:   1.  Right lower lobe indeterminate pulmonary nodule.   2.  Current smoking status.      PROCEDURES PERFORMED:   1.  Flexible bronchoscopy.  2.  Uniportal thoracoscopic right lower lobe wedge.    3.  Conversion to right posterolateral thoracotomy.  4.  Completion middle and lower bilobectomy.  5.  Therapeutic pneumoperitoneum.  6.  Bronchus intermedius bronchoplasty with intercostal muscle buttress.  7.  Intercostal nerve cryoablation, spaces 3-9.  8.  Mediastinal and hilar lymphadenectomy.  9.  Thoracoscopic pneumolysis.       PATHOLOGY RESULTS:   INVASIVE SQUAMOUS CELL CARCINOMA OF THE RIGHT LOWER LOBE      COMPLICATIONS  None    INTERVAL STUDIES   A 2 cm nodular opacity in the right lower lung anteriorly adjacent  to postoperative changes has similar appearance to the previous exam,  and could be related to postoperative scarring. Further attention to  this finding at follow-up is recommended    SUBJECTIVE   I still have some nerve pain, and have increased my gabapentin to 900 mg BID.   It is less painful but still there.   I just try to ignore it and \"do my " "thing\".    IMPRESSION (M79.2) Neuropathic pain  (primary encounter diagnosis) Squamous cell lung cancer, right  Manda reports feeling like her recovery is going well but still does have some neuropathic pain which is lessening but still is present.   She is sleeping well, has a good appetite, and has no respiratory concerns.   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    We discussed her CT findings.  I told her I would have this reviewed at our weekly conference Friday AM to get input about the findings and recommendations about next CT surveillance.  This would normally be done in 6 months, but we may want a shorter follow-up in 3 months to be sure this is a scar and not anything new.    PLAN  I spent 30 min on the date of the encounter in chart review, patient visit, review of tests, documentation and/or discussion with other providers about the issues documented above. I reviewed the plan as follows:  I will call you after our Friday am nodule conference with further recommendations  1. Necessary Tests & Appointments: CT surveillance, interval to be determined after review at conference later this week  2. Pain Control Plan: Robaxin refilled, continues with gabapentin 900 mg bid  3. Anticoagulation Plan: n/a  4. Smoking Cessation: n/a    All questions were answered and the patient and present family were in agreement with the plan.  I appreciate the opportunity to participate in the care of your patient and will keep you updated.  Sincerely,  RENETTA White, CNS      Again, thank you for allowing me to participate in the care of your patient.        Sincerely,        RENETTA Lewis CNS    "

## 2021-08-11 NOTE — PROGRESS NOTES
"Manda is a 71 year old who is being evaluated via a billable telephone visit.      What phone number would you like to be contacted at? [phone]    How would you like to obtain your AVS? Victoriano     Vitals - Patient Reported  Weight (Patient Reported): 63.5 kg (140 lb)  Height (Patient Reported): 160 cm (5' 2.99\")  BMI (Based on Pt Reported Ht/Wt): 24.81  Pain Score: Severe Pain (7)  Pain Loc: Abdomen    Ortiz Pryor [facility]    THORACIC SURGERY FOLLOW UP VISIT      I spoke to Ms. Kumar in follow-up today. The clinical summary follows:     DATE OF ADMISSION: 5/10/2021      PRE/POSTOPERATIVE DIAGNOSES:   1.  Right lower lobe indeterminate pulmonary nodule.   2.  Current smoking status.      PROCEDURES PERFORMED:   1.  Flexible bronchoscopy.  2.  Uniportal thoracoscopic right lower lobe wedge.    3.  Conversion to right posterolateral thoracotomy.  4.  Completion middle and lower bilobectomy.  5.  Therapeutic pneumoperitoneum.  6.  Bronchus intermedius bronchoplasty with intercostal muscle buttress.  7.  Intercostal nerve cryoablation, spaces 3-9.  8.  Mediastinal and hilar lymphadenectomy.  9.  Thoracoscopic pneumolysis.       PATHOLOGY RESULTS:   INVASIVE SQUAMOUS CELL CARCINOMA OF THE RIGHT LOWER LOBE      COMPLICATIONS  None    INTERVAL STUDIES   A 2 cm nodular opacity in the right lower lung anteriorly adjacent  to postoperative changes has similar appearance to the previous exam,  and could be related to postoperative scarring. Further attention to  this finding at follow-up is recommended    SUBJECTIVE   I still have some nerve pain, and have increased my gabapentin to 900 mg BID.   It is less painful but still there.   I just try to ignore it and \"do my thing\".    IMPRESSION (M79.2) Neuropathic pain  (primary encounter diagnosis) Squamous cell lung cancer, right  Manda reports feeling like her recovery is going well but still does have some neuropathic pain which is lessening but still is present.   She is " sleeping well, has a good appetite, and has no respiratory concerns.   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    We discussed her CT findings.  I told her I would have this reviewed at our weekly conference Friday AM to get input about the findings and recommendations about next CT surveillance.  This would normally be done in 6 months, but we may want a shorter follow-up in 3 months to be sure this is a scar and not anything new.    PLAN  I spent 30 min on the date of the encounter in chart review, patient visit, review of tests, documentation and/or discussion with other providers about the issues documented above. I reviewed the plan as follows:  I will call you after our Friday am nodule conference with further recommendations  1. Necessary Tests & Appointments: CT surveillance, interval to be determined after review at conference later this week  2. Pain Control Plan: Robaxin refilled, continues with gabapentin 900 mg bid  3. Anticoagulation Plan: n/a  4. Smoking Cessation: n/a    All questions were answered and the patient and present family were in agreement with the plan.  I appreciate the opportunity to participate in the care of your patient and will keep you updated.  Sincerely,  RENETTA White, CNS

## 2021-08-13 ENCOUNTER — TELEPHONE (OUTPATIENT)
Dept: SURGERY | Facility: CLINIC | Age: 71
End: 2021-08-13

## 2021-08-13 NOTE — TELEPHONE ENCOUNTER
I called Manda following a review of her recent CT scan at our Lung Nodule Conference today.   It was felt that we should repeat a chest CT scan in 3 months instead of waiting for 6 months.   The finding in right lung at the staple line is most likely scarring but it was felt appropriate to do a short term CT f/u at this time to be sure.    I had to leave a VM with this information and told her a  will be in touch to arrange this CT and a f/up with Dr. Douglas.

## 2021-08-16 DIAGNOSIS — M62.830 MUSCLE SPASM OF BACK: Primary | ICD-10-CM

## 2021-08-16 RX ORDER — TIZANIDINE 2 MG/1
2 TABLET ORAL 3 TIMES DAILY
Qty: 90 TABLET | Refills: 1 | Status: SHIPPED | OUTPATIENT
Start: 2021-08-16 | End: 2022-10-24

## 2021-09-05 ENCOUNTER — HEALTH MAINTENANCE LETTER (OUTPATIENT)
Age: 71
End: 2021-09-05

## 2021-10-01 ENCOUNTER — TELEPHONE (OUTPATIENT)
Dept: SURGERY | Facility: CLINIC | Age: 71
End: 2021-10-01

## 2021-10-01 NOTE — TELEPHONE ENCOUNTER
----- Message from RENETTA Lewis sent at 8/13/2021  9:20 AM CDT -----  Regarding: CT follow-up  Hi, I will be calling Manda later today.   She needs a CT f/up in 3 months (previous message was for 6 month) and a follow-up with Dr. Holly Douglas please.    Thanks  Cristiane

## 2021-10-15 SDOH — ECONOMIC STABILITY: FOOD INSECURITY: WITHIN THE PAST 12 MONTHS, YOU WORRIED THAT YOUR FOOD WOULD RUN OUT BEFORE YOU GOT MONEY TO BUY MORE.: NEVER TRUE

## 2021-10-15 SDOH — ECONOMIC STABILITY: TRANSPORTATION INSECURITY
IN THE PAST 12 MONTHS, HAS THE LACK OF TRANSPORTATION KEPT YOU FROM MEDICAL APPOINTMENTS OR FROM GETTING MEDICATIONS?: NO

## 2021-10-15 SDOH — ECONOMIC STABILITY: INCOME INSECURITY: HOW HARD IS IT FOR YOU TO PAY FOR THE VERY BASICS LIKE FOOD, HOUSING, MEDICAL CARE, AND HEATING?: NOT HARD AT ALL

## 2021-10-15 SDOH — HEALTH STABILITY: PHYSICAL HEALTH: ON AVERAGE, HOW MANY MINUTES DO YOU ENGAGE IN EXERCISE AT THIS LEVEL?: 20 MIN

## 2021-10-15 SDOH — ECONOMIC STABILITY: INCOME INSECURITY: IN THE LAST 12 MONTHS, WAS THERE A TIME WHEN YOU WERE NOT ABLE TO PAY THE MORTGAGE OR RENT ON TIME?: NO

## 2021-10-15 SDOH — HEALTH STABILITY: PHYSICAL HEALTH: ON AVERAGE, HOW MANY DAYS PER WEEK DO YOU ENGAGE IN MODERATE TO STRENUOUS EXERCISE (LIKE A BRISK WALK)?: 1 DAY

## 2021-10-15 SDOH — ECONOMIC STABILITY: FOOD INSECURITY: WITHIN THE PAST 12 MONTHS, THE FOOD YOU BOUGHT JUST DIDN'T LAST AND YOU DIDN'T HAVE MONEY TO GET MORE.: NEVER TRUE

## 2021-10-15 SDOH — ECONOMIC STABILITY: TRANSPORTATION INSECURITY
IN THE PAST 12 MONTHS, HAS LACK OF TRANSPORTATION KEPT YOU FROM MEETINGS, WORK, OR FROM GETTING THINGS NEEDED FOR DAILY LIVING?: NO

## 2021-10-15 ASSESSMENT — SOCIAL DETERMINANTS OF HEALTH (SDOH)
IN A TYPICAL WEEK, HOW MANY TIMES DO YOU TALK ON THE PHONE WITH FAMILY, FRIENDS, OR NEIGHBORS?: MORE THAN THREE TIMES A WEEK
DO YOU BELONG TO ANY CLUBS OR ORGANIZATIONS SUCH AS CHURCH GROUPS UNIONS, FRATERNAL OR ATHLETIC GROUPS, OR SCHOOL GROUPS?: NO
HOW OFTEN DO YOU ATTEND CHURCH OR RELIGIOUS SERVICES?: 1 TO 4 TIMES PER YEAR
HOW OFTEN DO YOU GET TOGETHER WITH FRIENDS OR RELATIVES?: ONCE A WEEK

## 2021-10-15 ASSESSMENT — ENCOUNTER SYMPTOMS
WEAKNESS: 0
JOINT SWELLING: 0
NERVOUS/ANXIOUS: 0
HEMATURIA: 0
ARTHRALGIAS: 1
BREAST MASS: 0
HEARTBURN: 0
DIARRHEA: 0
MYALGIAS: 0
NAUSEA: 0
HEADACHES: 0
PARESTHESIAS: 0
SORE THROAT: 0
COUGH: 0
SHORTNESS OF BREATH: 1
FEVER: 0
EYE PAIN: 0
DIZZINESS: 0
CHILLS: 0
CONSTIPATION: 0
FREQUENCY: 0
ABDOMINAL PAIN: 0
HEMATOCHEZIA: 0

## 2021-10-15 ASSESSMENT — ACTIVITIES OF DAILY LIVING (ADL): CURRENT_FUNCTION: NO ASSISTANCE NEEDED

## 2021-10-15 ASSESSMENT — LIFESTYLE VARIABLES
HOW MANY STANDARD DRINKS CONTAINING ALCOHOL DO YOU HAVE ON A TYPICAL DAY: 1 OR 2
HOW OFTEN DO YOU HAVE SIX OR MORE DRINKS ON ONE OCCASION: NEVER
HOW OFTEN DO YOU HAVE A DRINK CONTAINING ALCOHOL: 2-3 TIMES A WEEK

## 2021-10-18 ENCOUNTER — OFFICE VISIT (OUTPATIENT)
Dept: FAMILY MEDICINE | Facility: CLINIC | Age: 71
End: 2021-10-18
Payer: COMMERCIAL

## 2021-10-18 VITALS
SYSTOLIC BLOOD PRESSURE: 122 MMHG | HEART RATE: 96 BPM | HEIGHT: 61 IN | WEIGHT: 133 LBS | OXYGEN SATURATION: 95 % | DIASTOLIC BLOOD PRESSURE: 68 MMHG | RESPIRATION RATE: 18 BRPM | TEMPERATURE: 98.1 F | BODY MASS INDEX: 25.11 KG/M2

## 2021-10-18 DIAGNOSIS — E78.5 HYPERLIPIDEMIA LDL GOAL <130: ICD-10-CM

## 2021-10-18 DIAGNOSIS — Z12.31 VISIT FOR SCREENING MAMMOGRAM: ICD-10-CM

## 2021-10-18 DIAGNOSIS — I25.10 CORONARY ARTERY CALCIFICATION SEEN ON CT SCAN: ICD-10-CM

## 2021-10-18 DIAGNOSIS — C34.31 MALIGNANT NEOPLASM OF LOWER LOBE OF RIGHT LUNG (H): ICD-10-CM

## 2021-10-18 DIAGNOSIS — Z23 NEED FOR VACCINATION: ICD-10-CM

## 2021-10-18 DIAGNOSIS — Z12.11 SCREENING FOR COLON CANCER: ICD-10-CM

## 2021-10-18 DIAGNOSIS — F17.200 TOBACCO USE DISORDER: ICD-10-CM

## 2021-10-18 DIAGNOSIS — G47.00 INSOMNIA, UNSPECIFIED TYPE: ICD-10-CM

## 2021-10-18 DIAGNOSIS — Z00.00 MEDICARE ANNUAL WELLNESS VISIT, SUBSEQUENT: Primary | ICD-10-CM

## 2021-10-18 LAB
ERYTHROCYTE [DISTWIDTH] IN BLOOD BY AUTOMATED COUNT: 14.1 % (ref 10–15)
HCT VFR BLD AUTO: 44.3 % (ref 35–47)
HGB BLD-MCNC: 14.5 G/DL (ref 11.7–15.7)
MCH RBC QN AUTO: 31.2 PG (ref 26.5–33)
MCHC RBC AUTO-ENTMCNC: 32.7 G/DL (ref 31.5–36.5)
MCV RBC AUTO: 95 FL (ref 78–100)
PLATELET # BLD AUTO: 404 10E3/UL (ref 150–450)
RBC # BLD AUTO: 4.65 10E6/UL (ref 3.8–5.2)
WBC # BLD AUTO: 7.6 10E3/UL (ref 4–11)

## 2021-10-18 PROCEDURE — G0008 ADMIN INFLUENZA VIRUS VAC: HCPCS | Performed by: PHYSICIAN ASSISTANT

## 2021-10-18 PROCEDURE — 80061 LIPID PANEL: CPT | Performed by: PHYSICIAN ASSISTANT

## 2021-10-18 PROCEDURE — 90662 IIV NO PRSV INCREASED AG IM: CPT | Performed by: PHYSICIAN ASSISTANT

## 2021-10-18 PROCEDURE — 36415 COLL VENOUS BLD VENIPUNCTURE: CPT | Performed by: PHYSICIAN ASSISTANT

## 2021-10-18 PROCEDURE — 91300 COVID-19,PF,PFIZER (12+ YRS): CPT | Performed by: PHYSICIAN ASSISTANT

## 2021-10-18 PROCEDURE — 99397 PER PM REEVAL EST PAT 65+ YR: CPT | Mod: 25 | Performed by: PHYSICIAN ASSISTANT

## 2021-10-18 PROCEDURE — 80048 BASIC METABOLIC PNL TOTAL CA: CPT | Performed by: PHYSICIAN ASSISTANT

## 2021-10-18 PROCEDURE — 0004A COVID-19,PF,PFIZER (12+ YRS): CPT | Performed by: PHYSICIAN ASSISTANT

## 2021-10-18 PROCEDURE — 85027 COMPLETE CBC AUTOMATED: CPT | Performed by: PHYSICIAN ASSISTANT

## 2021-10-18 RX ORDER — QUETIAPINE FUMARATE 50 MG/1
25-50 TABLET, FILM COATED ORAL AT BEDTIME
Qty: 180 TABLET | Refills: 3 | Status: SHIPPED | OUTPATIENT
Start: 2021-10-18 | End: 2022-10-24

## 2021-10-18 RX ORDER — ROSUVASTATIN CALCIUM 20 MG/1
20 TABLET, COATED ORAL DAILY
Qty: 90 TABLET | Refills: 3 | Status: SHIPPED | OUTPATIENT
Start: 2021-10-18 | End: 2022-10-24

## 2021-10-18 ASSESSMENT — ENCOUNTER SYMPTOMS
ARTHRALGIAS: 1
BREAST MASS: 0
CHILLS: 0
DIARRHEA: 0
CONSTIPATION: 0
SHORTNESS OF BREATH: 1
JOINT SWELLING: 0
HEMATURIA: 0
NAUSEA: 0
HEARTBURN: 0
FREQUENCY: 0
HEMATOCHEZIA: 0
NERVOUS/ANXIOUS: 0
COUGH: 0
FEVER: 0
MYALGIAS: 0
EYE PAIN: 0
SORE THROAT: 0
DIZZINESS: 0
WEAKNESS: 0
PARESTHESIAS: 0
ABDOMINAL PAIN: 0
HEADACHES: 0

## 2021-10-18 ASSESSMENT — ACTIVITIES OF DAILY LIVING (ADL): CURRENT_FUNCTION: NO ASSISTANCE NEEDED

## 2021-10-18 ASSESSMENT — MIFFLIN-ST. JEOR: SCORE: 1042.72

## 2021-10-18 NOTE — PROGRESS NOTES
Prior to immunization administration, verified patients identity using patient s name and date of birth. Please see Immunization Activity for additional information.     Screening Questionnaire for Adult Immunization    Are you sick today?   No   Do you have allergies to medications, food, a vaccine component or latex?   No   Have you ever had a serious reaction after receiving a vaccination?   No   Do you have a long-term health problem with heart, lung, kidney, or metabolic disease (e.g., diabetes), asthma, a blood disorder, no spleen, complement component deficiency, a cochlear implant, or a spinal fluid leak?  Are you on long-term aspirin therapy?   No   Do you have cancer, leukemia, HIV/AIDS, or any other immune system problem?   No   Do you have a parent, brother, or sister with an immune system problem?   No   In the past 3 months, have you taken medications that affect  your immune system, such as prednisone, other steroids, or anticancer drugs; drugs for the treatment of rheumatoid arthritis, Crohn s disease, or psoriasis; or have you had radiation treatments?   No   Have you had a seizure, or a brain or other nervous system problem?   No   During the past year, have you received a transfusion of blood or blood    products, or been given immune (gamma) globulin or antiviral drug?   No   For women: Are you pregnant or is there a chance you could become       pregnant during the next month?   No   Have you received any vaccinations in the past 4 weeks?   No     Immunization questionnaire answers were all negative.        Per orders of Cassandra Gong, injection of Covid booster and Inluenza given by Eleonora Rodriguez. Patient instructed to remain in clinic for 15 minutes afterwards, and to report any adverse reaction to me immediately.       Screening performed by Eleonora Rodriguez on 10/18/2021 at 3:00 PM.

## 2021-10-18 NOTE — PROGRESS NOTES
"SUBJECTIVE:   Reanna Kumar is a 71 year old female who presents for Preventive Visit.      Patient has been advised of split billing requirements and indicates understanding: Yes   Are you in the first 12 months of your Medicare coverage?  No    Healthy Habits:     In general, how would you rate your overall health?  Good    Frequency of exercise:  1 day/week    Duration of exercise:  Less than 15 minutes    Do you usually eat at least 4 servings of fruit and vegetables a day, include whole grains    & fiber and avoid regularly eating high fat or \"junk\" foods?  No    Taking medications regularly:  Yes    Medication side effects:  None    Ability to successfully perform activities of daily living:  No assistance needed    Home Safety:  No safety concerns identified    Hearing Impairment:  No hearing concerns    In the past 6 months, have you been bothered by leaking of urine?  No    In general, how would you rate your overall mental or emotional health?  Good      PHQ-2 Total Score: 0    Additional concerns today:  No    Do you feel safe in your environment? Yes    Have you ever done Advance Care Planning? (For example, a Health Directive, POLST, or a discussion with a medical provider or your loved ones about your wishes): Yes, advance care planning is on file.      Fall risk  Fallen 2 or more times in the past year?: No  Any fall with injury in the past year?: No    Cognitive Screening   1) Repeat 3 items (Leader, Season, Table)    2) Clock draw: NORMAL  3) 3 item recall: Recalls 3 objects  Results: 3 items recalled: COGNITIVE IMPAIRMENT LESS LIKELY    Mini-CogTM Copyright GNENA Lisa. Licensed by the author for use in [facility]; reprinted with permission (rex@.[facility]). All rights reserved.        Reviewed and updated as needed this visit by clinical staff  Tobacco  Allergies  Meds  Problems  Med Hx  Surg Hx  Fam Hx  Soc Hx          Reviewed and updated as needed this visit by " Provider  Tobacco  Allergies  Meds  Problems  Med Hx  Surg Hx  Fam Hx         Social History     Tobacco Use     Smoking status: Current Every Day Smoker     Packs/day: 1.00     Years: 50.00     Pack years: 50.00     Types: Cigarettes     Smokeless tobacco: Never Used     Tobacco comment: 5/13/2021 Patient states that she is down to 5 cigarettes a day/Already using 21 mg patch for 1 month/Will step her down to 14 mg/Adding 4 mg nicotine lozenges   Substance Use Topics     Alcohol use: Yes     Comment: Occ         Alcohol Use 10/15/2021   Prescreen: >3 drinks/day or >7 drinks/week? No   Prescreen: >3 drinks/day or >7 drinks/week? -               Current providers sharing in care for this patient include:   Patient Care Team:  Cassandra Gong PA-C as PCP - General (Family Medicine)  Cassandra Gong PA-C as Assigned PCP  Stevo Mustafa MD as Assigned Pulmonology Provider  Palomo Castro MD as Assigned Surgical Provider    The following health maintenance items are reviewed in Epic and correct as of today:  Health Maintenance Due   Topic Date Due     ZOSTER IMMUNIZATION (1 of 2) Never done     COLORECTAL CANCER SCREENING  11/25/2020     INFLUENZA VACCINE (1) 09/01/2021     LIPID  10/14/2021     BP Readings from Last 3 Encounters:   10/18/21 122/68   05/19/21 126/66   05/05/21 (!) 147/74    Wt Readings from Last 3 Encounters:   10/18/21 60.3 kg (133 lb)   05/19/21 65.3 kg (143 lb 15.4 oz)   05/05/21 64 kg (141 lb)          Recent Labs   Lab Test 05/19/21  0529 05/18/21  0612 04/29/21  1555 10/14/20  1044 05/19/20  1001 05/19/20  1001 08/26/19  0939 08/26/19  0939   A1C  --   --   --  5.8*  --   --   --   --    LDL  --   --   --  121*  --  87  --  65   HDL  --   --   --  83  --  71  --  69   TRIG  --   --   --  118  --  118  --  153*   ALT  --   --   --  17  --  18  --  16   CR 0.53 0.46*   < > 0.61   < > 0.53   < > 0.54   GFRESTIMATED >90 >90   < > >90   < > >90   < > >90  "  GFRESTBLACK >90 >90   < > >90   < > >90   < > >90   POTASSIUM 4.4 4.5   < > 4.7   < > 4.5   < > 4.0    < > = values in this interval not displayed.      Mammogram Screening: Mammogram Screening: Recommended mammography every 1-2 years with patient discussion and risk factor consideration        Review of Systems   Constitutional: Negative for chills and fever.   HENT: Negative for congestion, ear pain, hearing loss and sore throat.    Eyes: Negative for pain and visual disturbance.   Respiratory: Positive for shortness of breath. Negative for cough.    Cardiovascular: Negative for chest pain and peripheral edema.   Gastrointestinal: Negative for abdominal pain, constipation, diarrhea, heartburn, hematochezia and nausea.   Breasts:  Negative for tenderness, breast mass and discharge.   Genitourinary: Negative for frequency, genital sores, hematuria, pelvic pain, urgency, vaginal bleeding and vaginal discharge.   Musculoskeletal: Positive for arthralgias. Negative for joint swelling and myalgias.   Skin: Negative for rash.   Neurological: Negative for dizziness, weakness, headaches and paresthesias.   Psychiatric/Behavioral: Negative for mood changes. The patient is not nervous/anxious.        OBJECTIVE:   /68   Pulse 96   Temp 98.1  F (36.7  C) (Oral)   Resp 18   Ht 1.537 m (5' 0.5\")   Wt 60.3 kg (133 lb)   SpO2 95%   BMI 25.55 kg/m   Estimated body mass index is 25.55 kg/m  as calculated from the following:    Height as of this encounter: 1.537 m (5' 0.5\").    Weight as of this encounter: 60.3 kg (133 lb).  Physical Exam  GENERAL APPEARANCE: healthy, alert and no distress  EYES: Eyes grossly normal to inspection, PERRL and conjunctivae and sclerae normal  HENT: ear canals and TM's normal, nose and mouth without ulcers or lesions, oropharynx clear and oral mucous membranes moist  NECK: no adenopathy, no asymmetry, masses, or scars and thyroid normal to palpation  RESP: lungs clear to auscultation - no " "rales, rhonchi or wheezes  CV: regular rate and rhythm, normal S1 S2, no S3 or S4, no murmur, click or rub, no peripheral edema and peripheral pulses strong  ABDOMEN: soft, nontender, no hepatosplenomegaly, no masses and bowel sounds normal  MS: no musculoskeletal defects are noted and gait is age appropriate without ataxia  SKIN: no suspicious lesions or rashes  NEURO: Normal strength and tone, sensory exam grossly normal, mentation intact and speech normal  PSYCH: mentation appears normal and affect normal/bright    Diagnostic Test Results:  Labs reviewed in Epic    ASSESSMENT / PLAN:       ICD-10-CM    1. Medicare annual wellness visit, subsequent  Z00.00 Lipid panel reflex to direct LDL Fasting     CBC with platelets     Basic metabolic panel     REVIEW OF HEALTH MAINTENANCE PROTOCOL ORDERS   2. Malignant neoplasm of lower lobe of right lung (H)  C34.31    3. Hyperlipidemia LDL goal <130  E78.5 rosuvastatin (CRESTOR) 20 MG tablet   4. Coronary artery calcification seen on CT scan  I25.10 rosuvastatin (CRESTOR) 20 MG tablet   5. Insomnia, unspecified type  G47.00 QUEtiapine (SEROQUEL) 50 MG tablet   6. Screening for colon cancer  Z12.11 Fecal colorectal cancer screen FIT   7. Need for vaccination  Z23 CANCELED: HC ADMIN COVID VAC PFIZER, BOOSTER   8. Visit for screening mammogram  Z12.31 MA Screening Digital Bilateral   9. Tobacco use disorder  F17.200      Agrees to fit, will schedule mammogram.  Will transfer care to [place].  Working on smoking cessation.    Medications refilled.  Labs today.  Has follow up with radiology and lung cancer surgeon next month.    Patient has been advised of split billing requirements and indicates understanding: Yes  COUNSELING:  Reviewed preventive health counseling, as reflected in patient instructions    Estimated body mass index is 25.55 kg/m  as calculated from the following:    Height as of this encounter: 1.537 m (5' 0.5\").    Weight as of this encounter: 60.3 kg (133 " lm).        She reports that she has been smoking cigarettes. She has a 50.00 pack-year smoking history. She has never used smokeless tobacco.  Tobacco Cessation Action Plan:   Pharmacotherapies : Nicotine patch will also get gum, pt has card to pay for this.  Doesn't  need prescription.      Appropriate preventive services were discussed with this patient, including applicable screening as appropriate for cardiovascular disease, diabetes, osteopenia/osteoporosis, and glaucoma.  As appropriate for age/gender, discussed screening for colorectal cancer, prostate cancer, breast cancer, and cervical cancer. Checklist reviewing preventive services available has been given to the patient.    Reviewed patients plan of care and provided an AVS. The Basic Care Plan (routine screening as documented in Health Maintenance) for Reanna meets the Care Plan requirement. This Care Plan has been established and reviewed with the Patient.    Counseling Resources:  ATP IV Guidelines  Pooled Cohorts Equation Calculator  Breast Cancer Risk Calculator  Breast Cancer: Medication to Reduce Risk  FRAX Risk Assessment  ICSI Preventive Guidelines  Dietary Guidelines for Americans, 2010  [employer]'s MyPlate  ASA Prophylaxis  Lung CA Screening    Cassandra Gong PA-C  [facility]    Identified Health Risks:

## 2021-10-19 LAB
ANION GAP SERPL CALCULATED.3IONS-SCNC: 6 MMOL/L (ref 3–14)
BUN SERPL-MCNC: 10 MG/DL (ref 7–30)
CALCIUM SERPL-MCNC: 10.9 MG/DL (ref 8.5–10.1)
CHLORIDE BLD-SCNC: 101 MMOL/L (ref 94–109)
CHOLEST SERPL-MCNC: 161 MG/DL
CO2 SERPL-SCNC: 25 MMOL/L (ref 20–32)
CREAT SERPL-MCNC: 0.58 MG/DL (ref 0.52–1.04)
FASTING STATUS PATIENT QL REPORTED: NORMAL
GFR SERPL CREATININE-BSD FRML MDRD: >90 ML/MIN/1.73M2
GLUCOSE BLD-MCNC: 87 MG/DL (ref 70–99)
HDLC SERPL-MCNC: 84 MG/DL
LDLC SERPL CALC-MCNC: 59 MG/DL
NONHDLC SERPL-MCNC: 77 MG/DL
POTASSIUM BLD-SCNC: 5.1 MMOL/L (ref 3.4–5.3)
SODIUM SERPL-SCNC: 132 MMOL/L (ref 133–144)
TRIGL SERPL-MCNC: 88 MG/DL

## 2021-10-21 PROCEDURE — 82274 ASSAY TEST FOR BLOOD FECAL: CPT | Performed by: PHYSICIAN ASSISTANT

## 2021-10-23 LAB — HEMOCCULT STL QL IA: NEGATIVE

## 2021-10-25 NOTE — RESULT ENCOUNTER NOTE
Hi Manda,    I just wanted to let you know that your lab results have been reviewed and are attached.    -FIT test (screening test for colon cancer) was normal.  There was no blood in your sample.  Recheck due in 1 year.    Please let me know if you have any questions and have a great week!    Sincerely,    Marilynn Gong PA-C    [facility]  [address] [place] [address]  Clinic Phone: [phone]

## 2021-11-08 ENCOUNTER — ANCILLARY PROCEDURE (OUTPATIENT)
Dept: CT IMAGING | Facility: CLINIC | Age: 71
End: 2021-11-08
Attending: CLINICAL NURSE SPECIALIST
Payer: COMMERCIAL

## 2021-11-08 ENCOUNTER — LAB (OUTPATIENT)
Dept: LAB | Facility: CLINIC | Age: 71
End: 2021-11-08
Payer: COMMERCIAL

## 2021-11-08 ENCOUNTER — DOCUMENTATION ONLY (OUTPATIENT)
Dept: LAB | Facility: CLINIC | Age: 71
End: 2021-11-08

## 2021-11-08 DIAGNOSIS — C34.31 CANCER OF LOWER LOBE OF RIGHT LUNG (H): ICD-10-CM

## 2021-11-08 DIAGNOSIS — R89.9 ABNORMAL LABORATORY TEST: Primary | ICD-10-CM

## 2021-11-08 DIAGNOSIS — R89.9 ABNORMAL LABORATORY TEST: ICD-10-CM

## 2021-11-08 PROCEDURE — 71250 CT THORAX DX C-: CPT | Mod: TC | Performed by: RADIOLOGY

## 2021-11-08 PROCEDURE — 80053 COMPREHEN METABOLIC PANEL: CPT

## 2021-11-08 PROCEDURE — 36415 COLL VENOUS BLD VENIPUNCTURE: CPT

## 2021-11-08 PROCEDURE — 83735 ASSAY OF MAGNESIUM: CPT

## 2021-11-08 PROCEDURE — 83970 ASSAY OF PARATHORMONE: CPT

## 2021-11-08 NOTE — PROGRESS NOTES
Patient was in today for her lab only appointment but has no orders. Per Dr. Emmanuel Moise, recheck Calcium. I pended the order for you to review. Please confirm if needed.   Thank you,  ALBARO Kumar

## 2021-11-10 ENCOUNTER — VIRTUAL VISIT (OUTPATIENT)
Dept: SURGERY | Facility: CLINIC | Age: 71
End: 2021-11-10
Attending: STUDENT IN AN ORGANIZED HEALTH CARE EDUCATION/TRAINING PROGRAM
Payer: COMMERCIAL

## 2021-11-10 ENCOUNTER — MYC MEDICAL ADVICE (OUTPATIENT)
Dept: FAMILY MEDICINE | Facility: CLINIC | Age: 71
End: 2021-11-10
Payer: COMMERCIAL

## 2021-11-10 DIAGNOSIS — C34.31 CANCER OF LOWER LOBE OF RIGHT LUNG (H): Primary | ICD-10-CM

## 2021-11-10 LAB
ALBUMIN SERPL-MCNC: 3.9 G/DL (ref 3.4–5)
ALP SERPL-CCNC: 142 U/L (ref 40–150)
ALT SERPL W P-5'-P-CCNC: 16 U/L (ref 0–50)
ANION GAP SERPL CALCULATED.3IONS-SCNC: 2 MMOL/L (ref 3–14)
AST SERPL W P-5'-P-CCNC: 15 U/L (ref 0–45)
BILIRUB SERPL-MCNC: 0.3 MG/DL (ref 0.2–1.3)
BUN SERPL-MCNC: 10 MG/DL (ref 7–30)
CALCIUM SERPL-MCNC: 11 MG/DL (ref 8.5–10.1)
CHLORIDE BLD-SCNC: 100 MMOL/L (ref 94–109)
CO2 SERPL-SCNC: 30 MMOL/L (ref 20–32)
CREAT SERPL-MCNC: 0.59 MG/DL (ref 0.52–1.04)
GFR SERPL CREATININE-BSD FRML MDRD: >90 ML/MIN/1.73M2
GLUCOSE BLD-MCNC: 118 MG/DL (ref 70–99)
POTASSIUM BLD-SCNC: 5.5 MMOL/L (ref 3.4–5.3)
PROT SERPL-MCNC: 7.5 G/DL (ref 6.8–8.8)
SODIUM SERPL-SCNC: 132 MMOL/L (ref 133–144)

## 2021-11-10 PROCEDURE — 99213 OFFICE O/P EST LOW 20 MIN: CPT | Mod: 95 | Performed by: CLINICAL NURSE SPECIALIST

## 2021-11-10 NOTE — LETTER
"    11/10/2021         RE: Reanna Kumar  [address] [facility] [address]        Dear Colleague,    Thank you for referring your patient, Reanna Kumar, to the [facility]. Please see a copy of my visit note below.    Thoracic Surgery Follow-up    THORACIC SURGERY FOLLOW UP VISIT    I spoke by telephone with Ms. Kumar in follow-up today. The clinical summary follows:    DATE OF ADMISSION: 5/10/2021      PRE/POSTOPERATIVE DIAGNOSES:   1.  Right lower lobe indeterminate pulmonary nodule.   2.  Current smoking status.      PROCEDURES PERFORMED:   1.  Flexible bronchoscopy.  2.  Uniportal thoracoscopic right lower lobe wedge.    3.  Conversion to right posterolateral thoracotomy.  4.  Completion middle and lower bilobectomy.  5.  Therapeutic pneumoperitoneum.  6.  Bronchus intermedius bronchoplasty with intercostal muscle buttress.  7.  Intercostal nerve cryoablation, spaces 3-9.  8.  Mediastinal and hilar lymphadenectomy.  9.  Thoracoscopic pneumolysis.       PATHOLOGY RESULTS:   INVASIVE SQUAMOUS CELL CARCINOMA OF THE RIGHT LOWER LOBE      COMPLICATIONS  None     INTERVAL STUDIES   Chest CT scan  11/8/21  IMPRESSION:  1. A 2 cm nodular area seen previously on the right is no longer  demonstrated with some linear scarring in this region previously.  2. A second nodule of the lateral right base is minimally longer than  the previous study.      SUBJECTIVE   I am still having significant problems, I feel like a weight is sitting under my right breast/abdomen and it hasn't really changed.   I stopped the gabapentin and muscle relaxer because \"they really weren't helping\".   She intermittently will take ES Tylenol alternating with ibuprofen with some help.   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    IMPRESSION (C34.31) Cancer of lower lobe of right lung (H)  (primary encounter diagnosis)  Plan: CT Chest w/o contrast       " "    Manda is a 71 year old woman who had surgery in May.   At her last visit, her chest CT had demonstrated a  2 cm nodular opacity in the right lower lung anteriorly adjacent to postoperative changes has similar appearance to the previous exam, and could be related to postoperative scarring. Further attention to this finding at follow-up is recommended. previous exam, and could be related to postoperative scarring. Further attention to this finding at follow-up is recommended.\"     At that time, we decided to have her get a short term CT follow-up rather than waiting for the usual 6 month surveillance.  We discussed the new CT findings and she was happy to hear that this had resolved.   I told her the pulmonary nodule(s) will continue to be monitored and we would arrange for another scan in 6 months.    PLAN  I spent 20 min on the date of the encounter in chart review, patient visit, review of tests, documentation and/or discussion with other providers about the issues documented above. I reviewed the plan as follows:  I will review her CT and symptoms at our weekly Lung Nodule Conference on Friday am to determine if her right pleural effusion could be contributing to her symptoms and whether a thoracentesis should be considered.  1. Necessary Tests & Appointments: Chest CT and follow-up in 6 months  2. Pain Control Plan:    Intermittently using ES Tylenol alternating with ibuprofen  3. Anticoagulation Plan: N/A  4. Smoking Cessation: N/A    All questions were answered and the patient and present family were in agreement with the plan.  I appreciate the opportunity to participate in the care of your patient and will keep you updated.  Sincerely,    RENETTA White, CNS  "

## 2021-11-10 NOTE — CONFIDENTIAL NOTE
"Pulmonary Nodule Conference      Patient Name: Reanna Kumar    Reason for conference discussion (brief overview): 70 yo who had right lower lobectomy in May for cancer.   She has a persistent effusion, complains of feeling a \"weight under my right breast and abdomen that hasn't changed since surgery\".   She doesn't have any increased dyspnea.   Gabapentin/Robaxin didn't help.   \"Surprisingly, it goes away when I lay down\".    Specific Question:  Would a thoracentesis be indicated?   Any thoughts on her symptoms?    Pertinent Histology:  A. LYMPH NODE, 11R, EXCISION:   - One lymph node, negative for malignancy (0/1).     B. LYMPH NODE, STATION 7, EXCISION:   - One lymph node, negative for malignancy (0/1).     C. LYMPH NODES, STATION 7, EXCISION:   - Four lymph nodes, negative for malignancy (0/4).     D. LYMPH NODE, 11R, EXCISION:   - Three lymph nodes, negative for malignancy (0/3).     E. LUNG, RIGHT LOWER LOBE, WEDGE RESECTION:   - INVASIVE SQUAMOUS CELL CARCINOMA, partly keratinizing and moderately   differentiated, single nodule, 1.2 cm   in greatest dimension.   - Visceral pleura is not involved.   - Parenchymal margin is negative for tumor (see part G for final margins).   - Severe emphysema with subpleural bullae formation and occasional   intra-alveolar clusters of pigmented   macrophages.   - AJCC pathologic staging is pT1b N0.     Referring Physician: RENETTA White, CNS    The patient's case was presented at the multidisciplinary conference for the above noted reason.  There was a consensus recommendation for the following actions:     Because the pleural effusion is small and not felt to be contributing to the patient's symptoms, Dr. Langston felt that he should see her, in person, in the next few weeks to assess further.    Case Lead:  Cristiane Rhodes    Interventional Radiology Staff Present: Dr. Shane Morris/ TYRA Miranda will ask scheduling to arrange a visit with Dr. Langston.   She " will call patient to let her know the plan.

## 2021-11-10 NOTE — PROGRESS NOTES
"Manda is a 71 year old who is being evaluated via a billable telephone visit.      What phone number would you like to be contacted at? [phone]  How would you like to obtain your AVS? Victoriano  Phone call duration: 20 minutes    Thoracic Surgery Follow-up    THORACIC SURGERY FOLLOW UP VISIT    I spoke by telephone with Ms. Kumar in follow-up today. The clinical summary follows:    DATE OF ADMISSION: 5/10/2021      PRE/POSTOPERATIVE DIAGNOSES:   1.  Right lower lobe indeterminate pulmonary nodule.   2.  Current smoking status.      PROCEDURES PERFORMED:   1.  Flexible bronchoscopy.  2.  Uniportal thoracoscopic right lower lobe wedge.    3.  Conversion to right posterolateral thoracotomy.  4.  Completion middle and lower bilobectomy.  5.  Therapeutic pneumoperitoneum.  6.  Bronchus intermedius bronchoplasty with intercostal muscle buttress.  7.  Intercostal nerve cryoablation, spaces 3-9.  8.  Mediastinal and hilar lymphadenectomy.  9.  Thoracoscopic pneumolysis.       PATHOLOGY RESULTS:   INVASIVE SQUAMOUS CELL CARCINOMA OF THE RIGHT LOWER LOBE      COMPLICATIONS  None     INTERVAL STUDIES   Chest CT scan  11/8/21  IMPRESSION:  1. A 2 cm nodular area seen previously on the right is no longer  demonstrated with some linear scarring in this region previously.  2. A second nodule of the lateral right base is minimally longer than  the previous study.      SUBJECTIVE   I am still having significant problems, I feel like a weight is sitting under my right breast/abdomen and it hasn't really changed.   I stopped the gabapentin and muscle relaxer because \"they really weren't helping\".   She intermittently will take ES Tylenol alternating with ibuprofen with some help.   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    IMPRESSION (C34.31) Cancer of lower lobe of right lung (H)  (primary encounter diagnosis)  Plan: CT Chest w/o contrast           Manda is a 71 year old " "woman who had surgery in May.   At her last visit, her chest CT had demonstrated a  2 cm nodular opacity in the right lower lung anteriorly adjacent to postoperative changes has similar appearance to the previous exam, and could be related to postoperative scarring. Further attention to this finding at follow-up is recommended. previous exam, and could be related to postoperative scarring. Further attention to this finding at follow-up is recommended.\"     At that time, we decided to have her get a short term CT follow-up rather than waiting for the usual 6 month surveillance.  We discussed the new CT findings and she was happy to hear that this had resolved.   I told her the pulmonary nodule(s) will continue to be monitored and we would arrange for another scan in 6 months.    PLAN  I spent 20 min on the date of the encounter in chart review, patient visit, review of tests, documentation and/or discussion with other providers about the issues documented above. I reviewed the plan as follows:  I will review her CT and symptoms at our weekly Lung Nodule Conference on Friday am to determine if her right pleural effusion could be contributing to her symptoms and whether a thoracentesis should be considered.  1. Necessary Tests & Appointments: Chest CT and follow-up in 6 months  2. Pain Control Plan:    Intermittently using ES Tylenol alternating with ibuprofen  3. Anticoagulation Plan: N/A  4. Smoking Cessation: N/A    All questions were answered and the patient and present family were in agreement with the plan.  I appreciate the opportunity to participate in the care of your patient and will keep you updated.  Sincerely,    RENETTA White, CNS  "

## 2021-11-11 LAB
MAGNESIUM SERPL-MCNC: 1.9 MG/DL (ref 1.6–2.3)
PTH-INTACT SERPL-MCNC: 38 PG/ML

## 2021-11-11 NOTE — RESULT ENCOUNTER NOTE
Hi Manda,    I just wanted to let you know that your lab results have been reviewed and are attached.    Your calcium is still high, I added on a couple of tests to see if we can determine why, those are still in process, I'll let you know when they come back.    Please let me know if you have any questions and have a great week!    Sincerely,    Marilynn Gong PA-C    [facility]  [address] [place] [address]  Clinic Phone: [phone]

## 2021-11-12 ENCOUNTER — TELEPHONE (OUTPATIENT)
Dept: SURGERY | Facility: CLINIC | Age: 71
End: 2021-11-12
Payer: COMMERCIAL

## 2021-11-12 NOTE — TELEPHONE ENCOUNTER
I called Manda to let her know that, after reviewing her images and symtoms today at our conference, Dr. Langston asked to see her in person to examine and see where she was feeling this weight/pain since her surgery in May.   She appreciated the call and will wait for a  to get in touch.

## 2021-11-15 ENCOUNTER — MYC MEDICAL ADVICE (OUTPATIENT)
Dept: FAMILY MEDICINE | Facility: CLINIC | Age: 71
End: 2021-11-15
Payer: COMMERCIAL

## 2021-11-15 NOTE — RESULT ENCOUNTER NOTE
César Holman,    I just wanted to let you know that your lab results have been reviewed and are attached.    Your other labs are normal.  I sent a message to oncology as your cancer treatment is likely causing the high calcium.  They should be in contact with you.    Please let me know if you have any questions and have a great week!    Sincerely,    Marilynn Gong PA-C    [facility]  [address] [place] [address]  Clinic Phone: [phone]

## 2021-12-01 ENCOUNTER — OFFICE VISIT (OUTPATIENT)
Dept: SURGERY | Facility: CLINIC | Age: 71
End: 2021-12-01
Attending: THORACIC SURGERY (CARDIOTHORACIC VASCULAR SURGERY)
Payer: COMMERCIAL

## 2021-12-01 VITALS
HEART RATE: 99 BPM | DIASTOLIC BLOOD PRESSURE: 87 MMHG | TEMPERATURE: 99.4 F | BODY MASS INDEX: 26.22 KG/M2 | RESPIRATION RATE: 16 BRPM | WEIGHT: 136.5 LBS | SYSTOLIC BLOOD PRESSURE: 131 MMHG | OXYGEN SATURATION: 97 %

## 2021-12-01 DIAGNOSIS — M79.2 NEUROPATHIC PAIN: Primary | ICD-10-CM

## 2021-12-01 DIAGNOSIS — C34.31 CANCER OF LOWER LOBE OF RIGHT LUNG (H): Primary | ICD-10-CM

## 2021-12-01 PROCEDURE — G0463 HOSPITAL OUTPT CLINIC VISIT: HCPCS

## 2021-12-01 PROCEDURE — 99214 OFFICE O/P EST MOD 30 MIN: CPT | Performed by: THORACIC SURGERY (CARDIOTHORACIC VASCULAR SURGERY)

## 2021-12-01 RX ORDER — GABAPENTIN 300 MG/1
300 CAPSULE ORAL AT BEDTIME
Status: DISCONTINUED | OUTPATIENT
Start: 2021-12-01 | End: 2021-12-01

## 2021-12-01 RX ORDER — GABAPENTIN 300 MG/1
300 CAPSULE ORAL AT BEDTIME
Qty: 30 CAPSULE | Refills: 3 | Status: SHIPPED | OUTPATIENT
Start: 2021-12-01 | End: 2022-10-24

## 2021-12-01 ASSESSMENT — PAIN SCALES - GENERAL: PAINLEVEL: MILD PAIN (2)

## 2021-12-01 NOTE — NURSING NOTE
"Oncology Rooming Note    December 1, 2021 3:38 PM   Reanna Kumar is a 71 year old female who presents for:    Chief Complaint   Patient presents with     Oncology Clinic Visit     Pt is here for a rtn [facility] Lung Cancer     Initial Vitals: Blood Pressure 131/87   Pulse 99   Temperature 99.4  F (37.4  C) (Oral)   Respiration 16   Weight 61.9 kg (136 lb 8 oz)   Oxygen Saturation 97%   Body Mass Index 26.22 kg/m   Estimated body mass index is 26.22 kg/m  as calculated from the following:    Height as of 10/18/21: 1.537 m (5' 0.5\").    Weight as of this encounter: 61.9 kg (136 lb 8 oz). Body surface area is 1.63 meters squared.  Mild Pain (2) Comment: Data Unavailable   No LMP recorded. Patient is postmenopausal.  Allergies reviewed: Yes  Medications reviewed: Yes    Medications: Medication refills not needed today.  Pharmacy name entered into [employer]: [id number] - [place] - [address]    Clinical concerns: Pt said she is still feeling pressure in her abdomen area.  When lying down she said this feeling goes away.       Nancy Hansen MA            "

## 2021-12-01 NOTE — PROGRESS NOTES
THORACIC SURGERY FOLLOW UP VISIT    I saw Ms. Kumar in follow-up today. The clinical summary follows:     DIAGNOSIS   Right lower lobe lung cancer     PROCEDURE   5/10/21 Uniportal VATS RLL wedge, completion lobectomy, conversion to thoracotomy with middle and lower bilobectomy, intercostal nerve cryoablation.     COMPLICATIONS  Intraoperative bronchus intermedius injury requiring bilobectomy.     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 R0 resection with 5 cm margins.   [id number], stage IA2.       INTERVAL STUDIES  CT scan 11/8/21: Small right pleural effusion. Decreased in size of RUL nodule, likely benign. No new pulmonary nodules.                    Past Medical History:   Diagnosis Date     Hyperlipidemia      Insomnia 2015     Lung cancer (H)      Pulmonary nodules      Past Surgical History:   Procedure Laterality Date     ARTHROPLASTY HIP Right 6/2/2020    Procedure: RIGHT TOTAL HIP ARTHROPLASTY;  Surgeon: Augie Murray MD;  Location:  OR     BRONCHOSCOPY FLEXIBLE AND RIGID N/A 5/18/2021    Procedure: BRONCHOSCOPY;  Surgeon: Palomo Castro MD;  [facility]: [facility]     HIP SURGERY       JOINT REPLACEMENT Left 2008    hip     THORACOSCOPIC LOBECTOMY LUNG Right 5/10/2021    Procedure: Uniportal thoracoscopic right lower lobe wedge segmentectomy, completion bilobectomy middle and lower, intercostal nerve cryoablation, converstion to thoracotomy, mediastinal lymphadenectomy;  Surgeon: Palomo Castro MD;  [facility]        Allergies   Allergen Reactions     Zyban [Bupropion Hydrobromide]      Increased anxiety     Current Outpatient Medications   Medication     albuterol (PROAIR HFA/PROVENTIL HFA/VENTOLIN HFA) 108 (90 Base) MCG/ACT inhaler     QUEtiapine (SEROQUEL) 50 MG tablet     rosuvastatin (CRESTOR) 20 MG tablet     tiZANidine (ZANAFLEX) 2 MG tablet     No current facility-administered medications for this visit.     ETOH None  TOB None    Exam  /87   Pulse 99   Temp 99.4   F (37.4  C) (Oral)   Resp 16   Wt 61.9 kg (136 lb 8 oz)   SpO2 97%   BMI 26.22 kg/m    Alert andoriented NAD.   Bilateral breath sounds. RRR.   Chest: Right thoracotomy scar completely healed. Hypoesthesia in the distribution of the thoracotomy dermatome.     SUBJECTIVE   Manda comes to clinic for a follow up. She was concerned about her chest wall being numb and having occasional pins and needles. She has been improving since surgery bust just wanted to make sure everyting was ok. She had a CT scan of the chest recently and wanted to discuss the results. No fever or cough.     From a personal perspective, she is spending the holidays with her family in town.     IMPRESSION   71 year old female patient 6 months after middle and lower bilobectomy for a RLL adenocarcinoma, now with right chest discomfort.     PLAN  I spent 45 min on the date of the encounter in chart review, patient visit, review of tests, documentation and/or discussion with other providers about the issues documented above. I reviewed the plan as follows:  I recommended restart Gabapentin since she stopped it a couple of months ago. Also, I offered a referral to outpatient PT and OT however, she does not think is necessary at this point.   We talked about the reassuring findings of the CT scan.   Follow up with [facility] for secondary surveillance of lung cancer.   All questions were answered and the patient and present family were in agreement with the plan.  I appreciate the opportunity to participate in the care of your patient and will keep you updated.  Sincerely,  Palomo Galindo MD

## 2021-12-01 NOTE — LETTER
12/1/2021         RE: Reanna Kumar  [address] [facility] [address]        Dear Colleague,    Thank you for referring your patient, Reanna Kumar, to the [facility]. Please see a copy of my visit note below.    THORACIC SURGERY FOLLOW UP VISIT    I saw Ms. Kumar in follow-up today. The clinical summary follows:     DIAGNOSIS   Right lower lobe lung cancer     PROCEDURE   5/10/21 Uniportal VATS RLL wedge, completion lobectomy, conversion to thoracotomy with middle and lower bilobectomy, intercostal nerve cryoablation.     COMPLICATIONS  Intraoperative bronchus intermedius injury requiring bilobectomy.     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 R0 resection with 5 cm margins.   [id number], stage IA2.       INTERVAL STUDIES  CT scan 11/8/21: Small right pleural effusion. Decreased in size of RUL nodule, likely benign. No new pulmonary nodules.                    Past Medical History:   Diagnosis Date     Hyperlipidemia      Insomnia 2015     Lung cancer (H)      Pulmonary nodules      Past Surgical History:   Procedure Laterality Date     ARTHROPLASTY HIP Right 6/2/2020    Procedure: RIGHT TOTAL HIP ARTHROPLASTY;  Surgeon: Augie Murray MD;  Location:  OR     BRONCHOSCOPY FLEXIBLE AND RIGID N/A 5/18/2021    Procedure: BRONCHOSCOPY;  Surgeon: Palomo Castro MD;  [facility]     HIP SURGERY       JOINT REPLACEMENT Left 2008    hip     THORACOSCOPIC LOBECTOMY LUNG Right 5/10/2021    Procedure: Uniportal thoracoscopic right lower lobe wedge segmentectomy, completion bilobectomy middle and lower, intercostal nerve cryoablation, converstion to thoracotomy, mediastinal lymphadenectomy;  Surgeon: Palomo Castro MD;  Location:  OR        Allergies   Allergen Reactions     Zyban [Bupropion Hydrobromide]      Increased anxiety     Current Outpatient Medications   Medication     albuterol (PROAIR HFA/PROVENTIL HFA/VENTOLIN HFA) 108 (90 Base) MCG/ACT inhaler      QUEtiapine (SEROQUEL) 50 MG tablet     rosuvastatin (CRESTOR) 20 MG tablet     tiZANidine (ZANAFLEX) 2 MG tablet     No current facility-administered medications for this visit.     ETOH None  TOB None    Exam  /87   Pulse 99   Temp 99.4  F (37.4  C) (Oral)   Resp 16   Wt 61.9 kg (136 lb 8 oz)   SpO2 97%   BMI 26.22 kg/m    Alert andoriented NAD.   Bilateral breath sounds. RRR.   Chest: Right thoracotomy scar completely healed. Hypoesthesia in the distribution of the thoracotomy dermatome.     SUBJECTIVE   Manda comes to clinic for a follow up. She was concerned about her chest wall being numb and having occasional pins and needles. She has been improving since surgery bust just wanted to make sure everyting was ok. She had a CT scan of the chest recently and wanted to discuss the results. No fever or cough.     From a personal perspective, she is spending the holidays with her family in town.     IMPRESSION   71 year old female patient 6 months after middle and lower bilobectomy for a RLL adenocarcinoma, now with right chest discomfort.     PLAN  I spent 45 min on the date of the encounter in chart review, patient visit, review of tests, documentation and/or discussion with other providers about the issues documented above. I reviewed the plan as follows:  I recommended restart Gabapentin since she stopped it a couple of months ago. Also, I offered a referral to outpatient PT and OT however, she does not think is necessary at this point.   We talked about the reassuring findings of the CT scan.   Follow up with [facility] for secondary surveillance of lung cancer.   All questions were answered and the patient and present family were in agreement with the plan.  I appreciate the opportunity to participate in the care of your patient and will keep you updated.  Sincerely,  Palomo Galindo MD

## 2021-12-13 ENCOUNTER — ANCILLARY PROCEDURE (OUTPATIENT)
Dept: MAMMOGRAPHY | Facility: CLINIC | Age: 71
End: 2021-12-13
Payer: COMMERCIAL

## 2021-12-13 DIAGNOSIS — Z12.31 VISIT FOR SCREENING MAMMOGRAM: ICD-10-CM

## 2021-12-13 PROCEDURE — 77067 SCR MAMMO BI INCL CAD: CPT | Mod: TC | Performed by: RADIOLOGY

## 2022-01-06 NOTE — PROGRESS NOTES
Assessment & Plan   Problem List Items Addressed This Visit     None      Visit Diagnoses     Cough    -  Primary    Relevant Orders    XR Chest 2 Views (Completed)    Influenza A/B antigen (Completed)    Symptomatic; Yes COVID-19 Virus (Coronavirus) by PCR Nose    Suspected COVID-19 virus infection        Relevant Orders    Symptomatic; Yes COVID-19 Virus (Coronavirus) by PCR Nose         Impression is likely viral URI including COVID-19. Vaccinated x 3. Will order COVID-19 PCR. Influenza negative. Appears well and non-toxic and I have low suspicion for impending airway obstruction or respiratory distress.  She will push p.o. fluids, use over-the-counter meds for symptoms, complete a course of azithromycin if not improving in the next week and follow-up with us in 2 weeks if not improving or urgent care/the ER if symptoms worsen/change at any time.    Complete history and physical exam as below. AF with normal VS.    DDx and Dx discussed with and explained to the pt to their satisfaction.  All questions were answered at this time. Pt expressed understanding of and agreement with this dx, tx, and plan. No further workup warranted and standard medication warnings given. I have given the patient a list of pertinent indications for re-evaluation. Will go to the Emergency Department if symptoms worsen or new concerning symptoms arise. Patient left in no apparent distress.     44 minutes spent on the date of the encounter doing chart review, history and exam, documentation and further activities per the note     See Patient Instructions    Return in about 1 week (around 1/14/2022) for a recheck of your symptoms if not improving, or call 911/go to an ER anytime if worsening.    JO ANN May  [facility] SHAYLA Holman is a 71 year old who presents for the following health issues     HPI     Cough for 3 days   No fever   No sick contacts   Right and middle and lower lobectomy due to  "lung cancer   Fully immunized   Productive sputum, does not know color   Chest tightness and rest due to surgery   No new chest tightness/pain/pressure   Has had two negative covid tests within the past week  OTC cough syrup decongestant   No ab pain, n/v/d, myalgia       Acute Illness  Acute illness concerns: Runny nose, congestion, cough, wheezing, fatigue  Onset/Duration: 3 days  Symptoms:  Fever: no  Chills/Sweats: no  Headache (location?): no  Sinus Pressure: no  Conjunctivitis:  no  Ear Pain: no  Rhinorrhea: YES, all the time, 4 days  Congestion: YES  Sore Throat: no  Cough: YES-productive of sputum, 4 day  Wheeze: YES  Decreased Appetite: no  Nausea: no  Vomiting: no  Diarrhea: no  Dysuria/Freq.: no  Dysuria or Hematuria: no  Fatigue/Achiness: YES- fatigue  Sick/Strep Exposure: no  Therapies tried and outcome: Muccinex, OTC cough medicine, Tylenol  Mild SYED, but no chest pain.    Review of Systems   Constitutional, HEENT, cardiovascular, pulmonary, gi and gu systems are negative, except as otherwise noted.      Objective    /87   Pulse 100   Temp 96.8  F (36  C) (Tympanic)   Resp 14   Ht 1.556 m (5' 1.26\")   Wt 62 kg (136 lb 9.6 oz)   SpO2 95%   BMI 25.59 kg/m    Body mass index is 25.59 kg/m .  Physical Exam  Vitals and nursing note reviewed.   Constitutional:       General: She is not in acute distress.     Appearance: She is not ill-appearing or diaphoretic.   HENT:      Head: Normocephalic and atraumatic.      Mouth/Throat:      Mouth: Mucous membranes are moist.   Eyes:      Conjunctiva/sclera: Conjunctivae normal.   Cardiovascular:      Rate and Rhythm: Normal rate and regular rhythm.      Heart sounds: Normal heart sounds. No murmur heard.  No friction rub. No gallop.    Pulmonary:      Effort: Pulmonary effort is normal. No respiratory distress.      Breath sounds: No stridor. No wheezing, rhonchi or rales.      Comments: Diminished lung sounds on right, baseline.  Abdominal:      " General: Bowel sounds are normal. There is no distension.      Palpations: Abdomen is soft. There is no mass.      Tenderness: There is no abdominal tenderness. There is no guarding or rebound.      Hernia: No hernia is present.   Skin:     General: Skin is warm and dry.   Neurological:      General: No focal deficit present.      Mental Status: She is alert. Mental status is at baseline.   Psychiatric:         Mood and Affect: Mood normal.         Behavior: Behavior normal.          Results for orders placed or performed in visit on 01/07/22   XR Chest 2 Views     Status: None    Narrative    XR CHEST 2 VW 1/7/2022 11:49 AM    HISTORY: Cough    COMPARISON: 11/8/2021      Impression    IMPRESSION: Right lung resection changes and chronic right pleural  effusion is stable. Emphysema. No focal infiltrates or pneumothorax.  Right thoracotomy changes. Normal heart size.    RIVKA ALONSO MD         SYSTEM ID:  [id number]   Results for orders placed or performed in visit on 01/07/22   Influenza A/B antigen     Status: Normal    Specimen: Nose; Swab   Result Value Ref Range    Influenza A antigen Negative Negative    Influenza B antigen Negative Negative    Narrative    Test results must be correlated with clinical data. If necessary, results should be confirmed by a molecular assay or viral culture.

## 2022-01-07 ENCOUNTER — OFFICE VISIT (OUTPATIENT)
Dept: FAMILY MEDICINE | Facility: CLINIC | Age: 72
End: 2022-01-07
Payer: COMMERCIAL

## 2022-01-07 ENCOUNTER — ANCILLARY PROCEDURE (OUTPATIENT)
Dept: GENERAL RADIOLOGY | Facility: CLINIC | Age: 72
End: 2022-01-07
Attending: PHYSICIAN ASSISTANT
Payer: COMMERCIAL

## 2022-01-07 VITALS
RESPIRATION RATE: 14 BRPM | SYSTOLIC BLOOD PRESSURE: 139 MMHG | WEIGHT: 136.6 LBS | OXYGEN SATURATION: 95 % | BODY MASS INDEX: 25.79 KG/M2 | TEMPERATURE: 96.8 F | HEIGHT: 61 IN | HEART RATE: 100 BPM | DIASTOLIC BLOOD PRESSURE: 87 MMHG

## 2022-01-07 DIAGNOSIS — R05.9 COUGH: Primary | ICD-10-CM

## 2022-01-07 DIAGNOSIS — R05.9 COUGH: ICD-10-CM

## 2022-01-07 DIAGNOSIS — Z20.822 SUSPECTED COVID-19 VIRUS INFECTION: ICD-10-CM

## 2022-01-07 LAB
FLUAV AG SPEC QL IA: NEGATIVE
FLUBV AG SPEC QL IA: NEGATIVE

## 2022-01-07 PROCEDURE — U0005 INFEC AGEN DETEC AMPLI PROBE: HCPCS | Performed by: PHYSICIAN ASSISTANT

## 2022-01-07 PROCEDURE — 99215 OFFICE O/P EST HI 40 MIN: CPT | Performed by: PHYSICIAN ASSISTANT

## 2022-01-07 PROCEDURE — 71046 X-RAY EXAM CHEST 2 VIEWS: CPT | Performed by: RADIOLOGY

## 2022-01-07 PROCEDURE — 87804 INFLUENZA ASSAY W/OPTIC: CPT | Performed by: PHYSICIAN ASSISTANT

## 2022-01-07 PROCEDURE — U0003 INFECTIOUS AGENT DETECTION BY NUCLEIC ACID (DNA OR RNA); SEVERE ACUTE RESPIRATORY SYNDROME CORONAVIRUS 2 (SARS-COV-2) (CORONAVIRUS DISEASE [COVID-19]), AMPLIFIED PROBE TECHNIQUE, MAKING USE OF HIGH THROUGHPUT TECHNOLOGIES AS DESCRIBED BY CMS-2020-01-R: HCPCS | Performed by: PHYSICIAN ASSISTANT

## 2022-01-07 RX ORDER — AZITHROMYCIN 250 MG/1
TABLET, FILM COATED ORAL
Qty: 6 TABLET | Refills: 0 | Status: SHIPPED | OUTPATIENT
Start: 2022-01-07 | End: 2022-01-12

## 2022-01-07 ASSESSMENT — MIFFLIN-ST. JEOR: SCORE: 1071.11

## 2022-01-07 ASSESSMENT — PAIN SCALES - GENERAL: PAINLEVEL: NO PAIN (0)

## 2022-01-07 NOTE — PATIENT INSTRUCTIONS
Trae Holman,    Thank you for allowing [facility] to manage your care.    I am unsure of the cause of your symptoms, but we must assume that this is COVID until proven otherwise. Even if your initial COVID test is negative, consider getting repeat testing 2-3 days later as the initial test could be falsely negative. I will order this for you and we will call you to schedule if needed.    If you develop worsening/changing symptoms at any time, please go to the emergency department for evaluation.    I ordered some xrays, please go to our radiology department to get your xrays.    Please allow 1-2 business days for our office to contact you in regards to your laboratory/radiological studies.  If not done so, I encourage you to login into [facility] ([email or web address] to review your results as well.     Drink 8-10 glasses of fluid daily to stay well-hydrated.    If you have any questions or concerns, please feel free to call us at [phone]    Sincerely,    Samir Case PA-C    Did you know?      You can schedule a video visit for follow-up appointments as well as future appointments for certain conditions.  Please see the below link.     [email or web address]    If you have not already done so,  I encourage you to sign up for [employer] ([email or web address]).  This will allow you to review your results, securely communicate with a provider, and schedule virtual visits as well.      Patient Education   After Your COVID-19 (Coronavirus) Test  You have been tested for COVID-19 (coronavirus).   If you'll have surgery in the next few days, we'll let you know ahead of time if you have the virus. Please call your surgeon's office with any questions.  For all other patients: Results are usually available in [employer] within 2 to 3 days.   If you do not have a [employer] account, you'll get a letter in the mail in about 7 to 10 days.   [employer] is often the  "fastest way to get test results. Please sign up if you do not already have a [employer] account. See the handout Getting COVID-19 Test Results in [employer] for help.  What if my test result is positive?  If your test is positive and you have not viewed your result in [employer], you'll get a phone call with your result. (A positive test means that you have the virus.)     Follow the tips under \"How do I self-isolate?\" below for 10 days (20 days if you have a weak immune system).    You don't need to be retested for COVID-19 before going back to school or work. As long as you're fever-free and feeling better, you can go back to school, work and other activities after waiting the 10 or 20 days.  What if I have questions after I get my results?  If you have questions about your results, please visit our testing website at [email or web address].   After 7 to 10 days, if you have not gotten your results:     Call [phone] ([phone]) and ask to speak with our COVID-19 results team.    If you're being treated at an infusion center: Call your infusion center directly.  What are the symptoms of COVID-19?  Cough, fever and trouble breathing are the most common signs of COVID-19.  Other symptoms can include new headaches, new muscle or body aches, new and unexplained fatigue (feeling very tired), chills, sore throat, congestion (stuffy or runny nose), diarrhea (loose poop), loss of taste or smell, belly pain, and nausea or vomiting (feeling sick to your stomach or throwing up).  You may already have symptoms of COVID-19, or they may show up later.  What should I do if I have symptoms?  If you're having surgery: Call your surgeon's office.  For all other patients: Stay home and away from others (self-isolate) until ...    You've had no fever--and no medicine that reduces fever--for 1 full day (24 hours), AND    Other symptoms have gotten better. For example, your cough or breathing has improved, " "AND    At least 10 days have passed since your symptoms first started.  How do I self-isolate?    Stay in your own room, even for meals. Use your own bathroom if you can.    Stay away from others in your home. No hugging, kissing or shaking hands. No visitors.    Don't go to work, school or anywhere else.    Clean \"high touch\" surfaces often (doorknobs, counters, handles). Use household cleaning spray or wipes. You'll find a full list of  on the EPA website: www.epa.gov/pesticide-registration/list-n-disinfectants-use-against-sars-cov-2.    Cover your mouth and nose with a mask or other face covering to avoid spreading germs.    Wash your hands and face often. Use soap and water.    Caregivers in these groups are at risk for severe illness due to COVID-19:  ? People 65 years and older  ? People who live in a nursing home or long-term care facility  ? People with chronic disease (lung, heart, cancer, diabetes, kidney, liver, immunologic)  ? People who have a weakened immune system, including those who:    Are in cancer treatment    Take medicine that weakens the immune system, such as corticosteroids    Had a bone marrow or organ transplant    Have an immune deficiency    Have poorly controlled HIV or AIDS    Are obese (body mass index of 40 or higher)    Smoke regularly    Caregivers should wear gloves while washing dishes, handling laundry and cleaning bedrooms and bathrooms.    Use caution when washing and drying laundry: Don't shake dirty laundry and use the warmest water setting that you can.    For more tips on managing your health at home, go to www.cdc.gov/coronavirus/2019-ncov/downloads/10Things.pdf.  How can I take care of myself at home?  1. Get lots of rest. Drink extra fluids (unless a doctor has told you not to).  2. Take Tylenol (acetaminophen) for fever or pain. If you have liver or kidney problems, ask your family doctor if it's OK to take Tylenol.   Adults can take either:  ? 650 mg (two 325 " mg pills) every 4 to 6 hours, or   ? 1,000 mg (two 500 mg pills) every 8 hours as needed.  ? Note: Don't take more than 3,000 mg in one day. Acetaminophen is found in many medicines (both prescribed and over-the-counter medicines). Read all labels to be sure you don't take too much.   For children, check the Tylenol bottle for the right dose. The dose is based on the child's age or weight.  3. If you have other health problems (like cancer, heart failure, an organ transplant or severe kidney disease): Call your specialty clinic if you don't feel better in the next 2 days.  4. Know when to call 911. Emergency warning signs include:  ? Trouble breathing or shortness of breath  ? Chest pain or pressure that doesn't go away  ? Feeling confused like you haven't felt before, or not being able to wake up  ? Bluish-colored lips or face  5. If your doctor prescribed a blood thinner medicine: Follow their instructions.  Where can I get more information?    [facility] - About COVID-19:   [email or web address]    CDC - If You're Sick: cdc.gov/coronavirus/2019-ncov/about/steps-when-sick.html    CDC - Ending Home Isolation: www.cdc.gov/coronavirus/2019-ncov/hcp/disposition-in-home-patients.html    CDC - Caring for Someone: www.cdc.gov/coronavirus/2019-ncov/if-you-are-sick/care-for-someone.html    [facility] - Interim Guidance for Hospital Discharge to Home: www.health.[facility].[place]/diseases/coronavirus/hcp/hospdischarge.pdf    [facility] clinical trials (COVID-19 research studies): clinicalaffairs.[email or web address]-clinical-trials    Below are the COVID-19 hotlines at the [facility] ([facility]). Interpreters are available.  ? For health questions: Call 141-876-1011 or [phone] (7 a.m. to 7 p.m.)  ? For questions about schools and childcare: Call 853-057-0038 or 1-898.906.7719 (7 a.m. to 7 p.m.)    For informational purposes only. Not to replace the advice of your health care provider. Clinically  reviewed by Infection Prevention and the [facility] COVID-19 Clinical Team. Copyright   2020 [facility]. All rights reserved. [employer] [id number] - Rev 11/11/20.       If you have been exposed to COVID-19 or test positive for COVID and are within 10 days of the start of your symptoms, you may be eligible for Monoclonal antibody infusion via the [place] [employer] (MNRAP) system      [email or web address]://[email or web address].[email or web address]/mnrap - Providers, patients or someone helping may complete this screening tool. [facility] offers this lottery process to refer patients for COVID-19 monoclonal antibody therapeutic REGEN-COV (casirivimab and imdevimab) including post-exposure prophylaxis. Referrals are provisional, and final clinical judgment remains with the infusion site to determine eligibility and scheduling.      REGEN-COV is authorized to treat non-hospitalized patients with mild to moderate COVID-19 in adult and pediatric patients, age 12 and older, with positive results of direct SARS-CoV-2 viral testing, and who are at high risk for progression to severe COVID 19, including hospitalization or death. For more detail, refer to the Health Advisory: REGEN-COV Approved for Post-Exposure Prophylactic Use (PDF) and   Fact Sheet for Health Care Providers Emergency Use Authorization of REGEN-COV  (casirivimab and imdevimab) (PDF).

## 2022-01-08 LAB — SARS-COV-2 RNA RESP QL NAA+PROBE: NEGATIVE

## 2022-05-09 ENCOUNTER — ANCILLARY PROCEDURE (OUTPATIENT)
Dept: CT IMAGING | Facility: CLINIC | Age: 72
End: 2022-05-09
Attending: CLINICAL NURSE SPECIALIST
Payer: COMMERCIAL

## 2022-05-09 DIAGNOSIS — C34.31 CANCER OF LOWER LOBE OF RIGHT LUNG (H): ICD-10-CM

## 2022-05-09 PROCEDURE — 71250 CT THORAX DX C-: CPT | Mod: TC | Performed by: RADIOLOGY

## 2022-05-11 ENCOUNTER — VIRTUAL VISIT (OUTPATIENT)
Dept: SURGERY | Facility: CLINIC | Age: 72
End: 2022-05-11
Attending: CLINICAL NURSE SPECIALIST
Payer: COMMERCIAL

## 2022-05-11 DIAGNOSIS — C34.31 CANCER OF LOWER LOBE OF RIGHT LUNG (H): ICD-10-CM

## 2022-05-11 DIAGNOSIS — R07.89 RIGHT-SIDED CHEST WALL PAIN: Primary | ICD-10-CM

## 2022-05-11 PROCEDURE — 99213 OFFICE O/P EST LOW 20 MIN: CPT | Mod: 95 | Performed by: CLINICAL NURSE SPECIALIST

## 2022-05-11 NOTE — PROGRESS NOTES
Manda is a 72 year old who is being evaluated via a billable telephone visit.      What phone number would you like to be contacted at? [phone]   How would you like to obtain your AVS? Victoriano  Phone call duration: 20 minutes    Kam Ramírez    THORACIC SURGERY FOLLOW UP VISIT    DIAGNOSIS   Right lower lobe lung cancer      PROCEDURE   5/10/21 Uniportal VATS RLL wedge, completion lobectomy, conversion to thoracotomy with middle and lower bilobectomy, intercostal nerve cryoablation.      COMPLICATIONS  Intraoperative bronchus intermedius injury requiring bilobectomy.      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 R0 resection with 5 cm margins.   [id number], stage IA2.         INTERVAL STUDIES  IMPRESSION:   1.  Stable exam without evidence for new disease.  2.  Stable bilateral pulmonary nodules.  3.  Linear nodular opacity at the right lung base as previously  described is slightly smaller.  4.  Emphysema.  5.  Bilateral stable hypodense adrenal nodules suggestive for  adenomas.    SUBJECTIVE   I haven't had any new respiratory issues but I still am having ongoing, 24/7 pain that has never gone away.   The gabapentin didn't help.   It feels like a constant side-ache from the bottom right rib cage over to my right breast.   I just keep living my life and try to not have it limit me, but it is exhausting to always have pain.    From a personal perspective, Manda continues to work for the [employer].    IMPRESSION (R07.89) Right-sided chest wall pain  (primary encounter diagnosis)    Plan: Physical Therapy Referral            (C34.31) Cancer of lower lobe of right lung (H)  Plan: Physical Therapy Referral, CT Chest w/o         contrast          I spoke by telephone to Manda today.   We discussed her CT findings and need for another surveillance CT in 6 months again.   I offered her a referral to the chronic pain clinic but she declined.   I then offered a PT referral to be assessed for myofascial  release to see if this may give her some relief.   She agreed to try that.    PLAN  I spent 20 min on the date of the encounter in chart review, patient visit, review of tests, documentation and/or discussion with other providers about the issues documented above. I reviewed the plan as follows:  Call with any new concerns or questions  1. Necessary Tests & Appointments: Chest CT every 6 months x twice more, then annually.  2. Pain Control Plan: Stopped gabapentin, per patient.    Declined referral to pain clinic  All questions were answered and the patient and present family were in agreement with the plan.  I appreciate the opportunity to participate in the care of your patient and will keep you updated.  Sincerely,  RENETTA White, CNS

## 2022-05-11 NOTE — LETTER
5/11/2022     RE: Reanna Kumar  [address] [facility]  [place] [address]    Dear Colleague,    Thank you for referring your patient, Reanna Kumar, to the [facility]. Please see a copy of my visit note below.    Manda is a 72 year old who is being evaluated via a billable telephone visit.      What phone number would you like to be contacted at? [phone]   How would you like to obtain your AVS? [employer]  Phone call duration: 20 minutes    Kam Ramírez    THORACIC SURGERY FOLLOW UP VISIT    DIAGNOSIS   Right lower lobe lung cancer      PROCEDURE   5/10/21 Uniportal VATS RLL wedge, completion lobectomy, conversion to thoracotomy with middle and lower bilobectomy, intercostal nerve cryoablation.      COMPLICATIONS  Intraoperative bronchus intermedius injury requiring bilobectomy.      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 R0 resection with 5 cm margins.   [id number], stage IA2.         INTERVAL STUDIES  IMPRESSION:   1.  Stable exam without evidence for new disease.  2.  Stable bilateral pulmonary nodules.  3.  Linear nodular opacity at the right lung base as previously  described is slightly smaller.  4.  Emphysema.  5.  Bilateral stable hypodense adrenal nodules suggestive for  adenomas.    SUBJECTIVE   I haven't had any new respiratory issues but I still am having ongoing, 24/7 pain that has never gone away.   The gabapentin didn't help.   It feels like a constant side-ache from the bottom right rib cage over to my right breast.   I just keep living my life and try to not have it limit me, but it is exhausting to always have pain.    From a personal perspective, Manda continues to work for the [employer].    IMPRESSION (R07.89) Right-sided chest wall pain  (primary encounter diagnosis)    Plan: Physical Therapy Referral          (C34.31) Cancer of lower lobe of right lung (H)  Plan: Physical Therapy Referral, CT Chest w/o         contrast        I spoke by telephone  to Manda today.   We discussed her CT findings and need for another surveillance CT in 6 months again.   I offered her a referral to the chronic pain clinic but she declined.   I then offered a PT referral to be assessed for myofascial release to see if this may give her some relief.   She agreed to try that.    PLAN  I spent 20 min on the date of the encounter in chart review, patient visit, review of tests, documentation and/or discussion with other providers about the issues documented above. I reviewed the plan as follows:  Call with any new concerns or questions  1. Necessary Tests & Appointments: Chest CT every 6 months x twice more, then annually.  2. Pain Control Plan: Stopped gabapentin, per patient.    Declined referral to pain clinic  All questions were answered and the patient and present family were in agreement with the plan.  I appreciate the opportunity to participate in the care of your patient and will keep you updated.  Sincerely,  RENETTA White, CNS

## 2022-05-17 ENCOUNTER — OFFICE VISIT (OUTPATIENT)
Dept: OPTOMETRY | Facility: CLINIC | Age: 72
End: 2022-05-17
Payer: COMMERCIAL

## 2022-05-17 DIAGNOSIS — H52.01 HYPERMETROPIA, RIGHT: ICD-10-CM

## 2022-05-17 DIAGNOSIS — H52.12 MYOPIA, LEFT: ICD-10-CM

## 2022-05-17 DIAGNOSIS — H52.223 REGULAR ASTIGMATISM OF BOTH EYES: ICD-10-CM

## 2022-05-17 DIAGNOSIS — H52.4 PRESBYOPIA: ICD-10-CM

## 2022-05-17 DIAGNOSIS — Z01.01 ENCOUNTER FOR EXAMINATION OF EYES AND VISION WITH ABNORMAL FINDINGS: Primary | ICD-10-CM

## 2022-05-17 DIAGNOSIS — H25.13 NUCLEAR AGE-RELATED CATARACT, BOTH EYES: ICD-10-CM

## 2022-05-17 PROCEDURE — 92015 DETERMINE REFRACTIVE STATE: CPT | Performed by: OPTOMETRIST

## 2022-05-17 PROCEDURE — 92004 COMPRE OPH EXAM NEW PT 1/>: CPT | Performed by: OPTOMETRIST

## 2022-05-17 ASSESSMENT — REFRACTION_WEARINGRX
OS_SPHERE: +2.50
OD_CYLINDER: SPHERE
SPECS_TYPE: OTC CHEATERS
OS_CYLINDER: SPHERE
OD_SPHERE: +2.50

## 2022-05-17 ASSESSMENT — REFRACTION_MANIFEST
OD_SPHERE: +0.50
OS_CYLINDER: +1.00
OS_SPHERE: -1.00
OS_CYLINDER: +2.00
OD_ADD: +2.50
OD_CYLINDER: +1.00
OS_SPHERE: -1.00
OS_ADD: +2.50
METHOD_AUTOREFRACTION: 1
OS_AXIS: 177
OD_AXIS: 170
OS_AXIS: 180

## 2022-05-17 ASSESSMENT — EXTERNAL EXAM - LEFT EYE: OS_EXAM: NORMAL

## 2022-05-17 ASSESSMENT — CUP TO DISC RATIO
OS_RATIO: 0.3
OD_RATIO: 0.3

## 2022-05-17 ASSESSMENT — KERATOMETRY
OS_AXISANGLE_DEGREES: 161
OS_AXISANGLE2_DEGREES: 071
OD_K1POWER_DIOPTERS: 43.75
OD_K2POWER_DIOPTERS: 45.50
OD_AXISANGLE_DEGREES: 178
METHOD_AUTO_MANUAL: AUTOMATED
OS_K2POWER_DIOPTERS: 46.25
OS_K1POWER_DIOPTERS: 45.75
OD_AXISANGLE2_DEGREES: 088

## 2022-05-17 ASSESSMENT — VISUAL ACUITY
OD_SC: 20/80+2
CORRECTION_TYPE: GLASSES
METHOD: SNELLEN - LINEAR
OS_SC: 20/80+2
OS_SC: 20/40
OD_SC+: -2
OS_CC: 20/30-1
OD_CC: 20/60-1
OS_SC+: -2
OD_SC: 20/40

## 2022-05-17 ASSESSMENT — TONOMETRY
OD_IOP_MMHG: 14
OS_IOP_MMHG: 16
IOP_METHOD: APPLANATION

## 2022-05-17 ASSESSMENT — EXTERNAL EXAM - RIGHT EYE: OD_EXAM: NORMAL

## 2022-05-17 ASSESSMENT — CONF VISUAL FIELD
OS_NORMAL: 1
METHOD: COUNTING FINGERS
OD_NORMAL: 1

## 2022-05-17 NOTE — LETTER
5/17/2022         RE: Reanna Kumar  [address] [place] [address]        Dear Colleague,    Thank you for referring your patient, Reanna Kumar, to the [facility]. Please see a copy of my visit note below.    Chief Complaint   Patient presents with     Annual Eye Exam         Last Eye Exam: 20+ years   Dilated Previously: Unsure, side effects of dilation explained today    What are you currently using to see?  +2.75 or +3.00 readers        Distance Vision Acuity: Satisfied with vision - noticing some trouble driving at night    Near Vision Acuity: Satisfied with vision while reading  with readers - uses computer without readers    Eye Comfort: good  Do you use eye drops? 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       OBJECTIVE: See Ophthalmology exam    ASSESSMENT:    ICD-10-CM    1. Encounter for examination of eyes and vision with abnormal findings  Z01.01    2. Nuclear age-related cataract, both eyes  H25.13    3. Hypermetropia, right  H52.01    4. Myopia, left  H52.12    5. Regular astigmatism of both eyes  H52.223    6. Presbyopia  H52.4        PLAN:     Patient Instructions   You have the start of mild cataracts.  You may notice some blurred vision or glare with night driving.  It is important that you wear good sunglasses to protect your eyes from the ultraviolet light from the sun.     Continue use of OTC reading glasses as needed.     Return in 1 year for a comprehensive eye exam, or sooner if needed.      The effects of the dilating drops last for 4- 6 hours.  You will be more sensitive to light and vision will be blurry up close.  Mydriatic sunglasses were given if needed.     Chilango Olivarez, OD  [facility]  [address] [place] [address]. [place]  [id number] [phone]            Again, thank you for allowing me to participate  in the care of your patient.        Sincerely,        Chilango Olivarez, OD

## 2022-05-17 NOTE — PROGRESS NOTES
Chief Complaint   Patient presents with     Annual Eye Exam         Last Eye Exam: 20+ years   Dilated Previously: Unsure, side effects of dilation explained today    What are you currently using to see?  +2.75 or +3.00 readers        Distance Vision Acuity: Satisfied with vision - noticing some trouble driving at night    Near Vision Acuity: Satisfied with vision while reading  with readers - uses computer without readers    Eye Comfort: good  Do you use eye drops? 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       OBJECTIVE: See Ophthalmology exam    ASSESSMENT:    ICD-10-CM    1. Encounter for examination of eyes and vision with abnormal findings  Z01.01    2. Nuclear age-related cataract, both eyes  H25.13    3. Hypermetropia, right  H52.01    4. Myopia, left  H52.12    5. Regular astigmatism of both eyes  H52.223    6. Presbyopia  H52.4        PLAN:     Patient Instructions   You have the start of mild cataracts.  You may notice some blurred vision or glare with night driving.  It is important that you wear good sunglasses to protect your eyes from the ultraviolet light from the sun.     Continue use of OTC reading glasses as needed.     Return in 1 year for a comprehensive eye exam, or sooner if needed.      The effects of the dilating drops last for 4- 6 hours.  You will be more sensitive to light and vision will be blurry up close.  Mydriatic sunglasses were given if needed.     Chilango Olivarez, OD  [address]  [place]  [address]    [phone]

## 2022-05-17 NOTE — PATIENT INSTRUCTIONS
You have the start of mild cataracts.  You may notice some blurred vision or glare with night driving.  It is important that you wear good sunglasses to protect your eyes from the ultraviolet light from the sun.     Continue use of OTC reading glasses as needed.     Return in 1 year for a comprehensive eye exam, or sooner if needed.      The effects of the dilating drops last for 4- 6 hours.  You will be more sensitive to light and vision will be blurry up close.  Mydriatic sunglasses were given if needed.     Chilango Olivarez, OD  [facility] [place]  [place]  [address]    [phone]

## 2022-05-18 ENCOUNTER — THERAPY VISIT (OUTPATIENT)
Dept: PHYSICAL THERAPY | Facility: CLINIC | Age: 72
End: 2022-05-18
Attending: CLINICAL NURSE SPECIALIST
Payer: COMMERCIAL

## 2022-05-18 DIAGNOSIS — C34.31 CANCER OF LOWER LOBE OF RIGHT LUNG (H): ICD-10-CM

## 2022-05-18 DIAGNOSIS — R07.89 RIGHT-SIDED CHEST WALL PAIN: ICD-10-CM

## 2022-05-18 DIAGNOSIS — R07.81 RIB PAIN ON RIGHT SIDE: Primary | ICD-10-CM

## 2022-05-18 PROCEDURE — 97161 PT EVAL LOW COMPLEX 20 MIN: CPT | Mod: GP

## 2022-05-18 PROCEDURE — 97530 THERAPEUTIC ACTIVITIES: CPT | Mod: GP

## 2022-05-18 PROCEDURE — 97110 THERAPEUTIC EXERCISES: CPT | Mod: GP

## 2022-05-18 NOTE — PROGRESS NOTES
[facility]    OUTPATIENT Physical Therapy ORTHOPEDIC EVALUATION  PLAN OF TREATMENT FOR OUTPATIENT REHABILITATION  (COMPLETE FOR INITIAL CLAIMS ONLY)  Patient's Last Name, First Name, M.I.  YOB: 1950  Reanna Kumar    Provider s Name:  [facility]   Medical Record No.  [id number]   Start of Care Date:  05/18/22   Onset Date:  05/10/21    Type:     _X__PT   ___OT Medical Diagnosis:    Encounter Diagnoses   Name Primary?     Cancer of lower lobe of right lung (H)      Right-sided chest wall pain      Rib pain on right side Yes        Treatment Diagnosis:  Chest wall & rib pain s/p lung surgery        Goals:     05/18/22 0500   Body Part   Goals listed below are for Chest & rib pain   Goal #1   Goal #1 reaching   Previous Functional Level No restrictions   Current Functional Level Cannot reach ;behind back   Performance level without anterior chest pain up to 5/10   STG Target Performance Reach ;behind back   Performance level <3/10 pain   Rationale for independent personal hygiene;for dressing   Due date 06/15/22   LTG Target Performance Reach;behind back   Performance Level not limited by pain   Rationale for independent personal hygiene;for dressing   Due date 06/29/22   Goal #2   Goal #2 posture/body mechanics   Previous Functional Level Patient reports fair posture and proper body mechanics   Current Functional Level Poor posture/body mechanics   LTG Target Performance Improve patient awareness to maintain optimum posture the following percentage of time;Patient able to demonstrate good posture and body mechanics without prompting   Performance Level 75% of the time   Rationale to prevent neck/back pain and avoid injury when lifting/carrying and performing tasks requiring bending   Due Date 06/29/22       Therapy Frequency:  1x/week  Predicted Duration of Therapy  Intervention:  4-6 weeks    Maribel Louis PT                 I CERTIFY THE NEED FOR THESE SERVICES FURNISHED UNDER        THIS PLAN OF TREATMENT AND WHILE UNDER MY CARE     (Physician co-signature of this document indicates review and certification of the therapy plan).                     Certification Date From:  05/18/22   Certification Date To:  07/13/22    Referring Provider:  Cristiane Rhodes    Initial Assessment        See Epic Evaluation SOC Date: 05/18/22

## 2022-05-18 NOTE — PROGRESS NOTES
"Physical Therapy Initial Evaluation  5/18/2022     KEY PT FINDINGS:  1) Increased thoracic kyphosis and hypomobile thoracic mobility in all planes  2) Reduced pain with posture correction  3) Accessory muscle breathing    Therapist Impression: Reanna is a 72 year old year old female referred to physical therapy by RENETTA White for treatment of chest wall pain s/p lung surgery. Patient presents to physical therapy with c/o unilateral chest, rib & back pain following lobectomy/thoracotomy 1 year ago. Subjective history and objective findings are consistent with post-surgical musculoskeletal pain. Due to these impairments, patient is unable to reach behind her body, sit, stand, walk or work without pain. Patient will benefit from skilled PT to address impairments/limitations in order to reach patient's goals, facilitate return to prior level of function, and maximize participation.    Precautions/Restrictions/MD instructions: E&T. \"Persistent right side pain radiates to under right breast, since lung surgery May 2021-   Evaluate for possible release\"     Injury/Condition Details:  Presenting Complaint Right-sided chest wall pain   Onset Timing/Date 1 year ago  DOS 05/10/2021   Mechanism DIAGNOSIS   Right lower lobe lung cancer   PROCEDURE   5/10/21 Uniportal VATS RLL wedge, completion lobectomy, conversion to thoracotomy with middle and lower bilobectomy, intercostal nerve cryoablation.      Symptom Behavior Details    Primary Symptoms Right chest wall: from anterior chest lower ribs, wraps around laterally to the back, deep to breast/pecs  -pain, pressure, a little numb feeling  -tolerable but its always there  -like a girdle is too tight   Worst Pain 3/10 (constantly)   Symptom Provocators Sitting, working, reaching behind body (as in wiping backside)   Best Pain 2/10    Symptom Relievers Laying down flat; sleeping  Sitting upright when driving 'unloads the pressure'   Time of day dependent? No "   Recent symptom change? no change in symptoms      Prior Testing/Intervention for current condition:  Prior Tests CT scan - see EMR   Prior Treatment Medication - gabapentin, muscle relaxers (not much improvement with either)     Lifestyle & General Medical History:  Employment HS athletic   -computer work, walking around the school   Usual physical activities  (within past year) Walking a few times per week  No regular exercise routine outside of ADLs  Fully active & independent with housework   Orthopaedic history Bilateral ANJALI 2008 & 2020   Notable medical history See Epic Chart  -emphysema, lung cancer  -smoking   Patient Reported Health good     THORACIC OBJECTIVE:    Posture: Poor, exaggerated kyphosis. Rounded shoulders. Forward head.  Posture Correction: improves symptoms 'less pressure'  Left sidelying reduces baseline pain 'feels like it releases'    Thoracic Spine AROM with pt seated: (Major, Moderate, Minimal or Nil loss)  Movement Loss Ivan Mod Min Nil Pain   Flexion    X Reduces baseline pain posteriorly.   Extension X    No change. Unable to get past neutral ext   Left Rotation  X   No change. Compensates into L SB   Right Rotation  X   No change.   Left Side Bending  X   Hinge at TL junction   Right Side bending  X   Hinge at TL junction     Thoracic Spine Joint Mobility (spring testing): hypomobile PA mobs T4-T10    Rib mobility: hypomobile lateral excursion ribs ~6-8 on R vs. L with inhalation.  -tendency to breathe with accessory muscles    Tender to palpation at the following structures: R rib angles & lateral ribs 7-9, directly on incision  NOT tender to palpation at the following structures: scapular muscles, sternum, lateral ribs 4-6     ASSESSMENT/PLAN:  Patient is a 72 year old female with thoracic complaints.    Patient has the following significant findings with corresponding treatment plan.                Diagnosis 1:  Chest pain s/p lobectomy & thoracotomy    Pain -   hot/cold therapy, US, electric stimulation, mechanical traction, manual therapy, splint/taping/bracing/orthotics, self management, education and home program  Decreased ROM/flexibility - manual therapy, therapeutic exercise, therapeutic activity and home program  Decreased joint mobility - manual therapy, therapeutic exercise, therapeutic activity and home program  Decreased strength - therapeutic exercise, therapeutic activities and home program  Impaired muscle performance - electric stimulation, neuro re-education and home program  Decreased function - therapeutic activities and home program  Impaired posture - neuro re-education, therapeutic activities and home program    Therapy Evaluation Codes:   1) History comprised of:   Personal factors that impact the plan of care:      Time since onset of symptoms.    Comorbidity factors that impact the plan of care are:      Chest pain, Emphysema and Smoking.     Medications impacting care: Sleep.  2) Examination of Body Systems comprised of:   Body structures and functions that impact the plan of care:      Thoracic Spine and Ribs.   Activity limitations that impact the plan of care are:      Bending, Dressing, Reading/Computer work, Sitting, Standing, Walking and Working.  3) Clinical presentation characteristics are:   Stable/Uncomplicated.  4) Decision-Making    Low complexity using standardized patient assessment instrument and/or measureable assessment of functional outcome.  Cumulative Therapy Evaluation is: Low complexity.    Previous and current functional limitations:  (See Goal Flow Sheet for this information)    Short term and Long term goals: (See Goal Flow Sheet for this information)     Communication ability:  Patient appears to be able to clearly communicate and understand verbal and written communication and follow directions correctly.  Treatment Explanation - The following has been discussed with the patient:   RX ordered/plan of care  Anticipated  outcomes  Possible risks and side effects  This patient would benefit from PT intervention to resume normal activities.   Rehab potential is good.    Frequency:  1 X week, once daily  Duration:  for 4-6 weeks  Discharge Plan:  Achieve all LTG.  Independent in home treatment program.  Return to previous functional level by discharge.  Reach maximal therapeutic benefit.    Please refer to the daily flowsheet for treatment today, total treatment time and time spent performing 1:1 timed codes.

## 2022-06-01 ENCOUNTER — THERAPY VISIT (OUTPATIENT)
Dept: PHYSICAL THERAPY | Facility: CLINIC | Age: 72
End: 2022-06-01
Payer: COMMERCIAL

## 2022-06-01 DIAGNOSIS — R07.89 RIGHT-SIDED CHEST WALL PAIN: Primary | ICD-10-CM

## 2022-06-01 DIAGNOSIS — R07.81 RIB PAIN ON RIGHT SIDE: ICD-10-CM

## 2022-06-01 PROCEDURE — 97140 MANUAL THERAPY 1/> REGIONS: CPT | Mod: GP

## 2022-06-01 PROCEDURE — 97110 THERAPEUTIC EXERCISES: CPT | Mod: GP

## 2022-06-08 ENCOUNTER — THERAPY VISIT (OUTPATIENT)
Dept: PHYSICAL THERAPY | Facility: CLINIC | Age: 72
End: 2022-06-08
Payer: COMMERCIAL

## 2022-06-08 DIAGNOSIS — R07.81 RIB PAIN ON RIGHT SIDE: ICD-10-CM

## 2022-06-08 DIAGNOSIS — C34.31 CANCER OF LOWER LOBE OF RIGHT LUNG (H): ICD-10-CM

## 2022-06-08 DIAGNOSIS — R07.89 RIGHT-SIDED CHEST WALL PAIN: Primary | ICD-10-CM

## 2022-06-08 PROCEDURE — 97110 THERAPEUTIC EXERCISES: CPT | Mod: GP

## 2022-06-08 PROCEDURE — 97140 MANUAL THERAPY 1/> REGIONS: CPT | Mod: GP

## 2022-06-08 PROCEDURE — 97530 THERAPEUTIC ACTIVITIES: CPT | Mod: GP

## 2022-06-15 ENCOUNTER — THERAPY VISIT (OUTPATIENT)
Dept: PHYSICAL THERAPY | Facility: CLINIC | Age: 72
End: 2022-06-15
Payer: COMMERCIAL

## 2022-06-15 DIAGNOSIS — R07.81 RIB PAIN ON RIGHT SIDE: ICD-10-CM

## 2022-06-15 DIAGNOSIS — R07.89 RIGHT-SIDED CHEST WALL PAIN: Primary | ICD-10-CM

## 2022-06-15 DIAGNOSIS — C34.31 CANCER OF LOWER LOBE OF RIGHT LUNG (H): ICD-10-CM

## 2022-06-15 PROCEDURE — 97140 MANUAL THERAPY 1/> REGIONS: CPT | Mod: GP

## 2022-06-15 PROCEDURE — 97110 THERAPEUTIC EXERCISES: CPT | Mod: GP

## 2022-10-10 ENCOUNTER — OFFICE VISIT (OUTPATIENT)
Dept: FAMILY MEDICINE | Facility: CLINIC | Age: 72
End: 2022-10-10
Payer: COMMERCIAL

## 2022-10-10 VITALS
SYSTOLIC BLOOD PRESSURE: 144 MMHG | OXYGEN SATURATION: 95 % | WEIGHT: 133.8 LBS | TEMPERATURE: 98.3 F | DIASTOLIC BLOOD PRESSURE: 84 MMHG | BODY MASS INDEX: 25.07 KG/M2 | HEART RATE: 107 BPM | RESPIRATION RATE: 14 BRPM

## 2022-10-10 DIAGNOSIS — R26.89 IMBALANCE: ICD-10-CM

## 2022-10-10 DIAGNOSIS — R55 SYNCOPE, UNSPECIFIED SYNCOPE TYPE: Primary | ICD-10-CM

## 2022-10-10 LAB
ERYTHROCYTE [DISTWIDTH] IN BLOOD BY AUTOMATED COUNT: 12.5 % (ref 10–15)
HCT VFR BLD AUTO: 44.8 % (ref 35–47)
HGB BLD-MCNC: 14.9 G/DL (ref 11.7–15.7)
MCH RBC QN AUTO: 33.4 PG (ref 26.5–33)
MCHC RBC AUTO-ENTMCNC: 33.3 G/DL (ref 31.5–36.5)
MCV RBC AUTO: 100 FL (ref 78–100)
PLATELET # BLD AUTO: 343 10E3/UL (ref 150–450)
RBC # BLD AUTO: 4.46 10E6/UL (ref 3.8–5.2)
WBC # BLD AUTO: 6.6 10E3/UL (ref 4–11)

## 2022-10-10 PROCEDURE — 36415 COLL VENOUS BLD VENIPUNCTURE: CPT | Performed by: PHYSICIAN ASSISTANT

## 2022-10-10 PROCEDURE — 85027 COMPLETE CBC AUTOMATED: CPT | Performed by: PHYSICIAN ASSISTANT

## 2022-10-10 PROCEDURE — 80048 BASIC METABOLIC PNL TOTAL CA: CPT | Performed by: PHYSICIAN ASSISTANT

## 2022-10-10 PROCEDURE — 99214 OFFICE O/P EST MOD 30 MIN: CPT | Performed by: PHYSICIAN ASSISTANT

## 2022-10-10 PROCEDURE — 93000 ELECTROCARDIOGRAM COMPLETE: CPT | Performed by: PHYSICIAN ASSISTANT

## 2022-10-10 PROCEDURE — 83735 ASSAY OF MAGNESIUM: CPT | Performed by: PHYSICIAN ASSISTANT

## 2022-10-10 PROCEDURE — 85379 FIBRIN DEGRADATION QUANT: CPT | Performed by: PHYSICIAN ASSISTANT

## 2022-10-10 ASSESSMENT — PAIN SCALES - GENERAL: PAINLEVEL: NO PAIN (0)

## 2022-10-10 NOTE — PATIENT INSTRUCTIONS
Trae Holman,    Thank you for allowing [facility] to manage your care.    I am unsure of the cause of your symptoms, but your exam is reassuring. We will see what our workup shows.     If you develop worsening/changing symptoms at any time or you faint again, please call 911 or go to the emergency department for evaluation.    I ordered some lab work, please go to the laboratory to get your studies.    Please allow 1-2 business days for our office to contact you in regards to your laboratory/radiological studies.  If not done so, I encourage you to login into [facility] ([email or web address] to review your results as well.     Drink 8-10 glasses of fluid daily to stay well-hydrated.    If you have any questions or concerns, please feel free to call us at [phone]    Sincerely,    Samir Case PA-C    Did you know?      You can schedule a video visit for follow-up appointments as well as future appointments for certain conditions.  Please see the below link.     [email or web address]    If you have not already done so,  I encourage you to sign up for [employer] ([email or web address]).  This will allow you to review your results, securely communicate with a provider, and schedule virtual visits as well.

## 2022-10-10 NOTE — PROGRESS NOTES
Assessment & Plan   Problem List Items Addressed This Visit    None  Visit Diagnoses     Syncope, unspecified syncope type    -  Primary    Relevant Orders    EKG 12-lead complete w/read - Clinics (Completed)    Adult Leadless EKG Monitor 8 to 14 Days    CBC with platelets    Basic metabolic panel  (Ca, Cl, CO2, Creat, Gluc, K, Na, BUN)    Magnesium    D dimer, quantitative    Imbalance              Could be dysrrythmia given no significant pre-syncopal symptoms, however EKG without Brugada, heart block, or abnormalities in the rate, OK, or QT segments. No family history of sudden cardiac death.    Doubt AMI. No chest pain before or after the syncopal episode. EKG without acute ischemic changes.     Doubt blood loss. Hgb pending. Patient denies black or bloody stools.     Doubt seizure as there was no post-ictal period after regaining consciousness. Also, the patient did not have bowel or bladder incontinence and did not bite the tongue.     Doubt stroke as patient does not have focal weakness or speech difficulties after the syncope.     Highly unlikely that the syncope was caused by a ruptured AAA given that patient does not have a history AAA and no belly or back pain.     Highly unlikely that an aortic dissection is the cause of syncope as that patient has not had chest or back pain and pulses are symmetrical in all 4 extremities.     Dimer done given mild tachycardia, but she has no new chest discomfort or sob.    Plan to send home with ZioPatch, pushing of fluids, return precautions and follow up with her primary in 2 weeks, sooner prn.    Complete history and physical exam as below. AF with normal VS except for mild tachycardia and elevated bp, though she admit to being anxious.    DDx and Dx discussed with and explained to the pt to their satisfaction.  All questions were answered at this time. Pt expressed understanding of and agreement with this dx, tx, and plan. No further workup warranted and standard  "medication warnings given. I have given the patient a list of pertinent indications for re-evaluation. Will go to the Emergency Department if symptoms worsen or new concerning symptoms arise. Patient left in no apparent distress.     Ordering of each unique test  35 minutes spent on the date of the encounter doing chart review, history and exam, documentation and further activities per the note     Nicotine/Tobacco Cessation:  She reports that she has been smoking cigarettes. She started smoking about 55 years ago. She has a 50.00 pack-year smoking history. She has never used smokeless tobacco.  Nicotine/Tobacco Cessation Plan:     BMI:   Estimated body mass index is 25.07 kg/m  as calculated from the following:    Height as of 1/7/22: 1.556 m (5' 1.26\").    Weight as of this encounter: 60.7 kg (133 lb 12.8 oz).     See Patient Instructions    Return in about 2 weeks (around 10/24/2022) for your already scheduled appointment, or call 911/go to an ER anytime if worsening.    JO ANN May  [facility] SHAYLA Holman is a 72 year old presenting for the following health issues:  Fall      HPI     Possible syncopal event 10/7/22. No oral trauma or incontinence. No sz history. No new meds. Does not remember going down. No bloody or black stools, nausea or vomiting. She feels she did not get injured during the incident. Did not go to the ER. Doesn't know if she lost consciousness. Remembers being on the floor. Had a couple glasses of wine. No injury. Ever since her lung CA surgery in 5/2021 she gets light headed from time to time and feels. 98% on RA immediately after the incident.     Review of Systems   Constitutional, HEENT, cardiovascular, pulmonary, gi and gu systems are negative, except as otherwise noted.      Objective    BP (!) 144/84   Pulse 107   Temp 98.3  F (36.8  C) (Tympanic)   Resp 14   Wt 60.7 kg (133 lb 12.8 oz)   SpO2 95%   BMI 25.07 kg/m    Body mass index is " 25.07 kg/m .  Physical Exam  Vitals and nursing note reviewed.   Constitutional:       General: She is not in acute distress.     Appearance: She is not ill-appearing or diaphoretic.   HENT:      Head: Normocephalic and atraumatic.      Mouth/Throat:      Mouth: Mucous membranes are moist.   Eyes:      Conjunctiva/sclera: Conjunctivae normal.   Cardiovascular:      Rate and Rhythm: Regular rhythm. Tachycardia present.      Heart sounds: Normal heart sounds. No murmur heard.    No friction rub. No gallop.      Comments: 2+ symmetric radial/PT pulses. No LE edema or tenderness.  Pulmonary:      Effort: Pulmonary effort is normal. No respiratory distress.      Breath sounds: Normal breath sounds. No stridor. No wheezing, rhonchi or rales.   Abdominal:      General: Bowel sounds are normal. There is no distension.      Palpations: Abdomen is soft. There is no mass.      Tenderness: There is no abdominal tenderness. There is no guarding or rebound.      Hernia: No hernia is present.   Skin:     General: Skin is warm and dry.   Neurological:      General: No focal deficit present.      Mental Status: She is alert. Mental status is at baseline.   Psychiatric:         Mood and Affect: Mood normal.         Behavior: Behavior normal.       EKG - Reviewed and interpreted by me appears normal, NSR, normal axis, normal intervals, no acute ST/T changes c/w ischemia, no LVH by voltage criteria, unchanged from previous tracings  No results found for any visits on 10/10/22.    Patient was given brochure, Self-Placement Instruction, ZIO box with ZIO and LOG book. Lotus Rodriguez, CMA

## 2022-10-11 LAB
ANION GAP SERPL CALCULATED.3IONS-SCNC: <1 MMOL/L (ref 3–14)
BUN SERPL-MCNC: 12 MG/DL (ref 7–30)
CALCIUM SERPL-MCNC: 11.1 MG/DL (ref 8.5–10.1)
CHLORIDE BLD-SCNC: 106 MMOL/L (ref 94–109)
CO2 SERPL-SCNC: 32 MMOL/L (ref 20–32)
CREAT SERPL-MCNC: 0.58 MG/DL (ref 0.52–1.04)
D DIMER PPP FEU-MCNC: <0.27 UG/ML FEU (ref 0–0.5)
GFR SERPL CREATININE-BSD FRML MDRD: >90 ML/MIN/1.73M2
GLUCOSE BLD-MCNC: 108 MG/DL (ref 70–99)
MAGNESIUM SERPL-MCNC: 1.8 MG/DL (ref 1.6–2.3)
POTASSIUM BLD-SCNC: 4.9 MMOL/L (ref 3.4–5.3)
SODIUM SERPL-SCNC: 138 MMOL/L (ref 133–144)

## 2022-10-13 ENCOUNTER — ANCILLARY PROCEDURE (OUTPATIENT)
Dept: CARDIOLOGY | Facility: CLINIC | Age: 72
End: 2022-10-13
Attending: PHYSICIAN ASSISTANT
Payer: COMMERCIAL

## 2022-10-13 DIAGNOSIS — R55 SYNCOPE, UNSPECIFIED SYNCOPE TYPE: ICD-10-CM

## 2022-10-13 NOTE — PROGRESS NOTES
Patient was given brochure, Self-Placement Instruction, ZIO box with ZIO and LOG book. Lotus Rodriguez CMA

## 2022-10-23 ENCOUNTER — HEALTH MAINTENANCE LETTER (OUTPATIENT)
Age: 72
End: 2022-10-23

## 2022-10-24 ENCOUNTER — OFFICE VISIT (OUTPATIENT)
Dept: FAMILY MEDICINE | Facility: CLINIC | Age: 72
End: 2022-10-24
Payer: COMMERCIAL

## 2022-10-24 VITALS
RESPIRATION RATE: 16 BRPM | BODY MASS INDEX: 24.73 KG/M2 | HEART RATE: 90 BPM | TEMPERATURE: 97.8 F | OXYGEN SATURATION: 98 % | DIASTOLIC BLOOD PRESSURE: 78 MMHG | SYSTOLIC BLOOD PRESSURE: 128 MMHG | HEIGHT: 61 IN | WEIGHT: 131 LBS

## 2022-10-24 DIAGNOSIS — I25.10 CORONARY ARTERY CALCIFICATION: ICD-10-CM

## 2022-10-24 DIAGNOSIS — G47.00 INSOMNIA, UNSPECIFIED TYPE: ICD-10-CM

## 2022-10-24 DIAGNOSIS — Z23 HIGH PRIORITY FOR 2019-NCOV VACCINE: ICD-10-CM

## 2022-10-24 DIAGNOSIS — Z12.11 SCREEN FOR COLON CANCER: ICD-10-CM

## 2022-10-24 DIAGNOSIS — Z23 NEED FOR PROPHYLACTIC VACCINATION AND INOCULATION AGAINST INFLUENZA: ICD-10-CM

## 2022-10-24 DIAGNOSIS — F17.200 TOBACCO USE DISORDER: ICD-10-CM

## 2022-10-24 DIAGNOSIS — Z12.31 ENCOUNTER FOR SCREENING MAMMOGRAM FOR BREAST CANCER: ICD-10-CM

## 2022-10-24 DIAGNOSIS — C34.31 CANCER OF LOWER LOBE OF RIGHT LUNG (H): ICD-10-CM

## 2022-10-24 DIAGNOSIS — E78.5 HYPERLIPIDEMIA LDL GOAL <130: ICD-10-CM

## 2022-10-24 DIAGNOSIS — Z00.00 ENCOUNTER FOR MEDICARE ANNUAL WELLNESS EXAM: Primary | ICD-10-CM

## 2022-10-24 DIAGNOSIS — I25.10 CORONARY ARTERY CALCIFICATION SEEN ON CT SCAN: ICD-10-CM

## 2022-10-24 LAB
ALT SERPL W P-5'-P-CCNC: 21 U/L (ref 0–50)
AST SERPL W P-5'-P-CCNC: 20 U/L (ref 0–45)
CHOLEST SERPL-MCNC: 162 MG/DL
FASTING STATUS PATIENT QL REPORTED: YES
HDLC SERPL-MCNC: 61 MG/DL
LDLC SERPL CALC-MCNC: 74 MG/DL
NONHDLC SERPL-MCNC: 101 MG/DL
TRIGL SERPL-MCNC: 136 MG/DL

## 2022-10-24 PROCEDURE — 84460 ALANINE AMINO (ALT) (SGPT): CPT | Performed by: PHYSICIAN ASSISTANT

## 2022-10-24 PROCEDURE — 0124A COVID-19,PF,PFIZER BOOSTER BIVALENT: CPT | Performed by: PHYSICIAN ASSISTANT

## 2022-10-24 PROCEDURE — 99214 OFFICE O/P EST MOD 30 MIN: CPT | Mod: 25 | Performed by: PHYSICIAN ASSISTANT

## 2022-10-24 PROCEDURE — 90662 IIV NO PRSV INCREASED AG IM: CPT | Performed by: PHYSICIAN ASSISTANT

## 2022-10-24 PROCEDURE — 91312 COVID-19,PF,PFIZER BOOSTER BIVALENT: CPT | Performed by: PHYSICIAN ASSISTANT

## 2022-10-24 PROCEDURE — 36415 COLL VENOUS BLD VENIPUNCTURE: CPT | Performed by: PHYSICIAN ASSISTANT

## 2022-10-24 PROCEDURE — G0439 PPPS, SUBSEQ VISIT: HCPCS | Performed by: PHYSICIAN ASSISTANT

## 2022-10-24 PROCEDURE — 80061 LIPID PANEL: CPT | Performed by: PHYSICIAN ASSISTANT

## 2022-10-24 PROCEDURE — 84450 TRANSFERASE (AST) (SGOT): CPT | Performed by: PHYSICIAN ASSISTANT

## 2022-10-24 PROCEDURE — G0008 ADMIN INFLUENZA VIRUS VAC: HCPCS | Performed by: PHYSICIAN ASSISTANT

## 2022-10-24 RX ORDER — ROSUVASTATIN CALCIUM 20 MG/1
20 TABLET, COATED ORAL DAILY
Qty: 90 TABLET | Refills: 3 | Status: SHIPPED | OUTPATIENT
Start: 2022-10-24 | End: 2023-10-30

## 2022-10-24 RX ORDER — QUETIAPINE FUMARATE 50 MG/1
25-50 TABLET, FILM COATED ORAL AT BEDTIME
Qty: 180 TABLET | Refills: 3 | Status: SHIPPED | OUTPATIENT
Start: 2022-10-24 | End: 2023-10-30

## 2022-10-24 ASSESSMENT — ENCOUNTER SYMPTOMS
FREQUENCY: 0
DYSURIA: 0
ARTHRALGIAS: 0
BREAST MASS: 0
SHORTNESS OF BREATH: 0
COUGH: 1
DIARRHEA: 0
CHILLS: 0
JOINT SWELLING: 0
NERVOUS/ANXIOUS: 0
PARESTHESIAS: 0
CONSTIPATION: 0
HEARTBURN: 0
HEADACHES: 0
HEMATURIA: 0
SORE THROAT: 0
WEAKNESS: 0
FEVER: 0
HEMATOCHEZIA: 0
MYALGIAS: 0
EYE PAIN: 0
PALPITATIONS: 0
DIZZINESS: 0
NAUSEA: 0
ABDOMINAL PAIN: 0

## 2022-10-24 ASSESSMENT — ACTIVITIES OF DAILY LIVING (ADL): CURRENT_FUNCTION: NO ASSISTANCE NEEDED

## 2022-10-24 ASSESSMENT — PAIN SCALES - GENERAL: PAINLEVEL: NO PAIN (0)

## 2022-10-24 NOTE — PROGRESS NOTES
"SUBJECTIVE:   Manda is a 72 year old who presents for Preventive Visit.      Patient has been advised of split billing requirements and indicates understanding: Yes  Are you in the first 12 months of your Medicare coverage?  No    Healthy Habits:     In general, how would you rate your overall health?  Good    Frequency of exercise:  1 day/week    Duration of exercise:  15-30 minutes    Do you usually eat at least 4 servings of fruit and vegetables a day, include whole grains    & fiber and avoid regularly eating high fat or \"junk\" foods?  No    Taking medications regularly:  Yes    Medication side effects:  None    Ability to successfully perform activities of daily living:  No assistance needed    Home Safety:  Throw rugs in the hallway and lack of grab bars in the bathroom    Hearing Impairment:  Need to ask people to speak up or repeat themselves    In the past 6 months, have you been bothered by leaking of urine?  No    In general, how would you rate your overall mental or emotional health?  Good      PHQ-2 Total Score: 0    Additional concerns today:  No    Finished 10 day zio patch.  No other episodes of syncope.     Removed right lower and middle lobe due to lung cancer.       Feels she does not have as much strength as she used to.  Has done physical therapy in the past.        Do you feel safe in your environment? Yes    Have you ever done Advance Care Planning? (For example, a Health Directive, POLST, or a discussion with a medical provider or your loved ones about your wishes): Yes, advance care planning is on file.       Fall risk  Fallen 2 or more times in the past year?: Yes  Any fall with injury in the past year?: No    Cognitive Screening   1) Repeat 3 items (Leader, Season, Table)    2) Clock draw: NORMAL  3) 3 item recall: Recalls 3 objects  Results: 3 items recalled: COGNITIVE IMPAIRMENT LESS LIKELY    Mini-CogTM Copyright S [employer]. Licensed by the author for use in [facility]; " reprinted with permission (sofabrizio@.[facility]). All rights reserved.      Do you have sleep apnea, excessive snoring or daytime drowsiness?: no    Reviewed and updated as needed this visit by clinical staff    Allergies  Meds              Reviewed and updated as needed this visit by Provider                 Social History     Tobacco Use     Smoking status: Every Day     Packs/day: 1.00     Years: 50.00     Pack years: 50.00     Types: Cigarettes     Start date: 6/15/1967     Smokeless tobacco: Never     Tobacco comments:     tried webutrin in 1996 but couldn't tolerate it   Substance Use Topics     Alcohol use: Yes         Alcohol Use 10/24/2022   Prescreen: >3 drinks/day or >7 drinks/week? No   Prescreen: >3 drinks/day or >7 drinks/week? -           Hyperlipidemia Follow-Up      Are you regularly taking any medication or supplement to lower your cholesterol?   Yes- crestor    Are you having muscle aches or other side effects that you think could be caused by your cholesterol lowering medication?  No    Insomnia:  On seroquel for years and works well.     Tobacco use:  Has cut down to about 1/2 ppd.  Has tried quitting multiple times in the past however unable to do so.  Has tried wellbutrin in the 90s, but did not tolerate it.       Current providers sharing in care for this patient include:   Patient Care Team:  Seda Wells PA-C as PCP - General (Family Medicine)  Palomo Castro MD as Assigned Surgical Provider  Cassandra Gong PA-C as Assigned PCP    The following health maintenance items are reviewed in Epic and correct as of today:  Health Maintenance   Topic Date Due     COPD ACTION PLAN  Never done     HEPATITIS B IMMUNIZATION (1 of 3 - 3-dose series) Never done     ZOSTER IMMUNIZATION (1 of 2) Never done     COLORECTAL CANCER SCREENING  10/22/2021     COVID-19 Vaccine (4 - Booster for Pfizer series) 12/13/2021     INFLUENZA VACCINE (1) 09/01/2022     LIPID  10/18/2022     MEDICARE  ANNUAL WELLNESS VISIT  10/18/2022     DEXA  03/26/2023     NICOTINE/TOBACCO CESSATION COUNSELING Q 1 YR  10/24/2023     ANNUAL REVIEW OF HM ORDERS  10/24/2023     FALL RISK ASSESSMENT  10/24/2023     MAMMO SCREENING  12/13/2023     ADVANCE CARE PLANNING  10/24/2027     DTAP/TDAP/TD IMMUNIZATION (3 - Td or Tdap) 08/03/2028     SPIROMETRY  Completed     HEPATITIS C SCREENING  Completed     PHQ-2 (once per calendar year)  Completed     Pneumococcal Vaccine: 65+ Years  Completed     IPV IMMUNIZATION  Aged Out     MENINGITIS IMMUNIZATION  Aged Out     LUNG CANCER SCREENING  Discontinued     Lab work is in process  Labs reviewed in EPIC  BP Readings from Last 3 Encounters:   10/24/22 128/78   10/10/22 (!) 144/84   01/07/22 139/87    Wt Readings from Last 3 Encounters:   10/24/22 59.4 kg (131 lb)   10/10/22 60.7 kg (133 lb 12.8 oz)   01/07/22 62 kg (136 lb 9.6 oz)                  Mammogram Screening: Mammogram Screening: Recommended mammography every 1-2 years with patient discussion and risk factor consideration        Review of Systems   Constitutional: Negative for chills and fever.   HENT: Positive for congestion. Negative for ear pain, hearing loss and sore throat.    Eyes: Negative for pain and visual disturbance.   Respiratory: Positive for cough. Negative for shortness of breath.    Cardiovascular: Positive for chest pain. Negative for palpitations and peripheral edema.   Gastrointestinal: Negative for abdominal pain, constipation, diarrhea, heartburn, hematochezia and nausea.   Breasts:  Negative for tenderness, breast mass and discharge.   Genitourinary: Negative for dysuria, frequency, genital sores, hematuria, pelvic pain, urgency, vaginal bleeding and vaginal discharge.   Musculoskeletal: Negative for arthralgias, joint swelling and myalgias.   Skin: Negative for rash.   Neurological: Negative for dizziness, weakness, headaches and paresthesias.   Psychiatric/Behavioral: Negative for mood changes. The patient  "is not nervous/anxious.      Constitutional, HEENT, cardiovascular, pulmonary, gi and gu systems are negative, except as otherwise noted.    OBJECTIVE:   /78 (BP Location: Left arm, Patient Position: Chair, Cuff Size: Adult Regular)   Pulse 90   Temp 97.8  F (36.6  C) (Tympanic)   Resp 16   Ht 1.549 m (5' 1\")   Wt 59.4 kg (131 lb)   SpO2 98%   BMI 24.75 kg/m   Estimated body mass index is 24.75 kg/m  as calculated from the following:    Height as of this encounter: 1.549 m (5' 1\").    Weight as of this encounter: 59.4 kg (131 lb).  Physical Exam  GENERAL APPEARANCE: healthy, alert and no distress  EYES: Eyes grossly normal to inspection, PERRL and conjunctivae and sclerae normal  HENT: ear canals and TM's normal, nose and mouth without ulcers or lesions, oropharynx clear and oral mucous membranes moist  NECK: no adenopathy, no asymmetry, masses, or scars and thyroid normal to palpation  RESP: lungs clear to auscultation - no rales, rhonchi or wheezes  BREAST: normal without masses, tenderness or nipple discharge and no palpable axillary masses or adenopathy  CV: regular rate and rhythm, normal S1 S2, no S3 or S4, no murmur, click or rub, no peripheral edema and peripheral pulses strong  ABDOMEN: soft, nontender, no hepatosplenomegaly, no masses and bowel sounds normal  MS: no musculoskeletal defects are noted and gait is age appropriate without ataxia  SKIN: no suspicious lesions or rashes  NEURO: Normal strength and tone, sensory exam grossly normal, mentation intact and speech normal  PSYCH: mentation appears normal and affect normal/bright    Diagnostic Test Results:  Labs reviewed in Epic    ASSESSMENT / PLAN:   (Z00.00) Encounter for Medicare annual wellness exam  (primary encounter diagnosis)  Comment:      HEALTH CARE MAINTENANCE              Reviewed USPTF recommendations and anticipatory guidance.              See orders.   Flu and pfizer booster given today.       You are due for another " "Mammogram in December.  Please call [facility] at [phone]  to set up the Mammogram.     Discussed Shingrex vaccine, please inquire at pharmacy for coverage and administration.      We'll wait to see what the results are with your zio patch.  As long as this is normal and you do not have any further episodes of syncope (passing out), no further testing is needed.     If you are feeling weakness, you may have some \"deconditioning\" or loss of muscle tone from all the recovery time you went through in the last 2 years.  May consider doing physical therapy to help with generalized strengthening.        (I25.10,  I25.84) Coronary artery calcification  Comment: doing well on statin.   Plan: Lipid panel reflex to direct LDL Non-fasting            (Z12.11) Screen for colon cancer  Comment: I discussed the importance of colorectal cancer screening, including the risks and benefits of the various procedures, including colonoscopy, cologuard and annual FOB immunoassay test.  Patient has opted to do the cologuard.       Plan: COLOGUARD(EX[employer] SCIENCES)            (C34.31) Cancer of lower lobe of right lung (H)  Comment: managed by oncology  Plan:     (G47.00) Insomnia, unspecified type  Comment: doing well with seroquel  Plan: QUEtiapine (SEROQUEL) 50 MG tablet            (E78.5) Hyperlipidemia LDL goal <130  Comment: tolerating statin well.   Plan: rosuvastatin (CRESTOR) 20 MG tablet, AST, ALT            (I25.10) Coronary artery calcification seen on CT scan  Comment: tolerating statin well.  Smoking cessation advised.   Plan: rosuvastatin (CRESTOR) 20 MG tablet            (Z12.31) Encounter for screening mammogram for breast cancer  Comment: I discussed in detail with Reanna Kumar the importance of breast cancer screening through monthly self breast exams and annual breast exams in the clinic.     Plan: *MA Screening Digital Bilateral            (F17.200) Tobacco use disorder  Comment: smoking " "cessation advised.  May try alternative ways of cessation, such as accupuncture or hypnosis.   She will reach out if she would like further help with cessation.   Plan:     (Z23) Need for prophylactic vaccination and inoculation against influenza  Comment: due  Plan: INFLUENZA, QUAD, HIGH DOSE, PF, 65YR + (FLUZONE        HD)            (Z23) High priority for 2019-nCoV vaccine  Comment: due.   Plan: COVID-19,PF,PFIZER BOOSTER BIVALENT 12+Yrs              Patient has been advised of split billing requirements and indicates understanding: Yes      COUNSELING:  Reviewed preventive health counseling, as reflected in patient instructions       Regular exercise       Healthy diet/nutrition       Fall risk prevention    Estimated body mass index is 24.75 kg/m  as calculated from the following:    Height as of this encounter: 1.549 m (5' 1\").    Weight as of this encounter: 59.4 kg (131 lb).        She reports that she has been smoking cigarettes. She started smoking about 55 years ago. She has a 50.00 pack-year smoking history. She has never used smokeless tobacco.  Nicotine/Tobacco Cessation Plan:   Information offered: Patient not interested at this time      Appropriate preventive services were discussed with this patient, including applicable screening as appropriate for cardiovascular disease, diabetes, osteopenia/osteoporosis, and glaucoma.  As appropriate for age/gender, discussed screening for colorectal cancer, prostate cancer, breast cancer, and cervical cancer. Checklist reviewing preventive services available has been given to the patient.    Reviewed patients plan of care and provided an AVS. The Intermediate Care Plan ( asthma action plan, low back pain action plan, and migraine action plan) for Reanna meets the Care Plan requirement. This Care Plan has been established and reviewed with the Patient.    Counseling Resources:  ATP IV Guidelines  Pooled Cohorts Equation Calculator  Breast Cancer Risk " Calculator  Breast Cancer: Medication to Reduce Risk  FRAX Risk Assessment  ICSI Preventive Guidelines  Dietary Guidelines for Americans, 2010  [employer]'s MyPlate  ASA Prophylaxis  Lung CA Screening    TYRA Walls [facility] SHAYLA    Identified Health Risks:

## 2022-10-24 NOTE — PATIENT INSTRUCTIONS
"You are due for another Mammogram in December.  Please call [facility] at [phone]  to set up the Mammogram.     Discussed Shingrex vaccine, please inquire at pharmacy for coverage and administration.      We'll wait to see what the results are with your zio patch.  As long as this is normal and you do not have any further episodes of syncope (passing out), no further testing is needed.     If you are feeling weakness, you may have some \"deconditioning\" or loss of muscle tone from all the recovery time you went through in the last 2 years.  May consider doing physical therapy to help with generalized strengthening.          Patient Education   Personalized Prevention Plan  You are due for the preventive services outlined below.  Your care team is available to assist you in scheduling these services.  If you have already completed any of these items, please share that information with your care team to update in your medical record.  Health Maintenance Due   Topic Date Due    COPD Action Plan  Never done    Hepatitis B Vaccine (1 of 3 - 3-dose series) Never done    Zoster (Shingles) Vaccine (1 of 2) Never done    Colorectal Cancer Screening  10/22/2021    COVID-19 Vaccine (4 - Booster for Pfizer series) 12/13/2021    PHQ-2 (once per calendar year)  01/01/2022    Flu Vaccine (1) 09/01/2022    Cholesterol Lab  10/18/2022    ANNUAL REVIEW OF HM ORDERS  10/18/2022    Annual Wellness Visit  10/18/2022        "

## 2022-11-09 ENCOUNTER — ANCILLARY PROCEDURE (OUTPATIENT)
Dept: CT IMAGING | Facility: CLINIC | Age: 72
End: 2022-11-09
Attending: CLINICAL NURSE SPECIALIST
Payer: COMMERCIAL

## 2022-11-09 DIAGNOSIS — C34.31 CANCER OF LOWER LOBE OF RIGHT LUNG (H): ICD-10-CM

## 2022-11-09 PROCEDURE — 71250 CT THORAX DX C-: CPT | Mod: TC | Performed by: RADIOLOGY

## 2022-11-10 LAB — NONINV COLON CA DNA+OCC BLD SCRN STL QL: NEGATIVE

## 2022-11-10 NOTE — PROGRESS NOTES
Manda is a 72 year old who is being evaluated via a billable telephone visit.      What phone number would you like to be contacted at? [phone]   How would you like to obtain your AVS? Victoriano Thomas     THORACIC SURGERY FOLLOW UP VISIT    I had a telephone visit with Mrs. Kumar in follow-up today. The clinical summary follows:     PREOP DIAGNOSIS   Right lower lobe lung cancer  PROCEDURE   Uniportal thoracoscopic right lower lobe wedge, completion lobectomy, conversion to thoracotomy with middle and lower bilobectomy, intercostal nerve cryoablation    DATE OF PROCEDURE  05/10/2021    HISTOPATHOLOGY   1.2 cm SCC, no BAO or LVI. R0 resection with 5 cm margins.   [id number], stage IA2.     COMPLICATIONS  None    INTERVAL STUDIES  CT chest: post operative changes of right lower lobectomy and right middle lobectomy, no evidence of recurrence or metastatic disease in the chest    Past Medical History:   Diagnosis Date     Heart disease 5/2018     Hyperlipidemia      Insomnia 2015     Lung cancer (H)      Pulmonary nodules       Past Surgical History:   Procedure Laterality Date     ARTHROPLASTY HIP Right 6/2/2020    Procedure: RIGHT TOTAL HIP ARTHROPLASTY;  Surgeon: Augie Murray MD;  Location: [facility] OR     BRONCHOSCOPY FLEXIBLE AND RIGID N/A 5/18/2021    Procedure: BRONCHOSCOPY;  Surgeon: Palomo Castro MD;  Location: [facility]     COLONOSCOPY  1/2021     HIP SURGERY       JOINT REPLACEMENT Left 2008    hip     THORACOSCOPIC LOBECTOMY LUNG Right 5/10/2021    Procedure: Uniportal thoracoscopic right lower lobe wedge segmentectomy, completion bilobectomy middle and lower, intercostal nerve cryoablation, converstion to thoracotomy, mediastinal lymphadenectomy;  Surgeon: Palomo Castro MD;  Location: [facility] OR      ETOH yes  TOB current smoker, 1 PPD, 50 pack years    SUBJECTIVE   Manda is doing ok. She did try PT to see if that would help with her chronic post-op pain on the lower right  abdomen. She has spoken to Cristiane about this a couple of times in the past. She says it feels like a constant side ache from the bottom of her ribs and down the side. The pain is not bad enough that she needs to take medication but it is quite bothersome/annoying. She has not tried heat packs or ice to the area.    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 They recently moved to [place] from [place].    IMPRESSION   72 year-old female with right lower lobe lung cancer status post right lower lobectomy and right middle lobectomy.    Try alternating heat/ice to the right side where her discomfort is.    PLAN  I spent 25 min on the date of the encounter in chart review, patient visit, review of tests, documentation and/or discussion with other providers about the issues documented above. I reviewed the plan as follows:  Chest CT in 6 months  Try heat/ice to the affected area  1. Necessary Tests & Appointments: chest CT in 6 months  2. Pain Control Plan: heat/ice  All questions were answered and the patient and present family were in agreement with the plan.  I appreciate the opportunity to participate in the care of your patient and will keep you updated.  Sincerely,        Phone call duration: 15 minutes

## 2022-11-11 ENCOUNTER — VIRTUAL VISIT (OUTPATIENT)
Dept: SURGERY | Facility: CLINIC | Age: 72
End: 2022-11-11
Attending: CLINICAL NURSE SPECIALIST
Payer: COMMERCIAL

## 2022-11-11 DIAGNOSIS — C34.31 CANCER OF LOWER LOBE OF RIGHT LUNG (H): Primary | ICD-10-CM

## 2022-11-11 PROCEDURE — 99213 OFFICE O/P EST LOW 20 MIN: CPT | Mod: 95 | Performed by: CLINICAL NURSE SPECIALIST

## 2022-11-11 NOTE — NURSING NOTE
Patient denies any changes since echeck-in regarding medication and allergies and states all information entered during echeck-in remains accurate.    Sarah PATEL

## 2022-11-11 NOTE — LETTER
11/11/2022         RE: Reanna Kumar  [address] [facility] [address]        Dear Colleague,    Thank you for referring your patient, Reanna Kumar, to the [facility]. Please see a copy of my visit note below.    Manda is a 72 year old who is being evaluated via a billable telephone visit.      What phone number would you like to be contacted at? [phone]   How would you like to obtain your AVS? Victoriano Thomas     THORACIC SURGERY FOLLOW UP VISIT    I had a telephone visit with Mrs. Kumar in follow-up today. The clinical summary follows:     PREOP DIAGNOSIS   Right lower lobe lung cancer  PROCEDURE   Uniportal thoracoscopic right lower lobe wedge, completion lobectomy, conversion to thoracotomy with middle and lower bilobectomy, intercostal nerve cryoablation    DATE OF PROCEDURE  05/10/2021    HISTOPATHOLOGY   1.2 cm SCC, no BAO or LVI. R0 resection with 5 cm margins.   [id number], stage IA2.     COMPLICATIONS  None    INTERVAL STUDIES  CT chest: post operative changes of right lower lobectomy and right middle lobectomy, no evidence of recurrence or metastatic disease in the chest    Past Medical History:   Diagnosis Date     Heart disease 5/2018     Hyperlipidemia      Insomnia 2015     Lung cancer (H)      Pulmonary nodules       Past Surgical History:   Procedure Laterality Date     ARTHROPLASTY HIP Right 6/2/2020    Procedure: RIGHT TOTAL HIP ARTHROPLASTY;  Surgeon: Augie Murray MD;  Location: [facility] OR     BRONCHOSCOPY FLEXIBLE AND RIGID N/A 5/18/2021    Procedure: BRONCHOSCOPY;  Surgeon: Palomo Castro MD;  Location:  GI     COLONOSCOPY  1/2021     HIP SURGERY       JOINT REPLACEMENT Left 2008    hip     THORACOSCOPIC LOBECTOMY LUNG Right 5/10/2021    Procedure: Uniportal thoracoscopic right lower lobe wedge segmentectomy, completion bilobectomy middle and lower, intercostal nerve cryoablation, converstion to thoracotomy, mediastinal  lymphadenectomy;  Surgeon: Palomo Castro MD;  Location: [facility] OR      ETOH yes  TOB current smoker, 1 PPD, 50 pack years    SUBJECTIVE   Manda is doing ok. She did try PT to see if that would help with her chronic post-op pain on the lower right abdomen. She has spoken to Cristiane about this a couple of times in the past. She says it feels like a constant side ache from the bottom of her ribs and down the side. The pain is not bad enough that she needs to take medication but it is quite bothersome/annoying. She has not tried heat packs or ice to the area.    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 They recently moved to [place] from [place].    IMPRESSION   72 year-old female with right lower lobe lung cancer status post right lower lobectomy and right middle lobectomy.    Try alternating heat/ice to the right side where her discomfort is.    PLAN  I spent 25 min on the date of the encounter in chart review, patient visit, review of tests, documentation and/or discussion with other providers about the issues documented above. I reviewed the plan as follows:  Chest CT in 6 months  Try heat/ice to the affected area  1. Necessary Tests & Appointments: chest CT in 6 months  2. Pain Control Plan: heat/ice  All questions were answered and the patient and present family were in agreement with the plan.  I appreciate the opportunity to participate in the care of your patient and will keep you updated.  Sincerely,  Phone call duration: 15 minutes       RENETTA Bernal CNS

## 2022-12-14 DIAGNOSIS — G47.00 INSOMNIA, UNSPECIFIED TYPE: ICD-10-CM

## 2022-12-14 RX ORDER — QUETIAPINE FUMARATE 50 MG/1
TABLET, FILM COATED ORAL
Qty: 180 TABLET | Refills: 3 | OUTPATIENT
Start: 2022-12-14

## 2022-12-14 NOTE — TELEPHONE ENCOUNTER
Patient is no longer under care with [place], provider previously seen no longer in system. Current PCP has already send Rx and there should be refills on file. Call to Pharmacy, unable to get through, will try later.     Hanane Villasenor R.N.      Second call to pharmacy. S/W Dung he is in agreement patient has refills on file from current PCP and to disregard.     Hanane Villasenor R.N.

## 2022-12-19 ENCOUNTER — ANCILLARY PROCEDURE (OUTPATIENT)
Dept: MAMMOGRAPHY | Facility: CLINIC | Age: 72
End: 2022-12-19
Attending: PHYSICIAN ASSISTANT
Payer: COMMERCIAL

## 2022-12-19 DIAGNOSIS — Z12.31 ENCOUNTER FOR SCREENING MAMMOGRAM FOR BREAST CANCER: ICD-10-CM

## 2022-12-19 PROCEDURE — 77067 SCR MAMMO BI INCL CAD: CPT | Mod: TC | Performed by: STUDENT IN AN ORGANIZED HEALTH CARE EDUCATION/TRAINING PROGRAM

## 2022-12-30 ENCOUNTER — ANCILLARY PROCEDURE (OUTPATIENT)
Dept: MAMMOGRAPHY | Facility: CLINIC | Age: 72
End: 2022-12-30
Attending: PHYSICIAN ASSISTANT
Payer: COMMERCIAL

## 2022-12-30 ENCOUNTER — ANCILLARY PROCEDURE (OUTPATIENT)
Dept: ULTRASOUND IMAGING | Facility: CLINIC | Age: 72
End: 2022-12-30
Attending: PHYSICIAN ASSISTANT
Payer: COMMERCIAL

## 2022-12-30 DIAGNOSIS — R92.8 ABNORMAL MAMMOGRAM: ICD-10-CM

## 2022-12-30 PROCEDURE — G0279 TOMOSYNTHESIS, MAMMO: HCPCS | Mod: RT | Performed by: RADIOLOGY

## 2022-12-30 PROCEDURE — 77065 DX MAMMO INCL CAD UNI: CPT | Mod: RT | Performed by: RADIOLOGY

## 2022-12-30 PROCEDURE — 76642 ULTRASOUND BREAST LIMITED: CPT | Mod: RT | Performed by: RADIOLOGY

## 2023-05-10 NOTE — PROGRESS NOTES
DISCHARGE SUMMARY    Reanna Kumar was seen 4 times for evaluation and treatment.  Patient did not return for further treatment and current status is unknown.  Due to short treatment duration, no objective or functional changes were made.  Please see goal flow sheet from episode noted date below and initial evaluation for further information.  Patient is discharged from therapy and therapy episode is resolved as of 05/10/23.      Linked Episodes   Type: Episode: Status: Noted: Resolved: Last update: Updated by:   PHYSICAL THERAPY Chest Wall Pain 5/18/22 Active 5/18/2022 5/10/23 6/15/2022  1:11 PM Maribel Louis, PT      Comments:

## 2023-05-11 ENCOUNTER — ANCILLARY PROCEDURE (OUTPATIENT)
Dept: CT IMAGING | Facility: CLINIC | Age: 73
End: 2023-05-11
Attending: CLINICAL NURSE SPECIALIST
Payer: COMMERCIAL

## 2023-05-11 DIAGNOSIS — C34.31 CANCER OF LOWER LOBE OF RIGHT LUNG (H): ICD-10-CM

## 2023-05-11 PROCEDURE — 71260 CT THORAX DX C+: CPT | Mod: TC | Performed by: RADIOLOGY

## 2023-05-11 RX ORDER — IOPAMIDOL 755 MG/ML
80 INJECTION, SOLUTION INTRAVASCULAR ONCE
Status: COMPLETED | OUTPATIENT
Start: 2023-05-11 | End: 2023-05-11

## 2023-05-11 RX ADMIN — IOPAMIDOL 80 ML: 755 INJECTION, SOLUTION INTRAVASCULAR at 10:10

## 2023-05-11 NOTE — PROGRESS NOTES
Virtual Visit Details    Type of service:  Telephone Visit   Phone call duration: 6 minutes     THORACIC SURGERY FOLLOW UP VISIT    I had a telephone visit with Mrs. Kumar in follow-up today. The clinical summary follows:     PREOP DIAGNOSIS   Right lower lobe lung cancer  PROCEDURE   Uniportal thoracoscopic right lower lobe wedge, completion lobectomy, conversion to thoracotomy with middle and lower bilobectomy, intercostal nerve cryoablation    DATE OF PROCEDURE  05/10/2021    HISTOPATHOLOGY   1.2 cm SCC, no BAO or LVI. R0 resection with 5 cm margins. [id number], stage IA2.      COMPLICATIONS  None    INTERVAL STUDIES  CT chest: Stable exam without evidence for new disease. A few stable findings as above.    Past Medical History:   Diagnosis Date     Heart disease 5/2018     Hyperlipidemia      Insomnia 2015     Lung cancer (H)      Pulmonary nodules       Past Surgical History:   Procedure Laterality Date     ARTHROPLASTY HIP Right 6/2/2020    Procedure: RIGHT TOTAL HIP ARTHROPLASTY;  Surgeon: Augie Murray MD;  Location:  OR     BRONCHOSCOPY FLEXIBLE AND RIGID N/A 5/18/2021    Procedure: BRONCHOSCOPY;  Surgeon: Palomo Castro MD;  Location:  GI     COLONOSCOPY  1/2021     HIP SURGERY       JOINT REPLACEMENT Left 2008    hip     THORACOSCOPIC LOBECTOMY LUNG Right 5/10/2021    Procedure: Uniportal thoracoscopic right lower lobe wedge segmentectomy, completion bilobectomy middle and lower, intercostal nerve cryoablation, converstion to thoracotomy, mediastinal lymphadenectomy;  Surgeon: Palomo Castro MD;  Location:  OR      Social History     Socioeconomic History     Marital status:      Spouse name: Not on file     Number of children: Not on file     Years of education: Not on file     Highest education level: Not on file   Occupational History     Not on file   Tobacco Use     Smoking status: Every Day     Packs/day: 1.00     Years: 50.00     Pack years: 50.00     Types:  Cigarettes     Start date: 6/15/1967     Smokeless tobacco: Never     Tobacco comments:     tried webutrin in 1996 but couldn't tolerate it   Vaping Use     Vaping status: Never Used   Substance and Sexual Activity     Alcohol use: Yes     Drug use: No     Sexual activity: Not Currently     Partners: Male     Birth control/protection: None   Other Topics Concern     Parent/sibling w/ CABG, MI or angioplasty before 65F 55M? No   Social History Narrative     Not on file     Social Determinants of Health     Financial Resource Strain: Low Risk  (10/15/2021)    Overall Financial Resource Strain (CARDIA)      Difficulty of Paying Living Expenses: Not hard at all   Food Insecurity: No Food Insecurity (10/15/2021)    Hunger Vital Sign      Worried About Running Out of Food in the Last Year: Never true      Ran Out of Food in the Last Year: Never true   Transportation Needs: No Transportation Needs (10/15/2021)    PRAPARE - Transportation      Lack of Transportation (Medical): No      Lack of Transportation (Non-Medical): No   Physical Activity: Insufficiently Active (10/15/2021)    Exercise Vital Sign      Days of Exercise per Week: 1 day      Minutes of Exercise per Session: 20 min   Stress: No Stress Concern Present (10/15/2021)    [employer] [place] of [employer]ational [employer] - Occupational Stress Questionnaire      Feeling of Stress : Only a little   Social Connections: Moderately Integrated (10/15/2021)    Social Connection and Isolation Panel [NHANES]      Frequency of Communication with Friends and Family: More than three times a week      Frequency of Social Gatherings with Friends and Family: Once a week      Attends Zoroastrian Services: 1 to 4 times per year      Active Member of Clubs or Organizations: No      Attends Club or Organization Meetings: Not on file      Marital Status:    Intimate Partner Violence: Not on file   Housing Stability: Low Risk  (10/15/2021)    Housing Stability Vital Sign      Unable to  Pay for Housing in the Last Year: No      Number of Places Lived in the Last Year: 1      Unstable Housing in the Last Year: No     SUBJECTIVE   Manda is doing well. She has no new complaints. She still has what she would describe as an achy feeling from time to time at the bottom of her rib cage on the right. She has tried heat, ice, and ibuprofen but nothing really seems to help.    IMPRESSION   73 year-old female status post right lower lobe wedge resection, completion right lower lobectomy and mediastinal lymph node dissection for a pT1bN0 (stage IA2) non small cell lung cancer. She is here to review her lung cancer surveillance CT.    Her CT is stable and not concerning for any recurrence or metastatic disease. We will get another chest CT in 1 year.    I suggested she try OTC lidocaine patches to the area to see if that helps minimize her discomfort.    PLAN  I spent 15 min on the date of the encounter in chart review, patient visit, review of tests, documentation and/or discussion with other providers about the issues documented above. I reviewed the plan as follows:  CT chest in 1 year  All questions were answered and the patient and present family were in agreement with the plan.  I appreciate the opportunity to participate in the care of your patient and will keep you updated.  Sincerely,

## 2023-05-12 ENCOUNTER — VIRTUAL VISIT (OUTPATIENT)
Dept: SURGERY | Facility: CLINIC | Age: 73
End: 2023-05-12
Attending: CLINICAL NURSE SPECIALIST
Payer: COMMERCIAL

## 2023-05-12 VITALS — BODY MASS INDEX: 24.84 KG/M2 | HEIGHT: 62 IN | WEIGHT: 135 LBS

## 2023-05-12 DIAGNOSIS — C34.31 CANCER OF LOWER LOBE OF RIGHT LUNG (H): Primary | ICD-10-CM

## 2023-05-12 LAB
CREAT BLD-MCNC: 0.6 MG/DL (ref 0.5–1)
GFR SERPL CREATININE-BSD FRML MDRD: >60 ML/MIN/1.73M2

## 2023-05-12 PROCEDURE — 82565 ASSAY OF CREATININE: CPT

## 2023-05-12 PROCEDURE — 99441 PR PHYSICIAN TELEPHONE EVALUATION 5-10 MIN: CPT | Mod: 95 | Performed by: CLINICAL NURSE SPECIALIST

## 2023-05-12 ASSESSMENT — PAIN SCALES - GENERAL: PAINLEVEL: NO PAIN (0)

## 2023-05-12 NOTE — NURSING NOTE
Is the patient currently in the state of [place]? YES    Visit mode:TELEPHONE    If the visit is dropped, the patient can be reconnected by: VIDEO VISIT: Send to e-mail at: [email or web address]    Will anyone else be joining the visit? NO      How would you like to obtain your AVS? MyChart    Are changes needed to the allergy or medication list? NO    Reason for visit: Video Visit (Follow Up)      Keily Gutiérrez

## 2023-05-12 NOTE — LETTER
5/12/2023         RE: Reanan Kumar  [address] [facility] [address]        Dear Colleague,    Thank you for referring your patient, Reanna Kumar, to the [facility]. Please see a copy of my visit note below.    Virtual Visit Details    Type of service:  Telephone Visit   Phone call duration: 6 minutes     THORACIC SURGERY FOLLOW UP VISIT    I had a telephone visit with Mrs. Kumar in follow-up today. The clinical summary follows:     PREOP DIAGNOSIS   Right lower lobe lung cancer  PROCEDURE   Uniportal thoracoscopic right lower lobe wedge, completion lobectomy, conversion to thoracotomy with middle and lower bilobectomy, intercostal nerve cryoablation    DATE OF PROCEDURE  05/10/2021    HISTOPATHOLOGY   1.2 cm SCC, no BAO or LVI. R0 resection with 5 cm margins. [id number], stage IA2.      COMPLICATIONS  None    INTERVAL STUDIES  CT chest: Stable exam without evidence for new disease. A few stable findings as above.    Past Medical History:   Diagnosis Date    Heart disease 5/2018    Hyperlipidemia     Insomnia 2015    Lung cancer (H)     Pulmonary nodules       Past Surgical History:   Procedure Laterality Date    ARTHROPLASTY HIP Right 6/2/2020    Procedure: RIGHT TOTAL HIP ARTHROPLASTY;  Surgeon: Augie Murray MD;  Location:  OR    BRONCHOSCOPY FLEXIBLE AND RIGID N/A 5/18/2021    Procedure: BRONCHOSCOPY;  Surgeon: Palomo Castro MD;  Location:  GI    COLONOSCOPY  1/2021    HIP SURGERY      JOINT REPLACEMENT Left 2008    hip    THORACOSCOPIC LOBECTOMY LUNG Right 5/10/2021    Procedure: Uniportal thoracoscopic right lower lobe wedge segmentectomy, completion bilobectomy middle and lower, intercostal nerve cryoablation, converstion to thoracotomy, mediastinal lymphadenectomy;  Surgeon: Palomo Castro MD;  Location:  OR      Social History     Socioeconomic History    Marital status:      Spouse name: Not on file    Number of children: Not on  file    Years of education: Not on file    Highest education level: Not on file   Occupational History    Not on file   Tobacco Use    Smoking status: Every Day     Packs/day: 1.00     Years: 50.00     Pack years: 50.00     Types: Cigarettes     Start date: 6/15/1967    Smokeless tobacco: Never    Tobacco comments:     tried webutrin in 1996 but couldn't tolerate it   Vaping Use    Vaping status: Never Used   Substance and Sexual Activity    Alcohol use: Yes    Drug use: No    Sexual activity: Not Currently     Partners: Male     Birth control/protection: None   Other Topics Concern    Parent/sibling w/ CABG, MI or angioplasty before 65F 55M? No   Social History Narrative    Not on file     Social Determinants of Health     Financial Resource Strain: Low Risk  (10/15/2021)    Overall Financial Resource Strain (CARDIA)     Difficulty of Paying Living Expenses: Not hard at all   Food Insecurity: No Food Insecurity (10/15/2021)    Hunger Vital Sign     Worried About Running Out of Food in the Last Year: Never true     Ran Out of Food in the Last Year: Never true   Transportation Needs: No Transportation Needs (10/15/2021)    PRAPARE - Transportation     Lack of Transportation (Medical): No     Lack of Transportation (Non-Medical): No   Physical Activity: Insufficiently Active (10/15/2021)    Exercise Vital Sign     Days of Exercise per Week: 1 day     Minutes of Exercise per Session: 20 min   Stress: No Stress Concern Present (10/15/2021)    [employer] of Occupational Health - Occupational Stress Questionnaire     Feeling of Stress : Only a little   Social Connections: Moderately Integrated (10/15/2021)    Social Connection and Isolation Panel [NHANES]     Frequency of Communication with Friends and Family: More than three times a week     Frequency of Social Gatherings with Friends and Family: Once a week     Attends Zoroastrianism Services: 1 to 4 times per year     Active Member of Clubs or Organizations: No      Attends Club or Organization Meetings: Not on file     Marital Status:    Intimate Partner Violence: Not on file   Housing Stability: Low Risk  (10/15/2021)    Housing Stability Vital Sign     Unable to Pay for Housing in the Last Year: No     Number of Places Lived in the Last Year: 1     Unstable Housing in the Last Year: No     SUBJECTIVE   Manda is doing well. She has no new complaints. She still has what she would describe as an achy feeling from time to time at the bottom of her rib cage on the right. She has tried heat, ice, and ibuprofen but nothing really seems to help.    IMPRESSION   73 year-old female status post right lower lobe wedge resection, completion right lower lobectomy and mediastinal lymph node dissection for a pT1bN0 (stage IA2) non small cell lung cancer. She is here to review her lung cancer surveillance CT.    Her CT is stable and not concerning for any recurrence or metastatic disease. We will get another chest CT in 1 year.    I suggested she try OTC lidocaine patches to the area to see if that helps minimize her discomfort.    PLAN  I spent 15 min on the date of the encounter in chart review, patient visit, review of tests, documentation and/or discussion with other providers about the issues documented above. I reviewed the plan as follows:  CT chest in 1 year  All questions were answered and the patient and present family were in agreement with the plan.  I appreciate the opportunity to participate in the care of your patient and will keep you updated.  Sincerely,    RENETTA Bernal CNS

## 2023-05-18 ENCOUNTER — OFFICE VISIT (OUTPATIENT)
Dept: FAMILY MEDICINE | Facility: CLINIC | Age: 73
End: 2023-05-18
Payer: COMMERCIAL

## 2023-05-18 VITALS
SYSTOLIC BLOOD PRESSURE: 138 MMHG | HEIGHT: 61 IN | OXYGEN SATURATION: 99 % | WEIGHT: 137 LBS | HEART RATE: 97 BPM | RESPIRATION RATE: 16 BRPM | BODY MASS INDEX: 25.86 KG/M2 | DIASTOLIC BLOOD PRESSURE: 82 MMHG | TEMPERATURE: 97.2 F

## 2023-05-18 DIAGNOSIS — H91.93 BILATERAL HEARING LOSS, UNSPECIFIED HEARING LOSS TYPE: Primary | ICD-10-CM

## 2023-05-18 PROCEDURE — 99213 OFFICE O/P EST LOW 20 MIN: CPT | Performed by: PHYSICIAN ASSISTANT

## 2023-05-18 NOTE — PROGRESS NOTES
"  Assessment & Plan     Bilateral hearing loss, unspecified hearing loss type  Ears clean, referral placed to audiology  - Adult Audiology [facility] Referral; Future      6 minutes spent by me on the date of the encounter doing chart review, history and exam, documentation and further activities per the note       BMI:   Estimated body mass index is 25.89 kg/m  as calculated from the following:    Height as of this encounter: 1.549 m (5' 1\").    Weight as of this encounter: 62.1 kg (137 lb).       CONSULTATION/REFERRAL to audiology    TYRA Walls [facility] SHAYLA Holman is a 73 year old, presenting for the following health issues:  Hearing Problem         View : No data to display.              Patient arrived to discuss hearing referral for Audiology due to hearing difficulties.     Concern - Hearing Loss   Onset: 5-6 months   Description: Patient has noticed in the last couple of months she has been dealing with bilateral hearing loss with no known cause or injury per pt. Struggling to hear television and conversations, denies pain or ear discharge.    Intensity: mild  Progression of Symptoms:  same  Previous history of similar problem: NA   Therapies tried and outcome: None    History of Present Illness       Reason for visit:  Hearing issues  Symptom onset:  More than a month  Symptoms include:  Struggle to hear tv and conversations  Symptom intensity:  Moderate  Symptom progression:  Staying the same  Had these symptoms before:  No    She eats 0-1 servings of fruits and vegetables daily.She consumes 0 sweetened beverage(s) daily.She exercises with enough effort to increase her heart rate 9 or less minutes per day.  She exercises with enough effort to increase her heart rate 3 or less days per week.   She is taking medications regularly.             Review of Systems   Constitutional, HEENT, cardiovascular, pulmonary, gi and gu systems are negative, except as otherwise " "noted.      Objective    /82 (BP Location: Left arm, Patient Position: Chair, Cuff Size: Adult Regular)   Pulse 97   Temp 97.2  F (36.2  C) (Tympanic)   Resp 16   Ht 1.549 m (5' 1\")   Wt 62.1 kg (137 lb)   SpO2 99%   BMI 25.89 kg/m    Body mass index is 25.89 kg/m .  Physical Exam   GENERAL: healthy, alert and no distress  HENT: ear canals and TM's normal,                     "

## 2023-06-15 ENCOUNTER — OFFICE VISIT (OUTPATIENT)
Dept: AUDIOLOGY | Facility: CLINIC | Age: 73
End: 2023-06-15
Attending: PHYSICIAN ASSISTANT
Payer: COMMERCIAL

## 2023-06-15 DIAGNOSIS — H90.3 SENSORINEURAL HEARING LOSS, ASYMMETRICAL: ICD-10-CM

## 2023-06-15 PROCEDURE — 92550 TYMPANOMETRY & REFLEX THRESH: CPT | Performed by: AUDIOLOGIST

## 2023-06-15 PROCEDURE — 92557 COMPREHENSIVE HEARING TEST: CPT | Performed by: AUDIOLOGIST

## 2023-06-15 NOTE — PROGRESS NOTES
AUDIOLOGY REPORT    SUBJECTIVE:  Reanna Kumar is a 73 year old female who was seen in the Audiology Clinic at the [facility] for audiologic evaluation, referred by Seda Wells PA-C .The patient has been seen previously in this clinic on 2/23/2016 for assessment and results indicated bilateral asymmetrical sensorineural hearing loss. The patient reports a decline in hearing and family history of hearing loss (daughter wears cochlear implants). The patient denies  bilateral tinnitus, bilateral otalgia, bilateral drainage, bilateral aural fullness and history of noise exposure.  The patient notes difficulty with communication in a variety of listening situations.  They were unaccompanied today.    OBJECTIVE:  Fall Risk Screen:  1. Have you fallen two or more times in the past year? No  2. Have you fallen and had an injury in the past year? No    Fall Risk Assessment Completed by Audiology    Otoscopic exam indicates ears are clear of cerumen bilaterally     Pure Tone Thresholds assessed using conventional audiometry with good  reliability from 250-8000 Hz bilaterally using insert earphones and circumaural headphones     RIGHT:  borderline-normal and mild from 250-1000 Hz sloping to mild and moderate sensorineural hearing loss    LEFT:    borderline-normal from 250- 500 Hz sloping to mild-moderate and moderate-severe sensorineural hearing loss    Tympanogram:    RIGHT: normal eardrum mobility    LEFT:   normal eardrum mobility    Reflexes (reported by stimulus ear):  RIGHT: Ipsilateral is present at normal levels  RIGHT: Contralateral is absent at frequencies tested  LEFT:   Ipsilateral is present at normal levels  LEFT:   Contralateral is present at normal levels      Speech Reception Threshold:    RIGHT: 30 dB HL    LEFT:   40 dB HL    Speech Reception Thresholds are in good agreement with pure tone thresholds.    Word Recognition Score:     RIGHT: 96% at 70 dB HL using NU-6 recorded  word list.    LEFT:   100% at 80 dB HL using NU-6 recorded word list.      ASSESSMENT:     ICD-10-CM    1. Sensorineural hearing loss, asymmetrical  H90.3 Adult Audiology  Referral          Compared to patient's previous audiogram dated 2/23/26, hearing has declined bilaterally. Today s results were discussed with the patient in detail.     PLAN:  Patient was counseled regarding hearing loss and impact on communication.  Patient is a good candidate for amplification at this time.   It is recommended that the patient schedule an ENT consult due to asymmetrical sensorineural hearing loss, decline in hearing and for medical clearance for hearing aid use.  Patient has 2 sets of ARI hearing aids (Tamie and Widex)that were her daughter's which she would like programmed for her use.  She will schedule a hearing aid consultation with Dr. Bateman so she can choose the best set of instruments for her to use.  If patient does not see ENT, she may be asked to sign a waiver declining ENT/medical clearance.  Please call this clinic with questions regarding these results or recommendations.          Kumar Ramires MA, CCC-A  [place] Licensed Audiologist #[id number]  6/15/2023

## 2023-06-22 ENCOUNTER — ALLIED HEALTH/NURSE VISIT (OUTPATIENT)
Dept: RESEARCH | Facility: CLINIC | Age: 73
End: 2023-06-22
Payer: COMMERCIAL

## 2023-06-22 VITALS
SYSTOLIC BLOOD PRESSURE: 137 MMHG | DIASTOLIC BLOOD PRESSURE: 87 MMHG | HEIGHT: 61 IN | WEIGHT: 135 LBS | OXYGEN SATURATION: 95 % | HEART RATE: 92 BPM | BODY MASS INDEX: 25.49 KG/M2

## 2023-06-22 DIAGNOSIS — Z00.6 EXAMINATION OF PARTICIPANT OR CONTROL IN CLINICAL RESEARCH: Primary | ICD-10-CM

## 2023-06-22 PROCEDURE — 99207 PR NO CHARGE-RESEARCH SERVICE: CPT

## 2023-06-22 NOTE — PROGRESS NOTES
[place] Study Consent    Study Description: This is a prospective intervention-based study that will involve data collection from individuals with measured or perceived mild-to-moderate hearing loss. This study is deemed to meet the qualification of a research study with non-significant risk.          Reanna Kumar a 73 year old female, was on-site  today to discuss participation in the [place] Study.       The consent form was reviewed with the patient.     The review of the study included:    Study Purpose      Participant Responsibilities      Study Data and Devices      Benefits and Risks of Participation      Compensation and Costs of Participation      Voluntary Participation      Confidentiality      Injury and Legal Rights      Protocol Version: 2.0     Subject ID: [id number]    The subject was queried in regards to her willingness to continue and her questions were answered to her satisfaction.     The patient has given her agreement to volunteer and participate in the above noted study.     The  eConsent and HIPAA form version 3.0 (8-Jun-2023) was signed on  22-JUN-2023 with the Clinical Research Unit of [facility].     A copy of the [place] consent will be placed in subject's medical record. A copy of the consent form was given to the subject today.    Study data is directly entered into Epic and [employer] per protocol.     No study procedures were done prior to Reanna Kumar providing informed consent.       22-JUN-2023    Sara Behmanesh

## 2023-06-22 NOTE — PROGRESS NOTES
"         Cortez Study Note    Study Description: This is a prospective intervention-based study that will involve data collection from individuals with measured or perceived mild-to-moderate hearing loss. This study is deemed to meet the qualification of a research study with non-significant risk.           Subject ID: [id number]    SCREENING     Demographic Info  Reanna Kumar   1950          73 year old  female    Race: White  Ethnicity: Non-/        Medical History:  Has the subject experienced any past and/or concomitant Medical History in the following categories: No  -Ear/Nose/Throat  -Dementia or other neurological condition preventing following instructions.  -Vascular conditions.    If yes, record that medical history in Medrio, other conditions are NOT entered into Medrio and deleted from below    Medications  Has the subject taken either of these medications in the last 6 months?   Parenteral Aminoglycoside Antibiotics: No   (ex. gentamicin, amikacin, tobramycin, neomycin, streptomycin and others)  Chemotherapy and/or radiation to Head and/or Neck: No      Vitals:  /87   Pulse 92   Ht 1.549 m (5' 1\")   Wt 61.2 kg (135 lb)   SpO2 95%   BMI 25.51 kg/m         Perceived Hearing Loss:  1. Do you have trouble hearing speech in noisy places? Yes  2. Do you find it hard to follow speech in groups? Yes  3. Do you have trouble hearing on the phone? No  4. Do you need to turn up the volume on the TV or radio, and other people complain it is too loud? Yes    ELIGIBILITY CRITERIA     Protocol Version: 2.0 (24-May-2023)  Consent Version: 3.0 (8-Jun-2023)  Criteria #  Inclusion Criteria (ALL MUST BE YES)  YES/NO/N/A   1  Age > 18 years Yes   2  Proficient in written and spoken English, defined by self report Yes   3  Mild- to Moderate- hearing loss as measured by pure tone audiometry (PTA) reference test, or self-report of perceived hearing loss and 15-19 dB hearing loss (by 4PTA) NA   4  " Participants have access to stable internet connection  Yes           Criteria # Exclusion Criteria (ALL MUST BE NO) YES/NO/N/A   5  Ear anatomy non-conducive to comfortable wear of headphone  NA   6  Active ear disease  No   7  Cerumen impaction that cannot be removed  No   8  Sudden loss of hearing (in the preceding 90 days), defined by self-report No   9  Self-report of loud environmental sound exposure (e.g., concert, construction site, fireworks) without hearing protection, within 72 hours of reference PTA assessed at Clinic Visit 1 NA   10  Tinnitus that impacts one's daily life, defined by self-report No   11  Use of cochlear implants No   12  Self-reported issues with small or confined spaces such as a single-person enclosed shahid, and/or claustrophobia No   13  Health technology, [employer], media outlet employees (or spouse of employees) or employees of /sites contracted to execute this study  No   14  User noted preference to not wear headphone consistently, or charge headphone and smartphone consistently, during field-use  No   15  Hearing loss >60 dB HL at 0.25-3kHz and >65 dB HL at 4kHz in either ear; assessed during PTA NA   16  Hearing loss that requires electroacoustic settings which are not acoustically stable in the participant's ear per audiologist judgement NA   17  Current regular use of hearing aids No   18  Active treatment, or treatment in the past 6 months, with either a chemotherapeutic drug for cancer, or radiation therapy to the head or neck region No   19  Active treatment, or treatment in the past 6 months, with parenteral aminoglycoside antibiotics  No   20   In the Investigator's opinion, unable to adhere to study procedures No     Patient does fulfill study inclusion criteria and no exclusion criteria are found at this time. IE criteria assessment will be completed at Clinic Visit 1.    Subject Disposition Summary  Is there a change in Subject Disposition? No    22-JUN-2023  Carrie  Behmanesh    **Note addendum on 3:10 PM June 22, 2023 removing allergies list, secondary to being beyond the scope of the study.     Sara Behmanesh

## 2023-06-22 NOTE — PROGRESS NOTES
[facility]go [facility] Physical Exam      Medical History Reviewed? No  (Parenteral aminoglycoside antibiotics: gentamicin, amikacin, tobramycin, neomycin, and streptomycin, ect )    Does patient use hearing aids daily? No    Physical Examination  For abnormal findings, please evaluate if the finding is Clinically Significant (by 'CS') or Not Clinically Significant (by 'NCS')  General Appearance   Normal  Head and Neck   Normal  Lungs     Normal  Cardiovascular   Normal  Abdomen    Normal  Lymph Nodes   Normal  Skin     Normal  Neurological    Normal      There were no vitals filed for this visit.           Otoscopy    Left Ear Right Ear   Ear Canal  Normal Normal   Abnormal Findings Description  N/A N/A   Finding of Other Description N/A N/A       22-JUN-2023    Cari Dunbar NP

## 2023-06-26 ENCOUNTER — OFFICE VISIT (OUTPATIENT)
Dept: OPTOMETRY | Facility: CLINIC | Age: 73
End: 2023-06-26
Payer: COMMERCIAL

## 2023-06-26 DIAGNOSIS — H02.105 ECTROPION OF LEFT LOWER EYELID, UNSPECIFIED ECTROPION TYPE: ICD-10-CM

## 2023-06-26 DIAGNOSIS — H52.4 PRESBYOPIA: ICD-10-CM

## 2023-06-26 DIAGNOSIS — H25.13 NUCLEAR AGE-RELATED CATARACT, BOTH EYES: ICD-10-CM

## 2023-06-26 DIAGNOSIS — H52.223 REGULAR ASTIGMATISM OF BOTH EYES: ICD-10-CM

## 2023-06-26 DIAGNOSIS — Z01.01 ENCOUNTER FOR EXAMINATION OF EYES AND VISION WITH ABNORMAL FINDINGS: Primary | ICD-10-CM

## 2023-06-26 PROCEDURE — 92015 DETERMINE REFRACTIVE STATE: CPT | Performed by: OPTOMETRIST

## 2023-06-26 PROCEDURE — 92014 COMPRE OPH EXAM EST PT 1/>: CPT | Performed by: OPTOMETRIST

## 2023-06-26 ASSESSMENT — VISUAL ACUITY
OS_PH_SC+: -2
OD_CC: 20/80+1
CORRECTION_TYPE: GLASSES
OS_PH_SC: 20/25
OS_SC+: -1
OD_SC: 20/50
OD_PH_SC: 20/40
OS_CC: 20/40-1
OD_SC+: -2
OS_SC: 20/60
OS_SC: 20/40
OD_SC: 20/80-1
METHOD: SNELLEN - LINEAR

## 2023-06-26 ASSESSMENT — CONF VISUAL FIELD
OS_INFERIOR_TEMPORAL_RESTRICTION: 0
OS_SUPERIOR_TEMPORAL_RESTRICTION: 0
METHOD: COUNTING FINGERS
OD_NORMAL: 1
OD_INFERIOR_TEMPORAL_RESTRICTION: 0
OS_INFERIOR_NASAL_RESTRICTION: 0
OS_NORMAL: 1
OD_SUPERIOR_TEMPORAL_RESTRICTION: 0
OS_SUPERIOR_NASAL_RESTRICTION: 0
OD_SUPERIOR_NASAL_RESTRICTION: 0
OD_INFERIOR_NASAL_RESTRICTION: 0

## 2023-06-26 ASSESSMENT — REFRACTION_WEARINGRX
OD_SPHERE: +2.50
OD_CYLINDER: SPHERE
SPECS_TYPE: OTC READERS
OS_CYLINDER: SPHERE
OS_SPHERE: +2.50

## 2023-06-26 ASSESSMENT — REFRACTION_MANIFEST
OD_ADD: +2.50
OS_ADD: +2.50
OD_AXIS: 172
OD_CYLINDER: +1.25
OS_SPHERE: -1.25
OS_CYLINDER: +1.25
OS_AXIS: 018
OD_SPHERE: +0.25

## 2023-06-26 ASSESSMENT — CUP TO DISC RATIO
OS_RATIO: 0.3
OD_RATIO: 0.3

## 2023-06-26 ASSESSMENT — EXTERNAL EXAM - LEFT EYE: OS_EXAM: NORMAL

## 2023-06-26 ASSESSMENT — TONOMETRY
OS_IOP_MMHG: 14
OD_IOP_MMHG: 16
IOP_METHOD: APPLANATION

## 2023-06-26 ASSESSMENT — EXTERNAL EXAM - RIGHT EYE: OD_EXAM: NORMAL

## 2023-06-26 NOTE — LETTER
6/26/2023         RE: Reanna Kumar  [address]  [place] [address]        Dear Colleague,    Thank you for referring your patient, Reanna Kumar, to the [facility]. Please see a copy of my visit note below.    Chief Complaint   Patient presents with     Annual Eye Exam         Last Eye Exam: 5/17/2022  Dilated Previously: Yes, side effects of dilation explained today    What are you currently using to see? +2.50 to +3.00 readers - wears for phone/book/computer    -Has never worn glasses for distance     Distance Vision Acuity: Noticed gradual change  - more hazy in right eye for ~3 months - wondering if cataract is worsening     Near Vision Acuity: Satisfied with vision while reading and using computer with readers    Eye Comfort: good  Do you use eye drops? : No  Occupation or Hobbies: Part time - [employer]    hospitals          Medical, surgical and family histories reviewed and updated 6/26/2023.       OBJECTIVE: See Ophthalmology exam    ASSESSMENT:    ICD-10-CM    1. Encounter for examination of eyes and vision with abnormal findings  Z01.01       2. Nuclear age-related cataract, both eyes  H25.13       3. Ectropion of left lower eyelid, unspecified ectropion type  H02.105       4. Regular astigmatism of both eyes  H52.223       5. Presbyopia  H52.4           PLAN:     Patient Instructions   Moderate cataracts each eye.   Refer to ophthalmology for cataract evaluation.       The effects of the dilating drops last for 4- 6 hours.  You will be more sensitive to light and vision will be blurry up close.  Mydriatic sunglasses were given if needed.     Chilango Olivarez, MICHELLE  [address]  [place]  [address]    [phone]            Again, thank you for allowing me to participate in the care of your patient.        Sincerely,        Chilango Olivarez OD

## 2023-06-26 NOTE — PATIENT INSTRUCTIONS
Moderate cataracts each eye.   Refer to ophthalmology for cataract evaluation.       The effects of the dilating drops last for 4- 6 hours.  You will be more sensitive to light and vision will be blurry up close.  Mydriatic sunglasses were given if needed.     Chilango Olivarez, OD  [facility]  [address] [place] [address]. [place]  [place]  [id number]    [phone]

## 2023-06-26 NOTE — PROGRESS NOTES
Chief Complaint   Patient presents with     Annual Eye Exam         Last Eye Exam: 5/17/2022  Dilated Previously: Yes, side effects of dilation explained today    What are you currently using to see? +2.50 to +3.00 readers - wears for phone/book/computer    -Has never worn glasses for distance     Distance Vision Acuity: Noticed gradual change  - more hazy in right eye for ~3 months - wondering if cataract is worsening     Near Vision Acuity: Satisfied with vision while reading and using computer with readers    Eye Comfort: good  Do you use eye drops? : No  Occupation or Hobbies: Part time - [employer]    [employer]          Medical, surgical and family histories reviewed and updated 6/26/2023.       OBJECTIVE: See Ophthalmology exam    ASSESSMENT:    ICD-10-CM    1. Encounter for examination of eyes and vision with abnormal findings  Z01.01       2. Nuclear age-related cataract, both eyes  H25.13       3. Ectropion of left lower eyelid, unspecified ectropion type  H02.105       4. Regular astigmatism of both eyes  H52.223       5. Presbyopia  H52.4           PLAN:     Patient Instructions   Moderate cataracts each eye.   Refer to ophthalmology for cataract evaluation.       The effects of the dilating drops last for 4- 6 hours.  You will be more sensitive to light and vision will be blurry up close.  Mydriatic sunglasses were given if needed.     Chilanog Olivarez, OD  [address] [place]  [address]    [phone]

## 2023-06-30 ENCOUNTER — OFFICE VISIT (OUTPATIENT)
Dept: AUDIOLOGY | Facility: CLINIC | Age: 73
End: 2023-06-30

## 2023-06-30 ENCOUNTER — ALLIED HEALTH/NURSE VISIT (OUTPATIENT)
Dept: RESEARCH | Facility: CLINIC | Age: 73
End: 2023-06-30
Payer: COMMERCIAL

## 2023-06-30 DIAGNOSIS — H90.3 SENSORY HEARING LOSS, BILATERAL: Primary | ICD-10-CM

## 2023-06-30 DIAGNOSIS — Z00.6 EXAMINATION OF PARTICIPANT OR CONTROL IN CLINICAL RESEARCH: Primary | ICD-10-CM

## 2023-06-30 DIAGNOSIS — H90.3 SENSORINEURAL HEARING LOSS, ASYMMETRICAL: ICD-10-CM

## 2023-06-30 PROCEDURE — 99207 PR NO CHARGE NURSE ONLY: CPT

## 2023-06-30 PROCEDURE — V5011 HEARING AID FITTING/CHECKING: HCPCS | Performed by: AUDIOLOGIST

## 2023-06-30 PROCEDURE — 92567 TYMPANOMETRY: CPT | Performed by: AUDIOLOGIST

## 2023-06-30 PROCEDURE — 92557 COMPREHENSIVE HEARING TEST: CPT | Performed by: AUDIOLOGIST

## 2023-06-30 NOTE — PROGRESS NOTES
Cortez Study Clinic Visit 1 Note    Study Description: This is a prospective intervention-based study that will involve data collection from individuals with measured or perceived mild-to-moderate hearing loss. This study is deemed to meet the qualification of a research study with non-significant risk.           Reanna Kumar a 73 year old female, was seen at the [facility] today to discuss participation in the Cortez study.     Pure Tone Reference, Tympanometry and Bone Conduction were performed during this visit.     Subject Status: Enrolled and Randomized    Study data was captured on the audiogram and then entered into EDC later by a Research Coordinator.     No study procedures were done prior to Reanna Kumar providing informed consent.     All subject questions were answered and they voiced understanding.     Giuliana Lima, AuD  06/30/23

## 2023-06-30 NOTE — PROGRESS NOTES
Diego Inclusion/Exclusion Criteria:    Study Name: Cortez   : Edilma Miller MD      Study Description: This is a prospective intervention-based study that will involve data collection from individuals with measured or perceived mild-to-moderate hearing loss. This study is deemed to meet the qualification of a research study with non-significant risk.     Protocol Version: 2.0 (24-May-2023)  Consent Version: 3.0 (8-Jun-2023)    Criteria #  Inclusion Criteria (ALL MUST BE YES)  YES/NO/N/A   1  Age > 18 years  Yes   2  Proficient in written and spoken English, defined by self report Yes   3  Mild- to Moderate- hearing loss as measured by pure tone audiometry (PTA) reference test, or self-report of perceived hearing loss and 15-25 dB hearing loss (by 4PTA) Yes   4  Participants have access to stable internet connection    Yes             Criteria # Exclusion Criteria (ALL MUST BE NO) YES/NO/N/A   5  Ear anatomy non-conducive to comfortable wear of headphone  No   6  Active ear disease    No   7  Cerumen impaction that cannot be removed    No   8  Sudden loss of hearing (in the preceding 90 days), defined by self-report No   9 Self-report of loud environmental sound exposure (e.g., concert, construction site, fireworks) without hearing protection, within 72 hours of reference PTA assessed at Clinic Visit 1 No   10    Tinnitus that impacts one's daily life, defined by self-report No   11  Use of cochlear implants   No   12  Self-reported issues with small or confined spaces such as single-person enclosed shahid, and/or claustrophobia No   13  Health technology, fitness, media outlet employees (or spouse of employees) or employees of /sites contracted to execute this study  No   14  User noted preference to not wear headphone consistently, or charge headphone and smartphone consistently, during field-use  No   15  Hearing loss >60 dB HL at 0.25-3kHz and >65 dB HL at 4kHz in either ear; assessed  during PTA No   16  Hearing loss that requires electroacoustic settings which are not acoustically stable in the participant's ear per audiologist judgement No   17    Current regular use of hearing aids No   18    Active treatment, or treatment in the past 6 months, with either a chemotherapeutic drug for cancer, or radiation therapy to the head or neck region No   19    Active treatment, or treatment in the past 6 months, with parenteral aminoglycoside antibiotics  No   20    In the Investigator's opinion, unable to adhere to study procedures No     Patient does fulfill study inclusion criteria and no exclusion criteria are found at this time. IE criteria assessment will be completed at Clinic Visit 1.      Edilma Miller MD    30-JUN-2023    Sara Behmanesh

## 2023-06-30 NOTE — PROGRESS NOTES
Cortez Study Clinic Visit 1 Note    Study Description: This is a prospective intervention-based study that will involve data collection from individuals with measured or perceived mild-to-moderate hearing loss. This study is deemed to meet the qualification of a research study with non-significant risk.           Reanna Kumar a 73 year old female, was seen at the [facility] today to discuss participation in the Cortez study and complete Clinic Visit 1.    Subject ID: [id number]    Study data including Pure Tone Reference, Tympanometry, Bone Conduction was captured on the audiogram and paper source and then entered into EDC later by Sara Behmanesh.     Randomization:   Group Confirmed? [x]  Treatment Arm: Self-Fit    Device Accountability Dispense  Was Device Kit Dispensed?  Yes   Date Device Kit was dispensed? 30-JUN-2023  Device Kit ID: [id number]    Phone ID: [id number]    Charging Case ID: [id number]   Case Serial Number: [id number]     Recommended Headphone Tip Size: L  Left Ear Final Tip Size: L   Right Ear Final Tip Size: L     Patient was extensively educated for their treatment arm. All questions were answered and subject voiced understanding.     Is there a change in Subject Disposition?  No  Were any Adverse Events (AEs) experienced?  No  Were there any Protocol Deviations? No    30-JUN-2023    Sara Behmanesh

## 2023-07-05 ENCOUNTER — TELEPHONE (OUTPATIENT)
Dept: RESEARCH | Facility: CLINIC | Age: 73
End: 2023-07-05
Payer: COMMERCIAL

## 2023-07-07 ENCOUNTER — OFFICE VISIT (OUTPATIENT)
Dept: AUDIOLOGY | Facility: CLINIC | Age: 73
End: 2023-07-07
Payer: COMMERCIAL

## 2023-07-07 ENCOUNTER — ALLIED HEALTH/NURSE VISIT (OUTPATIENT)
Dept: RESEARCH | Facility: CLINIC | Age: 73
End: 2023-07-07

## 2023-07-07 DIAGNOSIS — Z00.6 RESEARCH STUDY PATIENT: Primary | ICD-10-CM

## 2023-07-07 DIAGNOSIS — Z00.6 EXAMINATION OF PARTICIPANT OR CONTROL IN CLINICAL RESEARCH: Primary | ICD-10-CM

## 2023-07-07 PROCEDURE — 99207 PR NO CHARGE NURSE ONLY: CPT | Performed by: FAMILY MEDICINE

## 2023-07-07 PROCEDURE — V5020 CONFORMITY EVALUATION: HCPCS | Performed by: AUDIOLOGIST

## 2023-07-07 PROCEDURE — 92556 SPEECH AUDIOMETRY COMPLETE: CPT | Performed by: AUDIOLOGIST

## 2023-07-07 PROCEDURE — V5011 HEARING AID FITTING/CHECKING: HCPCS | Performed by: AUDIOLOGIST

## 2023-07-07 NOTE — PROGRESS NOTES
Cortez Study Clinic Visit 2 Note    Purpose: This is a prospective intervention-based study that will involve data collection from individuals with measured or perceived mild-to-moderate hearing loss. This study is deemed to meet the qualification of a research study with non-significant risk.        Reanna Kumar a 73 year old female , was seen at the Audiology clinic today to complete clinical visit 2 for the Cortez study.     Real Ear Measures and Speech in Noise were performed during this visit.     Study data was directly entered onto paper source and then entered into EDC later by a Research Coordinator.     All questions were answered and subject voiced understanding    Giuliana Lima, Joselito  07/07/23

## 2023-07-07 NOTE — PROGRESS NOTES
Cortez Study Clinic Visit 2 Note    Purpose: This is a prospective intervention-based study that will involve data collection from individuals with measured or perceived mild-to-moderate hearing loss. This study is deemed to meet the qualification of a research study with non-significant risk.        Subject ID: [id number]  Treatment Arm: Self-Fit    Reanna Kumar a 73 year old female , was seen at the Audiology clinic today to complete clinical visit 2 for the Cortez study.     Study data including Real Ear Measures and Speech in Noise was directly entered onto paper source and then entered into EDC later by Summer Davis.     Were the device serial numbers confirmed? Yes    Was there a change in Subject Disposition?  No  Were there any Adverse Events (AEs) experienced? No  Were there any Protocol Deviations? No    All subject questions were answered and they voiced their understanding.     7-JUL-2023    Summer Davis

## 2023-07-21 ENCOUNTER — TELEPHONE (OUTPATIENT)
Dept: RESEARCH | Facility: CLINIC | Age: 73
End: 2023-07-21

## 2023-07-21 ENCOUNTER — OFFICE VISIT (OUTPATIENT)
Dept: AUDIOLOGY | Facility: CLINIC | Age: 73
End: 2023-07-21

## 2023-07-21 ENCOUNTER — ALLIED HEALTH/NURSE VISIT (OUTPATIENT)
Dept: RESEARCH | Facility: CLINIC | Age: 73
End: 2023-07-21
Payer: COMMERCIAL

## 2023-07-21 DIAGNOSIS — Z00.6 RESEARCH SUBJECT: Primary | ICD-10-CM

## 2023-07-21 DIAGNOSIS — Z00.6 RESEARCH STUDY PATIENT: Primary | ICD-10-CM

## 2023-07-21 PROCEDURE — 99207 PR NO CHARGE NURSE ONLY: CPT

## 2023-07-21 PROCEDURE — 92556 SPEECH AUDIOMETRY COMPLETE: CPT | Performed by: AUDIOLOGIST

## 2023-07-21 PROCEDURE — V5020 CONFORMITY EVALUATION: HCPCS | Performed by: AUDIOLOGIST

## 2023-07-21 NOTE — PROGRESS NOTES
Cortez Study Clinic Visit 3 Note    Study Description: This is a prospective intervention-based study that will involve data collection from individuals with measured or perceived mild-to-moderate hearing loss. This study is deemed to meet the qualification of a research study with non-significant risk.       Reanna Kumar a 73 year old female, was seen at the Audiology clinic today to complete Clinic Visit 3 for the Cortez study.     Real Ear Measures and Speech in Noise were performed during this visit.     Study data was directly entered onto paper source and then entered into the EDC later by a Research Coordinator.     All questions were answered and subject voiced understanding.     Laureen SolorzanoAl[facility], AuD  07/21/23

## 2023-07-21 NOTE — PROGRESS NOTES
Cortez Study Clinic Visit 3 Note    Study Description: This is a prospective intervention-based study that will involve data collection from individuals with measured or perceived mild-to-moderate hearing loss. This study is deemed to meet the qualification of a research study with non-significant risk.       Subject ID: [id number]  Treatment Arm: Self-Fit    Reanna Kumar a 73 year old female, was seen at the Audiology clinic today to complete Clinic Visit 3 for the Cortez study.     Study data including Real Ear Measures and Speech in Noise was directly captured on paper source and then entered into the EDC later by Gloria Reeder RN.     [place] per protocol.       Device Accountability Return  Was the Device Kit Returned? Yes  Returned Device Kit ID: [id number]  Date Device Kit Returned: 21-JUL-2023    Were there Device Issues? No    If yes, provide description of device issues, whether device kit was replaced, and replacement device kit ID.     Did the subject complete the study? Yes  Date of Study Completion: 21-JUL-2023    Were any Adverse Events (AEs) experienced? No  Any Protocol Deviations? No    This visit thus completes their participation in the study.     21-JUL-2023    Gloria Reeder RN

## 2023-09-22 ENCOUNTER — OFFICE VISIT (OUTPATIENT)
Dept: OPHTHALMOLOGY | Facility: CLINIC | Age: 73
End: 2023-09-22
Attending: OPTOMETRIST
Payer: COMMERCIAL

## 2023-09-22 DIAGNOSIS — H52.213 IRREGULAR ASTIGMATISM OF BOTH EYES: ICD-10-CM

## 2023-09-22 DIAGNOSIS — H25.813 COMBINED FORMS OF AGE-RELATED CATARACT OF BOTH EYES: ICD-10-CM

## 2023-09-22 DIAGNOSIS — H52.201 HYPEROPIA OF RIGHT EYE WITH ASTIGMATISM AND PRESBYOPIA: ICD-10-CM

## 2023-09-22 DIAGNOSIS — H52.4 MYOPIA OF LEFT EYE WITH ASTIGMATISM AND PRESBYOPIA: ICD-10-CM

## 2023-09-22 DIAGNOSIS — H52.01 HYPEROPIA OF RIGHT EYE WITH ASTIGMATISM AND PRESBYOPIA: ICD-10-CM

## 2023-09-22 DIAGNOSIS — H52.12 MYOPIA OF LEFT EYE WITH ASTIGMATISM AND PRESBYOPIA: ICD-10-CM

## 2023-09-22 DIAGNOSIS — H52.202 MYOPIA OF LEFT EYE WITH ASTIGMATISM AND PRESBYOPIA: ICD-10-CM

## 2023-09-22 DIAGNOSIS — H26.8 PSEUDOEXFOLIATION (PXF) OF LEFT LENS CAPSULE: ICD-10-CM

## 2023-09-22 DIAGNOSIS — H52.4 HYPEROPIA OF RIGHT EYE WITH ASTIGMATISM AND PRESBYOPIA: ICD-10-CM

## 2023-09-22 DIAGNOSIS — H25.13 NUCLEAR AGE-RELATED CATARACT, BOTH EYES: Primary | ICD-10-CM

## 2023-09-22 PROCEDURE — 99204 OFFICE O/P NEW MOD 45 MIN: CPT | Performed by: OPHTHALMOLOGY

## 2023-09-22 PROCEDURE — 76519 ECHO EXAM OF EYE: CPT | Performed by: OPHTHALMOLOGY

## 2023-09-22 PROCEDURE — 92025 CPTRIZED CORNEAL TOPOGRAPHY: CPT | Performed by: OPHTHALMOLOGY

## 2023-09-22 ASSESSMENT — VISUAL ACUITY
METHOD: SNELLEN - LINEAR
OD_PH_SC: 20/40
OS_PH_SC: 20/25
OD_SC+: +1
OD_SC: 20/60
OD_PH_SC+: -2
OS_SC: 20/30
OS_PH_SC+: -2

## 2023-09-22 ASSESSMENT — REFRACTION_MANIFEST
OD_SPHERE: +1.00
OD_CYLINDER: +1.25
OD_AXIS: 172
OD_ADD: +2.50
OS_CYLINDER: +1.25
OS_ADD: +2.50
OS_SPHERE: -1.25
OS_AXIS: 018

## 2023-09-22 ASSESSMENT — CONF VISUAL FIELD
OD_NORMAL: 1
OD_INFERIOR_NASAL_RESTRICTION: 0
OS_NORMAL: 1
OD_SUPERIOR_NASAL_RESTRICTION: 0
OD_SUPERIOR_TEMPORAL_RESTRICTION: 0
OS_SUPERIOR_NASAL_RESTRICTION: 0
OS_SUPERIOR_TEMPORAL_RESTRICTION: 0
METHOD: COUNTING FINGERS
OD_INFERIOR_TEMPORAL_RESTRICTION: 0
OS_INFERIOR_NASAL_RESTRICTION: 0
OS_INFERIOR_TEMPORAL_RESTRICTION: 0

## 2023-09-22 ASSESSMENT — TONOMETRY
IOP_METHOD: TONOPEN
OD_IOP_MMHG: 15
OS_IOP_MMHG: 14

## 2023-09-22 ASSESSMENT — EXTERNAL EXAM - RIGHT EYE: OD_EXAM: NORMAL

## 2023-09-22 ASSESSMENT — CUP TO DISC RATIO
OD_RATIO: 0.3
OS_RATIO: 0.3

## 2023-09-22 ASSESSMENT — EXTERNAL EXAM - LEFT EYE: OS_EXAM: NORMAL
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
## 2023-09-25 ENCOUNTER — TELEPHONE (OUTPATIENT)
Dept: FAMILY MEDICINE | Facility: CLINIC | Age: 73
End: 2023-09-25
Payer: COMMERCIAL

## 2023-09-25 NOTE — TELEPHONE ENCOUNTER
Pt calling to state that she has a cataract procedure scheduled for 11/17/23. Pt has annual wellness with pcp 10/26/23.     Pt is requesting to be seen at the same time for both Annual Wellness and Pre-op. Pt compliant with scheduling pre-op for a different if needed as insurance has told her that she needs two different appointments for these.     Ok for one appointment for annual wellness and Pre-op exam? Will call pt with pcp recommendation if two appointments are needed.     Melanie Sy, RN on 9/25/2023 at 11:34 AM

## 2023-09-25 NOTE — TELEPHONE ENCOUNTER
Please notify Manda that yes, we can do both the same day.  I will do her preop and med check and we'll have our RN do the education that goes with the medicare wellness visit afterwards (which I do not suspect to be much at this time).      Seda Wells PA-C

## 2023-09-25 NOTE — TELEPHONE ENCOUNTER
"RN updated patient with provider directives. She stated this is not what she was asking.  She is asking if she can only have \"one charge\" vs having two charges. Does the annual wellness cover for her Pre-Op for this cataract surgery?     RN educated that there are charges based off of what is discussed during each visit and gave examples. This RN offered the billing line for this concern, she declined and became frustrated.      RN encouraged patient to call the surgery center to determine if they will accept the annual wellness as a pre-op. She stated the previous nurse stated she was going to call the cataract office to figure this out.    Marissa Denise RN on 9/25/2023 at 2:03 PM    "

## 2023-09-25 NOTE — TELEPHONE ENCOUNTER
Attempted to call patient with number on file to relay provider's message below with no answer, left voicemail to call clinic back at [phone].    Melanie Sy RN on 9/25/2023 at 1:06 PM

## 2023-09-25 NOTE — TELEPHONE ENCOUNTER
"A preop is not the same as the annual wellness visit.. .  It is a completely separate visit and is considered and billed out as an \"office visit\", not a \"preventative wellness visit\".  We can do both the same day if she would like.  Typically the preventative wellness visit has no charge and is covered by insurance if it is only preventative things discussed.  Insurance would charge out the office visit for the preop regardless.     Seda Wells PA-C      "

## 2023-10-03 ENCOUNTER — TELEPHONE (OUTPATIENT)
Dept: OPHTHALMOLOGY | Facility: CLINIC | Age: 73
End: 2023-10-03
Payer: COMMERCIAL

## 2023-10-03 NOTE — TELEPHONE ENCOUNTER
Spoke with patient, questions were not related to to her eye clinic appts but rather about her pre op physical and how those dates correlate to her surgery dates. She has her annual physical on 10/26/23 and would like to have her pre op done then as well (ok 'd with her PCP per telephone call found in chart) Explained that pre op needs to be done within 30 days of her surgery and her surgery dates are 11/17/23 and 12/15/23 so she would need to discuss with her PCP that she would need a second H&P done at some point within 30 days of her 2nd surgery. Pt voiced understanding and will bring this up to her PCP at her 10/26/23 physical.     Valentine Lugo, COT, 1:00 PM 10/03/2023

## 2023-10-03 NOTE — TELEPHONE ENCOUNTER
Patient called in and LVM for writer with questions regarding her upcoming recheck calc appointment and her post-op appointments. Will route to clinic to please assist with questions regarding her 10/20 appointment.    Julieth Malone on 10/3/23 at 12:39 PM.  Senior Perioperative Coordinator   Ph: [phone]

## 2023-10-20 ENCOUNTER — OFFICE VISIT (OUTPATIENT)
Dept: OPHTHALMOLOGY | Facility: CLINIC | Age: 73
End: 2023-10-20
Payer: COMMERCIAL

## 2023-10-20 DIAGNOSIS — H25.13 NUCLEAR AGE-RELATED CATARACT, BOTH EYES: Primary | ICD-10-CM

## 2023-10-20 PROCEDURE — 76516 ECHO EXAM OF EYE: CPT | Mod: 50 | Performed by: OPHTHALMOLOGY

## 2023-10-20 PROCEDURE — 99207 PR NO CHARGE NURSE ONLY: CPT | Performed by: OPHTHALMOLOGY

## 2023-10-20 NOTE — PROGRESS NOTES
HPI       Cataract Follow-Up    In both eyes.             Comments    Patient returning to repeat IOL calculation prior to surgery.           Last edited by Isac Markham on 10/20/2023  1:35 PM.         Review of systems for the eyes was negative other than the pertinent positives/negatives listed in the HPI.      Assessment & Plan    HPI:  Reanna Kumar is a 73 year old female with history of HLD, lung CA, insomnia presents for cataract evaluation from Dr. Olivarez. She notes hazy vision x several months and difficulty with night driving.       POHx: refractive error  PMHx: HLD, lung CA, insomnia  Current Medications: QUEtiapine (SEROQUEL) 50 MG tablet, Take 0.5-1 tablets (25-50 mg) by mouth At Bedtime (takes 0.5 x 50mg)  rosuvastatin (CRESTOR) 20 MG tablet, Take 1 tablet (20 mg) by mouth daily    No current facility-administered medications on file prior to visit.    FHx: denies family history of ocular conditions   PSHx: denies history of ocular surgeries       Current Eye Medications:      Assessment & Plan:  (H25.13) Nuclear age-related cataract, both eyes  (primary encounter diagnosis)  (H25.813) Combined forms of age-related cataract of both eyes  Special equipment/needs:  Eye: left  Anesthesia:topical  Dilates to: 5  Iris expansion:  Yes  Pseudoexfoliation: Yes  Trypan Blue: No  Trauma: No    Able lay to flat: Yes  Blood Thinner: No   Tamsulosin: No  DM: Yes  Guttae: No    Dominant Eye: left    Plan: [place] both eyes deferred toric    Proceed with CE/IOL left eye followed by right eye .        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        Charles Dangelo MD     Attending Physician Attestation:  Complete documentation of historical and exam elements from today's encounter can be found in the full encounter summary report (not reduplicated in this progress note).  I personally obtained the chief complaint(s) and history of present illness.  I confirmed and edited as necessary the review of systems, past medical/surgical history, family history, social history, and examination findings as documented by others; and I examined the patient myself.  I personally reviewed the relevant tests, images, and reports as documented above.  I formulated and edited as necessary the assessment and plan and discussed the findings and management plan with the patient and family. - Charles Dangelo MD

## 2023-10-29 ASSESSMENT — ENCOUNTER SYMPTOMS
ABDOMINAL PAIN: 0
BREAST MASS: 0

## 2023-10-29 ASSESSMENT — ACTIVITIES OF DAILY LIVING (ADL): CURRENT_FUNCTION: NO ASSISTANCE NEEDED

## 2023-10-30 ENCOUNTER — OFFICE VISIT (OUTPATIENT)
Dept: FAMILY MEDICINE | Facility: CLINIC | Age: 73
End: 2023-10-30
Payer: COMMERCIAL

## 2023-10-30 VITALS
DIASTOLIC BLOOD PRESSURE: 82 MMHG | SYSTOLIC BLOOD PRESSURE: 130 MMHG | BODY MASS INDEX: 26.24 KG/M2 | HEART RATE: 93 BPM | HEIGHT: 61 IN | TEMPERATURE: 97.6 F | RESPIRATION RATE: 16 BRPM | OXYGEN SATURATION: 98 % | WEIGHT: 139 LBS

## 2023-10-30 DIAGNOSIS — R74.8 ELEVATED ALKALINE PHOSPHATASE LEVEL: ICD-10-CM

## 2023-10-30 DIAGNOSIS — G47.00 INSOMNIA, UNSPECIFIED TYPE: ICD-10-CM

## 2023-10-30 DIAGNOSIS — Z29.11 NEED FOR VACCINATION AGAINST RESPIRATORY SYNCYTIAL VIRUS: ICD-10-CM

## 2023-10-30 DIAGNOSIS — I25.10 CORONARY ARTERY CALCIFICATION: ICD-10-CM

## 2023-10-30 DIAGNOSIS — G89.28 PAIN, CHRONIC POSTOPERATIVE: ICD-10-CM

## 2023-10-30 DIAGNOSIS — E83.52 SERUM CALCIUM ELEVATED: ICD-10-CM

## 2023-10-30 DIAGNOSIS — I25.10 CORONARY ARTERY CALCIFICATION SEEN ON CT SCAN: ICD-10-CM

## 2023-10-30 DIAGNOSIS — Z78.0 ASYMPTOMATIC MENOPAUSE: ICD-10-CM

## 2023-10-30 DIAGNOSIS — Z12.11 SCREEN FOR COLON CANCER: ICD-10-CM

## 2023-10-30 DIAGNOSIS — H91.93 BILATERAL HEARING LOSS, UNSPECIFIED HEARING LOSS TYPE: ICD-10-CM

## 2023-10-30 DIAGNOSIS — E78.5 HYPERLIPIDEMIA LDL GOAL <130: ICD-10-CM

## 2023-10-30 DIAGNOSIS — Z00.00 ENCOUNTER FOR MEDICARE ANNUAL WELLNESS EXAM: Primary | ICD-10-CM

## 2023-10-30 DIAGNOSIS — R73.03 PREDIABETES: ICD-10-CM

## 2023-10-30 DIAGNOSIS — Z12.31 ENCOUNTER FOR SCREENING MAMMOGRAM FOR BREAST CANCER: ICD-10-CM

## 2023-10-30 DIAGNOSIS — Z23 NEED FOR SHINGLES VACCINE: ICD-10-CM

## 2023-10-30 DIAGNOSIS — C34.31 CANCER OF LOWER LOBE OF RIGHT LUNG (H): ICD-10-CM

## 2023-10-30 PROBLEM — M25.551 HIP PAIN, RIGHT: Status: RESOLVED | Noted: 2019-08-26 | Resolved: 2023-10-30

## 2023-10-30 PROCEDURE — 99000 SPECIMEN HANDLING OFFICE-LAB: CPT | Performed by: PHYSICIAN ASSISTANT

## 2023-10-30 PROCEDURE — 90662 IIV NO PRSV INCREASED AG IM: CPT | Performed by: PHYSICIAN ASSISTANT

## 2023-10-30 PROCEDURE — G0008 ADMIN INFLUENZA VIRUS VAC: HCPCS | Performed by: PHYSICIAN ASSISTANT

## 2023-10-30 PROCEDURE — 36415 COLL VENOUS BLD VENIPUNCTURE: CPT | Performed by: PHYSICIAN ASSISTANT

## 2023-10-30 PROCEDURE — 80061 LIPID PANEL: CPT | Performed by: PHYSICIAN ASSISTANT

## 2023-10-30 PROCEDURE — 90480 ADMN SARSCOV2 VAC 1/ONLY CMP: CPT | Performed by: PHYSICIAN ASSISTANT

## 2023-10-30 PROCEDURE — 99214 OFFICE O/P EST MOD 30 MIN: CPT | Mod: 25 | Performed by: PHYSICIAN ASSISTANT

## 2023-10-30 PROCEDURE — G0439 PPPS, SUBSEQ VISIT: HCPCS | Performed by: PHYSICIAN ASSISTANT

## 2023-10-30 PROCEDURE — 84080 ASSAY ALKALINE PHOSPHATASES: CPT | Mod: 90 | Performed by: PHYSICIAN ASSISTANT

## 2023-10-30 PROCEDURE — 91320 SARSCV2 VAC 30MCG TRS-SUC IM: CPT | Performed by: PHYSICIAN ASSISTANT

## 2023-10-30 PROCEDURE — 80053 COMPREHEN METABOLIC PANEL: CPT | Mod: 90 | Performed by: PHYSICIAN ASSISTANT

## 2023-10-30 RX ORDER — DULOXETIN HYDROCHLORIDE 20 MG/1
20 CAPSULE, DELAYED RELEASE ORAL 2 TIMES DAILY
Qty: 60 CAPSULE | Refills: 2 | Status: SHIPPED | OUTPATIENT
Start: 2023-10-30 | End: 2024-02-26

## 2023-10-30 RX ORDER — RESPIRATORY SYNCYTIAL VIRUS VACCINE 120MCG/0.5
0.5 KIT INTRAMUSCULAR ONCE
Qty: 1 EACH | Refills: 0 | Status: CANCELLED | OUTPATIENT
Start: 2023-10-30 | End: 2023-10-30

## 2023-10-30 RX ORDER — QUETIAPINE FUMARATE 50 MG/1
50 TABLET, FILM COATED ORAL AT BEDTIME
Qty: 90 TABLET | Refills: 3 | Status: SHIPPED | OUTPATIENT
Start: 2023-10-30

## 2023-10-30 RX ORDER — ROSUVASTATIN CALCIUM 20 MG/1
20 TABLET, COATED ORAL DAILY
Qty: 90 TABLET | Refills: 3 | Status: SHIPPED | OUTPATIENT
Start: 2023-10-30

## 2023-10-30 ASSESSMENT — ENCOUNTER SYMPTOMS
ABDOMINAL PAIN: 0
BREAST MASS: 0

## 2023-10-30 ASSESSMENT — ACTIVITIES OF DAILY LIVING (ADL): CURRENT_FUNCTION: NO ASSISTANCE NEEDED

## 2023-10-30 NOTE — PATIENT INSTRUCTIONS
"Please call [facility] at [phone]  to set up the DeXA scan.     I recommend RSV and shingles vaccine at the pharmacy.     Let's try increasing the seroquel to 50 mg before bed.     For the chronic right sided chest discomfort, I suggest trying a chronic pain medication called cymbalta.  Take this twice per day (start after you have been on the higher dose of seroquel for a week or so).          Patient Education   Personalized Prevention Plan  You are due for the preventive services outlined below.  Your care team is available to assist you in scheduling these services.  If you have already completed any of these items, please share that information with your care team to update in your medical record.  Health Maintenance Due   Topic Date Due    Zoster (Shingles) Vaccine (1 of 2) Never done    RSV VACCINE 60+ (1 - 1-dose 60+ series) Never done    Colorectal Cancer Screening  11/05/2022    Osteoporosis Screening  03/26/2023    Flu Vaccine (1) 09/01/2023    COVID-19 Vaccine (5 - 2023-24 season) 09/01/2023    Cholesterol Lab  10/24/2023    ANNUAL REVIEW OF HM ORDERS  10/24/2023    Annual Wellness Visit  10/24/2023     Learning About Being Physically Active  What is physical activity?     Being physically active means doing any kind of activity that gets your body moving.  The types of physical activity that can help you get fit and stay healthy include:  Aerobic or \"cardio\" activities. These make your heart beat faster and make you breathe harder, such as brisk walking, riding a bike, or running. They strengthen your heart and lungs and build up your endurance.  Strength training activities. These make your muscles work against, or \"resist,\" something. Examples include lifting weights or doing push-ups. These activities help tone and strengthen your muscles and bones.  Stretches. These let you move your joints and muscles through their full range of motion. Stretching helps you be more " flexible.  Reaching a balance between these three types of physical activity is important because each one contributes to your overall fitness.  What are the benefits of being active?  Being active is one of the best things you can do for your health. It helps you to:  Feel stronger and have more energy to do all the things you like to do.  Focus better at school or work.  Feel, think, and sleep better.  Reach and stay at a healthy weight.  Lose fat and build lean muscle.  Lower your risk for serious health problems, including diabetes, heart attack, high blood pressure, and some cancers.  Keep your heart, lungs, bones, muscles, and joints strong and healthy.  How can you make being active part of your life?  Start slowly. Make it your long-term goal to get at least 30 minutes of exercise on most days of the week. Walking is a good choice. You also may want to do other activities, such as running, swimming, cycling, or playing tennis or team sports.  Pick activities that you like--ones that make your heart beat faster, your muscles stronger, and your muscles and joints more flexible. If you find more than one thing you like doing, do them all. You don't have to do the same thing every day.  Get your heart pumping every day. Any activity that makes your heart beat faster and keeps it at that rate for a while counts.  Here are some great ways to get your heart beating faster:  Go for a brisk walk, run, or bike ride.  Go for a hike or swim.  Go in-line skating.  Play a game of touch football, basketball, or soccer.  Ride a bike.  Play tennis or racquetball.  Climb stairs.  Even some household chores can be aerobic--just do them at a faster pace. Vacuuming, raking or mowing the lawn, sweeping the garage, and washing and waxing the car all can help get your heart rate up.  Strengthen your muscles during the week. You don't have to lift heavy weights or grow big, bulky muscles to get stronger. Doing a few simple  "activities that make your muscles work against, or \"resist,\" something can help you get stronger.  For example, you can:  Do push-ups or sit-ups, which use your own body weight as resistance.  Lift weights or dumbbells or use stretch bands at home or in a gym or community center.  Stretch your muscles often. Stretching will help you as you become more active. It can help you stay flexible, loosen tight muscles, and avoid injury. It can also help improve your balance and posture and can be a great way to relax.  Be sure to stretch the muscles you'll be using when you work out. It's best to warm your muscles slightly before you stretch them. Walk or do some other light aerobic activity for a few minutes, and then start stretching.  When you stretch your muscles:  Do it slowly. Stretching is not about going fast or making sudden movements.  Don't push or bounce during a stretch.  Hold each stretch for at least 15 to 30 seconds, if you can. You should feel a stretch in the muscle, but not pain.  Breathe out as you do the stretch. Then breathe in as you hold the stretch. Don't hold your breath.  If you're worried about how more activity might affect your health, have a checkup before you start. Follow any special advice your doctor gives you for getting a smart start.  Where can you learn more?  Go to https://www.OneMore[employer].net/patiented  Enter W332 in the search box to learn more about \"Learning About Being Physically Active.\"  Current as of: October 10, 2022               Content Version: 13.7    2564-1034 [employer].   Care instructions adapted under license by your healthcare professional. If you have questions about a medical condition or this instruction, always ask your healthcare professional. [employer] disclaims any warranty or liability for your use of this information.      Learning About Dietary Guidelines  What are the Dietary Guidelines for Americans?     Dietary Guidelines for " Americans provide tips for eating well and staying healthy. This helps reduce the risk for long-term (chronic) diseases.  These guidelines recommend that you:  Eat and drink the right amount for you. The [place] government's food guide is called MyPlate. It can help you make your own well-balanced eating plan.  Try to balance your eating with your activity. This helps you stay at a healthy weight.  Drink alcohol in moderation, if at all.  Limit foods high in salt, saturated fat, trans fat, and added sugar.  These guidelines are from the [place] Department of Agriculture and the [place] Department of Health and Human Services. They are updated every 5 years.  What is MyPlate?  MyPlate is the [place] government's food guide. It can help you make your own well-balanced eating plan. A balanced eating plan means that you eat enough, but not too much, and that your food gives you the nutrients you need to stay healthy.  MyPlate focuses on eating plenty of whole grains, fruits, and vegetables, and on limiting fat and sugar. It is available online at www.ChooseMyPlate.gov.  How can you get started?  If you're trying to eat healthier, you can slowly change your eating habits over time. You don't have to make big changes all at once. Start by adding one or two healthy foods to your meals each day.  Grains  Choose whole-grain breads and cereals and whole-wheat pasta and whole-grain crackers.  Vegetables  Eat a variety of vegetables every day. They have lots of nutrients and are part of a heart-healthy diet.  Fruits  Eat a variety of fruits every day. Fruits contain lots of nutrients. Choose fresh fruit instead of fruit juice.  Protein foods  Choose fish and lean poultry more often. Eat red meat and fried meats less often. Dried beans, tofu, and nuts are also good sources of protein.  Dairy  Choose low-fat or fat-free products from this food group. If you have problems digesting milk, try eating cheese or yogurt instead.  Fats and  "oils  Limit fats and oils if you're trying to cut calories. Choose healthy fats when you cook. These include canola oil and olive oil.  Where can you learn more?  Go to https://www.[employer].net/patiented  Enter D676 in the search box to learn more about \"Learning About Dietary Guidelines.\"  Current as of: March 1, 2023               Content Version: 13.7    6123-7469 [employer].   Care instructions adapted under license by your healthcare professional. If you have questions about a medical condition or this instruction, always ask your healthcare professional. [employer] disclaims any warranty or liability for your use of this information.         "

## 2023-10-30 NOTE — PROGRESS NOTES
"SUBJECTIVE:   Manda is a 73 year old who presents for Preventive Visit.      10/30/2023     3:22 PM   Additional Questions   Roomed by Paty   Accompanied by Self         10/30/2023     3:22 PM   Patient Reported Additional Medications   Patient reports taking the following new medications Taking Vitamin D       Are you in the first 12 months of your Medicare coverage?  No    Healthy Habits:     In general, how would you rate your overall health?  Good    Frequency of exercise:  1 day/week    Duration of exercise:  N/A    Do you usually eat at least 4 servings of fruit and vegetables a day, include whole grains    & fiber and avoid regularly eating high fat or \"junk\" foods?  No    Taking medications regularly:  Yes    Ability to successfully perform activities of daily living:  No assistance needed    Home Safety:  No safety concerns identified    Hearing Impairment:  No hearing concerns    In the past 6 months, have you been bothered by leaking of urine?  No    In general, how would you rate your overall mental or emotional health?  Good    Additional concerns today:  No    Patient with history of hyperlipidemia arrived for Annual Medicare Physical, undressing for breast exam. Pt is also interested in getting flu and covid vaccines.     Hyperlipidemia Follow-Up    Are you regularly taking any medication or supplement to lower your cholesterol?   Yes- Crestor  Are you having muscle aches or other side effects that you think could be caused by your cholesterol lowering medication?  No    Insomnia:  takes seroquel at night to help with sleep.  She takes 1/2 tab before bed and then another 1/2 tab a few hours later if she wakes up.  Takes a 2nd dose most of the time.     Continued right side chest discomfort x 2+ years (since surgery).  Symptoms are constant.  Better if she lays down.  Does not keep her up.  Present since her surgery for lung cancer.  Tried gabapentin no relief.  Has tried physical therapy as well.    "     Today's PHQ-2 Score:       10/29/2023     4:58 PM   PHQ-2 ( 1999 [employer])   Q1: Little interest or pleasure in doing things 0   Q2: Feeling down, depressed or hopeless 0   PHQ-2 Score 0   Q1: Little interest or pleasure in doing things Not at all   Q2: Feeling down, depressed or hopeless Not at all   PHQ-2 Score 0       Have you ever done Advance Care Planning? (For example, a Health Directive, POLST, or a discussion with a medical provider or your loved ones about your wishes): Yes, advance care planning is on file.    Hearing test:   Recently done with audiology and given hearing aides.      Fall risk  Fallen 2 or more times in the past year?: Yes  Any fall with injury in the past year?: No    Cognitive Screening   1) Repeat 3 items (Leader, Season, Table)    2) Clock draw: NORMAL  3) 3 item recall: Recalls 3 objects  Results: 3 items recalled: COGNITIVE IMPAIRMENT LESS LIKELY    Mini-CogTM Copyright GENNA Lisa. Licensed by the author for use in [facility]; reprinted with permission (rex@[facility]). All rights reserved.      Do you have sleep apnea, excessive snoring or daytime drowsiness? : no    Reviewed and updated as needed this visit by clinical staff   Tobacco  Allergies  Meds              Reviewed and updated as needed this visit by Provider                 Social History     Tobacco Use    Smoking status: Every Day     Packs/day: 1.00     Years: 50.00     Additional pack years: 0.00     Total pack years: 50.00     Types: Cigarettes     Start date: 6/15/1967    Smokeless tobacco: Never    Tobacco comments:     tried webutrin in 1996 but couldn't tolerate it   Substance Use Topics    Alcohol use: Yes     Comment: normal             10/29/2023     4:58 PM   Alcohol Use   Prescreen: >3 drinks/day or >7 drinks/week? No     Do you have a current opioid prescription? No  Do you use any other controlled substances or medications that are not prescribed by a provider? None              Current  providers sharing in care for this patient include:   Patient Care Team:  Seda Wells PA-C as PCP - General (Family Medicine)  Seda Wells PA-C as Assigned PCP  Kumar Ramires AuD as Audiologist (Audiology)  Laureen Paulino AuD as Audiologist (Audiology)  Giuliana Lima AuD as Audiologist (Audiology)  Charles Dangelo MD as Physician (Ophthalmology)  Charles Dangelo MD as Assigned Surgical Provider    The following health maintenance items are reviewed in Epic and correct as of today:  Health Maintenance   Topic Date Due    ZOSTER IMMUNIZATION (1 of 2) Never done    RSV VACCINE 60+ (1 - 1-dose 60+ series) Never done    DEXA  03/26/2023    LIPID  10/24/2023    NICOTINE/TOBACCO CESSATION COUNSELING Q 1 YR  10/30/2024    MEDICARE ANNUAL WELLNESS VISIT  10/30/2024    ANNUAL REVIEW OF HM ORDERS  10/30/2024    FALL RISK ASSESSMENT  10/30/2024    MAMMO SCREENING  12/30/2024    COLORECTAL CANCER SCREENING  11/04/2025    DTAP/TDAP/TD IMMUNIZATION (3 - Td or Tdap) 08/03/2028    ADVANCE CARE PLANNING  10/30/2028    HEPATITIS C SCREENING  Completed    PHQ-2 (once per calendar year)  Completed    INFLUENZA VACCINE  Completed    Pneumococcal Vaccine: 65+ Years  Completed    COVID-19 Vaccine  Completed    IPV IMMUNIZATION  Aged Out    HPV IMMUNIZATION  Aged Out    MENINGITIS IMMUNIZATION  Aged Out    LUNG CANCER SCREENING  Discontinued     Lab work is in process  Labs reviewed in EPIC  BP Readings from Last 3 Encounters:   10/30/23 130/82   06/22/23 137/87   05/18/23 138/82    Wt Readings from Last 3 Encounters:   10/30/23 63 kg (139 lb)   06/22/23 61.2 kg (135 lb)   05/18/23 62.1 kg (137 lb)                  Mammogram Screening: Mammogram Screening: Recommended mammography every 1-2 years with patient discussion and risk factor consideration        Review of Systems   Cardiovascular:  Positive for chest pain.   Gastrointestinal:  Negative for abdominal pain.   Breasts:  Negative for  "tenderness, breast mass and discharge.   Genitourinary:  Negative for pelvic pain, vaginal bleeding and vaginal discharge.     Constitutional, HEENT, cardiovascular, pulmonary, GI, , musculoskeletal, neuro, skin, endocrine and psych systems are negative, except as otherwise noted.    OBJECTIVE:   /82 (BP Location: Right arm, Patient Position: Chair, Cuff Size: Adult Regular)   Pulse 93   Temp 97.6  F (36.4  C) (Tympanic)   Resp 16   Ht 1.549 m (5' 1\")   Wt 63 kg (139 lb)   SpO2 98%   BMI 26.26 kg/m   Estimated body mass index is 26.26 kg/m  as calculated from the following:    Height as of this encounter: 1.549 m (5' 1\").    Weight as of this encounter: 63 kg (139 lb).  Physical Exam  GENERAL APPEARANCE: healthy, alert and no distress  EYES: Eyes grossly normal to inspection, PERRL and conjunctivae and sclerae normal  HENT: ear canals and TM's normal, nose and mouth without ulcers or lesions, oropharynx clear and oral mucous membranes moist  NECK: no adenopathy, no asymmetry, masses, or scars and thyroid normal to palpation  RESP: lungs clear to auscultation - no rales, rhonchi or wheezes  BREAST: normal without masses, tenderness or nipple discharge and no palpable axillary masses or adenopathy  CV: regular rate and rhythm, normal S1 S2, no S3 or S4, no murmur, click or rub, no peripheral edema and peripheral pulses strong  ABDOMEN: soft, nontender, no hepatosplenomegaly, no masses and bowel sounds normal  MS: no musculoskeletal defects are noted and gait is age appropriate without ataxia  SKIN: no suspicious lesions or rashes  NEURO: Normal strength and tone, sensory exam grossly normal, mentation intact and speech normal  PSYCH: mentation appears normal and affect normal/bright    Diagnostic Test Results:  Labs reviewed in Epic    ASSESSMENT / PLAN:   (Z00.00) Encounter for Medicare annual wellness exam  (primary encounter diagnosis)  Comment:     HEALTH CARE MAINTENANCE              Reviewed USPTF " recommendations and anticipatory guidance.              See orders.     (Z23) Need for shingles vaccine  Comment: Discussed Shingrex vaccine, patient will inquire at pharmacy for coverage and administration.      Plan:     (Z29.11) Need for vaccination against respiratory syncytial virus  Comment: Recommend RSV vaccine at pharmacy as well.   Plan:     (Z12.11) Screen for colon cancer  Comment:  Reviewed cancer screening, last cologuard done in last year and normal and would be due after age 75, therefore will stop screening.   Plan:     (I25.10,  I25.84) Coronary artery calcification  Comment: will leave statin as is.   Plan: Lipid panel reflex to direct LDL Non-fasting            (G47.00) Insomnia, unspecified type  Comment: will first try increasing to a full tab at bedtime.   Plan: QUEtiapine (SEROQUEL) 50 MG tablet            (E78.5) Hyperlipidemia LDL goal <130  Comment: stable.   Plan: rosuvastatin (CRESTOR) 20 MG tablet,         Comprehensive metabolic panel (BMP + Alb, Alk         Phos, ALT, AST, Total. Bili, TP)            (I25.10) Coronary artery calcification seen on CT scan  Comment: stable  Plan: rosuvastatin (CRESTOR) 20 MG tablet            (Z78.0) Asymptomatic menopause  Comment: Discussed the importance of Calcium and vitamin D intake, weight bearing exercise, avoidance of smoking and bone density monitoring (if appropriate).    Plan: DEXA HIP/PELVIS/SPINE - Future            (Z12.31) Encounter for screening mammogram for breast cancer  Comment: I discussed in detail with Reanna Kumar the importance of breast cancer screening through monthly self breast exams and annual breast exams in the clinic.   Plan: *MA Screening Digital Bilateral            (G89.28) Pain, chronic postoperative  Comment: will try cymbalta for chronic pain, recheck in 1 month.   Plan: DULoxetine (CYMBALTA) 20 MG capsule            (R73.03) Prediabetes  Comment: will monitor  Plan:     (H91.93) Bilateral hearing loss,  unspecified hearing loss type  Comment: has hearing aides  Plan:     Cancer of lower lobe of lung.  Monitored/managed by specialist.     Patient has been advised of split billing requirements and indicates understanding: Yes      COUNSELING:  Reviewed preventive health counseling, as reflected in patient instructions       Regular exercise       Healthy diet/nutrition       Hearing screening       Fall risk prevention       Osteoporosis prevention/bone health        She reports that she has been smoking cigarettes. She started smoking about 56 years ago. She has a 50.00 pack-year smoking history. She has never used smokeless tobacco.  Nicotine/Tobacco Cessation Plan:   Cessation advised      Appropriate preventive services were discussed with this patient, including applicable screening as appropriate for fall prevention, nutrition, physical activity, Tobacco-use cessation, weight loss and cognition.  Checklist reviewing preventive services available has been given to the patient.    Reviewed patients plan of care and provided an AVS. The Intermediate Care Plan ( asthma action plan, low back pain action plan, and migraine action plan) for Reanna meets the Care Plan requirement. This Care Plan has been established and reviewed with the Patient.        Seda Wells PA-C  [facility]    Identified Health Risks:  I have reviewed Opioid Use Disorder and Substance Use Disorder risk factors and made any needed referrals.   She is at risk for lack of exercise and has been provided with information to increase physical activity for the benefit of her well-being.  The patient was counseled and encouraged to consider modifying their diet and eating habits. She was provided with information on recommended healthy diet options.

## 2023-10-31 LAB
ALBUMIN SERPL BCG-MCNC: 4.5 G/DL (ref 3.5–5.2)
ALP SERPL-CCNC: 131 U/L (ref 35–104)
ALT SERPL W P-5'-P-CCNC: 20 U/L (ref 0–50)
ANION GAP SERPL CALCULATED.3IONS-SCNC: 12 MMOL/L (ref 7–15)
AST SERPL W P-5'-P-CCNC: 30 U/L (ref 0–45)
BILIRUB SERPL-MCNC: 0.4 MG/DL
BUN SERPL-MCNC: 7.4 MG/DL (ref 8–23)
CALCIUM SERPL-MCNC: 10.8 MG/DL (ref 8.8–10.2)
CHLORIDE SERPL-SCNC: 100 MMOL/L (ref 98–107)
CHOLEST SERPL-MCNC: 180 MG/DL
CREAT SERPL-MCNC: 0.57 MG/DL (ref 0.51–0.95)
DEPRECATED HCO3 PLAS-SCNC: 26 MMOL/L (ref 22–29)
EGFRCR SERPLBLD CKD-EPI 2021: >90 ML/MIN/1.73M2
GLUCOSE SERPL-MCNC: 98 MG/DL (ref 70–99)
HDLC SERPL-MCNC: 105 MG/DL
LDLC SERPL CALC-MCNC: 58 MG/DL
NONHDLC SERPL-MCNC: 75 MG/DL
POTASSIUM SERPL-SCNC: 4.5 MMOL/L (ref 3.4–5.3)
PROT SERPL-MCNC: 6.8 G/DL (ref 6.4–8.3)
SODIUM SERPL-SCNC: 138 MMOL/L (ref 135–145)
TRIGL SERPL-MCNC: 84 MG/DL

## 2023-11-06 LAB
ALP BONE SERPL-CCNC: 47 U/L
ALP LIVER SERPL-CCNC: 81 U/L
ALP OTHER SERPL-CCNC: 0 U/L
ALP SERPL-CCNC: 128 U/L

## 2023-11-09 DIAGNOSIS — H25.13 NUCLEAR AGE-RELATED CATARACT, BOTH EYES: Primary | ICD-10-CM

## 2023-11-09 RX ORDER — KETOROLAC TROMETHAMINE 5 MG/ML
SOLUTION OPHTHALMIC
Qty: 4 ML | Refills: 0 | Status: SHIPPED | OUTPATIENT
Start: 2023-11-15 | End: 2023-11-10 | Stop reason: DRUGHIGH

## 2023-11-09 RX ORDER — MOXIFLOXACIN 5 MG/ML
1 SOLUTION/ DROPS OPHTHALMIC 4 TIMES DAILY
Qty: 3 ML | Refills: 0 | Status: SHIPPED | OUTPATIENT
Start: 2023-11-17 | End: 2023-12-04

## 2023-11-09 RX ORDER — PREDNISOLONE ACETATE 10 MG/ML
SUSPENSION/ DROPS OPHTHALMIC
Qty: 4 ML | Refills: 0 | Status: SHIPPED | OUTPATIENT
Start: 2023-11-17 | End: 2023-12-04

## 2023-11-10 ENCOUNTER — TELEPHONE (OUTPATIENT)
Dept: OPHTHALMOLOGY | Facility: CLINIC | Age: 73
End: 2023-11-10
Payer: COMMERCIAL

## 2023-11-10 DIAGNOSIS — H25.13 NUCLEAR AGE-RELATED CATARACT, BOTH EYES: Primary | ICD-10-CM

## 2023-11-10 RX ORDER — KETOROLAC TROMETHAMINE 5 MG/ML
SOLUTION OPHTHALMIC
Qty: 5 ML | Refills: 0 | Status: SHIPPED | OUTPATIENT
Start: 2023-11-10 | End: 2023-11-10 | Stop reason: DRUGHIGH

## 2023-11-10 RX ORDER — KETOROLAC TROMETHAMINE 4 MG/ML
SOLUTION/ DROPS OPHTHALMIC
Qty: 5 ML | Refills: 0 | Status: SHIPPED | OUTPATIENT
Start: 2023-11-10 | End: 2023-11-13

## 2023-11-10 NOTE — TELEPHONE ENCOUNTER
Health Call Center    Phone Message    May a detailed message be left on voicemail: yes     Reason for Call: Medication Question or concern regarding medication   Prescription Clarification  Name of Medication: ketorolac (ACULAR) 0.5 % ophthalmic solution  Prescribing Provider: Dr Dangelo   Pharmacy: [facility] file in [place]   What on the order needs clarification? Radha CHEN from [facility] surgery dept calling that Dr Dangelo prescribed ketorolac but [facility] and 2 other pharmacy only carry the 4% not 5% and patient needs this medication ASAP since she has surgery scheduled for next week.     Please call Radha back at [phone] and advise. Thank you.    Action Taken: Message routed to:  Adult Clinics: Eye p [phone]    Travel Screening: Not Applicable                                                                   
0.4% ketorolac sent per request-- 0.5% not available per pharmacy    Dayron Del Toro RN 2:51 PM 11/10/23    
Future order for 0.5% in order (not 0.4% per my review) in system.    Message received to send 0.5% ketorolac for upcoming surgery    Rx sent per request to make sure pt able to obtain before surgery    Dayron Del Toro RN 12:57 PM 11/10/23    
Labs/Imaging Studies

## 2023-11-13 ENCOUNTER — ANESTHESIA EVENT (OUTPATIENT)
Dept: SURGERY | Facility: AMBULATORY SURGERY CENTER | Age: 73
End: 2023-11-13
Payer: COMMERCIAL

## 2023-11-13 DIAGNOSIS — H25.13 NUCLEAR AGE-RELATED CATARACT, BOTH EYES: ICD-10-CM

## 2023-11-13 RX ORDER — KETOROLAC TROMETHAMINE 5 MG/ML
SOLUTION OPHTHALMIC
Qty: 4 ML | Refills: 0 | Status: SHIPPED | OUTPATIENT
Start: 2023-11-15 | End: 2023-12-04

## 2023-11-13 ASSESSMENT — LIFESTYLE VARIABLES: TOBACCO_USE: 1

## 2023-11-13 NOTE — ANESTHESIA PREPROCEDURE EVALUATION
Anesthesia Pre-Procedure Evaluation    Patient: Reanna Kumar   MRN: [id number] : 1950        Procedure : Procedure(s):  PHACOEMULSIFICATION, CATARACT, WITH INTRAOCULAR LENS IMPLANT          Past Medical History:   Diagnosis Date    Heart disease 2018    Hyperlipidemia     Insomnia 2015    Lung cancer ([facility])     Nonsenile cataract 2023    Pulmonary nodules       Past Surgical History:   Procedure Laterality Date    ARTHROPLASTY HIP Right 2020    Procedure: RIGHT TOTAL HIP ARTHROPLASTY;  Surgeon: Augie Murray MD;  Location: [facility] OR    BRONCHOSCOPY FLEXIBLE AND RIGID N/A 2021    Procedure: BRONCHOSCOPY;  Surgeon: Palomo Castro MD;  Location: [facility] GI    COLONOSCOPY  2021    HIP SURGERY      JOINT REPLACEMENT Left     hip    THORACOSCOPIC LOBECTOMY LUNG Right 5/10/2021    Procedure: Uniportal thoracoscopic right lower lobe wedge segmentectomy, completion bilobectomy middle and lower, intercostal nerve cryoablation, converstion to thoracotomy, mediastinal lymphadenectomy;  Surgeon: Palomo Castro MD;  Location: [facility] OR      Allergies   Allergen Reactions    Zyban [Bupropion Hydrobromide]      Increased anxiety      Social History     Tobacco Use    Smoking status: Every Day     Packs/day: 1.00     Years: 50.00     Additional pack years: 0.00     Total pack years: 50.00     Types: Cigarettes     Start date: 6/15/1967    Smokeless tobacco: Never    Tobacco comments:     tried webutrin in  but couldn't tolerate it   Substance Use Topics    Alcohol use: Yes     Comment: normal      Wt Readings from Last 1 Encounters:   10/30/23 63 kg (139 lb)        Anesthesia Evaluation            ROS/MED HX  ENT/Pulmonary:     (+)                tobacco use, Current use,                      Neurologic:  - neg neurologic ROS     Cardiovascular:     (+) Dyslipidemia hypertension- -  CAD -  - -                                   (-) murmur   METS/Exercise Tolerance: >4 METS    Hematologic:   - neg hematologic  ROS     Musculoskeletal:  - neg musculoskeletal ROS     GI/Hepatic:  - neg GI/hepatic ROS     Renal/Genitourinary:  - neg Renal ROS     Endo:  - neg endo ROS     Psychiatric/Substance Use:  - neg psychiatric ROS     Infectious Disease:  - neg infectious disease ROS     Malignancy:   (+) Malignancy, History of Lung.    Other:  - neg other ROS          Physical Exam    Airway        Mallampati: II   TM distance: > 3 FB   Neck ROM: full   Mouth opening: > 3 cm    Respiratory Devices and Support         Dental     Comment: Teeth examined without any significant abnormality, patient denies loose, missing or chipped teeth or anything removable.         Cardiovascular          Rhythm and rate: regular and normal (-) no murmur    Pulmonary   pulmonary exam normal        breath sounds clear to auscultation           OUTSIDE LABS:  CBC:   Lab Results   Component Value Date    WBC 6.6 10/10/2022    WBC 7.6 10/18/2021    HGB 14.9 10/10/2022    HGB 14.5 10/18/2021    HCT 44.8 10/10/2022    HCT 44.3 10/18/2021     10/10/2022     10/18/2021     BMP:   Lab Results   Component Value Date     10/30/2023     10/10/2022    POTASSIUM 4.5 10/30/2023    POTASSIUM 4.9 10/10/2022    CHLORIDE 100 10/30/2023    CHLORIDE 106 10/10/2022    CO2 26 10/30/2023    CO2 32 10/10/2022    BUN 7.4 (L) 10/30/2023    BUN 12 10/10/2022    CR 0.57 10/30/2023    CR 0.6 05/12/2023    GLC 98 10/30/2023     (H) 10/10/2022     COAGS:   Lab Results   Component Value Date    PTT 26 01/02/2008    INR 1.48 (H) 01/06/2008     POC:   Lab Results   Component Value Date     (H) 05/13/2021     HEPATIC:   Lab Results   Component Value Date    ALBUMIN 4.5 10/30/2023    PROTTOTAL 6.8 10/30/2023    ALT 20 10/30/2023    AST 30 10/30/2023    ALKPHOS 131 (H) 10/30/2023    BILITOTAL 0.4 10/30/2023     OTHER:   Lab Results   Component Value Date    PH 7.33 (L) 05/10/2021    LACT 1.0 05/14/2021    A1C 5.8 (H) 10/14/2020     NALINI 10.8 (H) 10/30/2023    PHOS 3.4 05/18/2021    MAG 1.8 10/10/2022       Anesthesia Plan    ASA Status:  2    NPO Status:  NPO Appropriate    Anesthesia Type: MAC.   Induction: Intravenous.           Consents    Anesthesia Plan(s) and associated risks, benefits, and realistic alternatives discussed. Questions answered and patient/representative(s) expressed understanding.     - Discussed:     - Discussed with:  Patient      - Extended Intubation/Ventilatory Support Discussed: No.      - Patient is DNR/DNI Status: No     Use of blood products discussed: No .     Postoperative Care    Pain management: Multi-modal analgesia.        Comments:    Other Comments: Discussed plan for IV anesthetic with native airway, topical anesthetic. Discussed potential need for conversion to general anesthetic with airway management and likely potential for recall.              Jos eJ Eng MD

## 2023-11-16 ENCOUNTER — OFFICE VISIT (OUTPATIENT)
Dept: FAMILY MEDICINE | Facility: CLINIC | Age: 73
End: 2023-11-16
Payer: COMMERCIAL

## 2023-11-16 VITALS
TEMPERATURE: 97.6 F | DIASTOLIC BLOOD PRESSURE: 84 MMHG | RESPIRATION RATE: 16 BRPM | HEART RATE: 86 BPM | HEIGHT: 61 IN | BODY MASS INDEX: 25.68 KG/M2 | SYSTOLIC BLOOD PRESSURE: 124 MMHG | WEIGHT: 136 LBS | OXYGEN SATURATION: 97 %

## 2023-11-16 DIAGNOSIS — E83.52 SERUM CALCIUM ELEVATED: ICD-10-CM

## 2023-11-16 DIAGNOSIS — G89.28 PAIN, CHRONIC POSTOPERATIVE: ICD-10-CM

## 2023-11-16 DIAGNOSIS — H25.13 NUCLEAR AGE-RELATED CATARACT, BOTH EYES: ICD-10-CM

## 2023-11-16 DIAGNOSIS — G47.00 INSOMNIA, UNSPECIFIED TYPE: ICD-10-CM

## 2023-11-16 DIAGNOSIS — E78.5 HYPERLIPIDEMIA LDL GOAL <130: ICD-10-CM

## 2023-11-16 DIAGNOSIS — Z01.818 PREOP GENERAL PHYSICAL EXAM: Primary | ICD-10-CM

## 2023-11-16 LAB — CA-I BLD-MCNC: 5.4 MG/DL (ref 4.4–5.2)

## 2023-11-16 PROCEDURE — 82330 ASSAY OF CALCIUM: CPT | Performed by: PHYSICIAN ASSISTANT

## 2023-11-16 PROCEDURE — 99214 OFFICE O/P EST MOD 30 MIN: CPT | Performed by: PHYSICIAN ASSISTANT

## 2023-11-16 PROCEDURE — 36415 COLL VENOUS BLD VENIPUNCTURE: CPT | Performed by: PHYSICIAN ASSISTANT

## 2023-11-16 RX ORDER — RESPIRATORY SYNCYTIAL VIRUS VACCINE 120MCG/0.5
0.5 KIT INTRAMUSCULAR ONCE
Qty: 1 EACH | Refills: 0 | Status: CANCELLED | OUTPATIENT
Start: 2023-11-16 | End: 2023-11-16

## 2023-11-16 NOTE — PATIENT INSTRUCTIONS
Preparing for Your Surgery  Getting started  A nurse will call you to review your health history and instructions. They will give you an arrival time based on your scheduled surgery time. Please be ready to share:  Your doctor's clinic name and phone number  Your medical, surgical, and anesthesia history  A list of allergies and sensitivities  A list of medicines, including herbal treatments and over-the-counter drugs  Whether the patient has a legal guardian (ask how to send us the papers in advance)  Please tell us if you're pregnant--or if there's any chance you might be pregnant. Some surgeries may injure a fetus (unborn baby), so they require a pregnancy test. Surgeries that are safe for a fetus don't always need a test, and you can choose whether to have one.   If you have a child who's having surgery, please ask for a copy of Preparing for Your Child's Surgery.    Preparing for surgery  Within 10 to 30 days of surgery: Have a pre-op exam (sometimes called an H&P, or History and Physical). This can be done at a clinic or pre-operative center.  If you're having a , you may not need this exam. Talk to your care team.  At your pre-op exam, talk to your care team about all medicines you take. If you need to stop any medicines before surgery, ask when to start taking them again.  We do this for your safety. Many medicines can make you bleed too much during surgery. Some change how well surgery (anesthesia) drugs work.  Call your insurance company to let them know you're having surgery. (If you don't have insurance, call [phone].)  Call your clinic if there's any change in your health. This includes signs of a cold or flu (sore throat, runny nose, cough, rash, fever). It also includes a scrape or scratch near the surgery site.  If you have questions on the day of surgery, call your hospital or surgery center.  Eating and drinking guidelines  For your safety: Unless your surgeon tells you otherwise,  follow the guidelines below.  Eat and drink as usual until 8 hours before you arrive for surgery. After that, no food or milk.  Drink clear liquids until 2 hours before you arrive. These are liquids you can see through, like water, Gatorade, and Propel Water. They also include plain black coffee and tea (no cream or milk), candy, and breath mints. You can spit out gum when you arrive.  If you drink alcohol: Stop drinking it the night before surgery.  If your care team tells you to take medicine on the morning of surgery, it's okay to take it with a sip of water.  Preventing infection  Shower or bathe the night before and morning of your surgery. Follow the instructions your clinic gave you. (If no instructions, use regular soap.)  Don't shave or clip hair near your surgery site. We'll remove the hair if needed.  Don't smoke or vape the morning of surgery. You may chew nicotine gum up to 2 hours before surgery. A nicotine patch is okay.  Note: Some surgeries require you to completely quit smoking and nicotine. Check with your surgeon.  Your care team will make every effort to keep you safe from infection. We will:  Clean our hands often with soap and water (or an alcohol-based hand rub).  Clean the skin at your surgery site with a special soap that kills germs.  Give you a special gown to keep you warm. (Cold raises the risk of infection.)  Wear special hair covers, masks, gowns and gloves during surgery.  Give antibiotic medicine, if prescribed. Not all surgeries need antibiotics.  What to bring on the day of surgery  Photo ID and insurance card  Copy of your health care directive, if you have one  Glasses and hearing aids (bring cases)  You can't wear contacts during surgery  Inhaler and eye drops, if you use them (tell us about these when you arrive)  CPAP machine or breathing device, if you use them  A few personal items, if spending the night  If you have . . .  A pacemaker, ICD (cardiac defibrillator) or other  implant: Bring the ID card.  An implanted stimulator: Bring the remote control.  A legal guardian: Bring a copy of the certified (court-stamped) guardianship papers.  Please remove any jewelry, including body piercings. Leave jewelry and other valuables at home.  If you're going home the day of surgery  You must have a responsible adult drive you home. They should stay with you overnight as well.  If you don't have someone to stay with you, and you aren't safe to go home alone, we may keep you overnight. Insurance often won't pay for this.  After surgery  If it's hard to control your pain or you need more pain medicine, please call your surgeon's office.  Questions?   If you have any questions for your care team, list them here: _________________________________________________________________________________________________________________________________________________________________________ ____________________________________ ____________________________________ ____________________________________  For informational purposes only. Not to replace the advice of your health care provider. Copyright   2003, 2019 [place] [facility]. All rights reserved. Clinically reviewed by Samantha Leggett MD. [employer] 672595 - REV 12/22.    How to Take Your Medication Before Surgery  - Take all of your medications before surgery as usual (with a small sip of water)

## 2023-11-16 NOTE — PROGRESS NOTES
[facility]  [address]  [place] [address]  Phone: [phone]  Primary Provider: Seda Mariscal  Pre-op Performing Provider: SEDA MARISCAL      PREOPERATIVE EVALUATION:  Today's date: 11/16/2023    Manda is a 73 year old, presenting for the following:  Pre-Op Exam        Surgical Information:  Surgery/Procedure: Cataracts Surgery Left and Right   Surgery Location: [facility]   Surgeon: Dr. Dangelo  Surgery Date: Left Eye 11/17/23 and Right Eye on 12/15/23  Time of Surgery: 7:30AM   Where patient plans to recover: At home with family  Fax number for surgical facility: Note does not need to be faxed, will be available electronically in Epic.      Assessment & Plan     The proposed surgical procedure is considered LOW risk.    Preop general physical exam  Cleared for surgery    Nuclear age-related cataract, both eyes  Surgery indicated.    Serum calcium elevated  Will check an ionized calcium today, if still high, will check parathyroid and Vitamin D levels   - Ionized Calcium    Hyperlipidemia LDL goal <130  Tolerating statin    Insomnia, unspecified type  Improved with higher dose of seroquel.  Will leave as is     Pain, chronic postoperative  Improved with addition of cymbalta.              - No identified additional risk factors other than previously addressed    Antiplatelet or Anticoagulation Medication Instructions:   - Patient is on no antiplatelet or anticoagulation medications.    Additional Medication Instructions:  Patient is to take all scheduled medications on the day of surgery    RECOMMENDATION:  APPROVAL GIVEN to proceed with proposed procedure, without further diagnostic evaluation.            Subjective       HPI related to upcoming procedure: Cataracts.  Vision has been worsening, needs surgery.         11/10/2023     3:38 PM   Preop Questions   1. Have you ever had a heart attack or stroke? No   2. Have you ever had surgery on your  heart or blood vessels, such as a stent placement, a coronary artery bypass, or surgery on an artery in your head, neck, heart, or legs? No   3. Do you have chest pain with activity? No   4. Do you have a history of  heart failure? No   5. Do you currently have a cold, bronchitis or symptoms of other infection? No   6. Do you have a cough, shortness of breath, or wheezing? No   7. Do you or anyone in your family have previous history of blood clots? No   8. Do you or does anyone in your family have a serious bleeding problem such as prolonged bleeding following surgeries or cuts? No   9. Have you ever had problems with anemia or been told to take iron pills? No   10. Have you had any abnormal blood loss such as black, tarry or bloody stools, or abnormal vaginal bleeding? No   11. Have you ever had a blood transfusion? No   12. Are you willing to have a blood transfusion if it is medically needed before, during, or after your surgery? Yes   13. Have you or any of your relatives ever had problems with anesthesia? No   14. Do you have sleep apnea, excessive snoring or daytime drowsiness? No   15. Do you have any artifical heart valves or other implanted medical devices like a pacemaker, defibrillator, or continuous glucose monitor? No   16. Do you have artificial joints? YES - Bilateral Hips    17. Are you allergic to latex? No       Health Care Directive:  Patient has a Health Care Directive on file    Seroquel increased at last visit, tolerating well and sleep is better.  No side effects.     Started cymbalta, no side effects and it has reduced the pain some.  Thinking less about the pain, which is good.      On Vitamin D supplement (3 capsules daily)    Preoperative Review of :   reviewed - no record of controlled substances prescribed.      Status of Chronic Conditions:  See problem list for active medical problems.  Problems all longstanding and stable, except as noted/documented.  See ROS for pertinent  symptoms related to these conditions.    Review of Systems  Constitutional, neuro, ENT, endocrine, pulmonary, cardiac, gastrointestinal, genitourinary, musculoskeletal, integument and psychiatric systems are negative, except as otherwise noted.    Patient Active Problem List    Diagnosis Date Noted    Nuclear age-related cataract, both eyes 09/22/2023     Priority: Medium    Pseudoexfoliation (PXF) of left lens capsule 09/22/2023     Priority: Medium    Combined forms of age-related cataract of both eyes 09/22/2023     Priority: Medium    Malignant neoplasm of lower lobe of right lung (H) 10/18/2021     Priority: Medium    Lung nodule 04/08/2021     Priority: Medium     Added automatically from request for surgery [id number]      Status post total hip replacement, right 06/02/2020     Priority: Medium    Abnormal fasting glucose 08/27/2019     Priority: Medium    Coronary artery calcification 02/28/2019     Priority: Medium    Insomnia, unspecified type 09/13/2018     Priority: Medium    Pulmonary nodules 08/07/2018     Priority: Medium     8/2018: 5 mm spiculated nodule in RLL along major fissure, 3 mm endobrachial nodule in ROEL, 2 mm nodule in periphery of ROEL.  Repeat in 6 months, reminder placed.  Continue annual screening.      Family history of hearing loss 02/18/2016     Priority: Medium    Tobacco use disorder 07/06/2015     Priority: Medium    Advance Care Planning 09/30/2014     Priority: Medium     Advance Care Planning:   Receipt of ACP document:  Received: Health Care Directive which was witnessed or notarized on 11/1/01.  Document not previously scanned.  Validation form completed and sent with document to be scanned.  Code Status needs to be updated to reflect choices in most recent ACP document. Notification sent to Cherise Roth for followup.  Confirmed/documented designated decision maker(s). See permanent comments section of demographics in clinical tab. View document(s) and details by clicking on  code status.   Added by Gail Madrid on 9/30/2014.            Hyperlipidemia LDL goal <130 04/17/2013     Priority: Medium      Past Medical History:   Diagnosis Date    Heart disease 05/2018    Hyperlipidemia     Insomnia 2015    Lung cancer (H)     Nonsenile cataract 6/30/2023    Pulmonary nodules      Past Surgical History:   Procedure Laterality Date    ARTHROPLASTY HIP Right 6/2/2020    Procedure: RIGHT TOTAL HIP ARTHROPLASTY;  Surgeon: Augie Murray MD;  Location:  OR    BRONCHOSCOPY FLEXIBLE AND RIGID N/A 5/18/2021    Procedure: BRONCHOSCOPY;  Surgeon: Palomo Castro MD;  Location: [facility]    COLONOSCOPY  1/2021    HIP SURGERY      JOINT REPLACEMENT Left 2008    hip    THORACOSCOPIC LOBECTOMY LUNG Right 5/10/2021    Procedure: Uniportal thoracoscopic right lower lobe wedge segmentectomy, completion bilobectomy middle and lower, intercostal nerve cryoablation, converstion to thoracotomy, mediastinal lymphadenectomy;  Surgeon: Palomo Castro MD;  Location: [facility]     Current Outpatient Medications   Medication Sig Dispense Refill    DULoxetine (CYMBALTA) 20 MG capsule Take 1 capsule (20 mg) by mouth 2 times daily 60 capsule 2    ketorolac (ACULAR) 0.5 % ophthalmic solution Place 1 drop Into the left eye 4 times daily for 7 days, THEN 1 drop 3 times daily for 7 days, THEN 1 drop 2 times daily for 7 days, THEN 1 drop daily for 7 days. Start 2 days prior to surgery. 4 mL 0    [START ON 11/17/2023] moxifloxacin (VIGAMOX) 0.5 % ophthalmic solution Place 1 drop Into the left eye 4 times daily for 7 days Start morning of surgery. 3 mL 0    [START ON 11/17/2023] prednisoLONE acetate (PRED FORTE) 1 % ophthalmic suspension Place 1 drop Into the left eye 4 times daily for 7 days, THEN 1 drop 3 times daily for 7 days, THEN 1 drop 2 times daily for 7 days, THEN 1 drop daily for 7 days. Start after surgery. 4 mL 0    QUEtiapine (SEROQUEL) 50 MG tablet Take 1 tablet (50 mg) by mouth at bedtime (takes 0.5 x  "50mg) 90 tablet 3    rosuvastatin (CRESTOR) 20 MG tablet Take 1 tablet (20 mg) by mouth daily 90 tablet 3       Allergies   Allergen Reactions    Zyban [Bupropion Hydrobromide]      Increased anxiety        Social History     Tobacco Use    Smoking status: Every Day     Packs/day: 1.00     Years: 50.00     Additional pack years: 0.00     Total pack years: 50.00     Types: Cigarettes     Start date: 6/15/1967    Smokeless tobacco: Never    Tobacco comments:     tried webutrin in 1996 but couldn't tolerate it   Substance Use Topics    Alcohol use: Yes     Comment: normal     Family History   Problem Relation Age of Onset    Cancer Mother     Osteoporosis Mother     Breast Cancer Mother         70's    Uterine Cancer Mother     Arthritis Father     Genitourinary Problems Sister         Renal calculi    Glaucoma No family hx of     Macular Degeneration No family hx of      History   Drug Use No         Objective     /84 (BP Location: Left arm, Patient Position: Chair, Cuff Size: Adult Regular)   Pulse 86   Temp 97.6  F (36.4  C) (Tympanic)   Resp 16   Ht 1.549 m (5' 1\")   Wt 61.7 kg (136 lb)   SpO2 97%   BMI 25.70 kg/m      Physical Exam    GENERAL APPEARANCE: healthy, alert and no distress     NECK: no adenopathy, no asymmetry, masses, or scars and thyroid normal to palpation     RESP: lungs clear to auscultation - no rales, rhonchi or wheezes     CV: regular rates and rhythm, normal S1 S2, no S3 or S4 and no murmur, click or rub     ABDOMEN:  soft, nontender, no HSM or masses and bowel sounds normal     MS: extremities normal- no gross deformities noted, no evidence of inflammation in joints, FROM in all extremities.     SKIN: no suspicious lesions or rashes     NEURO: Normal strength and tone, sensory exam grossly normal, mentation intact and speech normal     PSYCH: mentation appears normal. and affect normal/bright     LYMPHATICS: No cervical adenopathy    Recent Labs   Lab Test 10/30/23  1627 " 05/12/23  1032 10/10/22  1550   HGB  --   --  14.9   PLT  --   --  343     --  138   POTASSIUM 4.5  --  4.9   CR 0.57 0.6 0.58        Diagnostics:  Labs pending at this time (ionized calcium from previous visit).  Results will be reviewed when available.   No EKG required for low risk surgery (cataract, skin procedure, breast biopsy, etc).    Revised Cardiac Risk Index (RCRI):  The patient has the following serious cardiovascular risks for perioperative complications:   - Coronary Artery Disease (MI, positive stress test, angina, Qs on EKG) = 1 point     RCRI Interpretation: 1 point: Class II (low risk - 0.9% complication rate)         Signed Electronically by: Seda Wells PA-C  Copy of this evaluation report is provided to requesting physician.

## 2023-11-17 ENCOUNTER — OFFICE VISIT (OUTPATIENT)
Dept: OPHTHALMOLOGY | Facility: CLINIC | Age: 73
End: 2023-11-17
Payer: COMMERCIAL

## 2023-11-17 ENCOUNTER — HOSPITAL ENCOUNTER (OUTPATIENT)
Facility: AMBULATORY SURGERY CENTER | Age: 73
Discharge: HOME | End: 2023-11-17
Attending: OPHTHALMOLOGY
Payer: COMMERCIAL

## 2023-11-17 ENCOUNTER — ANESTHESIA (OUTPATIENT)
Dept: SURGERY | Facility: AMBULATORY SURGERY CENTER | Age: 73
End: 2023-11-17
Payer: COMMERCIAL

## 2023-11-17 VITALS
SYSTOLIC BLOOD PRESSURE: 121 MMHG | OXYGEN SATURATION: 98 % | TEMPERATURE: 97.5 F | DIASTOLIC BLOOD PRESSURE: 62 MMHG | RESPIRATION RATE: 16 BRPM

## 2023-11-17 DIAGNOSIS — H25.812 COMBINED FORM OF AGE-RELATED CATARACT, LEFT EYE: Primary | ICD-10-CM

## 2023-11-17 DIAGNOSIS — H26.8 PSEUDOEXFOLIATION (PXF) OF LEFT LENS CAPSULE: ICD-10-CM

## 2023-11-17 DIAGNOSIS — Z96.1 PSEUDOPHAKIA, LEFT EYE: Primary | ICD-10-CM

## 2023-11-17 PROCEDURE — G8907 PT DOC NO EVENTS ON DISCHARG: HCPCS

## 2023-11-17 PROCEDURE — G8918 PT W/O PREOP ORDER IV AB PRO: HCPCS

## 2023-11-17 PROCEDURE — 99024 POSTOP FOLLOW-UP VISIT: CPT | Performed by: OPHTHALMOLOGY

## 2023-11-17 PROCEDURE — 66982 XCAPSL CTRC RMVL CPLX WO ECP: CPT | Mod: LT | Performed by: OPHTHALMOLOGY

## 2023-11-17 PROCEDURE — 66982 XCAPSL CTRC RMVL CPLX WO ECP: CPT | Mod: LT

## 2023-11-17 DEVICE — TECNIS 1-PC CLEAR MONO 6.0MM 25.0D
Type: IMPLANTABLE DEVICE | Site: EYE | Status: FUNCTIONAL
Brand: TECNIS IOL

## 2023-11-17 RX ORDER — DICLOFENAC SODIUM 1 MG/ML
1 SOLUTION/ DROPS OPHTHALMIC
Status: COMPLETED | OUTPATIENT
Start: 2023-11-17 | End: 2023-11-17

## 2023-11-17 RX ORDER — FENTANYL CITRATE 50 UG/ML
INJECTION, SOLUTION INTRAMUSCULAR; INTRAVENOUS PRN
Status: DISCONTINUED | OUTPATIENT
Start: 2023-11-17 | End: 2023-11-17

## 2023-11-17 RX ORDER — TETRACAINE HYDROCHLORIDE 5 MG/ML
SOLUTION OPHTHALMIC PRN
Status: DISCONTINUED | OUTPATIENT
Start: 2023-11-17 | End: 2023-11-17 | Stop reason: HOSPADM

## 2023-11-17 RX ORDER — MOXIFLOXACIN 5 MG/ML
1 SOLUTION/ DROPS OPHTHALMIC
Status: COMPLETED | OUTPATIENT
Start: 2023-11-17 | End: 2023-11-17

## 2023-11-17 RX ORDER — FENTANYL CITRATE 50 UG/ML
25 INJECTION, SOLUTION INTRAMUSCULAR; INTRAVENOUS EVERY 5 MIN PRN
Status: DISCONTINUED | OUTPATIENT
Start: 2023-11-17 | End: 2023-11-18 | Stop reason: HOSPADM

## 2023-11-17 RX ORDER — FENTANYL CITRATE 50 UG/ML
50 INJECTION, SOLUTION INTRAMUSCULAR; INTRAVENOUS EVERY 5 MIN PRN
Status: DISCONTINUED | OUTPATIENT
Start: 2023-11-17 | End: 2023-11-18 | Stop reason: HOSPADM

## 2023-11-17 RX ORDER — ONDANSETRON 2 MG/ML
4 INJECTION INTRAMUSCULAR; INTRAVENOUS EVERY 30 MIN PRN
Status: DISCONTINUED | OUTPATIENT
Start: 2023-11-17 | End: 2023-11-18 | Stop reason: HOSPADM

## 2023-11-17 RX ORDER — ONDANSETRON 4 MG/1
4 TABLET, ORALLY DISINTEGRATING ORAL EVERY 30 MIN PRN
Status: DISCONTINUED | OUTPATIENT
Start: 2023-11-17 | End: 2023-11-18 | Stop reason: HOSPADM

## 2023-11-17 RX ORDER — SODIUM CHLORIDE, SODIUM LACTATE, POTASSIUM CHLORIDE, CALCIUM CHLORIDE 600; 310; 30; 20 MG/100ML; MG/100ML; MG/100ML; MG/100ML
INJECTION, SOLUTION INTRAVENOUS CONTINUOUS
Status: DISCONTINUED | OUTPATIENT
Start: 2023-11-17 | End: 2023-11-18 | Stop reason: HOSPADM

## 2023-11-17 RX ORDER — TIMOLOL MALEATE 5 MG/ML
SOLUTION/ DROPS OPHTHALMIC PRN
Status: DISCONTINUED | OUTPATIENT
Start: 2023-11-17 | End: 2023-11-17 | Stop reason: HOSPADM

## 2023-11-17 RX ORDER — OXYCODONE HYDROCHLORIDE 5 MG/1
10 TABLET ORAL
Status: DISCONTINUED | OUTPATIENT
Start: 2023-11-17 | End: 2023-11-18 | Stop reason: HOSPADM

## 2023-11-17 RX ORDER — OXYCODONE HYDROCHLORIDE 5 MG/1
5 TABLET ORAL
Status: DISCONTINUED | OUTPATIENT
Start: 2023-11-17 | End: 2023-11-18 | Stop reason: HOSPADM

## 2023-11-17 RX ORDER — CYCLOPENTOLAT/TROPIC/PHENYLEPH 1%-1%-2.5%
1 DROPS (EA) OPHTHALMIC (EYE)
Status: COMPLETED | OUTPATIENT
Start: 2023-11-17 | End: 2023-11-17

## 2023-11-17 RX ORDER — MOXIFLOXACIN IN NACL,ISO-OS/PF 0.3MG/0.3
SYRINGE (ML) INTRAOCULAR PRN
Status: DISCONTINUED | OUTPATIENT
Start: 2023-11-17 | End: 2023-11-17 | Stop reason: HOSPADM

## 2023-11-17 RX ORDER — BALANCED SALT SOLUTION 6.4; .75; .48; .3; 3.9; 1.7 MG/ML; MG/ML; MG/ML; MG/ML; MG/ML; MG/ML
SOLUTION OPHTHALMIC PRN
Status: DISCONTINUED | OUTPATIENT
Start: 2023-11-17 | End: 2023-11-17 | Stop reason: HOSPADM

## 2023-11-17 RX ORDER — PROPARACAINE HYDROCHLORIDE 5 MG/ML
1 SOLUTION/ DROPS OPHTHALMIC
Status: DISCONTINUED | OUTPATIENT
Start: 2023-11-17 | End: 2023-11-18 | Stop reason: HOSPADM

## 2023-11-17 RX ORDER — ACETAMINOPHEN 325 MG/1
975 TABLET ORAL ONCE
Status: COMPLETED | OUTPATIENT
Start: 2023-11-17 | End: 2023-11-17

## 2023-11-17 RX ORDER — LIDOCAINE 40 MG/G
CREAM TOPICAL
Status: DISCONTINUED | OUTPATIENT
Start: 2023-11-17 | End: 2023-11-18 | Stop reason: HOSPADM

## 2023-11-17 RX ADMIN — PROPARACAINE HYDROCHLORIDE 1 DROP: 5 SOLUTION/ DROPS OPHTHALMIC at 06:16

## 2023-11-17 RX ADMIN — PROPARACAINE HYDROCHLORIDE 1 DROP: 5 SOLUTION/ DROPS OPHTHALMIC at 06:29

## 2023-11-17 RX ADMIN — MOXIFLOXACIN 1 DROP: 5 SOLUTION/ DROPS OPHTHALMIC at 06:29

## 2023-11-17 RX ADMIN — PROPARACAINE HYDROCHLORIDE 1 DROP: 5 SOLUTION/ DROPS OPHTHALMIC at 06:21

## 2023-11-17 RX ADMIN — MOXIFLOXACIN 1 DROP: 5 SOLUTION/ DROPS OPHTHALMIC at 06:21

## 2023-11-17 RX ADMIN — MOXIFLOXACIN 1 DROP: 5 SOLUTION/ DROPS OPHTHALMIC at 06:16

## 2023-11-17 RX ADMIN — DICLOFENAC SODIUM 1 DROP: 1 SOLUTION/ DROPS OPHTHALMIC at 06:16

## 2023-11-17 RX ADMIN — ACETAMINOPHEN 975 MG: 325 TABLET ORAL at 06:19

## 2023-11-17 RX ADMIN — Medication 1 DROP: at 06:16

## 2023-11-17 RX ADMIN — SODIUM CHLORIDE, SODIUM LACTATE, POTASSIUM CHLORIDE, CALCIUM CHLORIDE: 600; 310; 30; 20 INJECTION, SOLUTION INTRAVENOUS at 06:30

## 2023-11-17 RX ADMIN — DICLOFENAC SODIUM 1 DROP: 1 SOLUTION/ DROPS OPHTHALMIC at 06:21

## 2023-11-17 RX ADMIN — FENTANYL CITRATE 25 MCG: 50 INJECTION, SOLUTION INTRAMUSCULAR; INTRAVENOUS at 07:20

## 2023-11-17 RX ADMIN — Medication 1 DROP: at 06:21

## 2023-11-17 RX ADMIN — DICLOFENAC SODIUM 1 DROP: 1 SOLUTION/ DROPS OPHTHALMIC at 06:29

## 2023-11-17 RX ADMIN — Medication 1 DROP: at 06:29

## 2023-11-17 ASSESSMENT — TONOMETRY
IOP_METHOD: TONOPEN
OS_IOP_MMHG: 13

## 2023-11-17 ASSESSMENT — VISUAL ACUITY
OS_SC: 20/200
METHOD: SNELLEN - LINEAR

## 2023-11-17 ASSESSMENT — SLIT LAMP EXAM - LIDS: COMMENTS: NORMAL

## 2023-11-17 NOTE — PROGRESS NOTES
Review of systems for the eyes was negative other than the pertinent positives/negatives listed in the HPI.      Assessment & Plan    HPI:  Reanna Kumar is a 73 year old female with history of HLD, lung CA, insomnia presents for cataract evaluation from Dr. Olivarez for Postoperative day 0 left eye       POHx: refractive error  PMHx: HLD, lung CA, insomnia  Current Medications: DULoxetine (CYMBALTA) 20 MG capsule, Take 1 capsule (20 mg) by mouth 2 times daily  ketorolac (ACULAR) 0.5 % ophthalmic solution, Place 1 drop Into the left eye 4 times daily for 7 days, THEN 1 drop 3 times daily for 7 days, THEN 1 drop 2 times daily for 7 days, THEN 1 drop daily for 7 days. Start 2 days prior to surgery.  moxifloxacin (VIGAMOX) 0.5 % ophthalmic solution, Place 1 drop Into the left eye 4 times daily for 7 days Start morning of surgery.  prednisoLONE acetate (PRED FORTE) 1 % ophthalmic suspension, Place 1 drop Into the left eye 4 times daily for 7 days, THEN 1 drop 3 times daily for 7 days, THEN 1 drop 2 times daily for 7 days, THEN 1 drop daily for 7 days. Start after surgery.  QUEtiapine (SEROQUEL) 50 MG tablet, Take 1 tablet (50 mg) by mouth at bedtime (takes 0.5 x 50mg)  rosuvastatin (CRESTOR) 20 MG tablet, Take 1 tablet (20 mg) by mouth daily    [COMPLETED] acetaminophen (TYLENOL) tablet 975 mg  [COMPLETED] diclofenac (VOLTAREN) 0.1 % ophthalmic solution 1 drop  fentaNYL (PF) (SUBLIMAZE) injection 25 mcg  fentaNYL (PF) (SUBLIMAZE) injection 50 mcg  HYDROmorphone (DILAUDID) injection 0.2 mg  HYDROmorphone (DILAUDID) injection 0.4 mg  lactated ringers infusion  lactated ringers infusion  lactated ringers infusion  lidocaine (LMX4) kit  lidocaine 1 % 0.1-1 mL  [COMPLETED] moxifloxacin (VIGAMOX) 0.5 % ophthalmic solution 1 drop  ondansetron (ZOFRAN ODT) ODT tab 4 mg   Or  ondansetron (ZOFRAN) injection 4 mg  ondansetron (ZOFRAN ODT) ODT tab 4 mg   Or  ondansetron (ZOFRAN) injection 4 mg  oxyCODONE (ROXICODONE) tablet 10  mg  oxyCODONE (ROXICODONE) tablet 5 mg  prochlorperazine (COMPAZINE) injection 5 mg  prochlorperazine (COMPAZINE) injection 5 mg  proparacaine (ALCAINE) 0.5 % ophthalmic solution 1 drop  sodium chloride (PF) 0.9% PF flush 3 mL  sodium chloride (PF) 0.9% PF flush 3 mL  [COMPLETED] tropicamide 1 %-cyclopentolate 1 %-phenylephrine 2.5% 1-1-2.5 % ophthalmic solution 1 drop      FHx: denies family history of ocular conditions   PSHx: Cataract extraction/intraocular lens Dangelo left eye 11/17/23       Current Eye Medications:      Assessment & Plan:  Pseudophakia, left eye, day 0  Dangelo left eye 11/17/23   Doing well  Keep patch in place at night for 7 days  Start post-operative drops and taper according to instructions  Post-operative do's and don'ts reviewed, questions answered    Recheck 1 week    (H25.13) Nuclear age-related cataract, right eyes  (H25.813) Combined forms of age-related cataract of right eyes  Special equipment/needs:  Eye: left  Anesthesia:topical  Dilates to: 5  Iris expansion:  Yes  Pseudoexfoliation: Yes  Trypan Blue: No  Trauma: No    Able lay to flat: Yes  Blood Thinner: No   Tamsulosin: No  DM: Yes  Guttae: No    Dominant Eye: left    Plan: [place] both eyes deferred toric    Proceed with CE/IOL right eye .    (H52.01,  H52.201,  H52.4) Hyperopia of right eye with astigmatism and presbyopia  (H52.12,  H52.202,  H52.4) Myopia of left eye with astigmatism and presbyopia  Hold pending Cataract extraction/iol    (H26.8) Pseudoexfoliation (PXF) of left lens capsule  Normal IOP  No evidence of glaucoma on fundus exam  Advised regarding increased risk with cataract surgery and advocate for prompt removal    (H52.213) Irregular astigmatism of both eyes  Would not be good multifocal candidate with current ocular surface  Continue AT four times a day      Return for as scheduled.        Charles Dangelo MD     Attending Physician Attestation:  Complete documentation of historical and exam elements  from today's encounter can be found in the full encounter summary report (not reduplicated in this progress note).  I personally obtained the chief complaint(s) and history of present illness.  I confirmed and edited as necessary the review of systems, past medical/surgical history, family history, social history, and examination findings as documented by others; and I examined the patient myself.  I personally reviewed the relevant tests, images, and reports as documented above.  I formulated and edited as necessary the assessment and plan and discussed the findings and management plan with the patient and family. - Charles Dangelo MD

## 2023-11-17 NOTE — ANESTHESIA CARE TRANSFER NOTE
Patient: Reanna Kumar    Procedure: Procedure(s):  PHACOEMULSIFICATION, CATARACT, WITH INTRAOCULAR LENS IMPLANT       Diagnosis: Nuclear age-related cataract, both eyes [H25.13]  Pseudoexfoliation (PXF) of left lens capsule [H26.8]  Combined forms of age-related cataract of both eyes [H25.813]  Diagnosis Additional Information: No value filed.    Anesthesia Type:   MAC     Note:    Oropharynx: oropharynx clear of all foreign objects  Level of Consciousness: awake  Oxygen Supplementation: room air    Independent Airway: airway patency satisfactory and stable  Dentition: dentition unchanged  Vital Signs Stable: post-procedure vital signs reviewed and stable  Report to RN Given: handoff report given  Patient transferred to: Phase II  Comments: Anesthesia Care Transfer Note    Patient: Reanna Kumar    Transferred to: Phase II    Patient vital signs: stable    Airway: none    Monitors on, breathing spontaneously, report to RN    Karolina Dial CRNA  11/17/2023 7:56 AM         Handoff Report: Identifed the Patient, Identified the Reponsible Provider, Reviewed the pertinent medical history, Discussed the surgical course, Reviewed Intra-OP anesthesia mangement and issues during anesthesia, Set expectations for post-procedure period and Allowed opportunity for questions and acknowledgement of understanding      Vitals:  Vitals Value Taken Time   BP     Temp     Pulse     Resp     SpO2         Electronically Signed By: RENETTA Thornton CRNA  November 17, 2023  7:56 AM

## 2023-11-17 NOTE — OP NOTE
PREOPERATIVE DIAGNOSIS:   1. Combined form of age-related cataract, left eye    2.  3. Pseudoexfoliation (PXF) of left lens capsule   Miosis    Left eye   POSTOPERATIVE DIAGNOSIS: Same   PROCEDURES:   1. Cataract extraction with intraocular lens implant Left eye.  SURGEON: Charles Dangelo M.D.  INDICATIONS: The patient Reanna Kumar presented to the eye clinic with decreased vision secondary to cataract in the Left eye. The risks, benefits and alternatives to cataract extraction were discussed. The patient elected to proceed. All questions were answered to the patient's satisfaction.   DESCRIPTION OF PROCEDURE:   Prior to the procedure, appropriate cardiac and respiratory monitors were applied to the patient.  In the pre-operative holding area, a drop of topical tetracaine was placed in the operative eye.  The patient was brought to the operating room.  With adequate anesthesia, the Left eye was prepped and draped in the usual sterile fashion. A lid speculum was placed, and the operating microscope was rotated into position. A surgical pause was carried out to identify with all members of the surgical team the correct surgical site. A paracentesis was created.  Through this limbal paracentesis, the anterior chamber was filled with preservative-free lidocaine with epi shugarcaine followed by viscoelastic.  A temporal wound was created at the limbus using a 2.6 mm blade. A Malyugin ring 7.0 classic was inserted and afixed to the iris. A capsulorrhexis was initiated using a bent 25-gauge needle and was completed in continuous and circular fashion using the capsulorrhexis forceps. The lens nucleus was hydrodissected using balanced salt solution.  The lens nucleus was rotated and removed using phacoemulsification in a stop and chop technique.  Residual cortical material was removed using irrigation-aspiration.  The capsular bag was reinflated to its maximal extent with cohesive viscoelastic.  A 25.0 diopter ZCB00 was  inserted into the capsular bag.  The lens power selected was reviewed using the intraocular lens power measurements that were obtained preoperatively to confirm that the correct lens was selected for the desired post-operative refractive state. The Malyugin ring was removed using the . The residual viscoelastic was removed in its entirety, the wound were hydrated and found to be self-sealing.  Intracameral moxifloxacin was administered. Tactile pressure was confirmed to be in a normal range.  The lid speculum was removed, a drop of timolol was administered, and a shield was applied.  The patient tolerated the procedure well, and there were no complications.    PLAN: The patient will be discharged to home and will follow up today  EBL:  None  Complications:  None  Implant Name Type Inv. Item Serial No.  Lot No. LRB No. Used Action   EYE IMP IOL DELORIS PCL TECNIS ZCB00 25.0 - [id number] Lens/Eye Implant EYE IMP IOL DELORIS PCL TECNIS ZCB00 25.0 [id number] [employer]  Left 1 Implanted

## 2023-11-17 NOTE — ANESTHESIA POSTPROCEDURE EVALUATION
Patient: Raenna Kumar    Procedure: Procedure(s):  PHACOEMULSIFICATION, CATARACT, WITH INTRAOCULAR LENS IMPLANT       Anesthesia Type:  MAC    Note:  Disposition: Outpatient   Postop Pain Control: Uneventful            Sign Out: Well controlled pain   PONV: No   Neuro/Psych: Uneventful            Sign Out: Acceptable/Baseline neuro status   Airway/Respiratory: Uneventful            Sign Out: Acceptable/Baseline resp. status   CV/Hemodynamics: Uneventful            Sign Out: Acceptable CV status; No obvious hypovolemia; No obvious fluid overload   Other NRE:    DID A NON-ROUTINE EVENT OCCUR? No           Last vitals:  Vitals Value Taken Time   /62 11/17/23 0809   Temp 97.5  F (36.4  C) 11/17/23 0755   Pulse     Resp 16 11/17/23 0809   SpO2 98 % 11/17/23 0809       Electronically Signed By: Jose J Eng MD  November 17, 2023  8:24 AM

## 2023-11-21 ENCOUNTER — OFFICE VISIT (OUTPATIENT)
Dept: OPHTHALMOLOGY | Facility: CLINIC | Age: 73
End: 2023-11-21
Payer: COMMERCIAL

## 2023-11-21 DIAGNOSIS — Z96.1 PSEUDOPHAKIA, LEFT EYE: Primary | ICD-10-CM

## 2023-11-21 DIAGNOSIS — H18.20 CORNEA EDEMA: ICD-10-CM

## 2023-11-21 PROCEDURE — 2894A OCT RETINA SPECTRALIS OS (LEFT EYE): CPT | Performed by: OPHTHALMOLOGY

## 2023-11-21 PROCEDURE — 99024 POSTOP FOLLOW-UP VISIT: CPT | Performed by: OPHTHALMOLOGY

## 2023-11-21 PROCEDURE — 99207 PR BUNDLED PROCEDURE IN GLOBAL PKG: CPT | Performed by: OPHTHALMOLOGY

## 2023-11-21 ASSESSMENT — CONF VISUAL FIELD
OD_SUPERIOR_NASAL_RESTRICTION: 0
OS_INFERIOR_NASAL_RESTRICTION: 0
OS_INFERIOR_TEMPORAL_RESTRICTION: 0
OD_INFERIOR_NASAL_RESTRICTION: 0
OS_SUPERIOR_NASAL_RESTRICTION: 0
OD_NORMAL: 1
METHOD: COUNTING FINGERS
OD_SUPERIOR_TEMPORAL_RESTRICTION: 0
OS_SUPERIOR_TEMPORAL_RESTRICTION: 0
OS_NORMAL: 1
OD_INFERIOR_TEMPORAL_RESTRICTION: 0

## 2023-11-21 ASSESSMENT — TONOMETRY
IOP_METHOD: TONOPEN
OS_IOP_MMHG: 15

## 2023-11-21 ASSESSMENT — VISUAL ACUITY
OD_SC: 20/40
METHOD: SNELLEN - LINEAR
OS_SC: 20/60
OS_PH_SC: 20/50
OS_PH_SC+: -3

## 2023-11-21 ASSESSMENT — REFRACTION_MANIFEST
OS_SPHERE: PLANO
OS_ADD: +2.50
OS_CYLINDER: +1.50
OS_AXIS: 015

## 2023-11-21 ASSESSMENT — SLIT LAMP EXAM - LIDS: COMMENTS: NORMAL

## 2023-11-21 NOTE — NURSING NOTE
Chief Complaints and History of Present Illnesses   Patient presents with    Post Op (Ophthalmology) Left Eye     Chief Complaint(s) and History of Present Illness(es)       Post Op (Ophthalmology) Left Eye              Laterality: left eye              Comments    Pt returns for post-op left eye following cataract surgery. DOS 11/17/2023. She states that the vision left eye is still very blurry. She denies pain and flashes/floaters. She is using all 3 post-op drops QID left eye. She will likely post-pone the upcoming right eye due to the continued blur in the surgical eye.

## 2023-11-21 NOTE — PROGRESS NOTES
HPI       Post Op (Ophthalmology) Left Eye    In left eye.             Comments    Pt returns for post-op left eye following cataract surgery. DOS 11/17/2023. She states that the vision left eye is still very blurry. She denies pain and flashes/floaters. She is using all 3 post-op drops QID left eye. She will likely post-pone the upcoming right eye due to the continued blur in the surgical eye.           Last edited by Ro Hall on 11/21/2023 10:05 AM.           Review of systems for the eyes was negative other than the pertinent positives/negatives listed in the HPI.      Assessment & Plan    HPI:  Reanna Kumar is a 73 year old female with history of HLD, lung CA, insomnia presents for cataract evaluation from Dr. Olivarez for Postoperative week 1 left eye. Vision blurry still      POHx: refractive error  PMHx: HLD, lung CA, insomnia  Current Medications: DULoxetine (CYMBALTA) 20 MG capsule, Take 1 capsule (20 mg) by mouth 2 times daily  ketorolac (ACULAR) 0.5 % ophthalmic solution, Place 1 drop Into the left eye 4 times daily for 7 days, THEN 1 drop 3 times daily for 7 days, THEN 1 drop 2 times daily for 7 days, THEN 1 drop daily for 7 days. Start 2 days prior to surgery.  moxifloxacin (VIGAMOX) 0.5 % ophthalmic solution, Place 1 drop Into the left eye 4 times daily for 7 days Start morning of surgery.  prednisoLONE acetate (PRED FORTE) 1 % ophthalmic suspension, Place 1 drop Into the left eye 4 times daily for 7 days, THEN 1 drop 3 times daily for 7 days, THEN 1 drop 2 times daily for 7 days, THEN 1 drop daily for 7 days. Start after surgery.  QUEtiapine (SEROQUEL) 50 MG tablet, Take 1 tablet (50 mg) by mouth at bedtime (takes 0.5 x 50mg)  rosuvastatin (CRESTOR) 20 MG tablet, Take 1 tablet (20 mg) by mouth daily    No current facility-administered medications on file prior to visit.    FHx: denies family history of ocular conditions   PSHx: Cataract extraction/intraocular lens Dangelo left eye 11/17/23        Current Eye Medications:  Moxifloxacin/pred forte/ketorolac four times a day left eye        Assessment & Plan:  Pseudophakia, left eye  Dangelo left eye 11/17/23   Recheck 1 week  Continue on drops four times a day  until seen  Edema with D folds left eye today  Small eye with dense lens  Descemet's membrane attached today on OCT  Start marcie 128 four times a day    Return to clinic 1 week    (H25.13) Nuclear age-related cataract, right eyes  (H25.813) Combined forms of age-related cataract of right eyes  Special equipment/needs:  Eye: right  Anesthesia:topical  Dilates to: 5  Iris expansion:  Yes 6.25 RING  Pseudoexfoliation: Yes  Trypan Blue: No  Trauma: No    Able lay to flat: Yes  Blood Thinner: No   Tamsulosin: No  DM: Yes  Guttae: No    Dominant Eye: left    Plan: [place] both eyes deferred toric    Proceed with CE/IOL right eye  Scheduled 12/15/23  Use Malyugin 6.25    (H52.01,  H52.201,  H52.4) Hyperopia of right eye with astigmatism and presbyopia  (H52.12,  H52.202,  H52.4) Myopia of left eye with astigmatism and presbyopia  Hold pending Cataract extraction/iol    (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        Charles Dangelo MD     Attending Physician Attestation:  Complete documentation of historical and exam elements from today's encounter can be found in the full encounter summary report (not reduplicated in this progress note).  I personally obtained the chief complaint(s) and history of present illness.  I confirmed and edited as necessary the review of systems, past medical/surgical history, family history, social history, and examination findings as documented by  others; and I examined the patient myself.  I personally reviewed the relevant tests, images, and reports as documented above.  I formulated and edited as necessary the assessment and plan and discussed the findings and management plan with the patient and family. - Charles Dangelo MD

## 2023-11-28 ENCOUNTER — OFFICE VISIT (OUTPATIENT)
Dept: OPHTHALMOLOGY | Facility: CLINIC | Age: 73
End: 2023-11-28
Payer: COMMERCIAL

## 2023-11-28 DIAGNOSIS — Z96.1 PSEUDOPHAKIA, LEFT EYE: Primary | ICD-10-CM

## 2023-11-28 DIAGNOSIS — H18.20 CORNEA EDEMA: ICD-10-CM

## 2023-11-28 PROCEDURE — 99024 POSTOP FOLLOW-UP VISIT: CPT | Performed by: OPHTHALMOLOGY

## 2023-11-28 ASSESSMENT — EXTERNAL EXAM - LEFT EYE: OS_EXAM: NORMAL

## 2023-11-28 ASSESSMENT — CONF VISUAL FIELD
METHOD: COUNTING FINGERS
OS_INFERIOR_TEMPORAL_RESTRICTION: 0
OD_SUPERIOR_NASAL_RESTRICTION: 0
OD_SUPERIOR_TEMPORAL_RESTRICTION: 0
OS_INFERIOR_NASAL_RESTRICTION: 0
OD_INFERIOR_NASAL_RESTRICTION: 0
OD_INFERIOR_TEMPORAL_RESTRICTION: 0
OD_NORMAL: 1
OS_SUPERIOR_NASAL_RESTRICTION: 0
OS_SUPERIOR_TEMPORAL_RESTRICTION: 0
OS_NORMAL: 1

## 2023-11-28 ASSESSMENT — VISUAL ACUITY
OD_SC: 20/40
OS_SC+: -2
METHOD: SNELLEN - LINEAR
OS_SC: 20/25

## 2023-11-28 ASSESSMENT — REFRACTION_MANIFEST
OS_AXIS: 077
OS_SPHERE: -0.50
OS_CYLINDER: +1.50

## 2023-11-28 ASSESSMENT — SLIT LAMP EXAM - LIDS: COMMENTS: NORMAL

## 2023-11-28 ASSESSMENT — TONOMETRY
IOP_METHOD: TONOPEN
OS_IOP_MMHG: 10
OD_IOP_MMHG: 10

## 2023-11-28 ASSESSMENT — EXTERNAL EXAM - RIGHT EYE: OD_EXAM: NORMAL

## 2023-11-28 NOTE — PROGRESS NOTES
HPI       Post Op (Ophthalmology) Left Eye    In left eye.             Comments    S/P Cataract extraction with intraocular lens implant Left eye, 11/17/2023  Patient notes improvement over the past week. Colors appear brighter and vision in left eye is much clearer.   Using all drops as directed.             Last edited by Isac Markham on 11/28/2023 10:58 AM.           Review of systems for the eyes was negative other than the pertinent positives/negatives listed in the HPI.      Assessment & Plan    HPI:  Reanna Kumar is a 73 year old female with history of HLD, lung CA, insomnia presents for cataract evaluation from Dr. Olivarez for Postoperative week 2 left eye. Vision improving significantly over the last week. Using marcie 128      POHx: refractive error  PMHx: HLD, lung CA, insomnia  Current Medications: DULoxetine (CYMBALTA) 20 MG capsule, Take 1 capsule (20 mg) by mouth 2 times daily  ketorolac (ACULAR) 0.5 % ophthalmic solution, Place 1 drop Into the left eye 4 times daily for 7 days, THEN 1 drop 3 times daily for 7 days, THEN 1 drop 2 times daily for 7 days, THEN 1 drop daily for 7 days. Start 2 days prior to surgery.  prednisoLONE acetate (PRED FORTE) 1 % ophthalmic suspension, Place 1 drop Into the left eye 4 times daily for 7 days, THEN 1 drop 3 times daily for 7 days, THEN 1 drop 2 times daily for 7 days, THEN 1 drop daily for 7 days. Start after surgery.  QUEtiapine (SEROQUEL) 50 MG tablet, Take 1 tablet (50 mg) by mouth at bedtime (takes 0.5 x 50mg)  rosuvastatin (CRESTOR) 20 MG tablet, Take 1 tablet (20 mg) by mouth daily    No current facility-administered medications on file prior to visit.    FHx: denies family history of ocular conditions   PSHx: Cataract extraction/intraocular lens Dangelo left eye 11/17/23       Current Eye Medications:  pred forte/ketorolac four times a day left eye  Marcie 128 four times a day     Assessment & Plan:  Pseudophakia, left eye  Dangelo left eye 11/17/23    Taper drops three times a day x 1 week, twice a day x 1 week, daily x 1 week then stop  Edema with D folds left eye today much improved  Small eye with dense lens  Descemet's membrane attached today on OCT  Continue marcie 128 four times a day    Return to clinic 3-4 weeks    (H25.13) Nuclear age-related cataract, right eyes  (H25.813) Combined forms of age-related cataract of right eyes  Special equipment/needs:  Eye: right  Anesthesia:topical  Dilates to: 5  Iris expansion:  Yes 6.25 RING  Pseudoexfoliation: Yes  Trypan Blue: No  Trauma: No    Able lay to flat: Yes  Blood Thinner: No   Tamsulosin: No  DM: Yes  Guttae: No    Dominant Eye: left    Plan: [place] both eyes deferred toric    Scheduled 12/15/23. 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        Charles Dangelo MD     Attending Physician Attestation:  Complete documentation of historical and exam elements from today's encounter can be found in the full encounter summary report (not reduplicated in this progress note).  I personally obtained the chief complaint(s) and history of present illness.  I confirmed and edited as necessary the review of systems, past medical/surgical history, family history, social history, and examination findings as documented by others; and I examined the patient myself.  I personally reviewed the  relevant tests, images, and reports as documented above.  I formulated and edited as necessary the assessment and plan and discussed the findings and management plan with the patient and family. - Charles Dangelo MD

## 2023-11-28 NOTE — NURSING NOTE
Chief Complaints and History of Present Illnesses   Patient presents with    Post Op (Ophthalmology) Left Eye       Chief Complaint(s) and History of Present Illness(es)       Post Op (Ophthalmology) Left Eye              Laterality: left eye              Comments    S/P Cataract extraction with intraocular lens implant Left eye, 11/17/2023  Patient notes improvement over the past week. Colors appear brighter and vision in left eye is much clearer.   Using all drops as directed.                      Isac Markham, Ophthalmic Assistant

## 2023-12-04 DIAGNOSIS — H25.13 NUCLEAR AGE-RELATED CATARACT, BOTH EYES: ICD-10-CM

## 2023-12-04 RX ORDER — PREDNISOLONE ACETATE 10 MG/ML
SUSPENSION/ DROPS OPHTHALMIC
Qty: 4 ML | Refills: 0 | Status: SHIPPED | OUTPATIENT
Start: 2023-12-15 | End: 2024-01-12

## 2023-12-04 RX ORDER — KETOROLAC TROMETHAMINE 5 MG/ML
SOLUTION OPHTHALMIC
Qty: 4 ML | Refills: 0 | Status: SHIPPED | OUTPATIENT
Start: 2023-12-13 | End: 2024-01-10

## 2023-12-04 RX ORDER — MOXIFLOXACIN 5 MG/ML
1 SOLUTION/ DROPS OPHTHALMIC 4 TIMES DAILY
Qty: 3 ML | Refills: 0 | Status: SHIPPED | OUTPATIENT
Start: 2023-12-15 | End: 2023-12-22

## 2023-12-08 ENCOUNTER — TELEPHONE (OUTPATIENT)
Dept: OPHTHALMOLOGY | Facility: CLINIC | Age: 73
End: 2023-12-08
Payer: COMMERCIAL

## 2023-12-08 NOTE — TELEPHONE ENCOUNTER
Called patient and moved surgery to be on 1/12 instead of 12/15 per patient request. Also rescheduled post-op.     No further questions. Patient aware she will need to get a new pre-op physical.    Seda Malone on 12/8/2023 at 2:02 PM

## 2023-12-19 ENCOUNTER — OFFICE VISIT (OUTPATIENT)
Dept: OPHTHALMOLOGY | Facility: CLINIC | Age: 73
End: 2023-12-19
Payer: COMMERCIAL

## 2023-12-19 DIAGNOSIS — Z96.1 PSEUDOPHAKIA, LEFT EYE: Primary | ICD-10-CM

## 2023-12-19 PROCEDURE — 99024 POSTOP FOLLOW-UP VISIT: CPT | Performed by: OPHTHALMOLOGY

## 2023-12-19 ASSESSMENT — VISUAL ACUITY
OS_SC+: -2
OD_PH_SC+: -2
METHOD: SNELLEN - LINEAR
OD_PH_SC: 20/40
OS_SC: 20/20
OD_SC+: +2
METHOD_MR: DIAGNOSTIC MR
OD_SC: 20/50

## 2023-12-19 ASSESSMENT — SLIT LAMP EXAM - LIDS: COMMENTS: NORMAL

## 2023-12-19 ASSESSMENT — REFRACTION_MANIFEST
OS_AXIS: 010
OS_SPHERE: -0.25
OS_CYLINDER: +0.25

## 2023-12-19 ASSESSMENT — EXTERNAL EXAM - LEFT EYE: OS_EXAM: NORMAL

## 2023-12-19 ASSESSMENT — EXTERNAL EXAM - RIGHT EYE: OD_EXAM: NORMAL

## 2023-12-19 ASSESSMENT — TONOMETRY
IOP_METHOD: ICARE
OD_IOP_MMHG: 13
OS_IOP_MMHG: 10

## 2023-12-19 NOTE — NURSING NOTE
Chief Complaints and History of Present Illnesses   Patient presents with    Post Op (Ophthalmology) Left Eye     PHACOEMULSIFICATION, CATARACT, WITH INTRAOCULAR LENS IMPLANT 11/17/23     Chief Complaint(s) and History of Present Illness(es)       Post Op (Ophthalmology) Left Eye              Comments: PHACOEMULSIFICATION, CATARACT, WITH INTRAOCULAR LENS IMPLANT 11/17/23              Comments    Pt doing well   No problems or concerns   No eye pain     Yumiko Christianson COT 10:23 AM December 19, 2023

## 2023-12-19 NOTE — PROGRESS NOTES
HPI       Post Op (Ophthalmology) Left Eye     Additional comments: PHACOEMULSIFICATION, CATARACT, WITH INTRAOCULAR LENS IMPLANT 11/17/23             Comments    Pt doing well   No problems or concerns   No eye pain     Yumiko Christianson COT 10:23 AM December 19, 2023               Last edited by Yumiko Christianson on 12/19/2023 10:24 AM.           Review of systems for the eyes was negative other than the pertinent positives/negatives listed in the HPI.      Assessment & Plan    HPI:  Reanna Kumar is a 73 year old female with history of HLD, lung CA, insomnia presents for cataract evaluation from Dr. Olivarez for Postoperative week 4 left eye. Doing well    POHx: refractive error  PMHx: HLD, lung CA, insomnia  Current Medications: DULoxetine (CYMBALTA) 20 MG capsule, Take 1 capsule (20 mg) by mouth 2 times daily  ketorolac (ACULAR) 0.5 % ophthalmic solution, Place 1 drop into the right eye 4 times daily for 7 days, THEN 1 drop 3 times daily for 7 days, THEN 1 drop 2 times daily for 7 days, THEN 1 drop daily for 7 days. Start 2 days prior to surgery.  moxifloxacin (VIGAMOX) 0.5 % ophthalmic solution, Place 1 drop into the right eye 4 times daily for 7 days Start morning of surgery.  prednisoLONE acetate (PRED FORTE) 1 % ophthalmic suspension, Place 1 drop into the right eye 4 times daily for 7 days, THEN 1 drop 3 times daily for 7 days, THEN 1 drop 2 times daily for 7 days, THEN 1 drop daily for 7 days. Start after surgery.  QUEtiapine (SEROQUEL) 50 MG tablet, Take 1 tablet (50 mg) by mouth at bedtime (takes 0.5 x 50mg)  rosuvastatin (CRESTOR) 20 MG tablet, Take 1 tablet (20 mg) by mouth daily    No current facility-administered medications on file prior to visit.    FHx: denies family history of ocular conditions   PSHx: Cataract extraction/intraocular lens Dangelo left eye 11/17/23       Current Eye Medications:  Feli 128 daily left eye     Assessment & Plan:  Pseudophakia, left eye  Dangelo left eye 11/17/23     (H25.13)  Nuclear age-related cataract, right eyes  (H25.813) Combined forms of age-related cataract of right eyes  Special equipment/needs:  Eye: right  Anesthesia:topical  Dilates to: 5  Iris expansion:  Yes 6.25 RING  Pseudoexfoliation: Yes  Trypan Blue: No  Trauma: No    Able lay to flat: Yes  Blood Thinner: No   Tamsulosin: No  DM: Yes  Guttae: No    Dominant Eye: left    Plan: [place] both eyes deferred toric    Scheduled 1/12/24.   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        Charles Dangelo MD     Attending Physician Attestation:  Complete documentation of historical and exam elements from today's encounter can be found in the full encounter summary report (not reduplicated in this progress note).  I personally obtained the chief complaint(s) and history of present illness.  I confirmed and edited as necessary the review of systems, past medical/surgical history, family history, social history, and examination findings as documented by others; and I examined the patient myself.  I personally reviewed the relevant tests, images, and reports as documented above.  I formulated and edited as necessary the assessment and plan and discussed the findings and management plan with the patient and family. - Charles Dangelo MD

## 2023-12-27 ENCOUNTER — ANCILLARY PROCEDURE (OUTPATIENT)
Dept: BONE DENSITY | Facility: CLINIC | Age: 73
End: 2023-12-27
Attending: PHYSICIAN ASSISTANT
Payer: COMMERCIAL

## 2023-12-27 ENCOUNTER — ANCILLARY PROCEDURE (OUTPATIENT)
Dept: MAMMOGRAPHY | Facility: CLINIC | Age: 73
End: 2023-12-27
Attending: PHYSICIAN ASSISTANT
Payer: COMMERCIAL

## 2023-12-27 DIAGNOSIS — Z78.0 ASYMPTOMATIC POSTMENOPAUSAL STATUS: ICD-10-CM

## 2023-12-27 DIAGNOSIS — Z12.31 ENCOUNTER FOR SCREENING MAMMOGRAM FOR BREAST CANCER: ICD-10-CM

## 2023-12-27 DIAGNOSIS — Z78.0 ASYMPTOMATIC MENOPAUSE: ICD-10-CM

## 2023-12-27 PROCEDURE — 77080 DXA BONE DENSITY AXIAL: CPT | Mod: TC | Performed by: RADIOLOGY

## 2023-12-27 PROCEDURE — 77081 DXA BONE DENSITY APPENDICULR: CPT | Mod: TC | Performed by: RADIOLOGY

## 2023-12-27 PROCEDURE — 77067 SCR MAMMO BI INCL CAD: CPT | Mod: TC | Performed by: RADIOLOGY

## 2024-01-03 DIAGNOSIS — H25.13 NUCLEAR AGE-RELATED CATARACT, BOTH EYES: ICD-10-CM

## 2024-01-03 RX ORDER — PREDNISOLONE ACETATE 10 MG/ML
SUSPENSION/ DROPS OPHTHALMIC
Qty: 4 ML | Refills: 0 | Status: CANCELLED | OUTPATIENT
Start: 2024-01-03 | End: 2024-01-30

## 2024-01-03 RX ORDER — MOXIFLOXACIN 5 MG/ML
1 SOLUTION/ DROPS OPHTHALMIC 4 TIMES DAILY
Qty: 3 ML | Refills: 0 | Status: CANCELLED | OUTPATIENT
Start: 2024-01-03

## 2024-01-03 RX ORDER — KETOROLAC TROMETHAMINE 5 MG/ML
SOLUTION OPHTHALMIC
Qty: 4 ML | Refills: 0 | Status: CANCELLED | OUTPATIENT
Start: 2024-01-03 | End: 2024-01-30

## 2024-01-05 DIAGNOSIS — M81.0 OSTEOPOROSIS WITHOUT CURRENT PATHOLOGICAL FRACTURE, UNSPECIFIED OSTEOPOROSIS TYPE: Primary | ICD-10-CM

## 2024-01-05 NOTE — PROGRESS NOTES
Phone call to pt, already picked up eye drops for cataract surgery next week.  No additional refill needed currently. Bria Churchill RN

## 2024-01-08 ENCOUNTER — ANESTHESIA EVENT (OUTPATIENT)
Dept: SURGERY | Facility: AMBULATORY SURGERY CENTER | Age: 74
End: 2024-01-08
Payer: COMMERCIAL

## 2024-01-08 ASSESSMENT — LIFESTYLE VARIABLES: TOBACCO_USE: 1

## 2024-01-09 NOTE — ANESTHESIA PREPROCEDURE EVALUATION
Anesthesia Pre-Procedure Evaluation    Patient: Reanna Kumar   MRN: [id number] : 1950        Procedure : Procedure(s):  PHACOEMULSIFICATION, CATARACT, WITH INTRAOCULAR LENS IMPLANT          Past Medical History:   Diagnosis Date    Heart disease 2018    Hyperlipidemia     Insomnia 2015    Lung cancer ([facility])     Nonsenile cataract 2023    Pulmonary nodules       Past Surgical History:   Procedure Laterality Date    ARTHROPLASTY HIP Right 2020    Procedure: RIGHT TOTAL HIP ARTHROPLASTY;  Surgeon: Augie Murray MD;  Location: [facility]    BRONCHOSCOPY FLEXIBLE AND RIGID N/A 2021    Procedure: BRONCHOSCOPY;  Surgeon: Palomo Castro MD;  Location: [facility]    COLONOSCOPY  2021    HIP SURGERY      JOINT REPLACEMENT Left     hip    PHACOEMULSIFICATION CLEAR CORNEA WITH STANDARD INTRAOCULAR LENS IMPLANT Left 2023    Procedure: PHACOEMULSIFICATION, CATARACT, WITH INTRAOCULAR LENS IMPLANT;  Surgeon: Charles Dangelo MD;  Location: [facility] OR    THORACOSCOPIC LOBECTOMY LUNG Right 5/10/2021    Procedure: Uniportal thoracoscopic right lower lobe wedge segmentectomy, completion bilobectomy middle and lower, intercostal nerve cryoablation, converstion to thoracotomy, mediastinal lymphadenectomy;  Surgeon: Palomo Castro MD;  Location: [facility] OR      Allergies   Allergen Reactions    Zyban [Bupropion Hydrobromide]      Increased anxiety      Social History     Tobacco Use    Smoking status: Every Day     Packs/day: 1.00     Years: 50.00     Additional pack years: 0.00     Total pack years: 50.00     Types: Cigarettes     Start date: 6/15/1967    Smokeless tobacco: Never    Tobacco comments:     tried webutrin in  but couldn't tolerate it   Substance Use Topics    Alcohol use: Yes     Comment: normal      Wt Readings from Last 1 Encounters:   23 61.7 kg (136 lb)        Anesthesia Evaluation            ROS/MED HX  ENT/Pulmonary:     (+)                tobacco use,  Current use,                       Neurologic:  - neg neurologic ROS     Cardiovascular:     (+) Dyslipidemia hypertension- -  CAD -  - -                                   (-) murmur   METS/Exercise Tolerance: >4 METS    Hematologic:  - neg hematologic  ROS     Musculoskeletal:  - neg musculoskeletal ROS     GI/Hepatic:  - neg GI/hepatic ROS     Renal/Genitourinary:  - neg Renal ROS     Endo:  - neg endo ROS     Psychiatric/Substance Use:  - neg psychiatric ROS     Infectious Disease:  - neg infectious disease ROS     Malignancy:   (+) Malignancy, History of Lung.    Other:  - neg other ROS          Physical Exam    Airway        Mallampati: II   TM distance: > 3 FB   Neck ROM: full   Mouth opening: > 3 cm    Respiratory Devices and Support         Dental     Comment: Teeth examined without any significant abnormality, patient denies loose, missing or chipped teeth or anything removable.         Cardiovascular          Rhythm and rate: regular and normal (-) no murmur    Pulmonary   pulmonary exam normal        breath sounds clear to auscultation           OUTSIDE LABS:  CBC:   Lab Results   Component Value Date    WBC 6.6 10/10/2022    WBC 7.6 10/18/2021    HGB 14.9 10/10/2022    HGB 14.5 10/18/2021    HCT 44.8 10/10/2022    HCT 44.3 10/18/2021     10/10/2022     10/18/2021     BMP:   Lab Results   Component Value Date     10/30/2023     10/10/2022    POTASSIUM 4.5 10/30/2023    POTASSIUM 4.9 10/10/2022    CHLORIDE 100 10/30/2023    CHLORIDE 106 10/10/2022    CO2 26 10/30/2023    CO2 32 10/10/2022    BUN 7.4 (L) 10/30/2023    BUN 12 10/10/2022    CR 0.57 10/30/2023    CR 0.6 05/12/2023    GLC 98 10/30/2023     (H) 10/10/2022     COAGS:   Lab Results   Component Value Date    PTT 26 01/02/2008    INR 1.48 (H) 01/06/2008     POC:   Lab Results   Component Value Date     (H) 05/13/2021     HEPATIC:   Lab Results   Component Value Date    ALBUMIN 4.5 10/30/2023    PROTTOTAL 6.8  10/30/2023    ALT 20 10/30/2023    AST 30 10/30/2023    ALKPHOS 131 (H) 10/30/2023    BILITOTAL 0.4 10/30/2023     OTHER:   Lab Results   Component Value Date    PH 7.33 (L) 05/10/2021    LACT 1.0 05/14/2021    A1C 5.8 (H) 10/14/2020    NALINI 10.8 (H) 10/30/2023    PHOS 3.4 05/18/2021    MAG 1.8 10/10/2022       Anesthesia Plan    ASA Status:  2    NPO Status:  NPO Appropriate    Anesthesia Type: MAC.   Induction: Intravenous.           Consents    Anesthesia Plan(s) and associated risks, benefits, and realistic alternatives discussed. Questions answered and patient/representative(s) expressed understanding.     - Discussed:     - Discussed with:  Patient      - Extended Intubation/Ventilatory Support Discussed: No.      - Patient is DNR/DNI Status: No     Use of blood products discussed: No .     Postoperative Care    Pain management: Multi-modal analgesia.        Comments:    Other Comments: Discussed plan for IV anesthetic with native airway, topical anesthetic. Discussed potential need for conversion to general anesthetic with airway management and likely potential for recall.             Jose J Eng MD    I have reviewed the pertinent notes and labs in the chart from the past 30 days and (re)examined the patient.  Any updates or changes from those notes are reflected in this note.

## 2024-01-10 ENCOUNTER — OFFICE VISIT (OUTPATIENT)
Dept: FAMILY MEDICINE | Facility: CLINIC | Age: 74
End: 2024-01-10
Payer: COMMERCIAL

## 2024-01-10 VITALS
HEART RATE: 101 BPM | SYSTOLIC BLOOD PRESSURE: 134 MMHG | DIASTOLIC BLOOD PRESSURE: 78 MMHG | TEMPERATURE: 97.3 F | WEIGHT: 137.4 LBS | BODY MASS INDEX: 25.94 KG/M2 | HEIGHT: 61 IN | RESPIRATION RATE: 92 BRPM

## 2024-01-10 DIAGNOSIS — I25.10 CORONARY ARTERY CALCIFICATION: ICD-10-CM

## 2024-01-10 DIAGNOSIS — Z01.818 PREOP GENERAL PHYSICAL EXAM: Primary | ICD-10-CM

## 2024-01-10 DIAGNOSIS — M81.0 OSTEOPOROSIS WITHOUT CURRENT PATHOLOGICAL FRACTURE, UNSPECIFIED OSTEOPOROSIS TYPE: ICD-10-CM

## 2024-01-10 DIAGNOSIS — H25.13 NUCLEAR AGE-RELATED CATARACT, BOTH EYES: ICD-10-CM

## 2024-01-10 DIAGNOSIS — C34.31 MALIGNANT NEOPLASM OF LOWER LOBE OF RIGHT LUNG (H): ICD-10-CM

## 2024-01-10 DIAGNOSIS — E83.52 SERUM CALCIUM ELEVATED: ICD-10-CM

## 2024-01-10 LAB
ALBUMIN SERPL BCG-MCNC: 4.4 G/DL (ref 3.5–5.2)
ALP SERPL-CCNC: 133 U/L (ref 40–150)
ALT SERPL W P-5'-P-CCNC: 11 U/L (ref 0–50)
ANION GAP SERPL CALCULATED.3IONS-SCNC: 8 MMOL/L (ref 7–15)
AST SERPL W P-5'-P-CCNC: 21 U/L (ref 0–45)
BILIRUB SERPL-MCNC: 0.5 MG/DL
BUN SERPL-MCNC: 14.9 MG/DL (ref 8–23)
CA-I BLD-MCNC: 5.5 MG/DL (ref 4.4–5.2)
CALCIUM SERPL-MCNC: 11.2 MG/DL (ref 8.8–10.2)
CHLORIDE SERPL-SCNC: 98 MMOL/L (ref 98–107)
CREAT SERPL-MCNC: 0.62 MG/DL (ref 0.51–0.95)
DEPRECATED HCO3 PLAS-SCNC: 30 MMOL/L (ref 22–29)
EGFRCR SERPLBLD CKD-EPI 2021: >90 ML/MIN/1.73M2
GLUCOSE SERPL-MCNC: 122 MG/DL (ref 70–99)
POTASSIUM SERPL-SCNC: 5.6 MMOL/L (ref 3.4–5.3)
PROT SERPL-MCNC: 6.8 G/DL (ref 6.4–8.3)
PTH-INTACT SERPL-MCNC: 54 PG/ML (ref 15–65)
SODIUM SERPL-SCNC: 136 MMOL/L (ref 135–145)
VIT D+METAB SERPL-MCNC: 109 NG/ML (ref 20–50)

## 2024-01-10 PROCEDURE — 82330 ASSAY OF CALCIUM: CPT | Performed by: PHYSICIAN ASSISTANT

## 2024-01-10 PROCEDURE — 36415 COLL VENOUS BLD VENIPUNCTURE: CPT | Performed by: PHYSICIAN ASSISTANT

## 2024-01-10 PROCEDURE — 80053 COMPREHEN METABOLIC PANEL: CPT | Performed by: PHYSICIAN ASSISTANT

## 2024-01-10 PROCEDURE — 83970 ASSAY OF PARATHORMONE: CPT | Performed by: PHYSICIAN ASSISTANT

## 2024-01-10 PROCEDURE — 82306 VITAMIN D 25 HYDROXY: CPT | Performed by: PHYSICIAN ASSISTANT

## 2024-01-10 PROCEDURE — 99214 OFFICE O/P EST MOD 30 MIN: CPT | Mod: 24 | Performed by: PHYSICIAN ASSISTANT

## 2024-01-10 NOTE — PROGRESS NOTES
[facility] [place]  [address] [address]  [place] [address]  Phone: [phone]  Primary Provider: Mita Mariscal  Pre-op Performing Provider: MITA MARISCAL      PREOPERATIVE EVALUATION:  Today's date: 1/10/2024    Manda is a 73 year old, presenting for the following:  Pre Op Exam        1/10/2024     8:37 AM   Additional Questions   Roomed by MP         1/10/2024     8:37 AM   Patient Reported Additional Medications   Patient reports taking the following new medications None per patient       Surgical Information:  Surgery/Procedure: right cataract  Surgery Location: [facility]  Surgeon: Antolin  Surgery Date: 1/12/24  Time of Surgery: 8am  Where patient plans to recover: At home with family  Fax number for surgical facility: Note does not need to be faxed, will be available electronically in Epic.    Assessment & Plan     The proposed surgical procedure is considered LOW risk.    Preop general physical exam  Cleared for surgery.     Serum calcium elevated  Reviewed recent labs and DEXA, which showed osteoporosis.  Currently in workup for hyperparathyroidism.  Will check labs and go from there.  She is taking a calcium supplement.   - Vitamin D Deficiency  - Parathyroid Hormone Intact  - Ionized Calcium  - Comprehensive metabolic panel (BMP + Alb, Alk Phos, ALT, AST, Total. Bili, TP)    Osteoporosis without current pathological fracture, unspecified osteoporosis type  Reviewed DEXA briefly, will check labs and go from there. May consider medication for osteoporosis (will discuss after labs return).     Nuclear age-related cataract, both eyes  Due for right eye cataract removal now.     Malignant neoplasm of lower lobe of right lung (H)  Managed by oncology    Coronary artery calcification  Noted on CT chest, no h/o MI.  Still smoking.    Would benefit from aspirin   On crestor, may consider bumping up dose.   Will discuss in follow-up result call.           - No  identified additional risk factors other than previously addressed    Antiplatelet or Anticoagulation Medication Instructions:   - Patient is on no antiplatelet or anticoagulation medications.    Additional Medication Instructions:  Patient is to take all scheduled medications on the day of surgery    RECOMMENDATION:  APPROVAL GIVEN to proceed with proposed procedure, without further diagnostic evaluation.      25 minutes spent by me on the date of the encounter doing chart review, history and exam, documentation and further activities per the note      Subjective       HPI related to upcoming procedure:   Had left cataract removal with some mild complications post-operatively (therefore right eye cataract removal delayed).  Symptoms cleared up with OTC drops advised by specialist.            1/5/2024     1:32 PM   Preop Questions   1. Have you ever had a heart attack or stroke? No   2. Have you ever had surgery on your heart or blood vessels, such as a stent placement, a coronary artery bypass, or surgery on an artery in your head, neck, heart, or legs? No   3. Do you have chest pain with activity? No   4. Do you have a history of  heart failure? No   5. Do you currently have a cold, bronchitis or symptoms of other infection? No   6. Do you have a cough, shortness of breath, or wheezing? No   7. Do you or anyone in your family have previous history of blood clots? No   8. Do you or does anyone in your family have a serious bleeding problem such as prolonged bleeding following surgeries or cuts? No   9. Have you ever had problems with anemia or been told to take iron pills? No   10. Have you had any abnormal blood loss such as black, tarry or bloody stools, or abnormal vaginal bleeding? No   11. Have you ever had a blood transfusion? No   12. Are you willing to have a blood transfusion if it is medically needed before, during, or after your surgery? Yes   13. Have you or any of your relatives ever had problems with  anesthesia? No   14. Do you have sleep apnea, excessive snoring or daytime drowsiness? No   15. Do you have any artifical heart valves or other implanted medical devices like a pacemaker, defibrillator, or continuous glucose monitor? No   16. Do you have artificial joints? YES - right hip   17. Are you allergic to latex? No       Health Care Directive:  Patient has a Health Care Directive on file      Preoperative Review of :   reviewed - no record of controlled substances prescribed.      Status of Chronic Conditions:  See problem list for active medical problems.  Problems all longstanding and stable, except as noted/documented.  See ROS for pertinent symptoms related to these conditions.    Review of Systems  Constitutional, neuro, ENT, endocrine, pulmonary, cardiac, gastrointestinal, genitourinary, musculoskeletal, integument and psychiatric systems are negative, except as otherwise noted.    Patient Active Problem List    Diagnosis Date Noted    Osteoporosis without current pathological fracture, unspecified osteoporosis type 01/05/2024     Priority: Medium    Nuclear age-related cataract, both eyes 09/22/2023     Priority: Medium    Pseudoexfoliation (PXF) of left lens capsule 09/22/2023     Priority: Medium    Combined forms of age-related cataract of both eyes 09/22/2023     Priority: Medium    Malignant neoplasm of lower lobe of right lung (H) 10/18/2021     Priority: Medium    Lung nodule 04/08/2021     Priority: Medium     Added automatically from request for surgery [id number]      Status post total hip replacement, right 06/02/2020     Priority: Medium    Abnormal fasting glucose 08/27/2019     Priority: Medium    Coronary artery calcification 02/28/2019     Priority: Medium    Insomnia, unspecified type 09/13/2018     Priority: Medium    Pulmonary nodules 08/07/2018     Priority: Medium     8/2018: 5 mm spiculated nodule in RLL along major fissure, 3 mm endobrachial nodule in ROEL, 2 mm nodule in  periphery of ROEL.  Repeat in 6 months, reminder placed.  Continue annual screening.      Family history of hearing loss 02/18/2016     Priority: Medium    Tobacco use disorder 07/06/2015     Priority: Medium    Advance Care Planning 09/30/2014     Priority: Medium     Advance Care Planning:   Receipt of ACP document:  Received: Health Care Directive which was witnessed or notarized on 11/1/01.  Document not previously scanned.  Validation form completed and sent with document to be scanned.  Code Status needs to be updated to reflect choices in most recent ACP document. Notification sent to Cherise Roth for followup.  Confirmed/documented designated decision maker(s). See permanent comments section of demographics in clinical tab. View document(s) and details by clicking on code status.   Added by Gail Madrid on 9/30/2014.            Hyperlipidemia LDL goal <130 04/17/2013     Priority: Medium      Past Medical History:   Diagnosis Date    Arthritis     Heart disease 05/2018    Hyperlipidemia     Insomnia 2015    Lung cancer (H)     Nonsenile cataract 6/30/2023    Pulmonary nodules      Past Surgical History:   Procedure Laterality Date    ARTHROPLASTY HIP Right 06/02/2020    Procedure: RIGHT TOTAL HIP ARTHROPLASTY;  Surgeon: Augie Murray MD;  Location:  OR    BIOPSY      BRONCHOSCOPY FLEXIBLE AND RIGID N/A 05/18/2021    Procedure: BRONCHOSCOPY;  Surgeon: Palomo Castro MD;  Location: [facility]    COLONOSCOPY  01/2021    HIP SURGERY      JOINT REPLACEMENT Left 2008    hip    PHACOEMULSIFICATION CLEAR CORNEA WITH STANDARD INTRAOCULAR LENS IMPLANT Left 11/17/2023    Procedure: PHACOEMULSIFICATION, CATARACT, WITH INTRAOCULAR LENS IMPLANT;  Surgeon: Charles Dangelo MD;  Location:  OR    THORACOSCOPIC LOBECTOMY LUNG Right 05/10/2021    Procedure: Uniportal thoracoscopic right lower lobe wedge segmentectomy, completion bilobectomy middle and lower, intercostal nerve cryoablation, converstion to  "thoracotomy, mediastinal lymphadenectomy;  Surgeon: Palomo Castro MD;  Location: [facility]     Current Outpatient Medications   Medication Sig Dispense Refill    DULoxetine (CYMBALTA) 20 MG capsule Take 1 capsule (20 mg) by mouth 2 times daily 60 capsule 2    ketorolac (ACULAR) 0.5 % ophthalmic solution Place 1 drop into the right eye 4 times daily for 7 days, THEN 1 drop 3 times daily for 7 days, THEN 1 drop 2 times daily for 7 days, THEN 1 drop daily for 7 days. Start 2 days prior to surgery. 4 mL 0    prednisoLONE acetate (PRED FORTE) 1 % ophthalmic suspension Place 1 drop into the right eye 4 times daily for 7 days, THEN 1 drop 3 times daily for 7 days, THEN 1 drop 2 times daily for 7 days, THEN 1 drop daily for 7 days. Start after surgery. 4 mL 0    QUEtiapine (SEROQUEL) 50 MG tablet Take 1 tablet (50 mg) by mouth at bedtime (takes 0.5 x 50mg) 90 tablet 3    rosuvastatin (CRESTOR) 20 MG tablet Take 1 tablet (20 mg) by mouth daily 90 tablet 3       Allergies   Allergen Reactions    Zyban [Bupropion Hydrobromide]      Increased anxiety        Social History     Tobacco Use    Smoking status: Every Day     Packs/day: 0.50     Years: 55.00     Additional pack years: 0.00     Total pack years: 27.50     Types: Cigarettes     Start date: 6/15/1967     Passive exposure: Never    Smokeless tobacco: Never    Tobacco comments:     tried webutrin in 1996 but couldn't tolerate it   Substance Use Topics    Alcohol use: Yes     Comment: normal     Family History   Problem Relation Age of Onset    Cancer Mother     Osteoporosis Mother     Breast Cancer Mother         70's    Uterine Cancer Mother     Arthritis Father     Genitourinary Problems Sister         Renal calculi    Glaucoma No family hx of     Macular Degeneration No family hx of      History   Drug Use No         Objective     /78   Pulse 101   Temp 97.3  F (36.3  C) (Temporal)   Resp (!) 92   Ht 1.562 m (5' 1.5\")   Wt 62.3 kg (137 lb 6.4 oz)   HC " "16 cm (6.3\")   BMI 25.54 kg/m      Physical Exam    GENERAL APPEARANCE: healthy, alert and no distress     EYES: EOMI, PERRL     HENT: ear canals and TM's normal and nose and mouth without ulcers or lesions     NECK: no adenopathy, no asymmetry, masses, or scars and thyroid normal to palpation     RESP: lungs clear to auscultation - no rales, rhonchi or wheezes     CV: regular rates and rhythm, normal S1 S2, no S3 or S4 and no murmur, click or rub     ABDOMEN:  soft, nontender, no HSM or masses and bowel sounds normal     MS: extremities normal- no gross deformities noted, no evidence of inflammation in joints, FROM in all extremities.     SKIN: no suspicious lesions or rashes     NEURO: Normal strength and tone, sensory exam grossly normal, mentation intact and speech normal     PSYCH: mentation appears normal. and affect normal/bright     LYMPHATICS: No cervical adenopathy    Recent Labs   Lab Test 10/30/23  1627 05/12/23  1032 10/10/22  1550   HGB  --   --  14.9   PLT  --   --  343     --  138   POTASSIUM 4.5  --  4.9   CR 0.57 0.6 0.58        Diagnostics:  Labs pending at this time.  Results will be reviewed when available.   No EKG required for low risk surgery (cataract, skin procedure, breast biopsy, etc).    Revised Cardiac Risk Index (RCRI):  The patient has the following serious cardiovascular risks for perioperative complications:   - No serious cardiac risks = 0 points   Coronary artery calcifications have been seen on heart testing, no h/o MI.     RCRI Interpretation: 0 points: Class I (very low risk - 0.4% complication rate)         Signed Electronically by: Seda Wells PA-C  Copy of this evaluation report is provided to requesting physician.      "

## 2024-01-10 NOTE — PATIENT INSTRUCTIONS
Preparing for Your Surgery  Getting started  A nurse will call you to review your health history and instructions. They will give you an arrival time based on your scheduled surgery time. Please be ready to share:  Your doctor's clinic name and phone number  Your medical, surgical, and anesthesia history  A list of allergies and sensitivities  A list of medicines, including herbal treatments and over-the-counter drugs  Whether the patient has a legal guardian (ask how to send us the papers in advance)  Please tell us if you're pregnant--or if there's any chance you might be pregnant. Some surgeries may injure a fetus (unborn baby), so they require a pregnancy test. Surgeries that are safe for a fetus don't always need a test, and you can choose whether to have one.   If you have a child who's having surgery, please ask for a copy of Preparing for Your Child's Surgery.    Preparing for surgery  Within 10 to 30 days of surgery: Have a pre-op exam (sometimes called an H&P, or History and Physical). This can be done at a clinic or pre-operative center.  If you're having a , you may not need this exam. Talk to your care team.  At your pre-op exam, talk to your care team about all medicines you take. If you need to stop any medicines before surgery, ask when to start taking them again.  We do this for your safety. Many medicines can make you bleed too much during surgery. Some change how well surgery (anesthesia) drugs work.  Call your insurance company to let them know you're having surgery. (If you don't have insurance, call [phone].)  Call your clinic if there's any change in your health. This includes signs of a cold or flu (sore throat, runny nose, cough, rash, fever). It also includes a scrape or scratch near the surgery site.  If you have questions on the day of surgery, call your hospital or surgery center.  Eating and drinking guidelines  For your safety: Unless your surgeon tells you otherwise,  follow the guidelines below.  Eat and drink as usual until 8 hours before you arrive for surgery. After that, no food or milk.  Drink clear liquids until 2 hours before you arrive. These are liquids you can see through, like water, Gatorade, and Propel Water. They also include plain black coffee and tea (no cream or milk), candy, and breath mints. You can spit out gum when you arrive.  If you drink alcohol: Stop drinking it the night before surgery.  If your care team tells you to take medicine on the morning of surgery, it's okay to take it with a sip of water.  Preventing infection  Shower or bathe the night before and morning of your surgery. Follow the instructions your clinic gave you. (If no instructions, use regular soap.)  Don't shave or clip hair near your surgery site. We'll remove the hair if needed.  Don't smoke or vape the morning of surgery. You may chew nicotine gum up to 2 hours before surgery. A nicotine patch is okay.  Note: Some surgeries require you to completely quit smoking and nicotine. Check with your surgeon.  Your care team will make every effort to keep you safe from infection. We will:  Clean our hands often with soap and water (or an alcohol-based hand rub).  Clean the skin at your surgery site with a special soap that kills germs.  Give you a special gown to keep you warm. (Cold raises the risk of infection.)  Wear special hair covers, masks, gowns and gloves during surgery.  Give antibiotic medicine, if prescribed. Not all surgeries need antibiotics.  What to bring on the day of surgery  Photo ID and insurance card  Copy of your health care directive, if you have one  Glasses and hearing aids (bring cases)  You can't wear contacts during surgery  Inhaler and eye drops, if you use them (tell us about these when you arrive)  CPAP machine or breathing device, if you use them  A few personal items, if spending the night  If you have . . .  A pacemaker, ICD (cardiac defibrillator) or other  implant: Bring the ID card.  An implanted stimulator: Bring the remote control.  A legal guardian: Bring a copy of the certified (court-stamped) guardianship papers.  Please remove any jewelry, including body piercings. Leave jewelry and other valuables at home.  If you're going home the day of surgery  You must have a responsible adult drive you home. They should stay with you overnight as well.  If you don't have someone to stay with you, and you aren't safe to go home alone, we may keep you overnight. Insurance often won't pay for this.  After surgery  If it's hard to control your pain or you need more pain medicine, please call your surgeon's office.  Questions?   If you have any questions for your care team, list them here: _________________________________________________________________________________________________________________________________________________________________________ ____________________________________ ____________________________________ ____________________________________  For informational purposes only. Not to replace the advice of your health care provider. Copyright   2003, 2019 [employer] [facility]. All rights reserved. Clinically reviewed by Samantha Leggett MD. [employer] 132953 - REV 12/22.    How to Take Your Medication Before Surgery  - Take all of your medications before surgery as usual

## 2024-01-11 ENCOUNTER — TELEPHONE (OUTPATIENT)
Dept: FAMILY MEDICINE | Facility: CLINIC | Age: 74
End: 2024-01-11
Payer: COMMERCIAL

## 2024-01-11 DIAGNOSIS — E87.5 HYPERKALEMIA: Primary | ICD-10-CM

## 2024-01-11 DIAGNOSIS — R73.03 PREDIABETES: ICD-10-CM

## 2024-01-11 DIAGNOSIS — E83.52 SERUM CALCIUM ELEVATED: ICD-10-CM

## 2024-01-11 DIAGNOSIS — E67.3 VITAMIN D INTOXICATION: ICD-10-CM

## 2024-01-12 ENCOUNTER — ANESTHESIA (OUTPATIENT)
Dept: SURGERY | Facility: AMBULATORY SURGERY CENTER | Age: 74
End: 2024-01-12
Payer: COMMERCIAL

## 2024-01-12 ENCOUNTER — OFFICE VISIT (OUTPATIENT)
Dept: OPHTHALMOLOGY | Facility: CLINIC | Age: 74
End: 2024-01-12
Payer: COMMERCIAL

## 2024-01-12 ENCOUNTER — HOSPITAL ENCOUNTER (OUTPATIENT)
Facility: AMBULATORY SURGERY CENTER | Age: 74
Discharge: HOME | End: 2024-01-12
Attending: OPHTHALMOLOGY
Payer: COMMERCIAL

## 2024-01-12 VITALS
BODY MASS INDEX: 25.54 KG/M2 | RESPIRATION RATE: 16 BRPM | DIASTOLIC BLOOD PRESSURE: 64 MMHG | SYSTOLIC BLOOD PRESSURE: 132 MMHG | OXYGEN SATURATION: 94 % | TEMPERATURE: 97.8 F | WEIGHT: 137.4 LBS

## 2024-01-12 DIAGNOSIS — Z96.1 PSEUDOPHAKIA, RIGHT EYE: Primary | ICD-10-CM

## 2024-01-12 DIAGNOSIS — H25.811 COMBINED FORM OF AGE-RELATED CATARACT, RIGHT EYE: Primary | ICD-10-CM

## 2024-01-12 PROCEDURE — 66982 XCAPSL CTRC RMVL CPLX WO ECP: CPT | Mod: RT

## 2024-01-12 PROCEDURE — G8907 PT DOC NO EVENTS ON DISCHARG: HCPCS

## 2024-01-12 PROCEDURE — 66982 XCAPSL CTRC RMVL CPLX WO ECP: CPT | Mod: 79 | Performed by: OPHTHALMOLOGY

## 2024-01-12 PROCEDURE — G8918 PT W/O PREOP ORDER IV AB PRO: HCPCS

## 2024-01-12 PROCEDURE — 99024 POSTOP FOLLOW-UP VISIT: CPT | Performed by: OPHTHALMOLOGY

## 2024-01-12 DEVICE — TECNIS 1-PC CLEAR MONO 6.0MM 26.0D
Type: IMPLANTABLE DEVICE | Site: EYE | Status: FUNCTIONAL
Brand: TECNIS IOL

## 2024-01-12 RX ORDER — SODIUM CHLORIDE, SODIUM LACTATE, POTASSIUM CHLORIDE, CALCIUM CHLORIDE 600; 310; 30; 20 MG/100ML; MG/100ML; MG/100ML; MG/100ML
INJECTION, SOLUTION INTRAVENOUS CONTINUOUS PRN
Status: DISCONTINUED | OUTPATIENT
Start: 2024-01-12 | End: 2024-01-12

## 2024-01-12 RX ORDER — TIMOLOL MALEATE 5 MG/ML
SOLUTION/ DROPS OPHTHALMIC PRN
Status: DISCONTINUED | OUTPATIENT
Start: 2024-01-12 | End: 2024-01-12 | Stop reason: HOSPADM

## 2024-01-12 RX ORDER — MEPERIDINE HYDROCHLORIDE 25 MG/ML
12.5 INJECTION INTRAMUSCULAR; INTRAVENOUS; SUBCUTANEOUS EVERY 5 MIN PRN
Status: DISCONTINUED | OUTPATIENT
Start: 2024-01-12 | End: 2024-01-13 | Stop reason: HOSPADM

## 2024-01-12 RX ORDER — PROPARACAINE HYDROCHLORIDE 5 MG/ML
1 SOLUTION/ DROPS OPHTHALMIC
Status: DISCONTINUED | OUTPATIENT
Start: 2024-01-12 | End: 2024-01-13 | Stop reason: HOSPADM

## 2024-01-12 RX ORDER — SODIUM CHLORIDE, SODIUM LACTATE, POTASSIUM CHLORIDE, CALCIUM CHLORIDE 600; 310; 30; 20 MG/100ML; MG/100ML; MG/100ML; MG/100ML
INJECTION, SOLUTION INTRAVENOUS CONTINUOUS
Status: DISCONTINUED | OUTPATIENT
Start: 2024-01-12 | End: 2024-01-13 | Stop reason: HOSPADM

## 2024-01-12 RX ORDER — ONDANSETRON 4 MG/1
4 TABLET, ORALLY DISINTEGRATING ORAL EVERY 30 MIN PRN
Status: DISCONTINUED | OUTPATIENT
Start: 2024-01-12 | End: 2024-01-13 | Stop reason: HOSPADM

## 2024-01-12 RX ORDER — ACETAMINOPHEN 325 MG/1
975 TABLET ORAL ONCE
Status: DISCONTINUED | OUTPATIENT
Start: 2024-01-12 | End: 2024-01-13 | Stop reason: HOSPADM

## 2024-01-12 RX ORDER — FENTANYL CITRATE 50 UG/ML
25 INJECTION, SOLUTION INTRAMUSCULAR; INTRAVENOUS EVERY 5 MIN PRN
Status: DISCONTINUED | OUTPATIENT
Start: 2024-01-12 | End: 2024-01-13 | Stop reason: HOSPADM

## 2024-01-12 RX ORDER — LORAZEPAM 2 MG/ML
.5-1 INJECTION INTRAMUSCULAR
Status: DISCONTINUED | OUTPATIENT
Start: 2024-01-12 | End: 2024-01-13 | Stop reason: HOSPADM

## 2024-01-12 RX ORDER — MOXIFLOXACIN IN NACL,ISO-OS/PF 0.3MG/0.3
SYRINGE (ML) INTRAOCULAR PRN
Status: DISCONTINUED | OUTPATIENT
Start: 2024-01-12 | End: 2024-01-12 | Stop reason: HOSPADM

## 2024-01-12 RX ORDER — DICLOFENAC SODIUM 1 MG/ML
1 SOLUTION/ DROPS OPHTHALMIC
Status: COMPLETED | OUTPATIENT
Start: 2024-01-12 | End: 2024-01-12

## 2024-01-12 RX ORDER — OXYCODONE HYDROCHLORIDE 5 MG/1
10 TABLET ORAL
Status: DISCONTINUED | OUTPATIENT
Start: 2024-01-12 | End: 2024-01-13 | Stop reason: HOSPADM

## 2024-01-12 RX ORDER — FENTANYL CITRATE 50 UG/ML
25 INJECTION, SOLUTION INTRAMUSCULAR; INTRAVENOUS
Status: DISCONTINUED | OUTPATIENT
Start: 2024-01-12 | End: 2024-01-13 | Stop reason: HOSPADM

## 2024-01-12 RX ORDER — LIDOCAINE 40 MG/G
CREAM TOPICAL
Status: DISCONTINUED | OUTPATIENT
Start: 2024-01-12 | End: 2024-01-13 | Stop reason: HOSPADM

## 2024-01-12 RX ORDER — DIMENHYDRINATE 50 MG/ML
25 INJECTION, SOLUTION INTRAMUSCULAR; INTRAVENOUS
Status: DISCONTINUED | OUTPATIENT
Start: 2024-01-12 | End: 2024-01-13 | Stop reason: HOSPADM

## 2024-01-12 RX ORDER — LABETALOL HYDROCHLORIDE 5 MG/ML
10 INJECTION, SOLUTION INTRAVENOUS
Status: DISCONTINUED | OUTPATIENT
Start: 2024-01-12 | End: 2024-01-13 | Stop reason: HOSPADM

## 2024-01-12 RX ORDER — ACETAZOLAMIDE 250 MG/1
250 TABLET ORAL ONCE
Status: COMPLETED | OUTPATIENT
Start: 2024-01-12 | End: 2024-01-12

## 2024-01-12 RX ORDER — HYDRALAZINE HYDROCHLORIDE 20 MG/ML
2.5-5 INJECTION INTRAMUSCULAR; INTRAVENOUS EVERY 10 MIN PRN
Status: DISCONTINUED | OUTPATIENT
Start: 2024-01-12 | End: 2024-01-13 | Stop reason: HOSPADM

## 2024-01-12 RX ORDER — HYDROXYZINE HYDROCHLORIDE 10 MG/1
10 TABLET, FILM COATED ORAL EVERY 6 HOURS PRN
Status: DISCONTINUED | OUTPATIENT
Start: 2024-01-12 | End: 2024-01-13 | Stop reason: HOSPADM

## 2024-01-12 RX ORDER — ALBUTEROL SULFATE 0.83 MG/ML
2.5 SOLUTION RESPIRATORY (INHALATION) EVERY 4 HOURS PRN
Status: DISCONTINUED | OUTPATIENT
Start: 2024-01-12 | End: 2024-01-13 | Stop reason: HOSPADM

## 2024-01-12 RX ORDER — OXYCODONE HYDROCHLORIDE 5 MG/1
5 TABLET ORAL
Status: DISCONTINUED | OUTPATIENT
Start: 2024-01-12 | End: 2024-01-13 | Stop reason: HOSPADM

## 2024-01-12 RX ORDER — DEXAMETHASONE SODIUM PHOSPHATE 4 MG/ML
4 INJECTION, SOLUTION INTRA-ARTICULAR; INTRALESIONAL; INTRAMUSCULAR; INTRAVENOUS; SOFT TISSUE
Status: DISCONTINUED | OUTPATIENT
Start: 2024-01-12 | End: 2024-01-13 | Stop reason: HOSPADM

## 2024-01-12 RX ORDER — ONDANSETRON 2 MG/ML
4 INJECTION INTRAMUSCULAR; INTRAVENOUS EVERY 30 MIN PRN
Status: DISCONTINUED | OUTPATIENT
Start: 2024-01-12 | End: 2024-01-13 | Stop reason: HOSPADM

## 2024-01-12 RX ORDER — ACETAMINOPHEN 325 MG/1
975 TABLET ORAL ONCE
Status: COMPLETED | OUTPATIENT
Start: 2024-01-12 | End: 2024-01-12

## 2024-01-12 RX ORDER — SODIUM CHLORIDE, SODIUM LACTATE, POTASSIUM CHLORIDE, CALCIUM CHLORIDE 600; 310; 30; 20 MG/100ML; MG/100ML; MG/100ML; MG/100ML
INJECTION, SOLUTION INTRAVENOUS CONTINUOUS
Status: CANCELLED | OUTPATIENT
Start: 2024-01-12

## 2024-01-12 RX ORDER — MOXIFLOXACIN 5 MG/ML
1 SOLUTION/ DROPS OPHTHALMIC
Status: COMPLETED | OUTPATIENT
Start: 2024-01-12 | End: 2024-01-12

## 2024-01-12 RX ORDER — BALANCED SALT SOLUTION 6.4; .75; .48; .3; 3.9; 1.7 MG/ML; MG/ML; MG/ML; MG/ML; MG/ML; MG/ML
SOLUTION OPHTHALMIC PRN
Status: DISCONTINUED | OUTPATIENT
Start: 2024-01-12 | End: 2024-01-12 | Stop reason: HOSPADM

## 2024-01-12 RX ORDER — FENTANYL CITRATE 50 UG/ML
50 INJECTION, SOLUTION INTRAMUSCULAR; INTRAVENOUS EVERY 5 MIN PRN
Status: DISCONTINUED | OUTPATIENT
Start: 2024-01-12 | End: 2024-01-13 | Stop reason: HOSPADM

## 2024-01-12 RX ORDER — KETOROLAC TROMETHAMINE 30 MG/ML
15 INJECTION, SOLUTION INTRAMUSCULAR; INTRAVENOUS
Status: DISCONTINUED | OUTPATIENT
Start: 2024-01-12 | End: 2024-01-13 | Stop reason: HOSPADM

## 2024-01-12 RX ORDER — HALOPERIDOL 5 MG/ML
1 INJECTION INTRAMUSCULAR
Status: DISCONTINUED | OUTPATIENT
Start: 2024-01-12 | End: 2024-01-13 | Stop reason: HOSPADM

## 2024-01-12 RX ORDER — CYCLOPENTOLAT/TROPIC/PHENYLEPH 1%-1%-2.5%
1 DROPS (EA) OPHTHALMIC (EYE)
Status: COMPLETED | OUTPATIENT
Start: 2024-01-12 | End: 2024-01-12

## 2024-01-12 RX ORDER — TETRACAINE HYDROCHLORIDE 5 MG/ML
SOLUTION OPHTHALMIC PRN
Status: DISCONTINUED | OUTPATIENT
Start: 2024-01-12 | End: 2024-01-12 | Stop reason: HOSPADM

## 2024-01-12 RX ADMIN — MOXIFLOXACIN 1 DROP: 5 SOLUTION/ DROPS OPHTHALMIC at 07:19

## 2024-01-12 RX ADMIN — MOXIFLOXACIN 1 DROP: 5 SOLUTION/ DROPS OPHTHALMIC at 07:12

## 2024-01-12 RX ADMIN — DICLOFENAC SODIUM 1 DROP: 1 SOLUTION/ DROPS OPHTHALMIC at 07:12

## 2024-01-12 RX ADMIN — PROPARACAINE HYDROCHLORIDE 1 DROP: 5 SOLUTION/ DROPS OPHTHALMIC at 07:11

## 2024-01-12 RX ADMIN — DICLOFENAC SODIUM 1 DROP: 1 SOLUTION/ DROPS OPHTHALMIC at 07:19

## 2024-01-12 RX ADMIN — Medication 1 DROP: at 07:19

## 2024-01-12 RX ADMIN — Medication 1 DROP: at 07:27

## 2024-01-12 RX ADMIN — ACETAZOLAMIDE 250 MG: 250 TABLET ORAL at 07:22

## 2024-01-12 RX ADMIN — ACETAMINOPHEN 975 MG: 325 TABLET ORAL at 07:20

## 2024-01-12 RX ADMIN — SODIUM CHLORIDE, SODIUM LACTATE, POTASSIUM CHLORIDE, CALCIUM CHLORIDE: 600; 310; 30; 20 INJECTION, SOLUTION INTRAVENOUS at 07:14

## 2024-01-12 RX ADMIN — SODIUM CHLORIDE, SODIUM LACTATE, POTASSIUM CHLORIDE, CALCIUM CHLORIDE: 600; 310; 30; 20 INJECTION, SOLUTION INTRAVENOUS at 07:57

## 2024-01-12 RX ADMIN — DICLOFENAC SODIUM 1 DROP: 1 SOLUTION/ DROPS OPHTHALMIC at 07:27

## 2024-01-12 RX ADMIN — MOXIFLOXACIN 1 DROP: 5 SOLUTION/ DROPS OPHTHALMIC at 07:27

## 2024-01-12 RX ADMIN — Medication 1 DROP: at 07:12

## 2024-01-12 ASSESSMENT — VISUAL ACUITY
OD_SC: 20/200
METHOD: SNELLEN - LINEAR

## 2024-01-12 ASSESSMENT — TONOMETRY
OD_IOP_MMHG: 13
IOP_METHOD: TONOPEN

## 2024-01-12 ASSESSMENT — SLIT LAMP EXAM - LIDS: COMMENTS: NORMAL

## 2024-01-12 NOTE — ANESTHESIA POSTPROCEDURE EVALUATION
Patient: Reanna Kumar    Procedure: Procedure(s):  COMPLEX PHACOEMULSIFICATION, CATARACT, WITH INTRAOCULAR LENS IMPLANT       Anesthesia Type:  MAC    Note:  Disposition: Outpatient   Postop Pain Control: Uneventful            Sign Out: Well controlled pain   PONV: No   Neuro/Psych: Uneventful            Sign Out: Acceptable/Baseline neuro status   Airway/Respiratory: Uneventful            Sign Out: Acceptable/Baseline resp. status   CV/Hemodynamics: Uneventful            Sign Out: Acceptable CV status; No obvious hypovolemia; No obvious fluid overload   Other NRE:    DID A NON-ROUTINE EVENT OCCUR? No           Last vitals:  Vitals Value Taken Time   /64 01/12/24 0852   Temp 97.8  F (36.6  C) 01/12/24 0838   Pulse     Resp 16 01/12/24 0852   SpO2 94 % 01/12/24 0852       Electronically Signed By: Jose J Eng MD  January 12, 2024  9:20 AM

## 2024-01-12 NOTE — ANESTHESIA CARE TRANSFER NOTE
Patient: Reanna Kumar    Procedure: Procedure(s):  COMPLEX PHACOEMULSIFICATION, CATARACT, WITH INTRAOCULAR LENS IMPLANT       Diagnosis: Nuclear age-related cataract, both eyes [H25.13]  Pseudoexfoliation (PXF) of left lens capsule [H26.8]  Combined forms of age-related cataract of both eyes [H25.813]  Diagnosis Additional Information: No value filed.    Anesthesia Type:   MAC     Note:      Level of Consciousness: awake  Oxygen Supplementation: room air    Independent Airway: airway patency satisfactory and stable        Patient transferred to: Phase II    Handoff Report: Identifed the Patient, Identified the Reponsible Provider, Reviewed the pertinent medical history, Discussed the surgical course, Reviewed Intra-OP anesthesia mangement and issues during anesthesia, Set expectations for post-procedure period and Allowed opportunity for questions and acknowledgement of understanding      Vitals:  Vitals Value Taken Time   BP     Temp     Pulse     Resp     SpO2         Electronically Signed By: RENETTA William CRNA  January 12, 2024  8:39 AM

## 2024-01-12 NOTE — DISCHARGE INSTRUCTIONS
You had 975 mg of Tylenol at 0720. You may repeat this after 1:20.   Maximum amount of Tylenol/Acetaminophen in a 24 hour period is 4,000 mg.    ___________________________________________

## 2024-01-12 NOTE — PROGRESS NOTES
Review of systems for the eyes was negative other than the pertinent positives/negatives listed in the HPI.      Assessment & Plan    HPI:  Reanna Kumar is a 73 year old female with history of HLD, lung CA, insomnia presents for cataract evaluation from Dr. Olivarez for Postoperative day 0 right eye     POHx: refractive error  PMHx: HLD, lung CA, insomnia  Current Medications: DULoxetine (CYMBALTA) 20 MG capsule, Take 1 capsule (20 mg) by mouth 2 times daily  prednisoLONE acetate (PRED FORTE) 1 % ophthalmic suspension, Place 1 drop into the right eye 4 times daily for 7 days, THEN 1 drop 3 times daily for 7 days, THEN 1 drop 2 times daily for 7 days, THEN 1 drop daily for 7 days. Start after surgery.  QUEtiapine (SEROQUEL) 50 MG tablet, Take 1 tablet (50 mg) by mouth at bedtime (takes 0.5 x 50mg)  rosuvastatin (CRESTOR) 20 MG tablet, Take 1 tablet (20 mg) by mouth daily    [COMPLETED] acetaminophen (TYLENOL) tablet 975 mg  acetaminophen (TYLENOL) tablet 975 mg  acetaminophen (TYLENOL) tablet 975 mg  [COMPLETED] acetaZOLAMIDE (DIAMOX) tablet 250 mg  albuterol (PROVENTIL) neb solution 2.5 mg  dexAMETHasone (DECADRON) injection 4 mg  [COMPLETED] diclofenac (VOLTAREN) 0.1 % ophthalmic solution 1 drop  dimenhyDRINATE (DRAMAMINE) injection 25 mg  fentaNYL (PF) (SUBLIMAZE) injection 25 mcg  fentaNYL (PF) (SUBLIMAZE) injection 25 mcg  fentaNYL (PF) (SUBLIMAZE) injection 50 mcg  haloperidol lactate (HALDOL) injection 1 mg  hydrALAZINE (APRESOLINE) injection 2.5-5 mg  HYDROmorphone (DILAUDID) injection 0.2 mg  HYDROmorphone (DILAUDID) injection 0.4 mg  hydrOXYzine HCl (ATARAX) tablet 10 mg  ketorolac (TORADOL) injection 15 mg  labetalol (NORMODYNE/TRANDATE) injection 10 mg  lactated ringers infusion  lactated ringers infusion  lidocaine (LMX4) kit  lidocaine 1 % 0.1-1 mL  LORazepam (ATIVAN) injection 0.5-1 mg  meperidine (DEMEROL) injection 12.5 mg  [COMPLETED] moxifloxacin (VIGAMOX) 0.5 % ophthalmic solution 1  drop  ondansetron (ZOFRAN ODT) ODT tab 4 mg   Or  ondansetron (ZOFRAN) injection 4 mg  ondansetron (ZOFRAN ODT) ODT tab 4 mg   Or  ondansetron (ZOFRAN) injection 4 mg  oxyCODONE (ROXICODONE) tablet 10 mg  oxyCODONE (ROXICODONE) tablet 5 mg  prochlorperazine (COMPAZINE) injection 5 mg  prochlorperazine (COMPAZINE) injection 5 mg  proparacaine (ALCAINE) 0.5 % ophthalmic solution 1 drop  sodium chloride (PF) 0.9% PF flush 3 mL  sodium chloride (PF) 0.9% PF flush 3 mL  [COMPLETED] tropicamide 1 %-cyclopentolate 1 %-phenylephrine 2.5% 1-1-2.5 % ophthalmic solution 1 drop      FHx: denies family history of ocular conditions   PSHx: Cataract extraction/intraocular lens Dangelo left eye 11/17/23, right eye 01/12/24       Current Eye Medications:       Assessment & Plan:  Pseudophakia, right eye, day 0  Dangelo right eye  01/12/24   Doing well  Keep patch in place at night for 7 days  Start post-operative drops and taper according to instructions  Post-operative do's and don'ts reviewed, questions answered    Recheck 1 week  Start marcie 128 four times a day        Pseudophakia, left eye  Dangelo left eye 11/17/23     (H52.01,  H52.201,  H52.4) Hyperopia of right eye with astigmatism and presbyopia  (H52.12,  H52.202,  H52.4) Myopia of left eye with astigmatism and presbyopia  Hold pending Cataract extraction/iol    (H26.8) Pseudoexfoliation (PXF) of left lens capsule  Normal IOP  No evidence of glaucoma on fundus exam  Advised regarding increased risk with cataract surgery and advocate for prompt removal    (H52.213) Irregular astigmatism of both eyes  Would not be good multifocal candidate with current ocular surface  Continue AT four times a day      Return for as scheduled.        Charles Dangelo MD     Attending Physician Attestation:  Complete documentation of historical and exam elements from today's encounter can be found in the full encounter summary report (not reduplicated in this progress note).  I  personally obtained the chief complaint(s) and history of present illness.  I confirmed and edited as necessary the review of systems, past medical/surgical history, family history, social history, and examination findings as documented by others; and I examined the patient myself.  I personally reviewed the relevant tests, images, and reports as documented above.  I formulated and edited as necessary the assessment and plan and discussed the findings and management plan with the patient and family. - Charles Dangelo MD

## 2024-01-12 NOTE — OP NOTE
PREOPERATIVE DIAGNOSIS:   1.  2.  3. Combined form of age-related cataract, right eye   Miosis  Pseudoexfoliation     Right eye   POSTOPERATIVE DIAGNOSIS: Same   PROCEDURES:   1. Complex Cataract extraction with intraocular lens implant Right eye.  SURGEON: Charles Dangelo M.D.  INDICATIONS: The patient Reanna Kumar presented to the eye clinic with decreased vision secondary to cataract in the Right eye. The risks, benefits and alternatives to cataract extraction were discussed. The patient elected to proceed. All questions were answered to the patient's satisfaction.   DESCRIPTION OF PROCEDURE:   Prior to the procedure, appropriate cardiac and respiratory monitors were applied to the patient.  In the pre-operative holding area, a drop of topical tetracaine was placed in the operative eye.  The patient was brought to the operating room.  With adequate anesthesia, the Right eye was prepped and draped in the usual sterile fashion. A lid speculum was placed, and the operating microscope was rotated into position. A surgical pause was carried out to identify with all members of the surgical team the correct surgical site. A paracentesis was created.  Through this limbal paracentesis, the anterior chamber was filled with preservative-free lidocaine with epi shugarcaine followed by viscoelastic.  A temporal wound was created at the limbus using a 2.6 mm blade. A 6.25 Malyugin ring was inserted and afixed to the iris. A capsulorrhexis was initiated using a bent 25-gauge needle and was completed in continuous and circular fashion using the capsulorrhexis forceps. The lens nucleus was hydrodissected using balanced salt solution.  The lens nucleus was rotated and removed using phacoemulsification in a stop and chop technique.  Residual cortical material was removed using irrigation-aspiration.  The capsular bag was reinflated to its maximal extent with cohesive viscoelastic.  A 26.0 diopter ZCB00 was inserted into the  capsular bag.  The lens power selected was reviewed using the intraocular lens power measurements that were obtained preoperatively to confirm that the correct lens was selected for the desired post-operative refractive state. The Malyugin ring was removed using the . The residual viscoelastic was removed in its entirety, the wound were hydrated and found to be self-sealing.  Intracameral moxifloxacin was administered. Tactile pressure was confirmed to be in a normal range.  The lid speculum was removed, a drop of timolol was administered, and a shield was applied.  The patient tolerated the procedure well, and there were no complications.    PLAN: The patient will be discharged to home and will follow up today  EBL:  None  Complications:  None  Implant Name Type Inv. Item Serial No.  Lot No. LRB No. Used Action   EYE IMP IOL DELORIS PCL TECNIS ZCB00 26.0 - [id number] Lens/Eye Implant EYE IMP IOL DELORIS PCL TECNIS ZCB00 26.0 [id number] [employer]  Right 1 Implanted

## 2024-01-12 NOTE — TELEPHONE ENCOUNTER
Please call Manda and review labs (calcium/vitamin D, parathyroid)     Her vitamin D level is quite high.  Is she currently taking a Vitamin D supplement (I believe she did mention she was on something)?  What dose has she been taking?    I suggest she stop all Vitamin D supplements at this time.     Her parathyroid level is normal.     Potassium level is high, I suggest we recheck this level in 1 week.     Please route message back to me with an update (she has surgery scheduled 1/12/24, so please call 1/15/24.          Seda Wells PA-C    (Reviewed chart and she is managed by oncology team, last visit was Nov 2023 and CT scan of chest looked good, no recurrence or mets noted).  Calcium level has been consistently elevated for the past 2 years (this is not new).

## 2024-01-15 NOTE — TELEPHONE ENCOUNTER
I called and spoke to patient, she was on  Vit D 5000 international unit(s), she was taking it as  2-3 per day.   She says she already stopped her vitamin D when she saw her results.    Scheduled for lab on 1/23 (she is off work that day).    CBC and potassium are ordered.    Patient wonders if her elevated CO2 or elevated blood sugar are issues?   She says she came in before eating breakfast.    She also is concerned about the ongoing elevated calcium.   What else can cause this if her parathyroid is normal?    Also, when would the Vit D level be rechecked?    Routed to Seda Wells to address.    Lotus MCKNIGHT RN  [facility] Triage      .

## 2024-01-19 ENCOUNTER — MYC MEDICAL ADVICE (OUTPATIENT)
Dept: FAMILY MEDICINE | Facility: CLINIC | Age: 74
End: 2024-01-19
Payer: COMMERCIAL

## 2024-01-23 ENCOUNTER — OFFICE VISIT (OUTPATIENT)
Dept: OPHTHALMOLOGY | Facility: CLINIC | Age: 74
End: 2024-01-23
Payer: COMMERCIAL

## 2024-01-23 ENCOUNTER — LAB (OUTPATIENT)
Dept: LAB | Facility: CLINIC | Age: 74
End: 2024-01-23
Payer: COMMERCIAL

## 2024-01-23 DIAGNOSIS — H52.203 MYOPIA OF BOTH EYES WITH ASTIGMATISM AND PRESBYOPIA: ICD-10-CM

## 2024-01-23 DIAGNOSIS — E87.5 HYPERKALEMIA: ICD-10-CM

## 2024-01-23 DIAGNOSIS — Z96.1 PSEUDOPHAKIA, LEFT EYE: ICD-10-CM

## 2024-01-23 DIAGNOSIS — Z96.1 PSEUDOPHAKIA, RIGHT EYE: Primary | ICD-10-CM

## 2024-01-23 DIAGNOSIS — H52.4 MYOPIA OF BOTH EYES WITH ASTIGMATISM AND PRESBYOPIA: ICD-10-CM

## 2024-01-23 DIAGNOSIS — H52.13 MYOPIA OF BOTH EYES WITH ASTIGMATISM AND PRESBYOPIA: ICD-10-CM

## 2024-01-23 DIAGNOSIS — E83.52 SERUM CALCIUM ELEVATED: ICD-10-CM

## 2024-01-23 LAB
ERYTHROCYTE [DISTWIDTH] IN BLOOD BY AUTOMATED COUNT: 12.3 % (ref 10–15)
HCT VFR BLD AUTO: 47.2 % (ref 35–47)
HGB BLD-MCNC: 15.1 G/DL (ref 11.7–15.7)
MCH RBC QN AUTO: 32.5 PG (ref 26.5–33)
MCHC RBC AUTO-ENTMCNC: 32 G/DL (ref 31.5–36.5)
MCV RBC AUTO: 102 FL (ref 78–100)
PLATELET # BLD AUTO: 388 10E3/UL (ref 150–450)
RBC # BLD AUTO: 4.64 10E6/UL (ref 3.8–5.2)
WBC # BLD AUTO: 5.5 10E3/UL (ref 4–11)

## 2024-01-23 PROCEDURE — 99024 POSTOP FOLLOW-UP VISIT: CPT | Performed by: OPHTHALMOLOGY

## 2024-01-23 PROCEDURE — 36415 COLL VENOUS BLD VENIPUNCTURE: CPT

## 2024-01-23 PROCEDURE — 84132 ASSAY OF SERUM POTASSIUM: CPT

## 2024-01-23 PROCEDURE — 85027 COMPLETE CBC AUTOMATED: CPT

## 2024-01-23 ASSESSMENT — REFRACTION_MANIFEST
OD_AXIS: 002
OS_SPHERE: -0.75
OD_CYLINDER: +0.25
OS_AXIS: 180
OD_CYLINDER: +0.50
OS_SPHERE: -0.25
OS_AXIS: 179
OS_CYLINDER: +0.50
METHOD_AUTOREFRACTION: 1
OD_SPHERE: -1.00
OS_ADD: +2.50
OS_CYLINDER: +0.50
OD_AXIS: 002
OD_ADD: +2.50
OD_SPHERE: -0.50

## 2024-01-23 ASSESSMENT — CONF VISUAL FIELD
OD_SUPERIOR_NASAL_RESTRICTION: 0
OD_SUPERIOR_TEMPORAL_RESTRICTION: 0
OD_INFERIOR_TEMPORAL_RESTRICTION: 0
OS_SUPERIOR_NASAL_RESTRICTION: 0
OS_INFERIOR_NASAL_RESTRICTION: 0
OD_NORMAL: 1
OD_INFERIOR_NASAL_RESTRICTION: 0
OS_NORMAL: 1
METHOD: COUNTING FINGERS
OS_SUPERIOR_TEMPORAL_RESTRICTION: 0
OS_INFERIOR_TEMPORAL_RESTRICTION: 0

## 2024-01-23 ASSESSMENT — VISUAL ACUITY
METHOD: SNELLEN - LINEAR
OS_SC+: -2
OD_SC: 20/40
OS_SC: 20/25
OD_SC+: -2

## 2024-01-23 ASSESSMENT — TONOMETRY
IOP_METHOD: ICARE
OS_IOP_MMHG: 12
OD_IOP_MMHG: 12

## 2024-01-23 ASSESSMENT — EXTERNAL EXAM - LEFT EYE: OS_EXAM: NORMAL

## 2024-01-23 ASSESSMENT — EXTERNAL EXAM - RIGHT EYE: OD_EXAM: NORMAL

## 2024-01-23 ASSESSMENT — SLIT LAMP EXAM - LIDS: COMMENTS: NORMAL

## 2024-01-23 NOTE — NURSING NOTE
Chief Complaints and History of Present Illnesses   Patient presents with    Post Op (Ophthalmology) Right Eye       Chief Complaint(s) and History of Present Illness(es)       Post Op (Ophthalmology) Right Eye              Laterality: right eye              Comments    S/P Complex Cataract extraction with intraocular lens implant Right eye, 01/12/2024  Left eye on 11/17/2023    Patient is doing well, no concerns  Using drops as directed                    Isac Markham Ophthalmic Assistant

## 2024-01-23 NOTE — PROGRESS NOTES
HPI       Post Op (Ophthalmology) Right Eye    In right eye.             Comments    S/P Complex Cataract extraction with intraocular lens implant Right eye, 01/12/2024  Left eye on 11/17/2023    Patient is doing well, no concerns  Using drops as directed           Last edited by Isac Markham on 1/23/2024 11:10 AM.             Review of systems for the eyes was negative other than the pertinent positives/negatives listed in the HPI.      Assessment & Plan    HPI:  Reanna Kumar is a 73 year old female with history of HLD, lung CA, insomnia presents for cataract evaluation from Dr. Olivarez for Postoperative week 2 right eye     POHx: refractive error  PMHx: HLD, lung CA, insomnia  Current Medications: DULoxetine (CYMBALTA) 20 MG capsule, Take 1 capsule (20 mg) by mouth 2 times daily  QUEtiapine (SEROQUEL) 50 MG tablet, Take 1 tablet (50 mg) by mouth at bedtime (takes 0.5 x 50mg)  rosuvastatin (CRESTOR) 20 MG tablet, Take 1 tablet (20 mg) by mouth daily    No current facility-administered medications on file prior to visit.    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        Charles Dangelo MD      Attending Physician Attestation:  Complete documentation of historical and exam elements from today's encounter can be found in the full encounter summary report (not reduplicated in this progress note).  I personally obtained the chief complaint(s) and history of present illness.  I confirmed and edited as necessary the review of systems, past medical/surgical history, family history, social history, and examination findings as documented by others; and I examined the patient myself.  I personally reviewed the relevant tests, images, and reports as documented above.  I formulated and edited as necessary the assessment and plan and discussed the findings and management plan with the patient and family. - Charles Dangelo MD

## 2024-01-24 LAB — POTASSIUM SERPL-SCNC: 4.6 MMOL/L (ref 3.4–5.3)

## 2024-02-13 ENCOUNTER — OFFICE VISIT (OUTPATIENT)
Dept: OPHTHALMOLOGY | Facility: CLINIC | Age: 74
End: 2024-02-13
Payer: COMMERCIAL

## 2024-02-13 DIAGNOSIS — H52.13 MYOPIA OF BOTH EYES WITH ASTIGMATISM AND PRESBYOPIA: ICD-10-CM

## 2024-02-13 DIAGNOSIS — H26.8 PSEUDOEXFOLIATION (PXF) OF LEFT LENS CAPSULE: ICD-10-CM

## 2024-02-13 DIAGNOSIS — H52.203 MYOPIA OF BOTH EYES WITH ASTIGMATISM AND PRESBYOPIA: ICD-10-CM

## 2024-02-13 DIAGNOSIS — Z96.1 PSEUDOPHAKIA, BOTH EYES: Primary | ICD-10-CM

## 2024-02-13 DIAGNOSIS — H52.4 MYOPIA OF BOTH EYES WITH ASTIGMATISM AND PRESBYOPIA: ICD-10-CM

## 2024-02-13 PROCEDURE — 99024 POSTOP FOLLOW-UP VISIT: CPT | Performed by: OPHTHALMOLOGY

## 2024-02-13 PROCEDURE — 99207 PR NO BILLABLE SERVICE THIS VISIT: CPT | Performed by: OPHTHALMOLOGY

## 2024-02-13 PROCEDURE — 92015 DETERMINE REFRACTIVE STATE: CPT | Performed by: OPHTHALMOLOGY

## 2024-02-13 ASSESSMENT — CONF VISUAL FIELD
OD_SUPERIOR_TEMPORAL_RESTRICTION: 0
OD_INFERIOR_TEMPORAL_RESTRICTION: 0
OS_NORMAL: 1
OD_NORMAL: 1
OS_INFERIOR_TEMPORAL_RESTRICTION: 0
OS_INFERIOR_NASAL_RESTRICTION: 0
METHOD: COUNTING FINGERS
OS_SUPERIOR_NASAL_RESTRICTION: 0
OD_SUPERIOR_NASAL_RESTRICTION: 0
OD_INFERIOR_NASAL_RESTRICTION: 0
OS_SUPERIOR_TEMPORAL_RESTRICTION: 0

## 2024-02-13 ASSESSMENT — TONOMETRY
IOP_METHOD: TONOPEN
OS_IOP_MMHG: 15
OD_IOP_MMHG: 15

## 2024-02-13 ASSESSMENT — EXTERNAL EXAM - RIGHT EYE: OD_EXAM: NORMAL

## 2024-02-13 ASSESSMENT — VISUAL ACUITY
METHOD: SNELLEN - LINEAR
OD_SC+: +1
OS_SC+: -2
OS_SC: J5
OD_SC: J7
OD_SC: 20/50
OS_SC: 20/25

## 2024-02-13 ASSESSMENT — REFRACTION_MANIFEST
OD_AXIS: 017
OS_SPHERE: -2.25
OD_ADD: +2.75
METHOD_AUTOREFRACTION: 1
OS_SPHERE: -1.00
OS_ADD: +2.75
OD_CYLINDER: +2.75
OS_CYLINDER: +1.50
OD_AXIS: 025
OD_SPHERE: -1.50
OS_AXIS: 170
OS_AXIS: 008
OS_CYLINDER: +1.25
OD_CYLINDER: +2.25
OD_SPHERE: -1.00

## 2024-02-13 ASSESSMENT — CUP TO DISC RATIO
OS_RATIO: 0.3
OD_RATIO: 0.3

## 2024-02-13 ASSESSMENT — EXTERNAL EXAM - LEFT EYE: OS_EXAM: NORMAL

## 2024-02-13 NOTE — PROGRESS NOTES
HPI       Post Op (Ophthalmology) Right Eye    In both eyes.             Comments    Pt returns for final post-op following cataract surgery right eye 1/12/2024, left eye 11/17/2023. She states that her vision is good. Still has a few drops left, but has mostly discontinued them now. No problems at all.           Last edited by Ro Hall on 2/13/2024  1:54 PM.             Review of systems for the eyes was negative other than the pertinent positives/negatives listed in the HPI.      Assessment & Plan    HPI:  Reanna Kumar is a 73 year old female with history of HLD, lung CA, insomnia presents for cataract evaluation from Dr. Olivarez for Postoperative week 4 right eye     POHx: refractive error  PMHx: HLD, lung CA, insomnia  Current Medications: DULoxetine (CYMBALTA) 20 MG capsule, Take 1 capsule (20 mg) by mouth 2 times daily  QUEtiapine (SEROQUEL) 50 MG tablet, Take 1 tablet (50 mg) by mouth at bedtime (takes 0.5 x 50mg)  rosuvastatin (CRESTOR) 20 MG tablet, Take 1 tablet (20 mg) by mouth daily    No current facility-administered medications on file prior to visit.    FHx: denies family history of ocular conditions   PSHx: Cataract extraction/intraocular lens Dangelo left eye 11/17/23, right eye 01/12/24       Current Eye Medications:  Feli 128 four times a day      Assessment & Plan:  Pseudophakia, right eye  Dangelo right eye  01/12/24   Doing well      Pseudophakia, left eye  Dangelo left eye 11/17/23     (H52.13,  H52.203,  H52.4) Myopia of both eyes with astigmatism and presbyopia  Patient has minimal change in myopia but a copy of today's glasses prescription was given.  The patient may wish to update the glasses if the lenses are scratched or the frames are too small.  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 Olivarez .        Charles Dangelo MD     Attending Physician Attestation:  Complete documentation of historical and exam elements from today's encounter can be found in the full encounter summary report (not reduplicated in this progress note).  I personally obtained the chief complaint(s) and history of present illness.  I confirmed and edited as necessary the review of systems, past medical/surgical history, family history, social history, and examination findings as documented by others; and I examined the patient myself.  I personally reviewed the relevant tests, images, and reports as documented above.  I formulated and edited as necessary the assessment and plan and discussed the findings and management plan with the patient and family. - Charles Dangelo MD

## 2024-02-13 NOTE — NURSING NOTE
Chief Complaints and History of Present Illnesses   Patient presents with    Post Op (Ophthalmology) Right Eye     Chief Complaint(s) and History of Present Illness(es)       Post Op (Ophthalmology) Right Eye              Laterality: both eyes              Comments    Pt returns for final post-op following cataract surgery right eye 1/12/2024, left eye 11/17/2023. She states that her vision is good. Still has a few drops left, but has mostly discontinued them now. No problems at all.

## 2024-02-20 ENCOUNTER — TELEPHONE (OUTPATIENT)
Dept: FAMILY MEDICINE | Facility: CLINIC | Age: 74
End: 2024-02-20

## 2024-02-20 ENCOUNTER — LAB (OUTPATIENT)
Dept: LAB | Facility: CLINIC | Age: 74
End: 2024-02-20
Payer: COMMERCIAL

## 2024-02-20 DIAGNOSIS — R73.03 PREDIABETES: ICD-10-CM

## 2024-02-20 DIAGNOSIS — E67.3 VITAMIN D INTOXICATION: ICD-10-CM

## 2024-02-20 DIAGNOSIS — E83.52 SERUM CALCIUM ELEVATED: ICD-10-CM

## 2024-02-20 LAB
ANION GAP SERPL CALCULATED.3IONS-SCNC: 9 MMOL/L (ref 7–15)
BUN SERPL-MCNC: 10.2 MG/DL (ref 8–23)
CALCIUM SERPL-MCNC: 10.9 MG/DL (ref 8.8–10.2)
CHLORIDE SERPL-SCNC: 101 MMOL/L (ref 98–107)
CREAT SERPL-MCNC: 0.49 MG/DL (ref 0.51–0.95)
DEPRECATED HCO3 PLAS-SCNC: 25 MMOL/L (ref 22–29)
EGFRCR SERPLBLD CKD-EPI 2021: >90 ML/MIN/1.73M2
GLUCOSE SERPL-MCNC: 132 MG/DL (ref 70–99)
HBA1C MFR BLD: 6 % (ref 0–5.6)
POTASSIUM SERPL-SCNC: 6.2 MMOL/L (ref 3.4–5.3)
SODIUM SERPL-SCNC: 135 MMOL/L (ref 135–145)
VIT D+METAB SERPL-MCNC: 89 NG/ML (ref 20–50)

## 2024-02-20 PROCEDURE — 83036 HEMOGLOBIN GLYCOSYLATED A1C: CPT

## 2024-02-20 PROCEDURE — 80048 BASIC METABOLIC PNL TOTAL CA: CPT

## 2024-02-20 PROCEDURE — 82306 VITAMIN D 25 HYDROXY: CPT

## 2024-02-20 PROCEDURE — 36415 COLL VENOUS BLD VENIPUNCTURE: CPT

## 2024-02-20 NOTE — TELEPHONE ENCOUNTER
Patient calling back, I advised her of provider's advice regarding elevated potassium.   Patient denies palpitations, chest pain, or shortness of breath.   She agrees to go to an ER, she will discuss with her daughter and .     I asked her to call for follow up visit if/when needed after she is seen in ER.    Patient verbalized understanding of and agreement with plan.    Lotus MCKNIGHT RN  [facility] Triage

## 2024-02-20 NOTE — TELEPHONE ENCOUNTER
Hyperkalemia of 6.2. Will need cardiac monitor with EKG and medications to lower  the  potassium.    I will recommend going to the ED for it.

## 2024-02-20 NOTE — TELEPHONE ENCOUNTER
RN left voicemail to pt's mobile phone to call clinic back at [phone]. RN spoke with pt's , pt is not home and on will return home in about an hour (approx 4:30pm). No consent to communicate on file with pt's , RN gave pt's  clinic number to to have pt call clinic as soon as possible. Pt  verbalized understanding and will have pt call clinic back.     Team to call pt at a later time if no call back from pt.     Melanie Sy RN on 2/20/2024 at 3:31 PM

## 2024-02-20 NOTE — TELEPHONE ENCOUNTER
Routed to covering provider.    Also, waiting for covering providers to advise. Covering provider pool team 2, no providers in clinic.    [place] BSN RN  Triage Nurse  [facility]

## 2024-02-21 ENCOUNTER — TELEPHONE (OUTPATIENT)
Dept: FAMILY MEDICINE | Facility: CLINIC | Age: 74
End: 2024-02-21
Payer: COMMERCIAL

## 2024-02-21 DIAGNOSIS — E87.5 HYPERKALEMIA: Primary | ICD-10-CM

## 2024-02-21 DIAGNOSIS — E83.52 SERUM CALCIUM ELEVATED: ICD-10-CM

## 2024-02-21 NOTE — TELEPHONE ENCOUNTER
Patient calling, states that she was advised to go to ED on 2/20/24 due to elevated potassium level. Patient was seen in ED and had normal EKG and was placed on Lasix drip, noted that potassium dropped to 5.3 (before Lasix) and then patient was discharged. Patient was advised by ED to follow-up with PCP and have Potassium redraw completed in 1 week.    Patient has ED follow-up visit scheduled for 2/26/24 with PCP but is asking if she should have labwork completed prior to appointment? If PCP is agreeable to patient completing prior to appointment, please place orders.    Please route back to team to relay either way.  CB#: [phone], maikelm ok    Thanks,  MONICA StallingsN RN  [facility]

## 2024-02-21 NOTE — TELEPHONE ENCOUNTER
Reason for Call:  Appointment Request    Patient requesting this type of appt:  Hospital/ED Follow-Up     Requested provider: Seda Wells    Reason patient unable to be scheduled: Not within requested timeframe    When does patient want to be seen/preferred time: 3-7 days    Comments: ER follow up     [facility] you [facility] or would you prefer to receive a phone call?:   Patient would prefer a phone call   Okay to leave a detailed message?: Yes at Cell number on file:    Telephone Information:   Mobile [phone]       Call taken on 2/21/2024 at 10:02 AM by Elsa Kramer

## 2024-02-21 NOTE — TELEPHONE ENCOUNTER
Please schedule a non-fasting lab only appt 1 day prior to her visit with me (do a morning lab if possible, so I have the results the next day).     Seda Wells PA-C

## 2024-02-23 ENCOUNTER — LAB (OUTPATIENT)
Dept: LAB | Facility: CLINIC | Age: 74
End: 2024-02-23
Payer: COMMERCIAL

## 2024-02-23 DIAGNOSIS — E87.5 HYPERKALEMIA: ICD-10-CM

## 2024-02-23 DIAGNOSIS — E83.52 SERUM CALCIUM ELEVATED: ICD-10-CM

## 2024-02-23 LAB
ANION GAP SERPL CALCULATED.3IONS-SCNC: 9 MMOL/L (ref 7–15)
BUN SERPL-MCNC: 11.8 MG/DL (ref 8–23)
CALCIUM SERPL-MCNC: 10.9 MG/DL (ref 8.8–10.2)
CHLORIDE SERPL-SCNC: 101 MMOL/L (ref 98–107)
CREAT SERPL-MCNC: 0.5 MG/DL (ref 0.51–0.95)
DEPRECATED HCO3 PLAS-SCNC: 29 MMOL/L (ref 22–29)
EGFRCR SERPLBLD CKD-EPI 2021: >90 ML/MIN/1.73M2
GLUCOSE SERPL-MCNC: 101 MG/DL (ref 70–99)
POTASSIUM SERPL-SCNC: 4.4 MMOL/L (ref 3.4–5.3)
SODIUM SERPL-SCNC: 139 MMOL/L (ref 135–145)

## 2024-02-23 PROCEDURE — 80048 BASIC METABOLIC PNL TOTAL CA: CPT

## 2024-02-23 PROCEDURE — 36415 COLL VENOUS BLD VENIPUNCTURE: CPT

## 2024-02-26 ENCOUNTER — OFFICE VISIT (OUTPATIENT)
Dept: FAMILY MEDICINE | Facility: CLINIC | Age: 74
End: 2024-02-26
Payer: COMMERCIAL

## 2024-02-26 VITALS
DIASTOLIC BLOOD PRESSURE: 68 MMHG | TEMPERATURE: 97.9 F | OXYGEN SATURATION: 98 % | BODY MASS INDEX: 24.84 KG/M2 | SYSTOLIC BLOOD PRESSURE: 114 MMHG | HEART RATE: 93 BPM | HEIGHT: 62 IN | WEIGHT: 135 LBS | RESPIRATION RATE: 16 BRPM

## 2024-02-26 DIAGNOSIS — E67.3 VITAMIN D INTOXICATION: ICD-10-CM

## 2024-02-26 DIAGNOSIS — E87.5 HYPERKALEMIA: Primary | ICD-10-CM

## 2024-02-26 DIAGNOSIS — M81.0 OSTEOPOROSIS WITHOUT CURRENT PATHOLOGICAL FRACTURE, UNSPECIFIED OSTEOPOROSIS TYPE: ICD-10-CM

## 2024-02-26 DIAGNOSIS — I25.10 CORONARY ARTERY CALCIFICATION: ICD-10-CM

## 2024-02-26 DIAGNOSIS — E83.52 SERUM CALCIUM ELEVATED: ICD-10-CM

## 2024-02-26 PROCEDURE — 99214 OFFICE O/P EST MOD 30 MIN: CPT | Mod: 24 | Performed by: PHYSICIAN ASSISTANT

## 2024-02-26 RX ORDER — ASPIRIN 81 MG/1
81 TABLET ORAL DAILY
COMMUNITY
Start: 2024-02-26

## 2024-02-26 NOTE — PROGRESS NOTES
Assessment & Plan     Hyperkalemia  She did follow-up in ER as advised and repeat potassium was lower.  She was given lasix in ER.  Potassium a few days ago normalized.  Discussed possible etiologies of this.  May be secondary to a broken red blood cell.  She has remained asymptomatic the entire time.   Will recheck levels in 1 month.   - Basic metabolic panel  (Ca, Cl, CO2, Creat, Gluc, K, Na, BUN); Future    Serum calcium elevated    High calcium may be from previous cancer, this has been stable for the last 2 years.  Hyperparathyroidism was ruled out.  This could be affected by the Vitamin D levels.    Continue to hold vitamin d supplement.   - Basic metabolic panel  (Ca, Cl, CO2, Creat, Gluc, K, Na, BUN); Future    Vitamin D intoxication  Continue to hold vitamin d supplement.   - Vitamin D Deficiency; Future    Osteoporosis without current pathological fracture, unspecified osteoporosis type  Discussed the importance of weight bearing exercise, avoidance of smoking and bone density monitoring.  Discussed preventative measures such as keep throw rugs and cords off floors, handrails along stairwells and in bathroom.   She does have throw rugs in the home and knows she should get rid of these.     Reviewed last DEXA scan with patient.     Discussed treatment options, including bisphosphanates, as a way to slow down bone loss, including potential side effects.      She will consider and may message me if she would like a script for fosamax.     Coronary artery calcification  Discussed last CT scan of chest which showed moderate/severe coronary artery calcification.  She is on crestor.  I recommend she add a baby aspirin.  She denies any chest pain or shortness of breath.     She has not noticed any improvement in pain since starting cymbalta, would like to stop this which I think is reasonable.       30 minutes spent by me on the date of the encounter doing chart review, history and exam, documentation and  "further activities per the note    MED REC REQUIRED  Post Medication Reconciliation Status: discharge medications reconciled and changed, per note/orders  BMI  Estimated body mass index is 25.09 kg/m  as calculated from the following:    Height as of this encounter: 1.562 m (5' 1.5\").    Weight as of this encounter: 61.2 kg (135 lb).             Subjective   Manda is a 73 year old, presenting for the following health issues:  ER F/U        2/26/2024     1:58 PM   Additional Questions   Roomed by Paty   Accompanied by Self         2/26/2024     1:58 PM   Patient Reported Additional Medications   Patient reports taking the following new medications na     HPI       ED/UC Followup:    Facility:  [facility]   Date of visit: 02/20/24  Reason for visit: Serum calcium elevated. While at ER pt was put on IV drip with Lasix.   Current Status: Patient reports she has been feeling fine since discharge, denies symptoms of dizziness, SOB or Chest Pain. Had labs recollected 02/23/24, results in chart.               Review of Systems  Constitutional, HEENT, cardiovascular, pulmonary, gi and gu systems are negative, except as otherwise noted.      Objective    /68 (BP Location: Right arm, Patient Position: Chair, Cuff Size: Adult Regular)   Pulse 93   Temp 97.9  F (36.6  C) (Tympanic)   Resp 16   Ht 1.562 m (5' 1.5\")   Wt 61.2 kg (135 lb)   SpO2 98%   BMI 25.09 kg/m    Body mass index is 25.09 kg/m .  Physical Exam   GENERAL: alert and no distress    Lab on 02/23/2024   Component Date Value Ref Range Status    Sodium 02/23/2024 139  135 - 145 mmol/L Final    Reference intervals for this test were updated on 09/26/2023 to more accurately reflect our healthy population. There may be differences in the flagging of prior results with similar values performed with this method. Interpretation of those prior results can be made in the context of the updated reference intervals.     Potassium 02/23/2024 4.4  " 3.4 - 5.3 mmol/L Final    Chloride 02/23/2024 101  98 - 107 mmol/L Final    Carbon Dioxide (CO2) 02/23/2024 29  22 - 29 mmol/L Final    Anion Gap 02/23/2024 9  7 - 15 mmol/L Final    Urea Nitrogen 02/23/2024 11.8  8.0 - 23.0 mg/dL Final    Creatinine 02/23/2024 0.50 (L)  0.51 - 0.95 mg/dL Final    GFR Estimate 02/23/2024 >90  >60 mL/min/1.73m2 Final    Calcium 02/23/2024 10.9 (H)  8.8 - 10.2 mg/dL Final    Glucose 02/23/2024 101 (H)  70 - 99 mg/dL Final           Signed Electronically by: Seda Wells PA-C

## 2024-02-26 NOTE — PATIENT INSTRUCTIONS
Start a baby aspirin (given the moderate/severe coronary artery calcification noted on the last CT scan).     We'll continue to monitor potassium levels, however repeat testing was normal.      High calcium may be from previous cancer, this has been stable for the last 2 years.  Hyperparathyroidism was ruled out.  This could be affected by the Vitamin D levels.    Continue to hold vitamin d supplement.

## 2024-03-26 ENCOUNTER — LAB (OUTPATIENT)
Dept: LAB | Facility: CLINIC | Age: 74
End: 2024-03-26
Payer: COMMERCIAL

## 2024-03-26 DIAGNOSIS — E67.3 VITAMIN D INTOXICATION: ICD-10-CM

## 2024-03-26 DIAGNOSIS — E83.52 SERUM CALCIUM ELEVATED: ICD-10-CM

## 2024-03-26 DIAGNOSIS — E87.5 HYPERKALEMIA: ICD-10-CM

## 2024-03-26 PROCEDURE — 80048 BASIC METABOLIC PNL TOTAL CA: CPT

## 2024-03-26 PROCEDURE — 36415 COLL VENOUS BLD VENIPUNCTURE: CPT

## 2024-03-26 PROCEDURE — 82306 VITAMIN D 25 HYDROXY: CPT

## 2024-03-27 LAB
ANION GAP SERPL CALCULATED.3IONS-SCNC: 11 MMOL/L (ref 7–15)
BUN SERPL-MCNC: 11.8 MG/DL (ref 8–23)
CALCIUM SERPL-MCNC: 10.8 MG/DL (ref 8.8–10.2)
CHLORIDE SERPL-SCNC: 101 MMOL/L (ref 98–107)
CREAT SERPL-MCNC: 0.56 MG/DL (ref 0.51–0.95)
DEPRECATED HCO3 PLAS-SCNC: 27 MMOL/L (ref 22–29)
EGFRCR SERPLBLD CKD-EPI 2021: >90 ML/MIN/1.73M2
GLUCOSE SERPL-MCNC: 121 MG/DL (ref 70–99)
POTASSIUM SERPL-SCNC: 4.7 MMOL/L (ref 3.4–5.3)
SODIUM SERPL-SCNC: 139 MMOL/L (ref 135–145)
VIT D+METAB SERPL-MCNC: 54 NG/ML (ref 20–50)

## 2024-03-29 DIAGNOSIS — E78.5 HYPERLIPIDEMIA LDL GOAL <130: ICD-10-CM

## 2024-03-29 DIAGNOSIS — E67.3 VITAMIN D INTOXICATION: ICD-10-CM

## 2024-03-29 DIAGNOSIS — E87.5 HYPERKALEMIA: Primary | ICD-10-CM

## 2024-03-29 DIAGNOSIS — R73.03 PREDIABETES: ICD-10-CM

## 2024-05-10 ENCOUNTER — ANCILLARY PROCEDURE (OUTPATIENT)
Dept: CT IMAGING | Facility: CLINIC | Age: 74
End: 2024-05-10
Attending: CLINICAL NURSE SPECIALIST
Payer: COMMERCIAL

## 2024-05-10 DIAGNOSIS — C34.31 CANCER OF LOWER LOBE OF RIGHT LUNG (H): ICD-10-CM

## 2024-05-10 LAB
CREAT BLD-MCNC: 0.6 MG/DL (ref 0.5–1)
EGFRCR SERPLBLD CKD-EPI 2021: >60 ML/MIN/1.73M2

## 2024-05-10 PROCEDURE — 82565 ASSAY OF CREATININE: CPT

## 2024-05-10 PROCEDURE — 71260 CT THORAX DX C+: CPT | Mod: TC | Performed by: RADIOLOGY

## 2024-05-10 RX ORDER — IOPAMIDOL 755 MG/ML
79 INJECTION, SOLUTION INTRAVASCULAR ONCE
Status: COMPLETED | OUTPATIENT
Start: 2024-05-10 | End: 2024-05-10

## 2024-05-10 RX ADMIN — IOPAMIDOL 79 ML: 755 INJECTION, SOLUTION INTRAVASCULAR at 09:55

## 2024-05-13 ENCOUNTER — VIRTUAL VISIT (OUTPATIENT)
Dept: SURGERY | Facility: CLINIC | Age: 74
End: 2024-05-13
Attending: CLINICAL NURSE SPECIALIST
Payer: COMMERCIAL

## 2024-05-13 VITALS — BODY MASS INDEX: 25.49 KG/M2 | WEIGHT: 135 LBS | HEIGHT: 61 IN

## 2024-05-13 DIAGNOSIS — E04.1 THYROID NODULE: ICD-10-CM

## 2024-05-13 DIAGNOSIS — C34.31 MALIGNANT NEOPLASM OF LOWER LOBE OF RIGHT LUNG (H): Primary | ICD-10-CM

## 2024-05-13 PROCEDURE — 99441 PR PHYSICIAN TELEPHONE EVALUATION 5-10 MIN: CPT | Mod: 93 | Performed by: CLINICAL NURSE SPECIALIST

## 2024-05-13 ASSESSMENT — PAIN SCALES - GENERAL: PAINLEVEL: NO PAIN (0)

## 2024-05-13 NOTE — NURSING NOTE
Is the patient currently in the state of [place]? YES    Visit mode:TELEPHONE    If the visit is dropped, the patient can be reconnected by: TELEPHONE VISIT: Phone number:   Telephone Information:   Mobile [phone]       Will anyone else be joining the visit? NO  (If patient encounters technical issues they should call [phone])    How would you like to obtain your AVS? MyChart    Are changes needed to the allergy or medication list? No    Are refills needed on medications prescribed by this physician? NO    Reason for visit: DORIS LANGFORD

## 2024-05-13 NOTE — PROGRESS NOTES
Virtual Visit Details    Type of service:  Telephone Visit   Phone call duration: 10 minutes   Originating Location (pt. Location): Home    Distant Location (provider location):  Off-site    THORACIC SURGERY FOLLOW UP VISIT      I had a telephone visit with Mrs. Kumar in follow-up today. The clinical summary follows:     PREOP DIAGNOSIS   Right lower lobe lung cancer  PROCEDURE   Uniportal thoracoscopic right lower lobe wedge resection, completion lobectomy, conversion to thoracotomy with middle and lower bilobectomy, intercostal nerve cryoablation    DATE OF PROCEDURE  05/10/2021    HISTOPATHOLOGY   Squamous cell carcinoma, tumor size 1.2 cm, pT1bN0    COMPLICATIONS  None    INTERVAL STUDIES  CT chest 05/10/2024: Multiple new pulmonary nodules are present measuring up to 6 mm in the right upper lobe. Metastatic disease is in the differential diagnosis given history of lung malignancy. Recommend 3 month CT chest follow-up exam.    Past Medical History:   Diagnosis Date    Arthritis     Heart disease 05/2018    Hyperlipidemia     Insomnia 2015    Lung cancer (H)     Nonsenile cataract 6/30/2023    Pulmonary nodules       Past Surgical History:   Procedure Laterality Date    ARTHROPLASTY HIP Right 06/02/2020    Procedure: RIGHT TOTAL HIP ARTHROPLASTY;  Surgeon: Augie Murray MD;  Location:  OR    BIOPSY      BRONCHOSCOPY FLEXIBLE AND RIGID N/A 05/18/2021    Procedure: BRONCHOSCOPY;  Surgeon: Palomo Castro MD;  Location: [facility]    COLONOSCOPY  01/2021    HIP SURGERY      JOINT REPLACEMENT Left 2008    hip    PHACOEMULSIFICATION CLEAR CORNEA WITH STANDARD INTRAOCULAR LENS IMPLANT Left 11/17/2023    Procedure: PHACOEMULSIFICATION, CATARACT, WITH INTRAOCULAR LENS IMPLANT;  Surgeon: Charles Dangelo MD;  Location: [facility]    PHACOEMULSIFICATION CLEAR CORNEA WITH STANDARD INTRAOCULAR LENS IMPLANT Right 1/12/2024    Procedure: COMPLEX PHACOEMULSIFICATION, CATARACT, WITH INTRAOCULAR LENS IMPLANT;   Surgeon: Charles Dangelo MD;  Location: [facility]    THORACOSCOPIC LOBECTOMY LUNG Right 05/10/2021    Procedure: Uniportal thoracoscopic right lower lobe wedge segmentectomy, completion bilobectomy middle and lower, intercostal nerve cryoablation, converstion to thoracotomy, mediastinal lymphadenectomy;  Surgeon: Palomo Castro MD;  Location: [facility]      Social History     Socioeconomic History    Marital status:      Spouse name: Not on file    Number of children: Not on file    Years of education: Not on file    Highest education level: Not on file   Occupational History    Not on file   Tobacco Use    Smoking status: Every Day     Current packs/day: 0.50     Average packs/day: 0.5 packs/day for 56.9 years (28.5 ttl pk-yrs)     Types: Cigarettes     Start date: 6/15/1967     Passive exposure: Never    Smokeless tobacco: Never    Tobacco comments:     tried webutrin in 1996 but couldn't tolerate it   Vaping Use    Vaping status: Never Used   Substance and Sexual Activity    Alcohol use: Yes     Comment: normal    Drug use: No    Sexual activity: Not Currently     Partners: Male     Birth control/protection: None   Other Topics Concern    Parent/sibling w/ CABG, MI or angioplasty before 65F 55M? No   Social History Narrative    Not on file     Social Determinants of Health     Financial Resource Strain: Low Risk  (1/5/2024)    Financial Resource Strain     Within the past 12 months, have you or your family members you live with been unable to get utilities (heat, electricity) when it was really needed?: No   Food Insecurity: Low Risk  (1/5/2024)    Food Insecurity     Within the past 12 months, did you worry that your food would run out before you got money to buy more?: No     Within the past 12 months, did the food you bought just not last and you didn t have money to get more?: No   Transportation Needs: Low Risk  (1/5/2024)    Transportation Needs     Within the past 12 months, has lack of  transportation kept you from medical appointments, getting your medicines, non-medical meetings or appointments, work, or from getting things that you need?: No   Physical Activity: Insufficiently Active (10/15/2021)    Exercise Vital Sign     Days of Exercise per Week: 1 day     Minutes of Exercise per Session: 20 min   Stress: No Stress Concern Present (10/15/2021)    [employer] [place] [employer] - Occupational Stress Questionnaire     Feeling of Stress : Only a little   Social Connections: Moderately Integrated (10/15/2021)    Social Connection and Isolation Panel [NHANES]     Frequency of Communication with Friends and Family: More than three times a week     Frequency of Social Gatherings with Friends and Family: Once a week     Attends Restorationism Services: 1 to 4 times per year     Active Member of Clubs or Organizations: No     Attends Club or Organization Meetings: Not on file     Marital Status:    Interpersonal Safety: Low Risk  (1/10/2024)    Interpersonal Safety     Do you feel physically and emotionally safe where you currently live?: Yes     Within the past 12 months, have you been hit, slapped, kicked or otherwise physically hurt by someone?: No     Within the past 12 months, have you been humiliated or emotionally abused in other ways by your partner or ex-partner?: No   Housing Stability: Low Risk  (1/5/2024)    Housing Stability     Do you have housing? : Yes     Are you worried about losing your housing?: No     SUBJECTIVE   Manda is actually doing really well. She denies recent illness, cough or shortness of breath. She does still have a tight feeling at the bottom of her rib cage on the right side but this is nothing new. She knows that this is just how things will be and has learned to live with it. She is smoking but is down to 1/2 PPD from 1 PPD. She has been very nervous since reading the CT report that mention a possible return of the cancer.    IMPRESSION   Manda is a 74  year-old female status post uniportal thoracoscopic right lower lobe wedge resection, completion lobectomy, conversion to thoracotomy with middle and lower bilobectomy, intercostal nerve cryoablation.    We reviewed her CT results and I explained that while metastatic disease was in the differential of possible causes of the new lung nodules, other things such as low grade infection or an inflammatory process was also in the differential. Given the small size of the nodules, a PET/CT would not be helpful in determining the activity level of the nodules. Also, the small size makes a biopsy very likely to be non diagnostic. Given her previous bilobectomy on the right side, additional surgery for a biopsy of a possibly benign process is not recommended at this time.  As these are new findings, we do not know anything about their behavior therefore I suggest a short term follow up chest CT in 3 months.    She has not had a thyroid ultrasound for better evaluation of the thyroid nodule so I will have her get one to determine what follow up is indicated for the thyroid nodule.      PLAN  I spent 25 min on the date of the encounter in chart review, patient visit, review of tests, documentation and/or discussion with other providers about the issues documented above. I reviewed the plan as follows:  Thyroid US. Follow up with me in 3 months with a chest CT prior  Necessary Tests & Appointments: thyroid US and chest CT  All questions were answered and the patient and present family were in agreement with the plan.  I appreciate the opportunity to participate in the care of your patient and will keep you updated.  Sincerely,

## 2024-05-13 NOTE — LETTER
5/13/2024         RE: Reanna Kumar  [address] [place] [address]        Dear Colleague,    Thank you for referring your patient, Reanna Kumar, to the [facility]. Please see a copy of my visit note below.    Virtual Visit Details    Type of service:  Telephone Visit   Phone call duration: 10 minutes   Originating Location (pt. Location): Home    Distant Location (provider location):  Off-site    THORACIC SURGERY FOLLOW UP VISIT      I had a telephone visit with Mrs. Kumar in follow-up today. The clinical summary follows:     PREOP DIAGNOSIS   Right lower lobe lung cancer  PROCEDURE   Uniportal thoracoscopic right lower lobe wedge resection, completion lobectomy, conversion to thoracotomy with middle and lower bilobectomy, intercostal nerve cryoablation    DATE OF PROCEDURE  05/10/2021    HISTOPATHOLOGY   Squamous cell carcinoma, tumor size 1.2 cm, pT1bN0    COMPLICATIONS  None    INTERVAL STUDIES  CT chest 05/10/2024: Multiple new pulmonary nodules are present measuring up to 6 mm in the right upper lobe. Metastatic disease is in the differential diagnosis given history of lung malignancy. Recommend 3 month CT chest follow-up exam.    Past Medical History:   Diagnosis Date    Arthritis     Heart disease 05/2018    Hyperlipidemia     Insomnia 2015    Lung cancer (H)     Nonsenile cataract 6/30/2023    Pulmonary nodules       Past Surgical History:   Procedure Laterality Date    ARTHROPLASTY HIP Right 06/02/2020    Procedure: RIGHT TOTAL HIP ARTHROPLASTY;  Surgeon: Augie Murray MD;  Location: [facility] OR    BIOPSY      BRONCHOSCOPY FLEXIBLE AND RIGID N/A 05/18/2021    Procedure: BRONCHOSCOPY;  Surgeon: Palomo Castro MD;  Location:  GI    COLONOSCOPY  01/2021    HIP SURGERY      JOINT REPLACEMENT Left 2008    hip    PHACOEMULSIFICATION CLEAR CORNEA WITH STANDARD INTRAOCULAR LENS IMPLANT Left 11/17/2023    Procedure: PHACOEMULSIFICATION, CATARACT, WITH INTRAOCULAR  LENS IMPLANT;  Surgeon: Charles Dangelo MD;  Location: [facility]    PHACOEMULSIFICATION CLEAR CORNEA WITH STANDARD INTRAOCULAR LENS IMPLANT Right 1/12/2024    Procedure: COMPLEX PHACOEMULSIFICATION, CATARACT, WITH INTRAOCULAR LENS IMPLANT;  Surgeon: Charles Dangelo MD;  Location: [facility]    THORACOSCOPIC LOBECTOMY LUNG Right 05/10/2021    Procedure: Uniportal thoracoscopic right lower lobe wedge segmentectomy, completion bilobectomy middle and lower, intercostal nerve cryoablation, converstion to thoracotomy, mediastinal lymphadenectomy;  Surgeon: Palomo Castro MD;  Location:  OR      Social History     Socioeconomic History    Marital status:      Spouse name: Not on file    Number of children: Not on file    Years of education: Not on file    Highest education level: Not on file   Occupational History    Not on file   Tobacco Use    Smoking status: Every Day     Current packs/day: 0.50     Average packs/day: 0.5 packs/day for 56.9 years (28.5 ttl pk-yrs)     Types: Cigarettes     Start date: 6/15/1967     Passive exposure: Never    Smokeless tobacco: Never    Tobacco comments:     tried webutrin in 1996 but couldn't tolerate it   Vaping Use    Vaping status: Never Used   Substance and Sexual Activity    Alcohol use: Yes     Comment: normal    Drug use: No    Sexual activity: Not Currently     Partners: Male     Birth control/protection: None   Other Topics Concern    Parent/sibling w/ CABG, MI or angioplasty before 65F 55M? No   Social History Narrative    Not on file     Social Determinants of Health     Financial Resource Strain: Low Risk  (1/5/2024)    Financial Resource Strain     Within the past 12 months, have you or your family members you live with been unable to get utilities (heat, electricity) when it was really needed?: No   Food Insecurity: Low Risk  (1/5/2024)    Food Insecurity     Within the past 12 months, did you worry that your food would run out before you  got money to buy more?: No     Within the past 12 months, did the food you bought just not last and you didn t have money to get more?: No   Transportation Needs: Low Risk  (1/5/2024)    Transportation Needs     Within the past 12 months, has lack of transportation kept you from medical appointments, getting your medicines, non-medical meetings or appointments, work, or from getting things that you need?: No   Physical Activity: Insufficiently Active (10/15/2021)    Exercise Vital Sign     Days of Exercise per Week: 1 day     Minutes of Exercise per Session: 20 min   Stress: No Stress Concern Present (10/15/2021)    [place] of Occupational Health - Occupational Stress Questionnaire     Feeling of Stress : Only a little   Social Connections: Moderately Integrated (10/15/2021)    Social Connection and Isolation Panel [NHANES]     Frequency of Communication with Friends and Family: More than three times a week     Frequency of Social Gatherings with Friends and Family: Once a week     Attends Lutheran Services: 1 to 4 times per year     Active Member of Clubs or Organizations: No     Attends Club or Organization Meetings: Not on file     Marital Status:    Interpersonal Safety: Low Risk  (1/10/2024)    Interpersonal Safety     Do you feel physically and emotionally safe where you currently live?: Yes     Within the past 12 months, have you been hit, slapped, kicked or otherwise physically hurt by someone?: No     Within the past 12 months, have you been humiliated or emotionally abused in other ways by your partner or ex-partner?: No   Housing Stability: Low Risk  (1/5/2024)    Housing Stability     Do you have housing? : Yes     Are you worried about losing your housing?: No     SUBJECTIVE   Manda is actually doing really well. She denies recent illness, cough or shortness of breath. She does still have a tight feeling at the bottom of her rib cage on the right side but this is nothing new. She knows  that this is just how things will be and has learned to live with it. She is smoking but is down to 1/2 PPD from 1 PPD. She has been very nervous since reading the CT report that mention a possible return of the cancer.    IMPRESSION   Manda is a 74 year-old female status post uniportal thoracoscopic right lower lobe wedge resection, completion lobectomy, conversion to thoracotomy with middle and lower bilobectomy, intercostal nerve cryoablation.    We reviewed her CT results and I explained that while metastatic disease was in the differential of possible causes of the new lung nodules, other things such as low grade infection or an inflammatory process was also in the differential. Given the small size of the nodules, a PET/CT would not be helpful in determining the activity level of the nodules. Also, the small size makes a biopsy very likely to be non diagnostic. Given her previous bilobectomy on the right side, additional surgery for a biopsy of a possibly benign process is not recommended at this time.  As these are new findings, we do not know anything about their behavior therefore I suggest a short term follow up chest CT in 3 months.    She has not had a thyroid ultrasound for better evaluation of the thyroid nodule so I will have her get one to determine what follow up is indicated for the thyroid nodule.      PLAN  I spent 25 min on the date of the encounter in chart review, patient visit, review of tests, documentation and/or discussion with other providers about the issues documented above. I reviewed the plan as follows:  Thyroid US. Follow up with me in 3 months with a chest CT prior  Necessary Tests & Appointments: thyroid US and chest CT  All questions were answered and the patient and present family were in agreement with the plan.  I appreciate the opportunity to participate in the care of your patient and will keep you updated.  Sincerely,      Cherri Rodriguez, RENETTA CNS

## 2024-05-24 ENCOUNTER — ANCILLARY PROCEDURE (OUTPATIENT)
Dept: ULTRASOUND IMAGING | Facility: CLINIC | Age: 74
End: 2024-05-24
Attending: CLINICAL NURSE SPECIALIST
Payer: COMMERCIAL

## 2024-05-24 DIAGNOSIS — E04.1 THYROID NODULE: ICD-10-CM

## 2024-05-24 PROCEDURE — 76536 US EXAM OF HEAD AND NECK: CPT | Mod: TC | Performed by: RADIOLOGY

## 2024-06-04 DIAGNOSIS — E04.1 THYROID NODULE: Primary | ICD-10-CM

## 2024-07-15 ENCOUNTER — ANCILLARY PROCEDURE (OUTPATIENT)
Dept: ULTRASOUND IMAGING | Facility: CLINIC | Age: 74
End: 2024-07-15
Attending: CLINICAL NURSE SPECIALIST
Payer: COMMERCIAL

## 2024-07-15 DIAGNOSIS — E04.1 THYROID NODULE: ICD-10-CM

## 2024-07-15 PROCEDURE — 10005 FNA BX W/US GDN 1ST LES: CPT | Performed by: STUDENT IN AN ORGANIZED HEALTH CARE EDUCATION/TRAINING PROGRAM

## 2024-07-15 PROCEDURE — 88173 CYTOPATH EVAL FNA REPORT: CPT | Performed by: PATHOLOGY

## 2024-07-16 LAB
PATH REPORT.COMMENTS IMP SPEC: NORMAL
PATH REPORT.FINAL DX SPEC: NORMAL
PATH REPORT.GROSS SPEC: NORMAL
PATH REPORT.MICROSCOPIC SPEC OTHER STN: NORMAL
PATH REPORT.RELEVANT HX SPEC: NORMAL

## 2024-07-24 DIAGNOSIS — E04.1 THYROID NODULE: Primary | ICD-10-CM

## 2024-08-17 ENCOUNTER — OFFICE VISIT (OUTPATIENT)
Dept: URGENT CARE | Facility: URGENT CARE | Age: 74
End: 2024-08-17
Payer: COMMERCIAL

## 2024-08-17 ENCOUNTER — ANCILLARY PROCEDURE (OUTPATIENT)
Dept: GENERAL RADIOLOGY | Facility: CLINIC | Age: 74
End: 2024-08-17
Attending: FAMILY MEDICINE
Payer: COMMERCIAL

## 2024-08-17 VITALS
RESPIRATION RATE: 16 BRPM | HEART RATE: 89 BPM | SYSTOLIC BLOOD PRESSURE: 133 MMHG | BODY MASS INDEX: 26.26 KG/M2 | WEIGHT: 139 LBS | OXYGEN SATURATION: 96 % | DIASTOLIC BLOOD PRESSURE: 81 MMHG | TEMPERATURE: 97.8 F

## 2024-08-17 DIAGNOSIS — S99.921A FOOT INJURY, RIGHT, INITIAL ENCOUNTER: ICD-10-CM

## 2024-08-17 DIAGNOSIS — S99.921A FOOT INJURY, RIGHT, INITIAL ENCOUNTER: Primary | ICD-10-CM

## 2024-08-17 PROCEDURE — 73630 X-RAY EXAM OF FOOT: CPT | Mod: TC | Performed by: RADIOLOGY

## 2024-08-17 PROCEDURE — 73610 X-RAY EXAM OF ANKLE: CPT | Mod: TC | Performed by: RADIOLOGY

## 2024-08-17 PROCEDURE — 99214 OFFICE O/P EST MOD 30 MIN: CPT | Performed by: FAMILY MEDICINE

## 2024-08-17 NOTE — PROGRESS NOTES
Assessment & Plan     Foot injury, right, initial encounter  - XR Foot Right G/E 3 Views  - XR Ankle Right G/E 3 Views     Foot x-ray per my read without evidence of a Medina fracture or midfoot fracture.    Lateral ankle sprain most likely diagnosis at this current time symptomatic management was advised including analgesics, ice, elevation and rest.  Can use a soft Velcro brace to help with support in the area if choosing to find something more supportive than basic ACE wrap.    Efren Ray MD   [facility]    Subjective     Manda is a 74 year old female who presents to clinic today for the following health issues:  Chief Complaint   Patient presents with    Ankle Pain     Right ankle sprained yesterday.      HPI  Yesterday morning patient missed a step while wearing her flip-flops this caused her to roll the outside of her right ankle.  She has no prior history of fracture of this foot or right ankle area.  She has tried both Tylenol and ice along with using an ACE  wrap. Has a crutch to help her walk.       Patient Active Problem List    Diagnosis Date Noted    Osteoporosis without current pathological fracture, unspecified osteoporosis type 01/05/2024     Priority: Medium    Nuclear age-related cataract, both eyes 09/22/2023     Priority: Medium    Pseudoexfoliation (PXF) of left lens capsule 09/22/2023     Priority: Medium    Combined forms of age-related cataract of both eyes 09/22/2023     Priority: Medium    Malignant neoplasm of lower lobe of right lung (H) 10/18/2021     Priority: Medium    Lung nodule 04/08/2021     Priority: Medium     Added automatically from request for surgery [id number]      Status post total hip replacement, right 06/02/2020     Priority: Medium    Abnormal fasting glucose 08/27/2019     Priority: Medium    Coronary artery calcification 02/28/2019     Priority: Medium    Insomnia, unspecified type 09/13/2018     Priority: Medium    Pulmonary nodules 08/07/2018      Priority: Medium     8/2018: 5 mm spiculated nodule in RLL along major fissure, 3 mm endobrachial nodule in ROEL, 2 mm nodule in periphery of ROEL.  Repeat in 6 months, reminder placed.  Continue annual screening.      Family history of hearing loss 02/18/2016     Priority: Medium    Tobacco use disorder 07/06/2015     Priority: Medium    Hyperlipidemia LDL goal <130 04/17/2013     Priority: Medium       Current Outpatient Medications   Medication Sig Dispense Refill    aspirin 81 MG EC tablet Take 1 tablet (81 mg) by mouth daily      QUEtiapine (SEROQUEL) 50 MG tablet Take 1 tablet (50 mg) by mouth at bedtime (takes 0.5 x 50mg) 90 tablet 3    rosuvastatin (CRESTOR) 20 MG tablet Take 1 tablet (20 mg) by mouth daily 90 tablet 3     No current facility-administered medications for this visit.           Objective    /81   Pulse 89   Temp 97.8  F (36.6  C) (Tympanic)   Resp 16   Wt 63 kg (139 lb)   SpO2 96%   BMI 26.26 kg/m    Physical Exam         Slight discomfort at the fifth metatarsal base most of her tenderness is in the area of the ATFL and CFL there is no focal tenderness to the posterior part of her lateral malleolus.    Slight discomfort when pointing to the dorsal side of her midfoot.    No pain of the metatarsals.    Intact ROM of the R ankle. SLight swelling of lateral ankle    No results found for any visits on 08/17/24.                  The use of Dragon/Parko dictation services may have been used to construct the content in this note; any grammatical or spelling errors are non-intentional. Please contact the author of this note directly if you are in need of any clarification.

## 2024-08-17 NOTE — PATIENT INSTRUCTIONS
Ibuprofen and/or Tylenol every 4 hours as needed for discomfort      Continue to ice for the first 2 days to help with the swelling.  After that elevation is most helpful you can use pillows at bedtime to help raise the leg      Continue the ACE wrap or purchase a soft velcro ankle brace to support this area for the next week    If the radiologist notes any abnormalities not discussed at the visit today our team will call to discuss. A copy of the radiologist's interpretation of your imaging today will be available on [employer] within 1-2 business days, please call if you have questions.

## 2024-08-21 DIAGNOSIS — E78.5 HYPERLIPIDEMIA LDL GOAL <130: ICD-10-CM

## 2024-08-21 DIAGNOSIS — I25.10 CORONARY ARTERY CALCIFICATION SEEN ON CT SCAN: ICD-10-CM

## 2024-08-22 ENCOUNTER — OFFICE VISIT (OUTPATIENT)
Dept: OPTOMETRY | Facility: CLINIC | Age: 74
End: 2024-08-22
Payer: COMMERCIAL

## 2024-08-22 DIAGNOSIS — H52.223 REGULAR ASTIGMATISM OF BOTH EYES: ICD-10-CM

## 2024-08-22 DIAGNOSIS — H52.13 MYOPIA OF BOTH EYES: ICD-10-CM

## 2024-08-22 DIAGNOSIS — Z96.1 PSEUDOPHAKIA: ICD-10-CM

## 2024-08-22 DIAGNOSIS — H02.125 MECHANICAL ECTROPION OF LOWER EYELIDS OF BOTH EYES: ICD-10-CM

## 2024-08-22 DIAGNOSIS — H52.4 PRESBYOPIA: ICD-10-CM

## 2024-08-22 DIAGNOSIS — H02.122 MECHANICAL ECTROPION OF LOWER EYELIDS OF BOTH EYES: ICD-10-CM

## 2024-08-22 DIAGNOSIS — Z01.01 ENCOUNTER FOR EXAMINATION OF EYES AND VISION WITH ABNORMAL FINDINGS: Primary | ICD-10-CM

## 2024-08-22 PROCEDURE — 92014 COMPRE OPH EXAM EST PT 1/>: CPT | Performed by: OPTOMETRIST

## 2024-08-22 PROCEDURE — 92015 DETERMINE REFRACTIVE STATE: CPT | Performed by: OPTOMETRIST

## 2024-08-22 RX ORDER — ROSUVASTATIN CALCIUM 20 MG/1
20 TABLET, COATED ORAL DAILY
Qty: 90 TABLET | Refills: 3 | OUTPATIENT
Start: 2024-08-22

## 2024-08-22 ASSESSMENT — REFRACTION_MANIFEST
OS_CYLINDER: +1.25
OS_ADD: +2.50
OS_AXIS: 012
OD_CYLINDER: +3.00
OS_AXIS: 175
OS_SPHERE: -0.75
OD_AXIS: 003
METHOD_AUTOREFRACTION: 1
OD_CYLINDER: +2.00
OD_SPHERE: -1.00
OS_CYLINDER: +1.25
OD_AXIS: 180
OS_SPHERE: -0.75
OD_SPHERE: -1.00
OD_ADD: +2.50

## 2024-08-22 ASSESSMENT — CONF VISUAL FIELD
OS_SUPERIOR_TEMPORAL_RESTRICTION: 0
OD_SUPERIOR_NASAL_RESTRICTION: 0
OS_INFERIOR_TEMPORAL_RESTRICTION: 0
OD_NORMAL: 1
OD_SUPERIOR_TEMPORAL_RESTRICTION: 0
METHOD: COUNTING FINGERS
OS_NORMAL: 1
OS_SUPERIOR_NASAL_RESTRICTION: 0
OS_INFERIOR_NASAL_RESTRICTION: 0
OD_INFERIOR_NASAL_RESTRICTION: 0
OD_INFERIOR_TEMPORAL_RESTRICTION: 0

## 2024-08-22 ASSESSMENT — VISUAL ACUITY
CORRECTION_TYPE: GLASSES
OD_SC: 20/60
OD_SC+: -2
OS_CC: 20/20
OS_SC: 20/30
OS_SC+: -1
OD_SC: 20/40
OS_SC: 20/40-1
METHOD: SNELLEN - LINEAR
OD_CC: 20/30-2

## 2024-08-22 ASSESSMENT — REFRACTION_WEARINGRX
SPECS_TYPE: OTC
OS_CYLINDER: SPHERE
OD_CYLINDER: SPHERE
OD_SPHERE: +3.00
OS_SPHERE: +3.00

## 2024-08-22 ASSESSMENT — TONOMETRY
IOP_METHOD: APPLANATION
OD_IOP_MMHG: 13
OS_IOP_MMHG: 12

## 2024-08-22 ASSESSMENT — EXTERNAL EXAM - LEFT EYE: OS_EXAM: NORMAL

## 2024-08-22 ASSESSMENT — KERATOMETRY
OD_AXISANGLE_DEGREES: 180
OD_K2POWER_DIOPTERS: 45.50
OD_AXISANGLE2_DEGREES: 090
OS_K1POWER_DIOPTERS: 43.25
OS_AXISANGLE_DEGREES: 162
OD_K1POWER_DIOPTERS: 43.50
OS_AXISANGLE2_DEGREES: 072
OS_K2POWER_DIOPTERS: 45.25

## 2024-08-22 ASSESSMENT — CUP TO DISC RATIO
OD_RATIO: 0.3
OS_RATIO: 0.25

## 2024-08-22 ASSESSMENT — EXTERNAL EXAM - RIGHT EYE: OD_EXAM: NORMAL

## 2024-08-22 NOTE — PATIENT INSTRUCTIONS
Patient declines glasses prescription at this time.  Continue use of OTC reading glasses as needed.     Return in 1 year for a comprehensive eye exam, or sooner if needed.      The effects of the dilating drops last for 4- 6 hours.  You will be more sensitive to light and vision will be blurry up close.  Mydriatic sunglasses were given if needed.     Chilango Olivarez, OD  [facility]  [address] [place] [address]. [place]  [place]  [address]    [phone]

## 2024-08-22 NOTE — LETTER
8/22/2024      Reanna Kumar  [address]  [place] [address]      Dear Colleague,    Thank you for referring your patient, Reanna Kumar, to the [facility]. Please see a copy of my visit note below.    Chief Complaint   Patient presents with     Annual Eye Exam      -S/P cataract sx each eye Dr. Dangelo: right eye 1/12/2024, left eye 11/17/2023      Last Eye Exam: 6/26/2023 Dr. Olivarez  Dilated Previously: Yes, side effects of dilation explained today    What are you currently using to see? +3.00 OTC readers - uses for everything up close       Distance Vision Acuity: Satisfied with vision    Near Vision Acuity: Satisfied with vision while reading and using computer with readers    Eye Comfort: good  Do you use eye drops? : No  Occupation or Hobbies: Part time - [employer]     Madelyn Santacruz  Optometry Assistant       Medical, surgical and family histories reviewed and updated 8/22/2024.       OBJECTIVE: See Ophthalmology exam    ASSESSMENT:    ICD-10-CM    1. Encounter for examination of eyes and vision with abnormal findings  Z01.01       2. Pseudophakia  Z96.1       3. 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 Myopia of both eyes  H52.13       5. Regular astigmatism of both eyes  H52.223       6. Presbyopia  H52.4           PLAN:     Patient Instructions   Patient declines glasses prescription at this time.  Continue use of OTC reading glasses as needed.     Return in 1 year for a comprehensive eye exam, or sooner if needed.      The effects of the dilating drops last for 4- 6 hours.  You will be more sensitive to light and vision will be blurry up close.  Mydriatic sunglasses were given if needed.     Chilango Olivarez, OD  [facility]  [address] [place]  [phone]       Again, thank you for allowing me to participate in the care of your patient.        Sincerely,        Chilango RIZZO  Sher, OD

## 2024-08-22 NOTE — PROGRESS NOTES
Chief Complaint   Patient presents with    Annual Eye Exam      -S/P cataract sx each eye Dr. Dangelo: right eye 1/12/2024, left eye 11/17/2023      Last Eye Exam: 6/26/2023 Dr. Olivarez  Dilated Previously: Yes, side effects of dilation explained today    What are you currently using to see? +3.00 OTC readers - uses for everything up close       Distance Vision Acuity: Satisfied with vision    Near Vision Acuity: Satisfied with vision while reading and using computer with readers    Eye Comfort: good  Do you use eye drops? : No  Occupation or Hobbies: Part time - [employer]     Madelyn [facility]  Optometry Assistant       Medical, surgical and family histories reviewed and updated 8/22/2024.       OBJECTIVE: See Ophthalmology exam    ASSESSMENT:    ICD-10-CM    1. Encounter for examination of eyes and vision with abnormal findings  Z01.01       2. Pseudophakia  Z96.1       3. 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 Myopia of both eyes  H52.13       5. Regular astigmatism of both eyes  H52.223       6. Presbyopia  H52.4           PLAN:     Patient Instructions   Patient declines glasses prescription at this time.  Continue use of OTC reading glasses as needed.     Return in 1 year for a comprehensive eye exam, or sooner if needed.      The effects of the dilating drops last for 4- 6 hours.  You will be more sensitive to light and vision will be blurry up close.  Mydriatic sunglasses were given if needed.     Chilango Olivarez, OD  [address]  [address]    [phone]

## 2024-08-23 ENCOUNTER — ANCILLARY PROCEDURE (OUTPATIENT)
Dept: CT IMAGING | Facility: CLINIC | Age: 74
End: 2024-08-23
Attending: CLINICAL NURSE SPECIALIST
Payer: COMMERCIAL

## 2024-08-23 DIAGNOSIS — C34.31 MALIGNANT NEOPLASM OF LOWER LOBE OF RIGHT LUNG (H): ICD-10-CM

## 2024-08-23 LAB
CREAT BLD-MCNC: 0.7 MG/DL (ref 0.5–1)
EGFRCR SERPLBLD CKD-EPI 2021: >60 ML/MIN/1.73M2

## 2024-08-23 PROCEDURE — 82565 ASSAY OF CREATININE: CPT

## 2024-08-23 PROCEDURE — 71260 CT THORAX DX C+: CPT | Mod: TC | Performed by: RADIOLOGY

## 2024-08-23 RX ORDER — IOPAMIDOL 755 MG/ML
75 INJECTION, SOLUTION INTRAVASCULAR ONCE
Status: COMPLETED | OUTPATIENT
Start: 2024-08-23 | End: 2024-08-23

## 2024-08-23 RX ADMIN — IOPAMIDOL 75 ML: 755 INJECTION, SOLUTION INTRAVASCULAR at 09:37

## 2024-08-23 NOTE — PROGRESS NOTES
Virtual Visit Details    Type of service:  Telephone Visit   Phone call duration: 7 minutes   Originating Location (pt. Location): Home    Distant Location (provider location):  On-site        THORACIC SURGERY FOLLOW UP VISIT      I had a telephone visit with Mrs. Kumar in follow-up today. The clinical summary follows:     PREOP DIAGNOSIS   Right lower lobe lung cancer  PROCEDURE   Uniportal thoracoscopic right lower lobe wedge resection, completion lobectomy, conversion to thoracotomy with middle and lower bilobectomy, intercostal nerve cryoablation    DATE OF PROCEDURE  05/2021    HISTOPATHOLOGY   Squamous cell carcinoma, tumor size 1.2 cm pT1bN0    COMPLICATIONS  None    INTERVAL STUDIES  CT chest 08/23/2024:  1.  Waxing waning pulmonary nodules noted. Previously noted new right upper lobe nodule is not currently seen. However, there are new or slightly larger nodules currently demonstrated. Suggest follow-up CT chest in 3 months for surveillance.  2.  No new abnormality otherwise identified.  3.  Stable trace right pleural fluid.     New 3 mm lateral subpleural RUL nodule      New 4mm RUL nodule      Past Medical History:   Diagnosis Date    Arthritis     Heart disease 05/2018    Hyperlipidemia     Insomnia 2015    Lung cancer (H)     Nonsenile cataract 6/30/2023    Pulmonary nodules       Past Surgical History:   Procedure Laterality Date    ARTHROPLASTY HIP Right 06/02/2020    Procedure: RIGHT TOTAL HIP ARTHROPLASTY;  Surgeon: Augie Murray MD;  Location: [facility]    BIOPSY      BRONCHOSCOPY FLEXIBLE AND RIGID N/A 05/18/2021    Procedure: BRONCHOSCOPY;  Surgeon: Palomo Castro MD;  [facility]    COLONOSCOPY  01/2021    HIP SURGERY      JOINT REPLACEMENT Left 2008    hip    PHACOEMULSIFICATION CLEAR CORNEA WITH STANDARD INTRAOCULAR LENS IMPLANT Left 11/17/2023    Procedure: PHACOEMULSIFICATION, CATARACT, WITH INTRAOCULAR LENS IMPLANT;  Surgeon: Charles Dangelo MD;  Location:  OR     PHACOEMULSIFICATION CLEAR CORNEA WITH STANDARD INTRAOCULAR LENS IMPLANT Right 1/12/2024    Procedure: COMPLEX PHACOEMULSIFICATION, CATARACT, WITH INTRAOCULAR LENS IMPLANT;  Surgeon: Charles Dangelo MD;  Location:  OR    THORACOSCOPIC LOBECTOMY LUNG Right 05/10/2021    Procedure: Uniportal thoracoscopic right lower lobe wedge segmentectomy, completion bilobectomy middle and lower, intercostal nerve cryoablation, converstion to thoracotomy, mediastinal lymphadenectomy;  Surgeon: Palomo Castro MD;  [facility]      Social History     Socioeconomic History    Marital status:      Spouse name: Not on file    Number of children: Not on file    Years of education: Not on file    Highest education level: Not on file   Occupational History    Not on file   Tobacco Use    Smoking status: Every Day     Current packs/day: 0.75     Average packs/day: 0.8 packs/day for 57.2 years (42.9 ttl pk-yrs)     Types: Cigarettes     Start date: 6/15/1967     Passive exposure: Never    Smokeless tobacco: Never    Tobacco comments:     tried webutrin in 1996 but couldn't tolerate it   Vaping Use    Vaping status: Never Used   Substance and Sexual Activity    Alcohol use: Yes     Comment: normal    Drug use: No    Sexual activity: Not Currently     Partners: Male     Birth control/protection: None   Other Topics Concern    Parent/sibling w/ CABG, MI or angioplasty before 65F 55M? No   Social History Narrative    Not on file     Social Determinants of Health     Financial Resource Strain: Low Risk  (1/5/2024)    Financial Resource Strain     Within the past 12 months, have you or your family members you live with been unable to get utilities (heat, electricity) when it was really needed?: No   Food Insecurity: Low Risk  (1/5/2024)    Food Insecurity     Within the past 12 months, did you worry that your food would run out before you got money to buy more?: No     Within the past 12 months, did the food you  bought just not last and you didn t have money to get more?: No   Transportation Needs: Low Risk  (1/5/2024)    Transportation Needs     Within the past 12 months, has lack of transportation kept you from medical appointments, getting your medicines, non-medical meetings or appointments, work, or from getting things that you need?: No   Physical Activity: Insufficiently Active (10/15/2021)    Exercise Vital Sign     Days of Exercise per Week: 1 day     Minutes of Exercise per Session: 20 min   Stress: No Stress Concern Present (10/15/2021)    [place] [employer] [place] [employer] - Occupational Stress Questionnaire     Feeling of Stress : Only a little   Social Connections: Moderately Integrated (10/15/2021)    Social Connection and Isolation Panel [NHANES]     Frequency of Communication with Friends and Family: More than three times a week     Frequency of Social Gatherings with Friends and Family: Once a week     Attends [employer] Services: 1 to 4 times per year     Active Member of Clubs or Organizations: No     Attends Club or Organization Meetings: Not on file     Marital Status:    Interpersonal Safety: Low Risk  (1/10/2024)    Interpersonal Safety     Do you feel physically and emotionally safe where you currently live?: Yes     Within the past 12 months, have you been hit, slapped, kicked or otherwise physically hurt by someone?: No     Within the past 12 months, have you been humiliated or emotionally abused in other ways by your partner or ex-partner?: No   Housing Stability: Low Risk  (1/5/2024)    Housing Stability     Do you have housing? : Yes     Are you worried about losing your housing?: No      SUBJECTIVE   Manda is doing ok. She still has the tight feeling around the bottom of her rib cage. She is pretty busy during the day so she doesn't notice it much. When she lies down at night to go to sleep it resolves. She denies cough, shortness of breath, fevers, night sweats or unexplained weight  loss. She is still smoking. She has tried Wellbutrin in the past and did not tolerate it.     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    GENEVA Holman is a 74 year-old female status post uniportal thoracoscopic right lower lobe wedge resection, completion lobectomy, conversion to thoracotomy with middle and lower bilobectomy, intercostal nerve cryoablation of a pT1bN0 (stage IA2) non small cell lung cancer. Her last lung cancer surveillance chest CT demonstrates multiple new lung nodules. Her visit today is to review the results from her 3 months follow up chest CT that was done last week.    We reviewed her chest CT results. The previously seen right upper lobe nodule has resolved however, there are two new nodules this time. We again reviewed the potential causes of lung nodules including malignancy, low grade infection or inflammation. We will get another chest CT in 3 months.    PLAN  I spent 15 min on the date of the encounter in chart review, patient visit, review of tests, documentation and/or discussion with other providers about the issues documented above. I reviewed the plan as follows:  Follow up with me in 3 months with chest CT prior. Will revisit smoking cessation at that time.  1. Necessary Tests & Appointments: chest CT  All questions were answered and the patient and present family were in agreement with the plan.  I appreciate the opportunity to participate in the care of your patient and will keep you updated.  Sincerely,    RENETTA Bernal CNS

## 2024-08-26 ENCOUNTER — VIRTUAL VISIT (OUTPATIENT)
Dept: SURGERY | Facility: CLINIC | Age: 74
End: 2024-08-26
Attending: CLINICAL NURSE SPECIALIST
Payer: COMMERCIAL

## 2024-08-26 VITALS — WEIGHT: 135 LBS | BODY MASS INDEX: 25.49 KG/M2 | HEIGHT: 61 IN

## 2024-08-26 DIAGNOSIS — C34.31 MALIGNANT NEOPLASM OF LOWER LOBE OF RIGHT LUNG (H): Primary | ICD-10-CM

## 2024-08-26 PROCEDURE — 99441 PR PHYSICIAN TELEPHONE EVALUATION 5-10 MIN: CPT | Mod: 93 | Performed by: CLINICAL NURSE SPECIALIST

## 2024-08-26 ASSESSMENT — PAIN SCALES - GENERAL: PAINLEVEL: NO PAIN (0)

## 2024-08-26 NOTE — NURSING NOTE
Current patient location: [address] [place], [place] [address] [address]    Is the patient currently in the state of [place]? YES    Visit mode:TELEPHONE    If the visit is dropped, the patient can be reconnected by: TELEPHONE VISIT: Phone number:   Telephone Information:   Mobile [phone]       Will anyone else be joining the visit? NO  (If patient encounters technical issues they should call [phone]    How would you like to obtain your AVS? MyChart    Are changes needed to the allergy or medication list? No    Are refills needed on medications prescribed by this physician? NO    Rooming Documentation:  Questionnaire complete      Reason for visit: RECHECK    Cassandra LANGFORD

## 2024-08-26 NOTE — LETTER
8/26/2024      Reanna Kmuar  [address] [place] [address]      Dear Colleague,    Thank you for referring your patient, Reanna Kumar, to the [facility]. Please see a copy of my visit note below.    Virtual Visit Details    Type of service:  Telephone Visit   Phone call duration: 7 minutes   Originating Location (pt. Location): Home    Distant Location (provider location):  On-site        THORACIC SURGERY FOLLOW UP VISIT      I had a telephone visit with Mrs. Kumar in follow-up today. The clinical summary follows:     PREOP DIAGNOSIS   Right lower lobe lung cancer  PROCEDURE   Uniportal thoracoscopic right lower lobe wedge resection, completion lobectomy, conversion to thoracotomy with middle and lower bilobectomy, intercostal nerve cryoablation    DATE OF PROCEDURE  05/2021    HISTOPATHOLOGY   Squamous cell carcinoma, tumor size 1.2 cm pT1bN0    COMPLICATIONS  None    INTERVAL STUDIES  CT chest 08/23/2024:  1.  Waxing waning pulmonary nodules noted. Previously noted new right upper lobe nodule is not currently seen. However, there are new or slightly larger nodules currently demonstrated. Suggest follow-up CT chest in 3 months for surveillance.  2.  No new abnormality otherwise identified.  3.  Stable trace right pleural fluid.     New 3 mm lateral subpleural RUL nodule      New 4mm RUL nodule      Past Medical History:   Diagnosis Date     Arthritis      Heart disease 05/2018     Hyperlipidemia      Insomnia 2015     Lung cancer (H)      Nonsenile cataract 6/30/2023     Pulmonary nodules       Past Surgical History:   Procedure Laterality Date     ARTHROPLASTY HIP Right 06/02/2020    Procedure: RIGHT TOTAL HIP ARTHROPLASTY;  Surgeon: Augie Murray MD;  [facility]     BIOPSY       BRONCHOSCOPY FLEXIBLE AND RIGID N/A 05/18/2021    Procedure: BRONCHOSCOPY;  Surgeon: Palomo Castro MD;  [facility]     COLONOSCOPY  01/2021     HIP SURGERY       JOINT  REPLACEMENT Left 2008    hip     PHACOEMULSIFICATION CLEAR CORNEA WITH STANDARD INTRAOCULAR LENS IMPLANT Left 11/17/2023    Procedure: PHACOEMULSIFICATION, CATARACT, WITH INTRAOCULAR LENS IMPLANT;  Surgeon: Charles Dangelo MD;  [facility]     PHACOEMULSIFICATION CLEAR CORNEA WITH STANDARD INTRAOCULAR LENS IMPLANT Right 1/12/2024    Procedure: COMPLEX PHACOEMULSIFICATION, CATARACT, WITH INTRAOCULAR LENS IMPLANT;  Surgeon: Charles Dangelo MD;  Location: [facility]     THORACOSCOPIC LOBECTOMY LUNG Right 05/10/2021    Procedure: Uniportal thoracoscopic right lower lobe wedge segmentectomy, completion bilobectomy middle and lower, intercostal nerve cryoablation, converstion to thoracotomy, mediastinal lymphadenectomy;  Surgeon: Palomo Castro MD;  [facility]      Social History     Socioeconomic History     Marital status:      Spouse name: Not on file     Number of children: Not on file     Years of education: Not on file     Highest education level: Not on file   Occupational History     Not on file   Tobacco Use     Smoking status: Every Day     Current packs/day: 0.75     Average packs/day: 0.8 packs/day for 57.2 years (42.9 ttl pk-yrs)     Types: Cigarettes     Start date: 6/15/1967     Passive exposure: Never     Smokeless tobacco: Never     Tobacco comments:     tried webutrin in 1996 but couldn't tolerate it   Vaping Use     Vaping status: Never Used   Substance and Sexual Activity     Alcohol use: Yes     Comment: normal     Drug use: No     Sexual activity: Not Currently     Partners: Male     Birth control/protection: None   Other Topics Concern     Parent/sibling w/ CABG, MI or angioplasty before 65F 55M? No   Social History Narrative     Not on file     Social Determinants of Health     Financial Resource Strain: Low Risk  (1/5/2024)    Financial Resource Strain      Within the past 12 months, have you or your family members you live with been unable to get  utilities (heat, electricity) when it was really needed?: No   Food Insecurity: Low Risk  (1/5/2024)    Food Insecurity      Within the past 12 months, did you worry that your food would run out before you got money to buy more?: No      Within the past 12 months, did the food you bought just not last and you didn t have money to get more?: No   Transportation Needs: Low Risk  (1/5/2024)    Transportation Needs      Within the past 12 months, has lack of transportation kept you from medical appointments, getting your medicines, non-medical meetings or appointments, work, or from getting things that you need?: No   Physical Activity: Insufficiently Active (10/15/2021)    Exercise Vital Sign      Days of Exercise per Week: 1 day      Minutes of Exercise per Session: 20 min   Stress: No Stress Concern Present (10/15/2021)    [employer] [employer] - Occupational Stress Questionnaire      Feeling of Stress : Only a little   Social Connections: Moderately Integrated (10/15/2021)    Social Connection and Isolation Panel [NHANES]      Frequency of Communication with Friends and Family: More than three times a week      Frequency of Social Gatherings with Friends and Family: Once a week      Attends Taoism Services: 1 to 4 times per year      Active Member of Clubs or Organizations: No      Attends Club or Organization Meetings: Not on file      Marital Status:    Interpersonal Safety: Low Risk  (1/10/2024)    Interpersonal Safety      Do you feel physically and emotionally safe where you currently live?: Yes      Within the past 12 months, have you been hit, slapped, kicked or otherwise physically hurt by someone?: No      Within the past 12 months, have you been humiliated or emotionally abused in other ways by your partner or ex-partner?: No   Housing Stability: Low Risk  (1/5/2024)    Housing Stability      Do you have housing? : Yes      Are you worried about losing your housing?: No       SUBJECTIVE   Manda is doing ok. She still has the tight feeling around the bottom of her rib cage. She is pretty busy during the day so she doesn't notice it much. When she lies down at night to go to sleep it resolves. She denies cough, shortness of breath, fevers, night sweats or unexplained weight loss. She is still smoking. She has tried Wellbutrin in the past and did not tolerate it.     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    IMPRESSION   Manda is a 74 year-old female status post uniportal thoracoscopic right lower lobe wedge resection, completion lobectomy, conversion to thoracotomy with middle and lower bilobectomy, intercostal nerve cryoablation of a pT1bN0 (stage IA2) non small cell lung cancer. Her last lung cancer surveillance chest CT demonstrates multiple new lung nodules. Her visit today is to review the results from her 3 months follow up chest CT that was done last week.    We reviewed her chest CT results. The previously seen right upper lobe nodule has resolved however, there are two new nodules this time. We again reviewed the potential causes of lung nodules including malignancy, low grade infection or inflammation. We will get another chest CT in 3 months.    PLAN  I spent 15 min on the date of the encounter in chart review, patient visit, review of tests, documentation and/or discussion with other providers about the issues documented above. I reviewed the plan as follows:  Follow up with me in 3 months with chest CT prior. Will revisit smoking cessation at that time.  1. Necessary Tests & Appointments: chest CT  All questions were answered and the patient and present family were in agreement with the plan.  I appreciate the opportunity to participate in the care of your patient and will keep you updated.  Sincerely,    Cherri Rodriguez, RENETTA CNS        Again, thank you for allowing me to participate in the care of your patient.        Sincerely,        RENETTA Bernal CNS

## 2024-10-15 NOTE — CONFIDENTIAL NOTE
RECORDS RECEIVED FROM: internal    DATE RECEIVED: 10.25.24    NOTES (FOR ALL VISITS) STATUS DETAILS   OFFICE NOTES from referring provider internal    Cherri Rodriguez APRN CNS      OFFICE NOTES from other specialist internal  2.21.24 [facility]    MEDICATION LIST internal     IMAGING      DEXASCAN internal  12.27.23   CT (HEAD/NECK/CHEST/ABDOMEN) internal  8.23.24, 5.10.24, 5.11.23, 11.9.22   ULTRASOUND (HEAD/NECK) internal  Biopsy- 7.15.24  [facility] thyroid - 5.24.24   LABS     DIABETES: HBGA1C, CREATININE, FASTING LIPIDS, MICROALBUMIN URINE, POTASSIUM, TSH, T4    THYROID: TSH, T4, CBC, THYRODLONULIN, TOTAL T3, FREE T4, CALCITONIN, CEA internal  CREATININE- 8.23.24  FNA- 7.15.24  Vitamin D- 3.26.24  Bmp- 3.26.24  HBGA1C- 2.20.24  Pottssium- 1.23.24  Cmp- 2.20.24  Cbc- 1.23.24  Lipid- 10.30.23

## 2024-10-18 DIAGNOSIS — G47.00 INSOMNIA, UNSPECIFIED TYPE: ICD-10-CM

## 2024-10-22 RX ORDER — QUETIAPINE FUMARATE 50 MG/1
50 TABLET, FILM COATED ORAL AT BEDTIME
Qty: 90 TABLET | Refills: 0 | Status: SHIPPED | OUTPATIENT
Start: 2024-10-22 | End: 2024-11-07

## 2024-10-25 ENCOUNTER — VIRTUAL VISIT (OUTPATIENT)
Dept: ENDOCRINOLOGY | Facility: CLINIC | Age: 74
End: 2024-10-25
Payer: COMMERCIAL

## 2024-10-25 ENCOUNTER — PRE VISIT (OUTPATIENT)
Dept: ENDOCRINOLOGY | Facility: CLINIC | Age: 74
End: 2024-10-25

## 2024-10-25 DIAGNOSIS — E04.1 THYROID NODULE: ICD-10-CM

## 2024-10-25 PROCEDURE — G2211 COMPLEX E/M VISIT ADD ON: HCPCS | Mod: 95 | Performed by: INTERNAL MEDICINE

## 2024-10-25 PROCEDURE — 99204 OFFICE O/P NEW MOD 45 MIN: CPT | Mod: 95 | Performed by: INTERNAL MEDICINE

## 2024-10-25 NOTE — NURSING NOTE
Current patient location: [address] [place] [address]  [place] [address]    Is the patient currently in the state of [place]? YES    Visit mode:VIDEO    If the visit is dropped, the patient can be reconnected by: VIDEO VISIT: Text to cell phone:   Telephone Information:   Mobile [phone]       Will anyone else be joining the visit? NO  (If patient encounters technical issues they should call [phone]    Are changes needed to the allergy or medication list? Pt stated no med changes    Are refills needed on medications prescribed by this physician? NO    Rooming Documentation:  Not applicable    Reason for visit: Consult    Lore LANGFORD
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
## 2024-10-25 NOTE — PROGRESS NOTES
Virtual Visit Details    Type of service:  Video Visit   Originating Location (pt. Location): Home    Distant Location (provider location):  Off-site  Platform used for Video Visit: [facility]         Endocrinology Note         Reanna is a 74 year old female presents today for multiple thyroid nodules    HPI  Reanna is a 74 year old female with pT1bN0 (stage IA2) non small cell lung cancer status post uniportal thoracoscopic right lower lobe wedge resection, completion lobectomy, conversion to thoracotomy with middle and lower bilobectomy, intercostal nerve cryoablation presents today for multiple thyroid nodules.    Patient was incidentally found to have multiple thyroid nodules based on CT chest surveillance for lung cancer.   CT chest in May 2024 showed multiple hypodense and partially calcified nodules are again seen in the thyroid gland measuring up to 9 mm along the left lobe. No lymphadenopathy.    These multiple nodules were noted on the multiple CT chest in the past.    Ultrasound thyroid May 2024 showed multiple thyroid nodules ranging from 0.5 cm to 1.8 cm. She underwent FNA of the nodule #6 that measured 1.8x1.4x1.2 cm hypoechoic solid nodule in the left inferior thyroid with macrocalcification. The FNA was consistent with benign nodule but had scant cellularity.    Upon questioning, she has never noticed any changes in her neck. She does not know about thyroid nodule in the past. No difficulty swallowing. Denied head and neck irradiation. No family hx of thyroid nodule or cancer. Patient is a smoker. Mother had breast cancer at at 72 years.      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/2023    Pulmonary nodules        Allergies  Allergies   Allergen Reactions    Zyban [Bupropion Hydrobromide]      Increased anxiety     Medications  Current Outpatient Medications    Medication Sig Dispense Refill    aspirin 81 MG EC tablet Take 1 tablet (81 mg) by mouth daily      QUEtiapine (SEROQUEL) 50 MG tablet TAKE 1 TABLET BY MOUTH EVERY NIGHT AT BEDTIME 90 tablet 0    rosuvastatin (CRESTOR) 20 MG tablet Take 1 tablet (20 mg) by mouth daily 90 tablet 3     Family History  family history includes Arthritis in her father; Breast Cancer in her mother; Cancer in her mother; Genitourinary Problems in her sister; Osteoporosis in her mother; Uterine Cancer in her mother.  Social History  Social History     Tobacco Use    Smoking status: Every Day     Current packs/day: 0.75     Average packs/day: 0.8 packs/day for 57.4 years (43.0 ttl pk-yrs)     Types: Cigarettes     Start date: 6/15/1967     Passive exposure: Never    Smokeless tobacco: Never    Tobacco comments:     tried webutrin in 1996 but couldn't tolerate it   Vaping Use    Vaping status: Never Used   Substance Use Topics    Alcohol use: Yes     Comment: normal    Drug use: No   Occasionally drink ETOH  Current smoker    ROS  10 points ROS were negative otherwise mentioned in HPI    Physical Exam  Limited due to virtual visit  Constitutional: no distress, comfortable, pleasant   Eyes: anicteric, normal extra-ocular movements, no lid lag or retraction  Musculoskeletal: no edema   Skin:  no jaundice   Neurological: cranial nerves intact  Psychological: appropriate mood       RESULTS  I have personally reviewed labs and images. I also reviewed labs with patient and discussed the result and plan of care.    US THYROID 5/24/2024   CLINICAL HISTORY: Thyroid nodules noted on CT.  TECHNIQUE: Thyroid ultrasound.   COMPARISON: Chest CT 5/10/2024       FINDINGS:  RIGHT lobe: 5.4 x 1.7 x 1.7 cm. Heterogeneous echotexture.  Isthmus: 2 mm.  LEFT lobe: 5.5 x 2.3 x 2.2 cm. Heterogeneous echotexture.     NODULES:     Nodule 1: Slightly hypoechoic solid nodule in the mid right thyroid  lobe measures 1 x 0.8 x 0.5 cm.   Composition: Solid or almost  completely solid, 2 points   Echogenicity: Hypoechoic, 2 points   Shape: Wider-than-tall, 0 points   Margin: Smooth, 0 points   Echogenic Foci: None, or large comet-tail artifacts, 0 points   Point Total: 4-6 points. TI-RADS 4. If 1.5 cm or larger, recommend FNA; if 1 cm or larger, follow up US (annually for 5 years).      Nodule 3: Slightly hypoechoic solid nodule in the inferior right  thyroid lobe measures 0.8 x 0.7 x 0.5 cm.  Composition: Solid or almost completely solid, 2 points   Echogenicity: Hypoechoic, 2 points   Shape: Wider-than-tall, 0 points   Margin: Smooth, 0 points   Echogenic Foci: None, or large comet-tail artifacts, 0 points   Point Total: 4-6 points. TI-RADS 4. If 1.5 cm or larger, recommend FNA; if 1 cm or larger, follow up US (annually for 5 years).     Nodule 4: Predominantly solid isoechoic nodule in the mid left thyroid  lobe measures 1.2 x 1.2 x 0.7 cm.  Composition: Solid or almost completely solid, 2 points   Echogenicity: Hyperechoic or isoechoic, 1 point   Shape: Wider-than-tall, 0 points   Margin: Smooth, 0 points   Echogenic Foci: None, or large comet-tail artifacts, 0 points   Point Total: 3 points. TI-RADS 3. If 2.5 cm or larger, recommend FNA;   if 1.5 cm or larger, recommend follow up US at 1, 3, and 5 years.     Nodule 6: Predominantly solid hypoechoic nodule in the inferior left  thyroid lobe measures 1.8 x 1.4 x 1.2 cm.  Composition: Solid or almost completely solid, 2 points   Echogenicity: Hypoechoic, 2 points   Shape: Wider-than-tall, 0 points   Margin: Smooth, 0 points   Echogenic Foci: Macrocalcifications, 1 point   Point Total: 4-6 points. TI-RADS 4. If 1.5 cm or larger, recommend FNA; if 1 cm or larger, follow up US (annually for 5 years).                                                                    IMPRESSION:  A total of 6 thyroid nodules are identified, with the 4 most suspicious nodules described in detail above.    FNA 7/15/2024  Final Diagnosis   A. THYROID,  LEFT, LEFT THYROID NODULE #6, FINE NEEDLE ASPIRATE:  Interpretation -   - Benign (Ceres[place] II) - consistent with follicular nodular disease     - The [place] implied risk of malignancy and recommended clinical management:  Benign diagnosis has a 4 (2-7)% risk of malignancy, recommended management is clinical and sonographic follow-up.     Adequacy: Satisfactory for evaluation but limited by scant cellularity       ASSESSMENT:    Reanna is a 74 year old female with pT1bN0 (stage IA2) non small cell lung cancer status post uniportal thoracoscopic right lower lobe wedge resection, completion lobectomy, conversion to thoracotomy with middle and lower bilobectomy, intercostal nerve cryoablation presents today for multiple thyroid nodules.    1) multiple thyroid nodules: noted due to CT chest surveillance postoperative lung cancer. Never noticed any neck changes or nodules. Ultrasound thyroid May 2024 showed multiple thyroid nodules ranging from 0.5 cm to 1.8 cm. She underwent FNA of the nodule #6 that measured 1.8x1.4x1.2 cm hypoechoic solid nodule in the left inferior thyroid with macrocalcification. The FNA was consistent with benign nodule but had scant cellularity.    I have reviewed ultrasound characteristics. No concerning lesion.  Recommend to repeat ultrasound thyroid in 1 year    2) current smoker  3) hx of non small cell lung cancer s/p surgery    PLAN:   - recommend to repeat ultrasound in 1 year -- can be done with PCP or thoracic surgery's clinic    Joined the call at 10/25/2024, 8:57:06 am.  Left the call at 10/25/2024, 9:02:14 am.  You were on the call for 5 minutes 7 seconds .      Guillermo Bronson MD  Division of Diabetes and Endocrinology  Department of Medicine

## 2024-10-25 NOTE — LETTER
10/25/2024       RE: Reanna Kumar  [address] [place] [address]     Dear Colleague,    Thank you for referring your patient, Reanna Kumar, to the [facility] [place] at [facility]. Please see a copy of my visit note below.    Virtual Visit Details    Type of service:  Video Visit   Originating Location (pt. Location): Home    Distant Location (provider location):  Off-site  Platform used for Video Visit: [facility]         Endocrinology Note         Reanna is a 74 year old female presents today for multiple thyroid nodules    HPI  Reanna is a 74 year old female with pT1bN0 (stage IA2) non small cell lung cancer status post uniportal thoracoscopic right lower lobe wedge resection, completion lobectomy, conversion to thoracotomy with middle and lower bilobectomy, intercostal nerve cryoablation presents today for multiple thyroid nodules.    Patient was incidentally found to have multiple thyroid nodules based on CT chest surveillance for lung cancer.   CT chest in May 2024 showed multiple hypodense and partially calcified nodules are again seen in the thyroid gland measuring up to 9 mm along the left lobe. No lymphadenopathy.    These multiple nodules were noted on the multiple CT chest in the past.    Ultrasound thyroid May 2024 showed multiple thyroid nodules ranging from 0.5 cm to 1.8 cm. She underwent FNA of the nodule #6 that measured 1.8x1.4x1.2 cm hypoechoic solid nodule in the left inferior thyroid with macrocalcification. The FNA was consistent with benign nodule but had scant cellularity.    Upon questioning, she has never noticed any changes in her neck. She does not know about thyroid nodule in the past. No difficulty swallowing. Denied head and neck irradiation. No family hx of thyroid nodule or cancer. Patient is a smoker. Mother had breast cancer at at 72 years.      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     Pulmonary nodules        Allergies  Allergies   Allergen Reactions     Zyban [Bupropion Hydrobromide]      Increased anxiety     Medications  Current Outpatient Medications   Medication Sig Dispense Refill     aspirin 81 MG EC tablet Take 1 tablet (81 mg) by mouth daily       QUEtiapine (SEROQUEL) 50 MG tablet TAKE 1 TABLET BY MOUTH EVERY NIGHT AT BEDTIME 90 tablet 0     rosuvastatin (CRESTOR) 20 MG tablet Take 1 tablet (20 mg) by mouth daily 90 tablet 3     Family History  family history includes Arthritis in her father; Breast Cancer in her mother; Cancer in her mother; Genitourinary Problems in her sister; Osteoporosis in her mother; Uterine Cancer in her mother.  Social History  Social History     Tobacco Use     Smoking status: Every Day     Current packs/day: 0.75     Average packs/day: 0.8 packs/day for 57.4 years (43.0 ttl pk-yrs)     Types: Cigarettes     Start date: 6/15/1967     Passive exposure: Never     Smokeless tobacco: Never     Tobacco comments:     tried webutrin in 1996 but couldn't tolerate it   Vaping Use     Vaping status: Never Used   Substance Use Topics     Alcohol use: Yes     Comment: normal     Drug use: No   Occasionally drink ETOH  Current smoker    ROS  10 points ROS were negative otherwise mentioned in HPI    Physical Exam  Limited due to virtual visit  Constitutional: no distress, comfortable, pleasant   Eyes: anicteric, normal extra-ocular movements, no lid lag or retraction  Musculoskeletal: no edema   Skin:  no jaundice   Neurological: cranial nerves intact  Psychological: appropriate mood       RESULTS  I have personally reviewed labs and images. I also reviewed labs with patient and discussed the result and plan of care.    US THYROID 5/24/2024   CLINICAL HISTORY: Thyroid nodules noted on CT.  TECHNIQUE: Thyroid ultrasound.    COMPARISON: Chest CT 5/10/2024       FINDINGS:  RIGHT lobe: 5.4 x 1.7 x 1.7 cm. Heterogeneous echotexture.  Isthmus: 2 mm.  LEFT lobe: 5.5 x 2.3 x 2.2 cm. Heterogeneous echotexture.     NODULES:     Nodule 1: Slightly hypoechoic solid nodule in the mid right thyroid  lobe measures 1 x 0.8 x 0.5 cm.   Composition: Solid or almost completely solid, 2 points   Echogenicity: Hypoechoic, 2 points   Shape: Wider-than-tall, 0 points   Margin: Smooth, 0 points   Echogenic Foci: None, or large comet-tail artifacts, 0 points   Point Total: 4-6 points. TI-RADS 4. If 1.5 cm or larger, recommend FNA; if 1 cm or larger, follow up US (annually for 5 years).      Nodule 3: Slightly hypoechoic solid nodule in the inferior right  thyroid lobe measures 0.8 x 0.7 x 0.5 cm.  Composition: Solid or almost completely solid, 2 points   Echogenicity: Hypoechoic, 2 points   Shape: Wider-than-tall, 0 points   Margin: Smooth, 0 points   Echogenic Foci: None, or large comet-tail artifacts, 0 points   Point Total: 4-6 points. TI-RADS 4. If 1.5 cm or larger, recommend FNA; if 1 cm or larger, follow up US (annually for 5 years).     Nodule 4: Predominantly solid isoechoic nodule in the mid left thyroid  lobe measures 1.2 x 1.2 x 0.7 cm.  Composition: Solid or almost completely solid, 2 points   Echogenicity: Hyperechoic or isoechoic, 1 point   Shape: Wider-than-tall, 0 points   Margin: Smooth, 0 points   Echogenic Foci: None, or large comet-tail artifacts, 0 points   Point Total: 3 points. TI-RADS 3. If 2.5 cm or larger, recommend FNA;   if 1.5 cm or larger, recommend follow up US at 1, 3, and 5 years.     Nodule 6: Predominantly solid hypoechoic nodule in the inferior left  thyroid lobe measures 1.8 x 1.4 x 1.2 cm.  Composition: Solid or almost completely solid, 2 points   Echogenicity: Hypoechoic, 2 points   Shape: Wider-than-tall, 0 points   Margin: Smooth, 0 points   Echogenic Foci: Macrocalcifications, 1 point   Point Total: 4-6 points.  TI-RADS 4. If 1.5 cm or larger, recommend FNA; if 1 cm or larger, follow up US (annually for 5 years).                                                                    IMPRESSION:  A total of 6 thyroid nodules are identified, with the 4 most suspicious nodules described in detail above.    FNA 7/15/2024  Final Diagnosis   A. THYROID, LEFT, LEFT THYROID NODULE #6, FINE NEEDLE ASPIRATE:  Interpretation -   - Benign ([place] II) - consistent with follicular nodular disease     - The [place] implied risk of malignancy and recommended clinical management:  Benign diagnosis has a 4 (2-7)% risk of malignancy, recommended management is clinical and sonographic follow-up.     Adequacy: Satisfactory for evaluation but limited by scant cellularity       ASSESSMENT:    Reanna is a 74 year old female with pT1bN0 (stage IA2) non small cell lung cancer status post uniportal thoracoscopic right lower lobe wedge resection, completion lobectomy, conversion to thoracotomy with middle and lower bilobectomy, intercostal nerve cryoablation presents today for multiple thyroid nodules.    1) multiple thyroid nodules: noted due to CT chest surveillance postoperative lung cancer. Never noticed any neck changes or nodules. Ultrasound thyroid May 2024 showed multiple thyroid nodules ranging from 0.5 cm to 1.8 cm. She underwent FNA of the nodule #6 that measured 1.8x1.4x1.2 cm hypoechoic solid nodule in the left inferior thyroid with macrocalcification. The FNA was consistent with benign nodule but had scant cellularity.    I have reviewed ultrasound characteristics. No concerning lesion.  Recommend to repeat ultrasound thyroid in 1 year    2) current smoker  3) hx of non small cell lung cancer s/p surgery    PLAN:   - recommend to repeat ultrasound in 1 year -- can be done with PCP or thoracic surgery's clinic    Joined the call at 10/25/2024, 8:57:06 am.  Left the call at 10/25/2024, 9:02:14 am.  You were on the call for 5 minutes 7  seconds .      Guillermo Bronson MD  Division of Diabetes and Endocrinology  Department of Medicine

## 2024-11-02 SDOH — HEALTH STABILITY: PHYSICAL HEALTH: ON AVERAGE, HOW MANY DAYS PER WEEK DO YOU ENGAGE IN MODERATE TO STRENUOUS EXERCISE (LIKE A BRISK WALK)?: 1 DAY

## 2024-11-02 SDOH — HEALTH STABILITY: PHYSICAL HEALTH: ON AVERAGE, HOW MANY MINUTES DO YOU ENGAGE IN EXERCISE AT THIS LEVEL?: 30 MIN

## 2024-11-02 ASSESSMENT — SOCIAL DETERMINANTS OF HEALTH (SDOH): HOW OFTEN DO YOU GET TOGETHER WITH FRIENDS OR RELATIVES?: ONCE A WEEK

## 2024-11-07 ENCOUNTER — OFFICE VISIT (OUTPATIENT)
Dept: FAMILY MEDICINE | Facility: CLINIC | Age: 74
End: 2024-11-07
Attending: PHYSICIAN ASSISTANT
Payer: COMMERCIAL

## 2024-11-07 VITALS
HEIGHT: 59 IN | RESPIRATION RATE: 16 BRPM | BODY MASS INDEX: 27.01 KG/M2 | TEMPERATURE: 97.3 F | OXYGEN SATURATION: 98 % | WEIGHT: 134 LBS | HEART RATE: 94 BPM | DIASTOLIC BLOOD PRESSURE: 72 MMHG | SYSTOLIC BLOOD PRESSURE: 120 MMHG

## 2024-11-07 DIAGNOSIS — I25.10 CORONARY ARTERY CALCIFICATION SEEN ON CT SCAN: ICD-10-CM

## 2024-11-07 DIAGNOSIS — Z00.00 ENCOUNTER FOR MEDICARE ANNUAL WELLNESS EXAM: Primary | ICD-10-CM

## 2024-11-07 DIAGNOSIS — E78.5 HYPERLIPIDEMIA LDL GOAL <130: ICD-10-CM

## 2024-11-07 DIAGNOSIS — M81.0 OSTEOPOROSIS WITHOUT CURRENT PATHOLOGICAL FRACTURE, UNSPECIFIED OSTEOPOROSIS TYPE: ICD-10-CM

## 2024-11-07 DIAGNOSIS — F17.200 TOBACCO USE DISORDER: ICD-10-CM

## 2024-11-07 DIAGNOSIS — G47.00 INSOMNIA, UNSPECIFIED TYPE: ICD-10-CM

## 2024-11-07 DIAGNOSIS — R73.03 PREDIABETES: ICD-10-CM

## 2024-11-07 DIAGNOSIS — L98.9 SKIN LESION: ICD-10-CM

## 2024-11-07 DIAGNOSIS — Z23 NEED FOR SHINGLES VACCINE: ICD-10-CM

## 2024-11-07 DIAGNOSIS — E87.5 HYPERKALEMIA: ICD-10-CM

## 2024-11-07 DIAGNOSIS — E67.3 VITAMIN D INTOXICATION: ICD-10-CM

## 2024-11-07 LAB
EST. AVERAGE GLUCOSE BLD GHB EST-MCNC: 128 MG/DL
HBA1C MFR BLD: 6.1 % (ref 0–5.6)

## 2024-11-07 PROCEDURE — 80053 COMPREHEN METABOLIC PANEL: CPT | Performed by: PHYSICIAN ASSISTANT

## 2024-11-07 PROCEDURE — 90480 ADMN SARSCOV2 VAC 1/ONLY CMP: CPT | Performed by: PHYSICIAN ASSISTANT

## 2024-11-07 PROCEDURE — 83036 HEMOGLOBIN GLYCOSYLATED A1C: CPT | Performed by: PHYSICIAN ASSISTANT

## 2024-11-07 PROCEDURE — G0439 PPPS, SUBSEQ VISIT: HCPCS | Performed by: PHYSICIAN ASSISTANT

## 2024-11-07 PROCEDURE — 82306 VITAMIN D 25 HYDROXY: CPT | Performed by: PHYSICIAN ASSISTANT

## 2024-11-07 PROCEDURE — 80061 LIPID PANEL: CPT | Performed by: PHYSICIAN ASSISTANT

## 2024-11-07 PROCEDURE — 36415 COLL VENOUS BLD VENIPUNCTURE: CPT | Performed by: PHYSICIAN ASSISTANT

## 2024-11-07 PROCEDURE — G0008 ADMIN INFLUENZA VIRUS VAC: HCPCS | Performed by: PHYSICIAN ASSISTANT

## 2024-11-07 PROCEDURE — 90662 IIV NO PRSV INCREASED AG IM: CPT | Performed by: PHYSICIAN ASSISTANT

## 2024-11-07 PROCEDURE — 99214 OFFICE O/P EST MOD 30 MIN: CPT | Mod: 25 | Performed by: PHYSICIAN ASSISTANT

## 2024-11-07 PROCEDURE — 91320 SARSCV2 VAC 30MCG TRS-SUC IM: CPT | Performed by: PHYSICIAN ASSISTANT

## 2024-11-07 RX ORDER — QUETIAPINE FUMARATE 50 MG/1
75 TABLET, FILM COATED ORAL AT BEDTIME
Qty: 135 TABLET | Refills: 3 | Status: SHIPPED | OUTPATIENT
Start: 2024-11-07

## 2024-11-07 RX ORDER — ROSUVASTATIN CALCIUM 20 MG/1
20 TABLET, COATED ORAL DAILY
Qty: 90 TABLET | Refills: 3 | Status: SHIPPED | OUTPATIENT
Start: 2024-11-07

## 2024-11-07 NOTE — PATIENT INSTRUCTIONS
Let's increase the dose of your seroquel to help with sleep (this is prescribed off label for anxiety, but also helps with sleep).  Take 75 mg before bed.  We do need to be cautious about using any meds that can make you too drowsy, as this can increase your risk of falls.     Osteoporosis.  May consider medication to help slow bone loss.     Skin lesion on left ear: f/up with derm.     I do not recommend that strong steroid (triamcinolone) on the face.  The skin is very thin on the face and using such as strong steroid can increase risk of glaucoma.  Ok to use OTC hydrocortisone on the face or ear as needed.         Patient Education   Preventive Care Advice   This is general advice given by our system to help you stay healthy. However, your care team may have specific advice just for you. Please talk to your care team about your preventive care needs.  Nutrition  Eat 5 or more servings of fruits and vegetables each day.  Try wheat bread, brown rice and whole grain pasta (instead of white bread, rice, and pasta).  Get enough calcium and vitamin D. Check the label on foods and aim for 100% of the RDA (recommended daily allowance).  Lifestyle  Exercise at least 150 minutes each week  (30 minutes a day, 5 days a week).  Do muscle strengthening activities 2 days a week. These help control your weight and prevent disease.  No smoking.  Wear sunscreen to prevent skin cancer.  Have a dental exam and cleaning every 6 months.  Yearly exams  See your health care team every year to talk about:  Any changes in your health.  Any medicines your care team has prescribed.  Preventive care, family planning, and ways to prevent chronic diseases.  Shots (vaccines)   HPV shots (up to age 26), if you've never had them before.  Hepatitis B shots (up to age 59), if you've never had them before.  COVID-19 shot: Get this shot when it's due.  Flu shot: Get a flu shot every year.  Tetanus shot: Get a tetanus shot every 10  years.  Pneumococcal, hepatitis A, and RSV shots: Ask your care team if you need these based on your risk.  Shingles shot (for age 50 and up)  General health tests  Diabetes screening:  Starting at age 35, Get screened for diabetes at least every 3 years.  If you are younger than age 35, ask your care team if you should be screened for diabetes.  Cholesterol test: At age 39, start having a cholesterol test every 5 years, or more often if advised.  Bone density scan (DEXA): At age 50, ask your care team if you should have this scan for osteoporosis (brittle bones).  Hepatitis C: Get tested at least once in your life.  STIs (sexually transmitted infections)  Before age 24: Ask your care team if you should be screened for STIs.  After age 24: Get screened for STIs if you're at risk. You are at risk for STIs (including HIV) if:  You are sexually active with more than one person.  You don't use condoms every time.  You or a partner was diagnosed with a sexually transmitted infection.  If you are at risk for HIV, ask about PrEP medicine to prevent HIV.  Get tested for HIV at least once in your life, whether you are at risk for HIV or not.  Cancer screening tests  Cervical cancer screening: If you have a cervix, begin getting regular cervical cancer screening tests starting at age 21.  Breast cancer scan (mammogram): If you've ever had breasts, begin having regular mammograms starting at age 40. This is a scan to check for breast cancer.  Colon cancer screening: It is important to start screening for colon cancer at age 45.  Have a colonoscopy test every 10 years (or more often if you're at risk) Or, ask your provider about stool tests like a FIT test every year or Cologuard test every 3 years.  To learn more about your testing options, visit:   .  For help making a decision, visit:   https://bit.ly/ri19443.  Prostate cancer screening test: If you have a prostate, ask your care team if a prostate cancer screening test  (PSA) at age 55 is right for you.  Lung cancer screening: If you are a current or former smoker ages 50 to 80, ask your care team if ongoing lung cancer screenings are right for you.  For informational purposes only. Not to replace the advice of your health care provider. Copyright   2023 [employer]. All rights reserved. Clinically reviewed by the [facility] Transitions Program. [employer]Â® 013115 - REV 01/24.  Hearing Loss: Care Instructions  Overview     Hearing loss is a sudden or slow decrease in how well you hear. It can range from slight to profound. Permanent hearing loss can occur with aging. It also can happen when you are exposed long-term to loud noise. Examples include listening to loud music, riding motorcycles, or being around other loud machines.  Hearing loss can affect your work and home life. It can make you feel lonely or depressed. You may feel that you have lost your independence. But hearing aids and other devices can help you hear better and feel connected to others.  Follow-up care is a key part of your treatment and safety. Be sure to make and go to all appointments, and call your doctor if you are having problems. It's also a good idea to know your test results and keep a list of the medicines you take.  How can you care for yourself at home?  Avoid loud noises whenever possible. This helps keep your hearing from getting worse.  Always wear hearing protection around loud noises.  Wear a hearing aid as directed.  A professional can help you pick a hearing aid that will work best for you.  You can also get hearing aids over the counter for mild to moderate hearing loss.  Have hearing tests as your doctor suggests. They can show whether your hearing has changed. Your hearing aid may need to be adjusted.  Use other devices as needed. These may include:  Telephone amplifiers and hearing aids that can connect to a television, stereo, radio, or microphone.  Devices that use  "lights or vibrations. These alert you to the doorbell, a ringing telephone, or a baby monitor.  Television closed-captioning. This shows the words at the bottom of the screen. Most new TVs can do this.  TTY (text telephone). This lets you type messages back and forth on the telephone instead of talking or listening. These devices are also called TDD. When messages are typed on the keyboard, they are sent over the phone line to a receiving TTY. The message is shown on a monitor.  Use text messaging, social media, and email if it is hard for you to communicate by telephone.  Try to learn a listening technique called speechreading. It is not lipreading. You pay attention to people's gestures, expressions, posture, and tone of voice. These clues can help you understand what a person is saying. Face the person you are talking to, and have them face you. Make sure the lighting is good. You need to see the other person's face clearly.  Think about counseling if you need help to adjust to your hearing loss.  When should you call for help?  Watch closely for changes in your health, and be sure to contact your doctor if:    You think your hearing is getting worse.     You have new symptoms, such as dizziness or nausea.   Where can you learn more?  Go to https://www.[employer].net/patiented  Enter R798 in the search box to learn more about \"Hearing Loss: Care Instructions.\"  Current as of: September 27, 2023  Content Version: 14.2 2024 [facility].   Care instructions adapted under license by your healthcare professional. If you have questions about a medical condition or this instruction, always ask your healthcare professional. [employer] disclaims any warranty or liability for your use of this information.       "

## 2024-11-07 NOTE — PROGRESS NOTES
Preventive Care Visit  [facility] SHAYLA Wells PA-C, Family Medicine  Nov 7, 2024      Assessment & Plan     Encounter for Medicare annual wellness exam      HEALTH CARE MAINTENANCE              Reviewed USPTF recommendations and anticipatory guidance.              See orders.       Need for shingles vaccine  Recommend at the pharmacy.     Insomnia, unspecified type  Patient instructions:  Let's increase the dose of your seroquel to help with sleep (this is prescribed off label for anxiety, but also helps with sleep).  Take 75 mg before bed.  We do need to be cautious about using any meds that can make you too drowsy, as this can increase your risk of falls.   - QUEtiapine (SEROQUEL) 50 MG tablet; Take 1.5 tablets (75 mg) by mouth at bedtime.    Hyperlipidemia LDL goal <130  Tolerating well.    - rosuvastatin (CRESTOR) 20 MG tablet; Take 1 tablet (20 mg) by mouth daily.  - Lipid panel reflex to direct LDL Fasting    Coronary artery calcification seen on CT scan  Tolerating statin.   - rosuvastatin (CRESTOR) 20 MG tablet; Take 1 tablet (20 mg) by mouth daily.    Skin lesion  Further evaluation with dermatologist advised.   - Adult Dermatology  Referral; Future    Tobacco use disorder  No changes.     Osteoporosis without current pathological fracture, unspecified osteoporosis type  Discussed the importance of Calcium and vitamin D intake, weight bearing exercise, avoidance of smoking and bone density monitoring.  Discussed preventative measures and medications.     Reviewed last DEXA scan with patient.     Discussed treatment options, including bisphosphanates, as a way to slow down bone loss, including potential side effects.        She will consider meds (would like to first see what labs look like).  PTH has been normal in the past.       Prediabetes  Sugars still in prediabetes range, will monitor.   - Hemoglobin A1c  - Comprehensive metabolic panel (BMP + Alb, Alk Phos, ALT,  "AST, Total. Bili, TP)    Vitamin D intoxication  Has been high in the past and suspected to be the cause of her hypercalcemia.  Will recheck levels.   - Vitamin D Deficiency    Hyperkalemia  Will recheck.   - Comprehensive metabolic panel (BMP + Alb, Alk Phos, ALT, AST, Total. Bili, TP)    Patient has been advised of split billing requirements and indicates understanding: Yes        Nicotine/Tobacco Cessation  She reports that she has been smoking cigarettes. She started smoking about 57 years ago. She has a 43 pack-year smoking history. She has never been exposed to tobacco smoke. She has never used smokeless tobacco.  Nicotine/Tobacco Cessation Plan  Self help information given to patient      BMI  Estimated body mass index is 26.66 kg/m  as calculated from the following:    Height as of this encounter: 1.51 m (4' 11.45\").    Weight as of this encounter: 60.8 kg (134 lb).       Counseling  Appropriate preventive services were addressed with this patient via screening, questionnaire, or discussion as appropriate for fall prevention, nutrition, physical activity, Tobacco-use cessation, social engagement, weight loss and cognition.  Checklist reviewing preventive services available has been given to the patient.  Reviewed patient's diet, addressing concerns and/or questions.   She is at risk for lack of exercise and has been provided with information to increase physical activity for the benefit of her well-being.   She is at risk for psychosocial distress and has been provided with information to reduce risk.   The patient was provided with written information regarding signs of hearing loss.           Subjective   Manda is a 74 year old, presenting for the following:  Physical        11/7/2024    10:11 AM   Additional Questions   Roomed by Paty   Accompanied by Self         11/7/2024    10:11 AM   Patient Reported Additional Medications   Patient reports taking the following new medications NA "           HPI      Patient with history of Hyperlipidemia arrived for Annual Medicare Physical.     Patient is fasting for lab work.     No questions or concerns at todays visit.     Using seroquel to help with sleep.  Has been on this for many years.  Does not feel it is as effective anymore at 50 mg and sometimes needing to take another 1/2 tab.      Has a skin lesion on left ear present for 20+ years.  Not changing.  She saw a dermatologist for it about 20 years ago and they were not concerned then. It will get inflamed at times and sometimes crust/drain. She has tried using kenalog to the area and a small spot on her face and it helps calm the irritation down.     Hyperlipidemia Follow-Up    Are you regularly taking any medication or supplement to lower your cholesterol?   Yes- Crestor   Are you having muscle aches or other side effects that you think could be caused by your cholesterol lowering medication?  No      Health Care Directive  Patient has a Health Care Directive on file  Not discussed.       11/2/2024   General Health   How would you rate your overall physical health? Good   Feel stress (tense, anxious, or unable to sleep) Only a little      (!) STRESS CONCERN      11/2/2024   Nutrition   Diet: Regular (no restrictions)            11/2/2024   Exercise   Days per week of moderate/strenous exercise 1 day   Average minutes spent exercising at this level 30 min      (!) EXERCISE CONCERN      11/2/2024   Social Factors   Frequency of gathering with friends or relatives Once a week   Worry food won't last until get money to buy more No   Food not last or not have enough money for food? No   Do you have housing? (Housing is defined as stable permanent housing and does not include staying ouside in a car, in a tent, in an abandoned building, in an overnight shelter, or couch-surfing.) Yes   Are you worried about losing your housing? No   Lack of transportation? No   Unable to get utilities  (heat,electricity)? No            11/2/2024   Fall Risk   Fallen 2 or more times in the past year? No    Trouble with walking or balance? No        Patient-reported          11/2/2024   Activities of Daily Living- Home Safety   Needs help with the following daily activites None of the above   Safety concerns in the home None of the above            11/2/2024   Dental   Dentist two times every year? Yes            11/2/2024   Hearing Screening   Hearing concerns? (!) I NEED TO ASK PEOPLE TO SPEAK UP OR REPEAT THEMSELVES.    (!) TROUBLE UNDERSTANDING SOFT OR WHISPERED SPEECH.       Multiple values from one day are sorted in reverse-chronological order         11/2/2024   Driving Risk Screening   Patient/family members have concerns about driving No            11/2/2024   General Alertness/Fatigue Screening   Have you been more tired than usual lately? No            11/2/2024   Urinary Incontinence Screening   Bothered by leaking urine in past 6 months No            11/2/2024   TB Screening   Were you born outside of the [place]? No                  11/2/2024   Substance Use   If I could quit smoking, I would Somewhat agree   I want to quit somking, worry about health affects Completely agree   Willing to make a plan to quit smoking Somewhat agree   Willing to cut down before quitting Somewhat agree   Alcohol more than 3/day or more than 7/wk No   Do you have a current opioid prescription? No   How severe/bad is pain from 1 to 10? 3/10   Do you use any other substances recreationally? No        Social History     Tobacco Use    Smoking status: Every Day     Current packs/day: 0.75     Average packs/day: 0.8 packs/day for 57.4 years (43.0 ttl pk-yrs)     Types: Cigarettes     Start date: 6/15/1967     Passive exposure: Never    Smokeless tobacco: Never    Tobacco comments:     tried webutrin in 1996 but couldn't tolerate it   Vaping Use    Vaping status: Never Used   Substance Use Topics    Alcohol use: Yes     Comment:  normal    Drug use: No           12/27/2023   LAST FHS-7 RESULTS   1st degree relative breast or ovarian cancer Yes   Any relative bilateral breast cancer No   Any male have breast cancer No   Any ONE woman have BOTH breast AND ovarian cancer No   Any woman with breast cancer before 50yrs No   2 or more relatives with breast AND/OR ovarian cancer No   2 or more relatives with breast AND/OR bowel cancer No           Mammogram Screening - After age 74- determine frequency with patient based on health status, life expectancy and patient goals    ASCVD Risk   The ASCVD Risk score (Nelly CLEMENS, et al., 2019) failed to calculate for the following reasons:    The valid HDL cholesterol range is 20 to 100 mg/dL            Reviewed and updated as needed this visit by Provider                    Past Medical History:   Diagnosis Date    Arthritis     Heart disease 05/2018    Hyperlipidemia     Insomnia 2015    Lung cancer (H)     Nonsenile cataract 6/30/2023    Pulmonary nodules      Past Surgical History:   Procedure Laterality Date    ARTHROPLASTY HIP Right 06/02/2020    Procedure: RIGHT TOTAL HIP ARTHROPLASTY;  Surgeon: Augie Murray MD;  Location:  OR    BIOPSY      BRONCHOSCOPY FLEXIBLE AND RIGID N/A 05/18/2021    Procedure: BRONCHOSCOPY;  Surgeon: Palomo Castro MD;  [facility]    COLONOSCOPY  01/2021    HIP SURGERY      JOINT REPLACEMENT Left 2008    hip    PHACOEMULSIFICATION CLEAR CORNEA WITH STANDARD INTRAOCULAR LENS IMPLANT Left 11/17/2023    Procedure: PHACOEMULSIFICATION, CATARACT, WITH INTRAOCULAR LENS IMPLANT;  Surgeon: Charles Dangelo MD;  Location:  OR    PHACOEMULSIFICATION CLEAR CORNEA WITH STANDARD INTRAOCULAR LENS IMPLANT Right 1/12/2024    Procedure: COMPLEX PHACOEMULSIFICATION, CATARACT, WITH INTRAOCULAR LENS IMPLANT;  Surgeon: Charles Dangelo MD;  Location:  OR    THORACOSCOPIC LOBECTOMY LUNG Right 05/10/2021    Procedure: Uniportal thoracoscopic  right lower lobe wedge segmentectomy, completion bilobectomy middle and lower, intercostal nerve cryoablation, converstion to thoracotomy, mediastinal lymphadenectomy;  Surgeon: Palomo Castro MD;  [facility]     Lab work is in process  Current providers sharing in care for this patient include:  Patient Care Team:  Seda Wells PA-C as PCP - General (Family Medicine)  Seda Wells PA-C as Assigned PCP  Kumar Ramires AuD as Audiologist (Audiology)  Laureen Paulino AuD as Audiologist (Audiology)  Giuliana Lima AuD as Audiologist (Audiology)  Charles Dangelo MD as Physician (Ophthalmology)  Charles Dangelo MD as Assigned Surgical Provider    The following health maintenance items are reviewed in Epic and correct as of today:  Health Maintenance   Topic Date Due    ZOSTER IMMUNIZATION (1 of 2) Never done    INFLUENZA VACCINE (1) 09/01/2024    COVID-19 Vaccine (6 - 2024-25 season) 09/01/2024    LIPID  10/30/2024    ANNUAL REVIEW OF HM ORDERS  10/30/2024    NICOTINE/TOBACCO CESSATION COUNSELING Q 1 YR  10/30/2024    MEDICARE ANNUAL WELLNESS VISIT  10/30/2024    COLORECTAL CANCER SCREENING  11/04/2025    FALL RISK ASSESSMENT  11/07/2025    GLUCOSE  03/26/2027    DTAP/TDAP/TD IMMUNIZATION (3 - Td or Tdap) 08/03/2028    ADVANCE CARE PLANNING  10/30/2028    DEXA  12/27/2038    HEPATITIS C SCREENING  Completed    PHQ-2 (once per calendar year)  Completed    Pneumococcal Vaccine: 65+ Years  Completed    RSV VACCINE  Completed    HPV IMMUNIZATION  Aged Out    MENINGITIS IMMUNIZATION  Aged Out    RSV MONOCLONAL ANTIBODY  Aged Out    MAMMO SCREENING  Discontinued    LUNG CANCER SCREENING  Discontinued         Review of Systems  Constitutional, neuro, ENT, endocrine, pulmonary, cardiac, gastrointestinal, genitourinary, musculoskeletal, integument and psychiatric systems are negative, except as otherwise noted.     Objective    Exam  /72 (BP Location: Left arm,  "Patient Position: Chair, Cuff Size: Adult Regular)   Pulse 94   Temp 97.3  F (36.3  C) (Tympanic)   Resp 16   Ht 1.51 m (4' 11.45\")   Wt 60.8 kg (134 lb)   SpO2 98%   BMI 26.66 kg/m     Estimated body mass index is 26.66 kg/m  as calculated from the following:    Height as of this encounter: 1.51 m (4' 11.45\").    Weight as of this encounter: 60.8 kg (134 lb).    Physical Exam  GENERAL: alert and no distress  EYES: Eyes grossly normal to inspection, PERRL and conjunctivae and sclerae normal  HENT: ear canals and TM's normal, nose and mouth without ulcers or lesions, left ear with a patch of dry flaky red skin.   NECK: no adenopathy, no asymmetry, masses, or scars  RESP: lungs clear to auscultation - no rales, rhonchi or wheezes  CV: regular rate and rhythm, normal S1 S2, no S3 or S4, no murmur, click or rub, no peripheral edema  ABDOMEN: soft, nontender, no hepatosplenomegaly, no masses and bowel sounds normal  MS: no gross musculoskeletal defects noted, no edema  SKIN: no suspicious lesions or rashes  NEURO: Normal strength and tone, mentation intact and speech normal  PSYCH: mentation appears normal, affect normal/bright         11/7/2024   Mini Cog   Clock Draw Score 2 Normal   3 Item Recall 3 objects recalled   Mini Cog Total Score 5                 Signed Electronically by: Seda Wells PA-C    "

## 2024-11-08 LAB
ALBUMIN SERPL BCG-MCNC: 4.3 G/DL (ref 3.5–5.2)
ALP SERPL-CCNC: 132 U/L (ref 40–150)
ALT SERPL W P-5'-P-CCNC: 26 U/L (ref 0–50)
ANION GAP SERPL CALCULATED.3IONS-SCNC: 10 MMOL/L (ref 7–15)
AST SERPL W P-5'-P-CCNC: 33 U/L (ref 0–45)
BILIRUB SERPL-MCNC: 0.4 MG/DL
BUN SERPL-MCNC: 14.3 MG/DL (ref 8–23)
CALCIUM SERPL-MCNC: 10.5 MG/DL (ref 8.8–10.4)
CHLORIDE SERPL-SCNC: 101 MMOL/L (ref 98–107)
CHOLEST SERPL-MCNC: 185 MG/DL
CREAT SERPL-MCNC: 0.58 MG/DL (ref 0.51–0.95)
EGFRCR SERPLBLD CKD-EPI 2021: >90 ML/MIN/1.73M2
FASTING STATUS PATIENT QL REPORTED: YES
FASTING STATUS PATIENT QL REPORTED: YES
GLUCOSE SERPL-MCNC: 109 MG/DL (ref 70–99)
HCO3 SERPL-SCNC: 27 MMOL/L (ref 22–29)
HDLC SERPL-MCNC: 84 MG/DL
LDLC SERPL CALC-MCNC: 87 MG/DL
NONHDLC SERPL-MCNC: 101 MG/DL
POTASSIUM SERPL-SCNC: 4.4 MMOL/L (ref 3.4–5.3)
PROT SERPL-MCNC: 6.9 G/DL (ref 6.4–8.3)
SODIUM SERPL-SCNC: 138 MMOL/L (ref 135–145)
TRIGL SERPL-MCNC: 72 MG/DL
VIT D+METAB SERPL-MCNC: 38 NG/ML (ref 20–50)

## 2024-11-11 ENCOUNTER — OFFICE VISIT (OUTPATIENT)
Dept: DERMATOLOGY | Facility: CLINIC | Age: 74
End: 2024-11-11
Payer: COMMERCIAL

## 2024-11-11 DIAGNOSIS — H61.002 CHONDRODERMATITIS NODULARIS CHRONICA HELICIS, LEFT: Primary | ICD-10-CM

## 2024-11-11 NOTE — PROGRESS NOTES
[facility] Dermatology Note    Encounter Date: Nov 11, 2024    Dermatology Problem List:  1. Chondrodermatitis nodularis helicis  - current tx: hydrocortisone cream    ______________________________________    Impression/Plan:  1. 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       Staff Involved:  Staff and Scribe    Scribe Disclosure:   I, Salud Kulkarni, am serving as a scribe; to document services personally performed by Augie Smith MD -based on data collection and the provider's statements to me.     Provider Disclosure:   The documentation recorded by the scribe accurately reflects the services I personally performed and the decisions made by me.    Augie Smith MD   of Dermatology  Department of Dermatology  [facility]        CC:   Chief Complaint   Patient presents with    Derm Problem     Pt has a spot on the L ear that went down with using hydrocortisone cream. But she can still feel the bump.       History of Present Illness:  Ms. Reanna Kumar is a 74 year old female who presents as a new patient.    Here for spot on the left ear. Patient states that the spot was much worse, but now it has somewhat resolved.after using the hydrocortisone cream starting on Thursday.  She states that she may not be able to see it, but she can still feel the bump.     Labs:  N/a    Physical exam:  Vitals: There were no vitals taken for this visit.  GEN: This is a well developed, well-nourished female in no acute distress, in a pleasant mood.    SKIN: Nichole phototype II  - Focused examination of the left ear was performed.  - erythematous scaly papule to the left helix  - No other lesions of concern on areas examined.      Past Medical History:   Past Medical History:   Diagnosis Date    Arthritis     Heart disease 05/2018    Hyperlipidemia     Insomnia 2015    Lung cancer (H)     Nonsenile cataract 6/30/2023    Pulmonary nodules      Past Surgical History:   Procedure Laterality Date    ARTHROPLASTY HIP Right 06/02/2020    Procedure: RIGHT TOTAL HIP ARTHROPLASTY;  Surgeon: Augie Murray MD;  Location:  OR    BIOPSY      BRONCHOSCOPY FLEXIBLE AND RIGID N/A 05/18/2021    Procedure: BRONCHOSCOPY;  Surgeon: Palomo Castro MD;  Location:  GI    COLONOSCOPY  01/2021    HIP SURGERY      JOINT REPLACEMENT Left 2008    hip    PHACOEMULSIFICATION CLEAR CORNEA WITH STANDARD INTRAOCULAR LENS IMPLANT Left 11/17/2023    Procedure: PHACOEMULSIFICATION, CATARACT, WITH INTRAOCULAR LENS IMPLANT;  Surgeon: Charles Dangelo MD;  Location: [facility]    PHACOEMULSIFICATION CLEAR CORNEA WITH STANDARD INTRAOCULAR LENS IMPLANT Right 1/12/2024    Procedure: COMPLEX PHACOEMULSIFICATION, CATARACT, WITH INTRAOCULAR LENS IMPLANT;  Surgeon: Charles Dangelo MD;  Location: [facility] OR    THORACOSCOPIC LOBECTOMY LUNG Right 05/10/2021    Procedure: Uniportal thoracoscopic right lower lobe wedge segmentectomy, completion bilobectomy middle and lower, intercostal nerve cryoablation, converstion to thoracotomy, mediastinal lymphadenectomy;  Surgeon: Palomo Castro MD;  Location:  OR       Social History:   reports that she has been smoking cigarettes. She started smoking about 57 years ago. She has a 43.1 pack-year smoking history. She has never been exposed to tobacco smoke. She has never used smokeless tobacco. She reports current alcohol use. She reports that she does not use drugs.    Family History:  Family History   Problem Relation Age of Onset    Cancer Mother     Osteoporosis Mother     Breast Cancer Mother         70's    Uterine Cancer Mother     Arthritis Father     Genitourinary Problems Sister         Renal  calculi    Glaucoma No family hx of     Macular Degeneration No family hx of     Diabetes No family hx of        Medications:  Current Outpatient Medications   Medication Sig Dispense Refill    aspirin 81 MG EC tablet Take 1 tablet (81 mg) by mouth daily      QUEtiapine (SEROQUEL) 50 MG tablet Take 1.5 tablets (75 mg) by mouth at bedtime. 135 tablet 3    rosuvastatin (CRESTOR) 20 MG tablet Take 1 tablet (20 mg) by mouth daily. 90 tablet 3     Allergies   Allergen Reactions    Zyban [Bupropion Hydrobromide]      Increased anxiety

## 2024-11-11 NOTE — LETTER
11/11/2024      Reanna Kumar  [address] [place] [address]      Dear Colleague,    Thank you for referring your patient, Reanna Kumar, to the [facility]. Please see a copy of my visit note below.    [facility] Dermatology Note    Encounter Date: Nov 11, 2024    Dermatology Problem List:  1. Chondrodermatitis nodularis helicis  - current tx: hydrocortisone cream    ______________________________________    Impression/Plan:  1. 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       Staff Involved:  Staff and Scribe    Scribe Disclosure:   I, Salud Kulkarni, am serving as a scribe; to document services personally performed by Augie Smith MD -based on data collection and the provider's statements to me.     Provider Disclosure:   The documentation recorded by the scribe accurately reflects the services I personally performed and the decisions made by me.    Augie Smith MD   of Dermatology  Department of Dermatology  [facility]        CC:   Chief Complaint   Patient presents with     Derm Problem     Pt has a spot on the L ear that went down with using hydrocortisone cream. But she can still feel the bump.       History of Present Illness:  Ms. Reanna Kumar is a 74 year old female who presents as a new patient.    Here for spot on the left ear. Patient states that the spot was much worse, but now it has somewhat resolved.after using the hydrocortisone cream starting on Thursday.  She states that she may not be able to see it, but she can still feel the bump.     Labs:  N/a    Physical exam:  Vitals: There were no vitals taken for this visit.  GEN: This is a well developed, well-nourished  female in no acute distress, in a pleasant mood.    SKIN: Nichole phototype II  - Focused examination of the left ear was performed.  - erythematous scaly papule to the left helix  - No other lesions of concern on areas examined.     Past Medical History:   Past Medical History:   Diagnosis Date     Arthritis      Heart disease 05/2018     Hyperlipidemia      Insomnia 2015     Lung cancer (H)      Nonsenile cataract 6/30/2023     Pulmonary nodules      Past Surgical History:   Procedure Laterality Date     ARTHROPLASTY HIP Right 06/02/2020    Procedure: RIGHT TOTAL HIP ARTHROPLASTY;  Surgeon: Augie Murray MD;  Location:  OR     BIOPSY       BRONCHOSCOPY FLEXIBLE AND RIGID N/A 05/18/2021    Procedure: BRONCHOSCOPY;  Surgeon: Palomo Castro MD;  [facility]     COLONOSCOPY  01/2021     HIP SURGERY       JOINT REPLACEMENT Left 2008    hip     PHACOEMULSIFICATION CLEAR CORNEA WITH STANDARD INTRAOCULAR LENS IMPLANT Left 11/17/2023    Procedure: PHACOEMULSIFICATION, CATARACT, WITH INTRAOCULAR LENS IMPLANT;  Surgeon: Charles Dangelo MD;  Location: [facility]     PHACOEMULSIFICATION CLEAR CORNEA WITH STANDARD INTRAOCULAR LENS IMPLANT Right 1/12/2024    Procedure: COMPLEX PHACOEMULSIFICATION, CATARACT, WITH INTRAOCULAR LENS IMPLANT;  Surgeon: Charles Dangelo MD;  Location: [facility]     THORACOSCOPIC LOBECTOMY LUNG Right 05/10/2021    Procedure: Uniportal thoracoscopic right lower lobe wedge segmentectomy, completion bilobectomy middle and lower, intercostal nerve cryoablation, converstion to thoracotomy, mediastinal lymphadenectomy;  Surgeon: Palomo Castro MD;  Location:  OR       Social History:   reports that she has been smoking cigarettes. She started smoking about 57 years ago. She has a 43.1 pack-year smoking history. She has never been exposed to tobacco smoke. She has never used smokeless tobacco. She reports current alcohol use. She reports that she does not use  drugs.    Family History:  Family History   Problem Relation Age of Onset     Cancer Mother      Osteoporosis Mother      Breast Cancer Mother         70's     Uterine Cancer Mother      Arthritis Father      Genitourinary Problems Sister         Renal calculi     Glaucoma No family hx of      Macular Degeneration No family hx of      Diabetes No family hx of        Medications:  Current Outpatient Medications   Medication Sig Dispense Refill     aspirin 81 MG EC tablet Take 1 tablet (81 mg) by mouth daily       QUEtiapine (SEROQUEL) 50 MG tablet Take 1.5 tablets (75 mg) by mouth at bedtime. 135 tablet 3     rosuvastatin (CRESTOR) 20 MG tablet Take 1 tablet (20 mg) by mouth daily. 90 tablet 3     Allergies   Allergen Reactions     Zyban [Bupropion Hydrobromide]      Increased anxiety                    Again, thank you for allowing me to participate in the care of your patient.        Sincerely,        Augie Smith MD

## 2024-11-11 NOTE — NURSING NOTE
Reanna Kumar's goals for this visit include:   Chief Complaint   Patient presents with    Derm Problem     Pt has a spot on the L ear that went down with using hydrocortisone cream. But she can still feel the bump.       She requests these members of her care team be copied on today's visit information:     PCP: Seda Wells    Referring Provider:  Seda Wells PA-C  [address] [place] [address]    There were no vitals taken for this visit.    Do you need any medication refills at today's visit?     Amira Modi LPN on 11/11/2024 at 1:24 PM

## 2024-11-18 ENCOUNTER — MYC MEDICAL ADVICE (OUTPATIENT)
Dept: FAMILY MEDICINE | Facility: CLINIC | Age: 74
End: 2024-11-18
Payer: COMMERCIAL

## 2024-11-18 DIAGNOSIS — Z12.31 ENCOUNTER FOR SCREENING MAMMOGRAM FOR BREAST CANCER: Primary | ICD-10-CM

## 2024-11-25 NOTE — PROGRESS NOTES
Virtual Visit Details    Type of service:  Telephone Visit   Phone call duration:  minutes   Originating Location (pt. Location): Home    Distant Location (provider location):  Off-site      THORACIC SURGERY FOLLOW UP VISIT      I had a telephone visit with Mrs. Kumar in follow-up today. The clinical summary follows:     PREOP DIAGNOSIS   Right lower lobe lung cancer  PROCEDURE   Uniportal thoracoscopic right lower lobe wedge resection, completion lobectomy, conversion to thoracotomy with middle and lower bilobectomy, intercostal nerve cryoablation    DATE OF PROCEDURE  05/19/2021    HISTOPATHOLOGY   Squamous cell carcinoma, tumor size 1.2 cm pT1bN0    COMPLICATIONS  None    INTERVAL STUDIES  CT chest 11/26/2024 (preliminary read):  1.  New irregular nodular opacity measuring up to 18 mm along the right middle lobe. A few other new nodules seen at the left upper lobe and there are other stable nodules. Correlate with an infectious or inflammatory cause but neoplasm is not excluded. Recommend further workup and surveillance CT chest in three months.  2.  Stable trace right pleural fluid and right pleural thickening.        Past Medical History:   Diagnosis Date    Arthritis     Heart disease 05/2018    Hyperlipidemia     Insomnia 2015    Lung cancer (H)     Nonsenile cataract 6/30/2023    Pulmonary nodules       Past Surgical History:   Procedure Laterality Date    ARTHROPLASTY HIP Right 06/02/2020    Procedure: RIGHT TOTAL HIP ARTHROPLASTY;  Surgeon: Augie Murray MD;  Location: [facility]    BIOPSY      BRONCHOSCOPY FLEXIBLE AND RIGID N/A 05/18/2021    Procedure: BRONCHOSCOPY;  Surgeon: Palomo Castro MD;  Location: [facility]    COLONOSCOPY  01/2021    HIP SURGERY      JOINT REPLACEMENT Left 2008    hip    PHACOEMULSIFICATION CLEAR CORNEA WITH STANDARD INTRAOCULAR LENS IMPLANT Left 11/17/2023    Procedure: PHACOEMULSIFICATION, CATARACT, WITH INTRAOCULAR LENS IMPLANT;  Surgeon: Charles Dangelo MD;   Location: [facility]    PHACOEMULSIFICATION CLEAR CORNEA WITH STANDARD INTRAOCULAR LENS IMPLANT Right 1/12/2024    Procedure: COMPLEX PHACOEMULSIFICATION, CATARACT, WITH INTRAOCULAR LENS IMPLANT;  Surgeon: Charles Dangelo MD;  Location: [facility]    THORACOSCOPIC LOBECTOMY LUNG Right 05/10/2021    Procedure: Uniportal thoracoscopic right lower lobe wedge segmentectomy, completion bilobectomy middle and lower, intercostal nerve cryoablation, converstion to thoracotomy, mediastinal lymphadenectomy;  Surgeon: Palomo Castro MD;  Location: [facility]      Social History     Socioeconomic History    Marital status:      Spouse name: Not on file    Number of children: Not on file    Years of education: Not on file    Highest education level: Not on file   Occupational History    Not on file   Tobacco Use    Smoking status: Every Day     Current packs/day: 0.75     Average packs/day: 0.8 packs/day for 57.4 years (43.1 ttl pk-yrs)     Types: Cigarettes     Start date: 6/15/1967     Passive exposure: Never    Smokeless tobacco: Never    Tobacco comments:     tried webutrin in 1996 but couldn't tolerate it   Vaping Use    Vaping status: Never Used   Substance and Sexual Activity    Alcohol use: Yes     Comment: normal    Drug use: No    Sexual activity: Not Currently     Partners: Male     Birth control/protection: None   Other Topics Concern    Parent/sibling w/ CABG, MI or angioplasty before 65F 55M? No   Social History Narrative    Not on file     Social Drivers of Health     Financial Resource Strain: Low Risk  (11/2/2024)    Financial Resource Strain     Within the past 12 months, have you or your family members you live with been unable to get utilities (heat, electricity) when it was really needed?: No   Food Insecurity: Low Risk  (11/2/2024)    Food Insecurity     Within the past 12 months, did you worry that your food would run out before you got money to buy more?: No     Within the past 12 months, did  the food you bought just not last and you didn t have money to get more?: No   Transportation Needs: Low Risk  (11/2/2024)    Transportation Needs     Within the past 12 months, has lack of transportation kept you from medical appointments, getting your medicines, non-medical meetings or appointments, work, or from getting things that you need?: No   Physical Activity: Insufficiently Active (11/2/2024)    Exercise Vital Sign     Days of Exercise per Week: 1 day     Minutes of Exercise per Session: 30 min   Stress: No Stress Concern Present (11/2/2024)    [employer] [place] [employer] - Occupational Stress Questionnaire     Feeling of Stress : Only a little   Social Connections: Unknown (11/2/2024)    Social Connection and Isolation Panel [NHANES]     Frequency of Communication with Friends and Family: Not on file     Frequency of Social Gatherings with Friends and Family: Once a week     Attends Hindu Services: Not on file     Active Member of Clubs or Organizations: Not on file     Attends Club or Organization Meetings: Not on file     Marital Status: Not on file   Interpersonal Safety: Low Risk  (11/7/2024)    Interpersonal Safety     Do you feel physically and emotionally safe where you currently live?: Yes     Within the past 12 months, have you been hit, slapped, kicked or otherwise physically hurt by someone?: No     Within the past 12 months, have you been humiliated or emotionally abused in other ways by your partner or ex-partner?: No   Housing Stability: Low Risk  (11/2/2024)    Housing Stability     Do you have housing? : Yes     Are you worried about losing your housing?: No      SUBJECTIVE   Manda is doing ok. She had a junky cough a few weeks ago but that is gone and she feels fine now. No shortness of breath. She still has the tightness/pressure around her rib cage-it feels like she is wearing a corset. She has had this since surgery. It is quite bothersome and she is wondering what, if  anything, can be done.    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 Her job schedule is very flexible and she likes that.    IMPRESSION   Manda is a 74 year-old female status post uniportal thoracoscopic right lower lobe wedge resection, completion lobectomy, conversion to thoracotomy with middle and lower bilobectomy, intercostal nerve cryoablation of a pT1bN0 (stage IA2) non small cell lung cancer.     I reviewed the CT report with her. I assured her that the development of this 18 mm sub solid nodule within in 3 months is more typical of an infectious or inflammatory etiology rather than a malignancy. She does have other small/tiny nodules that have come and gone for the last couple of years. This new nodule could be related to the productive cough she had a few weeks ago. We will get a short term follow up chest CT in 3 months to follow up on this.    I think the tightness/pressure she is feeling could be long term nerve pain from the surgery which can happen with a thoracotomy. Manda is interested in trying gabapentin to help with the nerve pain. I will send a prescription in and have one of our nurses call her in a few weeks to find out if this has helped. If this does not help, I can refer her to the Pain Clinic for consideration of nerve ablation/injection.    PLAN  I spent 25 min on the date of the encounter in chart review, patient visit, review of tests, documentation and/or discussion with other providers about the issues documented above. I reviewed the plan as follows:  Follow up with me in 3 months with a chest CT prior  All questions were answered and the patient and present family were in agreement with the plan.  I appreciate the opportunity to participate in the care of your patient and will keep you updated.  Sincerely,    Cherri Rodriguez, RENETTA CNS

## 2024-11-26 ENCOUNTER — ANCILLARY PROCEDURE (OUTPATIENT)
Dept: CT IMAGING | Facility: CLINIC | Age: 74
End: 2024-11-26
Attending: CLINICAL NURSE SPECIALIST
Payer: COMMERCIAL

## 2024-11-26 ENCOUNTER — VIRTUAL VISIT (OUTPATIENT)
Dept: SURGERY | Facility: CLINIC | Age: 74
End: 2024-11-26
Attending: CLINICAL NURSE SPECIALIST
Payer: COMMERCIAL

## 2024-11-26 VITALS — BODY MASS INDEX: 25.49 KG/M2 | HEIGHT: 61 IN | WEIGHT: 135 LBS

## 2024-11-26 DIAGNOSIS — C34.31 MALIGNANT NEOPLASM OF LOWER LOBE OF RIGHT LUNG (H): ICD-10-CM

## 2024-11-26 DIAGNOSIS — C34.31 MALIGNANT NEOPLASM OF LOWER LOBE OF RIGHT LUNG (H): Primary | ICD-10-CM

## 2024-11-26 PROCEDURE — 99441 PR PHYSICIAN TELEPHONE EVALUATION 5-10 MIN: CPT | Mod: 93 | Performed by: CLINICAL NURSE SPECIALIST

## 2024-11-26 PROCEDURE — 71260 CT THORAX DX C+: CPT | Mod: TC | Performed by: RADIOLOGY

## 2024-11-26 RX ORDER — GABAPENTIN 300 MG/1
300 CAPSULE ORAL 3 TIMES DAILY
Qty: 90 CAPSULE | Refills: 1 | Status: SHIPPED | OUTPATIENT
Start: 2024-11-26

## 2024-11-26 RX ORDER — IOPAMIDOL 755 MG/ML
82 INJECTION, SOLUTION INTRAVASCULAR ONCE
Status: COMPLETED | OUTPATIENT
Start: 2024-11-26 | End: 2024-11-26

## 2024-11-26 RX ADMIN — IOPAMIDOL 82 ML: 755 INJECTION, SOLUTION INTRAVASCULAR at 10:28

## 2024-11-26 ASSESSMENT — PAIN SCALES - GENERAL: PAINLEVEL_OUTOF10: MODERATE PAIN (4)

## 2024-11-26 NOTE — NURSING NOTE
Patient confirms medications and allergies are accurate via patients echeck in completion, and or denies any changes since last reviewed/verified.     [facility]    Is the patient currently in the state of [place]? YES    Visit mode:TELEPHONE    If the visit is dropped, the patient can be reconnected by:TELEPHONE VISIT: Phone number:   Telephone Information:   Mobile [phone]       Will anyone else be joining the visit? NO  (If patient encounters technical issues they should call [phone]    Are changes needed to the allergy or medication list? No    Are refills needed on medications prescribed by this physician? YES    Rooming Documentation:  Questionnaire(s) completed    Reason for visit: RECHECK    Yumiko LANGFORD

## 2024-11-26 NOTE — LETTER
11/26/2024      Reanna Kumar  [address] [place] [address]      Dear Colleague,    Thank you for referring your patient, Reanna Kumar, to the [facility]. Please see a copy of my visit note below.    Virtual Visit Details    Type of service:  Telephone Visit   Phone call duration:  minutes   Originating Location (pt. Location): Home    Distant Location (provider location):  Off-site      THORACIC SURGERY FOLLOW UP VISIT      I had a telephone visit with Mrs. Kumar in follow-up today. The clinical summary follows:     PREOP DIAGNOSIS   Right lower lobe lung cancer  PROCEDURE   Uniportal thoracoscopic right lower lobe wedge resection, completion lobectomy, conversion to thoracotomy with middle and lower bilobectomy, intercostal nerve cryoablation    DATE OF PROCEDURE  05/19/2021    HISTOPATHOLOGY   Squamous cell carcinoma, tumor size 1.2 cm pT1bN0    COMPLICATIONS  None    INTERVAL STUDIES  CT chest 11/26/2024 (preliminary read):  1.  New irregular nodular opacity measuring up to 18 mm along the right middle lobe. A few other new nodules seen at the left upper lobe and there are other stable nodules. Correlate with an infectious or inflammatory cause but neoplasm is not excluded. Recommend further workup and surveillance CT chest in three months.  2.  Stable trace right pleural fluid and right pleural thickening.        Past Medical History:   Diagnosis Date     Arthritis      Heart disease 05/2018     Hyperlipidemia      Insomnia 2015     Lung cancer (H)      Nonsenile cataract 6/30/2023     Pulmonary nodules       Past Surgical History:   Procedure Laterality Date     ARTHROPLASTY HIP Right 06/02/2020    Procedure: RIGHT TOTAL HIP ARTHROPLASTY;  Surgeon: Augie Murray MD;  Location: [facility]     BIOPSY       BRONCHOSCOPY FLEXIBLE AND RIGID N/A 05/18/2021    Procedure: BRONCHOSCOPY;  Surgeon: Palomo Castro MD;  [facility]     COLONOSCOPY  01/2021     HIP  SURGERY       JOINT REPLACEMENT Left 2008    hip     PHACOEMULSIFICATION CLEAR CORNEA WITH STANDARD INTRAOCULAR LENS IMPLANT Left 11/17/2023    Procedure: PHACOEMULSIFICATION, CATARACT, WITH INTRAOCULAR LENS IMPLANT;  Surgeon: Charles Dangelo MD;  [facility]: [facility]     PHACOEMULSIFICATION CLEAR CORNEA WITH STANDARD INTRAOCULAR LENS IMPLANT Right 1/12/2024    Procedure: COMPLEX PHACOEMULSIFICATION, CATARACT, WITH INTRAOCULAR LENS IMPLANT;  Surgeon: Charles Dangelo MD;  Location: [facility]     THORACOSCOPIC LOBECTOMY LUNG Right 05/10/2021    Procedure: Uniportal thoracoscopic right lower lobe wedge segmentectomy, completion bilobectomy middle and lower, intercostal nerve cryoablation, converstion to thoracotomy, mediastinal lymphadenectomy;  Surgeon: Palomo Castro MD;  Location: [facility]      Social History     Socioeconomic History     Marital status:      Spouse name: Not on file     Number of children: Not on file     Years of education: Not on file     Highest education level: Not on file   Occupational History     Not on file   Tobacco Use     Smoking status: Every Day     Current packs/day: 0.75     Average packs/day: 0.8 packs/day for 57.4 years (43.1 ttl pk-yrs)     Types: Cigarettes     Start date: 6/15/1967     Passive exposure: Never     Smokeless tobacco: Never     Tobacco comments:     tried webutrin in 1996 but couldn't tolerate it   Vaping Use     Vaping status: Never Used   Substance and Sexual Activity     Alcohol use: Yes     Comment: normal     Drug use: No     Sexual activity: Not Currently     Partners: Male     Birth control/protection: None   Other Topics Concern     Parent/sibling w/ CABG, MI or angioplasty before 65F 55M? No   Social History Narrative     Not on file     Social Drivers of Health     Financial Resource Strain: Low Risk  (11/2/2024)    Financial Resource Strain      Within the past 12 months, have you or your family members you live with been  unable to get utilities (heat, electricity) when it was really needed?: No   Food Insecurity: Low Risk  (11/2/2024)    Food Insecurity      Within the past 12 months, did you worry that your food would run out before you got money to buy more?: No      Within the past 12 months, did the food you bought just not last and you didn t have money to get more?: No   Transportation Needs: Low Risk  (11/2/2024)    Transportation Needs      Within the past 12 months, has lack of transportation kept you from medical appointments, getting your medicines, non-medical meetings or appointments, work, or from getting things that you need?: No   Physical Activity: Insufficiently Active (11/2/2024)    Exercise Vital Sign      Days of Exercise per Week: 1 day      Minutes of Exercise per Session: 30 min   Stress: No Stress Concern Present (11/2/2024)    [employer] [place] [employer] - Occupational Stress Questionnaire      Feeling of Stress : Only a little   Social Connections: Unknown (11/2/2024)    Social Connection and Isolation Panel [NHANES]      Frequency of Communication with Friends and Family: Not on file      Frequency of Social Gatherings with Friends and Family: Once a week      Attends Mosque Services: Not on file      Active Member of Clubs or Organizations: Not on file      Attends Club or Organization Meetings: Not on file      Marital Status: Not on file   Interpersonal Safety: Low Risk  (11/7/2024)    Interpersonal Safety      Do you feel physically and emotionally safe where you currently live?: Yes      Within the past 12 months, have you been hit, slapped, kicked or otherwise physically hurt by someone?: No      Within the past 12 months, have you been humiliated or emotionally abused in other ways by your partner or ex-partner?: No   Housing Stability: Low Risk  (11/2/2024)    Housing Stability      Do you have housing? : Yes      Are you worried about losing your housing?: No      SUBJECTIVE   Manda  is doing ok. She had a junky cough a few weeks ago but that is gone and she feels fine now. No shortness of breath. She still has the tightness/pressure around her rib cage-it feels like she is wearing a corset. She has had this since surgery. It is quite bothersome and she is wondering what, if anything, can be done.    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 Her job schedule is very flexible and she likes that.    IMPRESSION   Manda is a 74 year-old female status post uniportal thoracoscopic right lower lobe wedge resection, completion lobectomy, conversion to thoracotomy with middle and lower bilobectomy, intercostal nerve cryoablation of a pT1bN0 (stage IA2) non small cell lung cancer.     I reviewed the CT report with her. I assured her that the development of this 18 mm sub solid nodule within in 3 months is more typical of an infectious or inflammatory etiology rather than a malignancy. She does have other small/tiny nodules that have come and gone for the last couple of years. This new nodule could be related to the productive cough she had a few weeks ago. We will get a short term follow up chest CT in 3 months to follow up on this.    I think the tightness/pressure she is feeling could be long term nerve pain from the surgery which can happen with a thoracotomy. Manda is interested in trying gabapentin to help with the nerve pain. I will send a prescription in and have one of our nurses call her in a few weeks to find out if this has helped. If this does not help, I can refer her to the Pain Clinic for consideration of nerve ablation/injection.    PLAN  I spent 25 min on the date of the encounter in chart review, patient visit, review of tests, documentation and/or discussion with other providers about the issues documented above. I reviewed the plan as follows:  Follow up with  me in 3 months with a chest CT prior  All questions were answered and the patient and present family were in agreement with the plan.  I appreciate the opportunity to participate in the care of your patient and will keep you updated.  Sincerely,    RENETTA Bernal       Again, thank you for allowing me to participate in the care of your patient.        Sincerely,        RENETTA Bernal CNS

## 2024-12-11 ENCOUNTER — PATIENT OUTREACH (OUTPATIENT)
Dept: SURGERY | Facility: CLINIC | Age: 74
End: 2024-12-11
Payer: COMMERCIAL

## 2024-12-12 NOTE — TELEPHONE ENCOUNTER
"[facility]: Thoracic Surgery                                                                                      Called patient, at the request of RENETTA Rodriguez, to follow up to see if the gabapentin Cherri prescribed at her last visit with her for suspected chronic nerve pain is helping any.    Called and spoke with patient.   Patient verbalized that she is diligently taking the gabapentin as prescribed (300mg three times a day) and she doesn't seem to notice any difference. Reports that she continues to experience an '\"extreme pressure under her breast, that feels as if she has a tight girdle on that she can't wait to take off\".     Writer will update RENETTA Rodriguez and be in touch with her with her response and/or any additional interventions.     Patient appreciated writer's call.     Epic message with above information sent to RENETTA Rodriguez.    Ute Mejia RN, BSN  Thoracic Surgery RN Care Coordinator        Signature:  Ute Mejia RN  "

## 2024-12-30 ENCOUNTER — ANCILLARY PROCEDURE (OUTPATIENT)
Dept: MAMMOGRAPHY | Facility: CLINIC | Age: 74
End: 2024-12-30
Attending: PHYSICIAN ASSISTANT
Payer: COMMERCIAL

## 2024-12-30 DIAGNOSIS — Z12.31 ENCOUNTER FOR SCREENING MAMMOGRAM FOR BREAST CANCER: ICD-10-CM

## 2024-12-30 PROCEDURE — 77063 BREAST TOMOSYNTHESIS BI: CPT | Mod: TC | Performed by: RADIOLOGY

## 2024-12-30 PROCEDURE — 77067 SCR MAMMO BI INCL CAD: CPT | Mod: TC | Performed by: RADIOLOGY

## 2025-01-06 NOTE — TELEPHONE ENCOUNTER
LVM that patient is due for his  3 month follow-up (around 1/21/2025).    Routing to [facility] lab calling with a critical lab value .    Potassium is 6.2 from today's lab draw.    Christine M Klisch, RN

## 2025-02-21 ENCOUNTER — ANCILLARY PROCEDURE (OUTPATIENT)
Dept: CT IMAGING | Facility: CLINIC | Age: 75
End: 2025-02-21
Attending: CLINICAL NURSE SPECIALIST
Payer: COMMERCIAL

## 2025-02-21 DIAGNOSIS — C34.31 MALIGNANT NEOPLASM OF LOWER LOBE OF RIGHT LUNG (H): ICD-10-CM

## 2025-02-21 LAB
CREAT BLD-MCNC: 0.7 MG/DL (ref 0.5–1)
EGFRCR SERPLBLD CKD-EPI 2021: >60 ML/MIN/1.73M2

## 2025-02-21 PROCEDURE — 71260 CT THORAX DX C+: CPT | Mod: TC | Performed by: RADIOLOGY

## 2025-02-21 PROCEDURE — 82565 ASSAY OF CREATININE: CPT

## 2025-02-21 RX ORDER — IOPAMIDOL 755 MG/ML
82 INJECTION, SOLUTION INTRAVASCULAR ONCE
Status: COMPLETED | OUTPATIENT
Start: 2025-02-21 | End: 2025-02-21

## 2025-02-21 RX ADMIN — IOPAMIDOL 82 ML: 755 INJECTION, SOLUTION INTRAVASCULAR at 10:39

## 2025-02-25 ENCOUNTER — VIRTUAL VISIT (OUTPATIENT)
Dept: SURGERY | Facility: CLINIC | Age: 75
End: 2025-02-25
Attending: CLINICAL NURSE SPECIALIST
Payer: COMMERCIAL

## 2025-02-25 VITALS — BODY MASS INDEX: 24.84 KG/M2 | WEIGHT: 135 LBS | HEIGHT: 62 IN

## 2025-02-25 DIAGNOSIS — C34.31 MALIGNANT NEOPLASM OF LOWER LOBE OF RIGHT LUNG (H): Primary | ICD-10-CM

## 2025-02-25 PROCEDURE — 98012 SYNCH AUDIO-ONLY EST SF 10: CPT | Performed by: CLINICAL NURSE SPECIALIST

## 2025-02-25 PROCEDURE — 1125F AMNT PAIN NOTED PAIN PRSNT: CPT | Mod: 93 | Performed by: CLINICAL NURSE SPECIALIST

## 2025-02-25 ASSESSMENT — PAIN SCALES - GENERAL: PAINLEVEL_OUTOF10: MODERATE PAIN (4)

## 2025-02-25 NOTE — LETTER
2/25/2025      Reanna Kumar  [address] [place] [address]      Dear Colleague,    Thank you for referring your patient, Reanna Kumar, to the [facility]. Please see a copy of my visit note below.    Virtual Visit Details    Type of service:  Telephone Visit   Phone call duration: 5 minutes   Originating Location (pt. Location): Home    Distant Location (provider location):  Off-site  Telephone visit completed due to the patient did not consent to a video visit.      THORACIC SURGERY FOLLOW UP VISIT      I had a telephone visit with Mrs. Kumar in follow-up today. The clinical summary follows:     PREOP DIAGNOSIS   Right lower lobe lung cancer  PROCEDURE   Uniportal thoracoscopic right lower lobe wedge resection, completion lobectomy, conversion to thoracotomy with middle and lower bilobectomy, intercostal nerve cryoablation    DATE OF PROCEDURE  05/19/2021    HISTOPATHOLOGY   Squamous cell carcinoma, tumor size 1.2 cm pT1bN0    COMPLICATIONS  None    INTERVAL STUDIES  CT chest 02/21/2025:  1.  Interval resolution of previously described new right middle lobe opacity with multiple new nodular opacities throughout both lungs, favor chronic infectious/inflammatory process but recommend attention on follow-up imaging.  2.  Stable emphysema and postsurgical changes status post right lower lobectomy.    Past Medical History:   Diagnosis Date     Arthritis      Heart disease 05/2018     Hyperlipidemia      Insomnia 2015     Lung cancer (H)      Nonsenile cataract 6/30/2023     Pulmonary nodules       Past Surgical History:   Procedure Laterality Date     ARTHROPLASTY HIP Right 06/02/2020    Procedure: RIGHT TOTAL HIP ARTHROPLASTY;  Surgeon: Augie Murray MD;  Location: [facility]     BIOPSY       BRONCHOSCOPY FLEXIBLE AND RIGID N/A 05/18/2021    Procedure: BRONCHOSCOPY;  Surgeon: Palomo Castro MD;  [facility]     COLONOSCOPY  01/2021     HIP SURGERY       JOINT  REPLACEMENT Left 2008    hip     PHACOEMULSIFICATION CLEAR CORNEA WITH STANDARD INTRAOCULAR LENS IMPLANT Left 11/17/2023    Procedure: PHACOEMULSIFICATION, CATARACT, WITH INTRAOCULAR LENS IMPLANT;  Surgeon: Charles Dangelo MD;  Location: [facility]     PHACOEMULSIFICATION CLEAR CORNEA WITH STANDARD INTRAOCULAR LENS IMPLANT Right 1/12/2024    Procedure: COMPLEX PHACOEMULSIFICATION, CATARACT, WITH INTRAOCULAR LENS IMPLANT;  Surgeon: Charles Dangelo MD;  Location: [facility]     THORACOSCOPIC LOBECTOMY LUNG Right 05/10/2021    Procedure: Uniportal thoracoscopic right lower lobe wedge segmentectomy, completion bilobectomy middle and lower, intercostal nerve cryoablation, converstion to thoracotomy, mediastinal lymphadenectomy;  Surgeon: Palomo Castro MD;  [facility]      Social History     Socioeconomic History     Marital status:      Spouse name: Not on file     Number of children: Not on file     Years of education: Not on file     Highest education level: Not on file   Occupational History     Not on file   Tobacco Use     Smoking status: Every Day     Current packs/day: 0.75     Average packs/day: 0.8 packs/day for 57.7 years (43.3 ttl pk-yrs)     Types: Cigarettes     Start date: 6/15/1967     Passive exposure: Never     Smokeless tobacco: Never     Tobacco comments:     tried webutrin in 1996 but couldn't tolerate it   Vaping Use     Vaping status: Never Used   Substance and Sexual Activity     Alcohol use: Yes     Comment: normal     Drug use: No     Sexual activity: Not Currently     Partners: Male     Birth control/protection: None   Other Topics Concern     Parent/sibling w/ CABG, MI or angioplasty before 65F 55M? No   Social History Narrative     Not on file     Social Drivers of Health     Financial Resource Strain: Low Risk  (11/2/2024)    Financial Resource Strain      Within the past 12 months, have you or your family members you live with been unable to get utilities  (heat, electricity) when it was really needed?: No   Food Insecurity: Low Risk  (11/2/2024)    Food Insecurity      Within the past 12 months, did you worry that your food would run out before you got money to buy more?: No      Within the past 12 months, did the food you bought just not last and you didn t have money to get more?: No   Transportation Needs: Low Risk  (11/2/2024)    Transportation Needs      Within the past 12 months, has lack of transportation kept you from medical appointments, getting your medicines, non-medical meetings or appointments, work, or from getting things that you need?: No   Physical Activity: Insufficiently Active (11/2/2024)    Exercise Vital Sign      Days of Exercise per Week: 1 day      Minutes of Exercise per Session: 30 min   Stress: No Stress Concern Present (11/2/2024)    [employer] [place] [employer] - Occupational Stress Questionnaire      Feeling of Stress : Only a little   Social Connections: Unknown (11/2/2024)    Social Connection and Isolation Panel [NHANES]      Frequency of Communication with Friends and Family: Not on file      Frequency of Social Gatherings with Friends and Family: Once a week      Attends Bahai Services: Not on file      Active Member of Clubs or Organizations: Not on file      Attends Club or Organization Meetings: Not on file      Marital Status: Not on file   Interpersonal Safety: Low Risk  (11/7/2024)    Interpersonal Safety      Do you feel physically and emotionally safe where you currently live?: Yes      Within the past 12 months, have you been hit, slapped, kicked or otherwise physically hurt by someone?: No      Within the past 12 months, have you been humiliated or emotionally abused in other ways by your partner or ex-partner?: No   Housing Stability: Low Risk  (11/2/2024)    Housing Stability      Do you have housing? : Yes      Are you worried about losing your housing?: No        SUBJECTIVE   Manda is doing good. She  did have congestion and a cough when she got this most recent chest CT. She felt kind of crummy for a couple of days-slept a lot but is feeling better now.     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 Her job schedule is very flexible and she likes that.     IMPRESSION   Manda is a 74 year-old female status post uniportal thoracoscopic right lower lobe wedge resection, completion lobectomy, conversion to thoracotomy with middle and lower bilobectomy, intercostal nerve cryoablation of a pT1bN0 (stage IA2) non small cell lung cancer.     Given that she has symptoms that correlate with the CT findings, I recommend a short term follow up chest CT in 3 months.     PLAN  I spent 15 min on the date of the encounter in chart review, patient visit, review of tests, documentation and/or discussion with other providers about the issues documented above. I reviewed the plan as follows:  Follow up with me in 3 months with a chest CT prior  All questions were answered and the patient and present family were in agreement with the plan.  I appreciate the opportunity to participate in the care of your patient and will keep you updated.  Sincerely,    RENETTA Bernal       Again, thank you for allowing me to participate in the care of your patient.        Sincerely,        RENETTA Bernal    Electronically signed

## 2025-02-25 NOTE — NURSING NOTE
Current patient location:  [address] [place] [address]     Is the patient currently in the state of [place]? YES    Visit mode: TELEPHONE    If the visit is dropped, the patient can be reconnected by:TELEPHONE VISIT: Phone number:   Telephone Information:   Mobile [phone]       Will anyone else be joining the visit? NO  (If patient encounters technical issues they should call [phone]    Are changes needed to the allergy or medication list? Pt stated no changes to allergies and Pt stated no med changes    Are refills needed on medications prescribed by this physician? NO    Rooming Documentation:  Unable to complete questionnaire(s) due to time    Reason for visit: DORIS LANGFORD

## 2025-02-25 NOTE — PROGRESS NOTES
Virtual Visit Details    Type of service:  Telephone Visit   Phone call duration: 5 minutes   Originating Location (pt. Location): Home    Distant Location (provider location):  Off-site  Telephone visit completed due to the patient did not consent to a video visit.      THORACIC SURGERY FOLLOW UP VISIT      I had a telephone visit with Mrs. Kumar in follow-up today. The clinical summary follows:     PREOP DIAGNOSIS   Right lower lobe lung cancer  PROCEDURE   Uniportal thoracoscopic right lower lobe wedge resection, completion lobectomy, conversion to thoracotomy with middle and lower bilobectomy, intercostal nerve cryoablation    DATE OF PROCEDURE  05/19/2021    HISTOPATHOLOGY   Squamous cell carcinoma, tumor size 1.2 cm pT1bN0    COMPLICATIONS  None    INTERVAL STUDIES  CT chest 02/21/2025:  1.  Interval resolution of previously described new right middle lobe opacity with multiple new nodular opacities throughout both lungs, favor chronic infectious/inflammatory process but recommend attention on follow-up imaging.  2.  Stable emphysema and postsurgical changes status post right lower lobectomy.    Past Medical History:   Diagnosis Date    Arthritis     Heart disease 05/2018    Hyperlipidemia     Insomnia 2015    Lung cancer (H)     Nonsenile cataract 6/30/2023    Pulmonary nodules       Past Surgical History:   Procedure Laterality Date    ARTHROPLASTY HIP Right 06/02/2020    Procedure: RIGHT TOTAL HIP ARTHROPLASTY;  Surgeon: Augie Murray MD;  Location:  OR    BIOPSY      BRONCHOSCOPY FLEXIBLE AND RIGID N/A 05/18/2021    Procedure: BRONCHOSCOPY;  Surgeon: Palomo Castro MD;  [facility]    COLONOSCOPY  01/2021    HIP SURGERY      JOINT REPLACEMENT Left 2008    hip    PHACOEMULSIFICATION CLEAR CORNEA WITH STANDARD INTRAOCULAR LENS IMPLANT Left 11/17/2023    Procedure: PHACOEMULSIFICATION, CATARACT, WITH INTRAOCULAR LENS IMPLANT;  Surgeon: Charles Dangelo MD;  Location:  OR     PHACOEMULSIFICATION CLEAR CORNEA WITH STANDARD INTRAOCULAR LENS IMPLANT Right 1/12/2024    Procedure: COMPLEX PHACOEMULSIFICATION, CATARACT, WITH INTRAOCULAR LENS IMPLANT;  Surgeon: Charles Dangelo MD;  Location:  OR    THORACOSCOPIC LOBECTOMY LUNG Right 05/10/2021    Procedure: Uniportal thoracoscopic right lower lobe wedge segmentectomy, completion bilobectomy middle and lower, intercostal nerve cryoablation, converstion to thoracotomy, mediastinal lymphadenectomy;  Surgeon: Palomo Castro MD;  [facility]      Social History     Socioeconomic History    Marital status:      Spouse name: Not on file    Number of children: Not on file    Years of education: Not on file    Highest education level: Not on file   Occupational History    Not on file   Tobacco Use    Smoking status: Every Day     Current packs/day: 0.75     Average packs/day: 0.8 packs/day for 57.7 years (43.3 ttl pk-yrs)     Types: Cigarettes     Start date: 6/15/1967     Passive exposure: Never    Smokeless tobacco: Never    Tobacco comments:     tried webutrin in 1996 but couldn't tolerate it   Vaping Use    Vaping status: Never Used   Substance and Sexual Activity    Alcohol use: Yes     Comment: normal    Drug use: No    Sexual activity: Not Currently     Partners: Male     Birth control/protection: None   Other Topics Concern    Parent/sibling w/ CABG, MI or angioplasty before 65F 55M? No   Social History Narrative    Not on file     Social Drivers of Health     Financial Resource Strain: Low Risk  (11/2/2024)    Financial Resource Strain     Within the past 12 months, have you or your family members you live with been unable to get utilities (heat, electricity) when it was really needed?: No   Food Insecurity: Low Risk  (11/2/2024)    Food Insecurity     Within the past 12 months, did you worry that your food would run out before you got money to buy more?: No     Within the past 12 months, did the food you  bought just not last and you didn t have money to get more?: No   Transportation Needs: Low Risk  (11/2/2024)    Transportation Needs     Within the past 12 months, has lack of transportation kept you from medical appointments, getting your medicines, non-medical meetings or appointments, work, or from getting things that you need?: No   Physical Activity: Insufficiently Active (11/2/2024)    Exercise Vital Sign     Days of Exercise per Week: 1 day     Minutes of Exercise per Session: 30 min   Stress: No Stress Concern Present (11/2/2024)    [employer] [place] [employer]ational [employer] - Occupational Stress Questionnaire     Feeling of Stress : Only a little   Social Connections: Unknown (11/2/2024)    Social Connection and Isolation Panel [NHANES]     Frequency of Communication with Friends and Family: Not on file     Frequency of Social Gatherings with Friends and Family: Once a week     Attends Rastafari Services: Not on file     Active Member of Clubs or Organizations: Not on file     Attends Club or Organization Meetings: Not on file     Marital Status: Not on file   Interpersonal Safety: Low Risk  (11/7/2024)    Interpersonal Safety     Do you feel physically and emotionally safe where you currently live?: Yes     Within the past 12 months, have you been hit, slapped, kicked or otherwise physically hurt by someone?: No     Within the past 12 months, have you been humiliated or emotionally abused in other ways by your partner or ex-partner?: No   Housing Stability: Low Risk  (11/2/2024)    Housing Stability     Do you have housing? : Yes     Are you worried about losing your housing?: No        SUBJECTIVE   Manda is doing good. She did have congestion and a cough when she got this most recent chest CT. She felt kind of crummy for a couple of days-slept a lot but is feeling better now.     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 Her job schedule is very flexible and she likes that.     IMPRESSION   Manda is a 74 year-old female status post uniportal thoracoscopic right lower lobe wedge resection, completion lobectomy, conversion to thoracotomy with middle and lower bilobectomy, intercostal nerve cryoablation of a pT1bN0 (stage IA2) non small cell lung cancer.     Given that she has symptoms that correlate with the CT findings, I recommend a short term follow up chest CT in 3 months.     PLAN  I spent 15 min on the date of the encounter in chart review, patient visit, review of tests, documentation and/or discussion with other providers about the issues documented above. I reviewed the plan as follows:  Follow up with me in 3 months with a chest CT prior  All questions were answered and the patient and present family were in agreement with the plan.  I appreciate the opportunity to participate in the care of your patient and will keep you updated.  Sincerely,    RENETTA Bernal CNS

## 2025-05-04 ENCOUNTER — HEALTH MAINTENANCE LETTER (OUTPATIENT)
Age: 75
End: 2025-05-04

## 2025-05-19 ENCOUNTER — ANCILLARY PROCEDURE (OUTPATIENT)
Dept: CT IMAGING | Facility: CLINIC | Age: 75
End: 2025-05-19
Attending: CLINICAL NURSE SPECIALIST
Payer: COMMERCIAL

## 2025-05-19 DIAGNOSIS — C34.31 MALIGNANT NEOPLASM OF LOWER LOBE OF RIGHT LUNG (H): ICD-10-CM

## 2025-05-19 LAB
CREAT BLD-MCNC: 0.6 MG/DL (ref 0.5–1)
EGFRCR SERPLBLD CKD-EPI 2021: >60 ML/MIN/1.73M2

## 2025-05-19 PROCEDURE — 71260 CT THORAX DX C+: CPT | Mod: TC | Performed by: RADIOLOGY

## 2025-05-19 PROCEDURE — 82565 ASSAY OF CREATININE: CPT

## 2025-05-19 RX ORDER — IOPAMIDOL 755 MG/ML
82 INJECTION, SOLUTION INTRAVASCULAR ONCE
Status: COMPLETED | OUTPATIENT
Start: 2025-05-19 | End: 2025-05-19

## 2025-05-19 RX ADMIN — IOPAMIDOL 82 ML: 755 INJECTION, SOLUTION INTRAVASCULAR at 10:33

## 2025-05-20 NOTE — PROGRESS NOTES
Virtual Visit Details    Type of service:  Telephone Visit   Phone call duration: 5 minutes   Originating Location (pt. Location): Home    Distant Location (provider location):  On-site  Telephone visit completed due to the patient did not consent to a video visit.  THORACIC SURGERY FOLLOW UP VISIT      I had a telephone visit with Mrs. Kumar in follow-up today. The clinical summary follows:     PREOP DIAGNOSIS   Right lower lobe lung cancer  PROCEDURE   Uniportal thoracoscopic right lower lobe wedge resection, completion lobectomy, conversion to thoracotomy with middle and lower bilobectomy, intercostal nerve cryoablation    DATE OF PROCEDURE  05/19/2021    HISTOPATHOLOGY   Squamous cell carcinoma, tumor size 1.2 cm pT1bN0    COMPLICATIONS  None    INTERVAL STUDIES  CT chest 05/19/2025:  1.  Multiple bilateral pulmonary nodules again noted with several resolving, or are smaller in the interval. No new airspace disease identified.  2.  Stable postoperative change on the right.    Past Medical History:   Diagnosis Date    Arthritis     Heart disease 05/2018    Hyperlipidemia     Insomnia 2015    Lung cancer (H)     Nonsenile cataract 6/30/2023    Pulmonary nodules       Past Surgical History:   Procedure Laterality Date    ARTHROPLASTY HIP Right 06/02/2020    Procedure: RIGHT TOTAL HIP ARTHROPLASTY;  Surgeon: Augie Murray MD;  Location:  OR    BIOPSY      BRONCHOSCOPY FLEXIBLE AND RIGID N/A 05/18/2021    Procedure: BRONCHOSCOPY;  Surgeon: Palomo Castro MD;  [facility]    COLONOSCOPY  01/2021    HIP SURGERY      JOINT REPLACEMENT Left 2008    hip    PHACOEMULSIFICATION CLEAR CORNEA WITH STANDARD INTRAOCULAR LENS IMPLANT Left 11/17/2023    Procedure: PHACOEMULSIFICATION, CATARACT, WITH INTRAOCULAR LENS IMPLANT;  Surgeon: Charles Dangelo MD;  [facility]    PHACOEMULSIFICATION CLEAR CORNEA WITH STANDARD INTRAOCULAR LENS IMPLANT Right 1/12/2024    Procedure: COMPLEX  PHACOEMULSIFICATION, CATARACT, WITH INTRAOCULAR LENS IMPLANT;  Surgeon: Charles Dangelo MD;  [facility]    THORACOSCOPIC LOBECTOMY LUNG Right 05/10/2021    Procedure: Uniportal thoracoscopic right lower lobe wedge segmentectomy, completion bilobectomy middle and lower, intercostal nerve cryoablation, converstion to thoracotomy, mediastinal lymphadenectomy;  Surgeon: Palomo Castro MD;  [facility]      Social History     Socioeconomic History    Marital status:      Spouse name: Not on file    Number of children: Not on file    Years of education: Not on file    Highest education level: Not on file   Occupational History    Not on file   Tobacco Use    Smoking status: Every Day     Current packs/day: 0.75     Average packs/day: 0.8 packs/day for 57.9 years (43.4 ttl pk-yrs)     Types: Cigarettes     Start date: 6/15/1967     Passive exposure: Never    Smokeless tobacco: Never    Tobacco comments:     tried webutrin in 1996 but couldn't tolerate it   Vaping Use    Vaping status: Never Used   Substance and Sexual Activity    Alcohol use: Yes     Comment: normal    Drug use: No    Sexual activity: Not Currently     Partners: Male     Birth control/protection: None   Other Topics Concern    Parent/sibling w/ CABG, MI or angioplasty before 65F 55M? No   Social History Narrative    Not on file     Social Drivers of Health     Financial Resource Strain: Low Risk  (11/2/2024)    Financial Resource Strain     Within the past 12 months, have you or your family members you live with been unable to get utilities (heat, electricity) when it was really needed?: No   Food Insecurity: Low Risk  (11/2/2024)    Food Insecurity     Within the past 12 months, did you worry that your food would run out before you got money to buy more?: No     Within the past 12 months, did the food you bought just not last and you didn t have money to get more?: No   Transportation Needs: Low Risk  (11/2/2024)     Transportation Needs     Within the past 12 months, has lack of transportation kept you from medical appointments, getting your medicines, non-medical meetings or appointments, work, or from getting things that you need?: No   Physical Activity: Insufficiently Active (11/2/2024)    Exercise Vital Sign     Days of Exercise per Week: 1 day     Minutes of Exercise per Session: 30 min   Stress: No Stress Concern Present (11/2/2024)    [employer] [place] [employer] - Occupational Stress Questionnaire     Feeling of Stress : Only a little   Social Connections: Unknown (11/2/2024)    Social Connection and Isolation Panel [NHANES]     Frequency of Communication with Friends and Family: Not on file     Frequency of Social Gatherings with Friends and Family: Once a week     Attends Muslim Services: Not on file     Active Member of Clubs or Organizations: Not on file     Attends Club or Organization Meetings: Not on file     Marital Status: Not on file   Interpersonal Safety: Low Risk  (11/7/2024)    Interpersonal Safety     Do you feel physically and emotionally safe where you currently live?: Yes     Within the past 12 months, have you been hit, slapped, kicked or otherwise physically hurt by someone?: No     Within the past 12 months, have you been humiliated or emotionally abused in other ways by your partner or ex-partner?: No   Housing Stability: Low Risk  (11/2/2024)    Housing Stability     Do you have housing? : Yes     Are you worried about losing your housing?: No        SUBJECTIVE   Manda is doing much better-she is no longer dealing with a congestion or cough. [facility]     From a personal perspective, her sister will be coming from [place] this summer and they will be going to [place] to see their other sister.    IMPRESSION   Manda is a 74 year-old female status post uniportal thoracoscopic right lower lobe wedge resection, completion lobectomy, conversion to thoracotomy with middle and  lower bilobectomy, intercostal nerve cryoablation of a pT1bN0 (stage IA2) non small cell lung cancer.     Manda is really pleased that the CT showed improvement. We will get another chest CT in 6 months and if that continues to be stable or even more improved we can resume our annual cancer surveillance.    PLAN  I spent 15 min on the date of the encounter in chart review, patient visit, review of tests, documentation and/or discussion with other providers about the issues documented above. I reviewed the plan as follows:  Follow up with me in 6 months with a chest CT prior  All questions were answered and the patient and present family were in agreement with the plan.  I appreciate the opportunity to participate in the care of your patient and will keep you updated.  Sincerely,    RENETTA Bernal CNS

## 2025-05-21 ENCOUNTER — VIRTUAL VISIT (OUTPATIENT)
Dept: SURGERY | Facility: CLINIC | Age: 75
End: 2025-05-21
Attending: CLINICAL NURSE SPECIALIST
Payer: COMMERCIAL

## 2025-05-21 VITALS — BODY MASS INDEX: 23.92 KG/M2 | HEIGHT: 62 IN | WEIGHT: 130 LBS

## 2025-05-21 DIAGNOSIS — C34.31 MALIGNANT NEOPLASM OF LOWER LOBE OF RIGHT LUNG (H): Primary | ICD-10-CM

## 2025-05-21 PROCEDURE — 98012 SYNCH AUDIO-ONLY EST SF 10: CPT | Performed by: CLINICAL NURSE SPECIALIST

## 2025-05-21 PROCEDURE — 1125F AMNT PAIN NOTED PAIN PRSNT: CPT | Mod: 93 | Performed by: CLINICAL NURSE SPECIALIST

## 2025-05-21 ASSESSMENT — PAIN SCALES - GENERAL: PAINLEVEL_OUTOF10: MILD PAIN (3)

## 2025-05-21 NOTE — NURSING NOTE
Patient confirms medications and allergies are accurate via patients echeck in completion, and or denies any changes since last reviewed/verified.     Current patient location: [address] [place] [id number]    Is the patient currently in the state of [place]? YES    Visit mode: TELEPHONE    If the visit is dropped, the patient can be reconnected by:TELEPHONE VISIT: Phone number:   Telephone Information:   Mobile [phone]       Will anyone else be joining the visit? NO  (If patient encounters technical issues they should call [phone] :[phone])    Are changes needed to the allergy or medication list? No    Are refills needed on medications prescribed by this physician? NO    Rooming Documentation:  Questionnaire(s) completed    Reason for visit: RECHECK    Yumiko LARAF

## 2025-05-21 NOTE — LETTER
5/21/2025      Reanna Kumar  [address] [place] [address]      Dear Colleague,    Thank you for referring your patient, Reanna Kumar, to the [facility]. Please see a copy of my visit note below.    Virtual Visit Details    Type of service:  Telephone Visit   Phone call duration: 5 minutes   Originating Location (pt. Location): Home    Distant Location (provider location):  On-site  Telephone visit completed due to the patient did not consent to a video visit.  THORACIC SURGERY FOLLOW UP VISIT      I had a telephone visit with Mrs. Kumar in follow-up today. The clinical summary follows:     PREOP DIAGNOSIS   Right lower lobe lung cancer  PROCEDURE   Uniportal thoracoscopic right lower lobe wedge resection, completion lobectomy, conversion to thoracotomy with middle and lower bilobectomy, intercostal nerve cryoablation    DATE OF PROCEDURE  05/19/2021    HISTOPATHOLOGY   Squamous cell carcinoma, tumor size 1.2 cm pT1bN0    COMPLICATIONS  None    INTERVAL STUDIES  CT chest 05/19/2025:  1.  Multiple bilateral pulmonary nodules again noted with several resolving, or are smaller in the interval. No new airspace disease identified.  2.  Stable postoperative change on the right.    Past Medical History:   Diagnosis Date     Arthritis      Heart disease 05/2018     Hyperlipidemia      Insomnia 2015     Lung cancer ([facility])      Nonsenile cataract 6/30/2023     Pulmonary nodules       Past Surgical History:   Procedure Laterality Date     ARTHROPLASTY HIP Right 06/02/2020    Procedure: RIGHT TOTAL HIP ARTHROPLASTY;  Surgeon: Augie Murray MD;  Location: [facility] OR     BIOPSY       BRONCHOSCOPY FLEXIBLE AND RIGID N/A 05/18/2021    Procedure: BRONCHOSCOPY;  Surgeon: Palomo Castro MD;  Location:  GI     COLONOSCOPY  01/2021     HIP SURGERY       JOINT REPLACEMENT Left 2008    hip     PHACOEMULSIFICATION CLEAR CORNEA WITH STANDARD INTRAOCULAR LENS IMPLANT Left 11/17/2023     Procedure: PHACOEMULSIFICATION, CATARACT, WITH INTRAOCULAR LENS IMPLANT;  Surgeon: Charles Dangelo MD;  Location: [facility] OR     PHACOEMULSIFICATION CLEAR CORNEA WITH STANDARD INTRAOCULAR LENS IMPLANT Right 1/12/2024    Procedure: COMPLEX PHACOEMULSIFICATION, CATARACT, WITH INTRAOCULAR LENS IMPLANT;  Surgeon: Charles Dangelo MD;  Location: [facility] OR     THORACOSCOPIC LOBECTOMY LUNG Right 05/10/2021    Procedure: Uniportal thoracoscopic right lower lobe wedge segmentectomy, completion bilobectomy middle and lower, intercostal nerve cryoablation, converstion to thoracotomy, mediastinal lymphadenectomy;  Surgeon: Palomo Castro MD;  Location: [facility] OR      Social History     Socioeconomic History     Marital status:      Spouse name: Not on file     Number of children: Not on file     Years of education: Not on file     Highest education level: Not on file   Occupational History     Not on file   Tobacco Use     Smoking status: Every Day     Current packs/day: 0.75     Average packs/day: 0.8 packs/day for 57.9 years (43.4 ttl pk-yrs)     Types: Cigarettes     Start date: 6/15/1967     Passive exposure: Never     Smokeless tobacco: Never     Tobacco comments:     tried webutrin in 1996 but couldn't tolerate it   Vaping Use     Vaping status: Never Used   Substance and Sexual Activity     Alcohol use: Yes     Comment: normal     Drug use: No     Sexual activity: Not Currently     Partners: Male     Birth control/protection: None   Other Topics Concern     Parent/sibling w/ CABG, MI or angioplasty before 65F 55M? No   Social History Narrative     Not on file     Social Drivers of Health     Financial Resource Strain: Low Risk  (11/2/2024)    Financial Resource Strain      Within the past 12 months, have you or your family members you live with been unable to get utilities (heat, electricity) when it was really needed?: No   Food Insecurity: Low Risk  (11/2/2024)    Food Insecurity      Within  the past 12 months, did you worry that your food would run out before you got money to buy more?: No      Within the past 12 months, did the food you bought just not last and you didn t have money to get more?: No   Transportation Needs: Low Risk  (11/2/2024)    Transportation Needs      Within the past 12 months, has lack of transportation kept you from medical appointments, getting your medicines, non-medical meetings or appointments, work, or from getting things that you need?: No   Physical Activity: Insufficiently Active (11/2/2024)    Exercise Vital Sign      Days of Exercise per Week: 1 day      Minutes of Exercise per Session: 30 min   Stress: No Stress Concern Present (11/2/2024)    [employer] [place] [employer] - Occupational Stress Questionnaire      Feeling of Stress : Only a little   Social Connections: Unknown (11/2/2024)    Social Connection and Isolation Panel [NHANES]      Frequency of Communication with Friends and Family: Not on file      Frequency of Social Gatherings with Friends and Family: Once a week      Attends Cheondoism Services: Not on file      Active Member of Clubs or Organizations: Not on file      Attends Club or Organization Meetings: Not on file      Marital Status: Not on file   Interpersonal Safety: Low Risk  (11/7/2024)    Interpersonal Safety      Do you feel physically and emotionally safe where you currently live?: Yes      Within the past 12 months, have you been hit, slapped, kicked or otherwise physically hurt by someone?: No      Within the past 12 months, have you been humiliated or emotionally abused in other ways by your partner or ex-partner?: No   Housing Stability: Low Risk  (11/2/2024)    Housing Stability      Do you have housing? : Yes      Are you worried about losing your housing?: No        SUBJECTIVE   Manda is doing much better-she is no longer dealing with a congestion or cough. [facility]     From a personal perspective, her sister  will be coming from [place] this summer and they will be going to [place] to see their other sister.    IMPRESSION   Manda is a 74 year-old female status post uniportal thoracoscopic right lower lobe wedge resection, completion lobectomy, conversion to thoracotomy with middle and lower bilobectomy, intercostal nerve cryoablation of a pT1bN0 (stage IA2) non small cell lung cancer.     Manda is really pleased that the CT showed improvement. We will get another chest CT in 6 months and if that continues to be stable or even more improved we can resume our annual cancer surveillance.    PLAN  I spent 15 min on the date of the encounter in chart review, patient visit, review of tests, documentation and/or discussion with other providers about the issues documented above. I reviewed the plan as follows:  Follow up with me in 6 months with a chest CT prior  All questions were answered and the patient and present family were in agreement with the plan.  I appreciate the opportunity to participate in the care of your patient and will keep you updated.  Sincerely,    RENETTA Bernal       Again, thank you for allowing me to participate in the care of your patient.        Sincerely,        RENETTA Bernal    Electronically signed

## (undated) DEVICE — ENDO ADPT BRONCH SWIVEL Y A1002

## (undated) DEVICE — PREP SKIN SCRUB TRAY 4461A

## (undated) DEVICE — MANIFOLD NEPTUNE 4 PORT 700-20

## (undated) DEVICE — SU VICRYL 0 CP-1 27" J467H

## (undated) DEVICE — WIPES FOLEY CARE SURESTEP PROVON DFC100

## (undated) DEVICE — SYR BULB IRRIG 50ML LATEX FREE 0035280

## (undated) DEVICE — SU ETHIBOND 5 LRDA 30" B499T

## (undated) DEVICE — IMM PILLOW ABDUCT HIP MED 31143061

## (undated) DEVICE — ENDO VALVE BX EVIS MAJ-210

## (undated) DEVICE — STPL ENDO RELOAD 45MM MEDIUM THICK PURPLE EGIA45AMT

## (undated) DEVICE — PREP CHLORAPREP 26ML TINTED ORANGE  260815

## (undated) DEVICE — LIGHT HANDLE X1 31140133

## (undated) DEVICE — DRSG PRIMAPORE 02X3" 7133

## (undated) DEVICE — SU SILK 0 TIE 6X30" A306H

## (undated) DEVICE — SURGICEL HEMOSTAT 4X8" 1952

## (undated) DEVICE — LINEN TOWEL PACK X5 5464

## (undated) DEVICE — HOOD FLYTE W/PEELAWAY 408-800-100

## (undated) DEVICE — ESU ELEC BLADE 6" COATED/INSULATED E1455-6

## (undated) DEVICE — NDL COUNTER 20CT 31142493

## (undated) DEVICE — SPONGE KITTNER 30-101

## (undated) DEVICE — SUCTION DRY CHEST DRAIN OASIS 3600-100

## (undated) DEVICE — Device

## (undated) DEVICE — GLOVE PROTEXIS POWDER FREE 7.0 ORTHOPEDIC 2D73ET70

## (undated) DEVICE — SU VICRYL 2-0 SH 27" UND J417H

## (undated) DEVICE — SU VICRYL 4-0 RB-1 27" J304

## (undated) DEVICE — STPL ENDO RELOAD SIG GIA CVD TIP 2.0 SUL 45MM MED SIG45CTAMT

## (undated) DEVICE — SU VICRYL 2-0 CT-1 27" UND J259H

## (undated) DEVICE — DRSG KERLIX FLUFFS X5

## (undated) DEVICE — DRILL BIT FLEXIBLE REFLECTION 25MM  71362925

## (undated) DEVICE — SU VICRYL 0 CTX 36" J370H

## (undated) DEVICE — LINEN GOWN XLG 5407

## (undated) DEVICE — GLOVE PROTEXIS POWDER FREE 8.0 ORTHOPEDIC 2D73ET80

## (undated) DEVICE — GLOVE PROTEXIS POWDER FREE 7.5 ORTHOPEDIC 2D73ET75

## (undated) DEVICE — SU VICRYL 0 UR-6 27" J603H

## (undated) DEVICE — SU ETHIBOND 0 CTX 30" X864H

## (undated) DEVICE — SOL WATER IRRIG 1000ML BOTTLE 2F7114

## (undated) DEVICE — DRAPE IOBAN INCISE 23X17" 6650EZ

## (undated) DEVICE — SUCTION TIP YANKAUER STR K87

## (undated) DEVICE — CATH PROBE CRYOABLATION CRYOICE FLEX 10CM CRYO3

## (undated) DEVICE — BONE CLEANING TIP INTERPULSE  0210-010-000

## (undated) DEVICE — ADAPTER CATHETER ADDTO 2219

## (undated) DEVICE — PACK TOTAL HIP W/U DRAPE SOP15HUFSC

## (undated) DEVICE — SU ETHIBOND 0 CT-1 CR 8X18" CX21D

## (undated) DEVICE — SUCTION IRR SYSTEM W/O TIP INTERPULSE HANDPIECE 0210-100-000

## (undated) DEVICE — ENDO SCOPE WARMER SEAL  C3101

## (undated) DEVICE — ENDO VALVE SUCTION BRONCH EVIS MAJ-209

## (undated) DEVICE — LINEN TOWEL PACK X6 WHITE 5487

## (undated) DEVICE — SYR 50ML LL W/O NDL 309653

## (undated) DEVICE — SU VICRYL 3-0 SH 27" J316H

## (undated) DEVICE — DRSG XEROFORM 5X9" 8884431605

## (undated) DEVICE — ESU GROUND PAD ADULT W/CORD E7507

## (undated) DEVICE — BONE CLEANING TIP INTERPULSE FEMORAL CANAL 0210-008-000

## (undated) DEVICE — SPONGE TONSIL W/STRING MED 23275-680

## (undated) DEVICE — SPONGE LAP 18X18" X8435

## (undated) DEVICE — SOLUTION WOUND CLEANSING 3/4OZ 10% PVP EA-L3011FB-50

## (undated) DEVICE — STPL ENDO HANDLE GIA ULTRA UNIVERSAL STD EGIAUSTND

## (undated) DEVICE — STPL ENDO RELOAD SIG GIA CVD TIP TAN 30MM MED SIG30CTAVM

## (undated) DEVICE — GLOVE PROTEXIS BLUE W/NEU-THERA 7.0  2D73EB70

## (undated) DEVICE — TIES BANDING T50R

## (undated) DEVICE — SU TICRON 2 GS-21DA 36" 3146-81

## (undated) DEVICE — CONNECTOR STOPCOCK 3 WAY MALE LL 2C6204

## (undated) DEVICE — ESU LIGASURE MARYLAND LAPAROSCOPIC SLR/DVDR 5MMX37CM LF1937

## (undated) DEVICE — LUBRICANT INST KIT ENDO-LUBE 220-90

## (undated) DEVICE — DRAIN ROUND W/RESERV KIT JACKSON PRATT 10FR 400ML SU130-402D

## (undated) DEVICE — SU VICRYL 2-0 CP-1 27" UND J266H

## (undated) DEVICE — CATH TRAY FOLEY SURESTEP 16FR W/URNE MTR STLK LATEX A303316A

## (undated) DEVICE — BLADE SAW SAGITTAL STRK 25X89.5X0.96MM 4/2000 2108-393-005

## (undated) DEVICE — SUCTION MANIFOLD NEPTUNE 2 SYS 4 PORT 0702-020-000

## (undated) DEVICE — SU SILK 0 SH 30" K834H

## (undated) DEVICE — TUBING INSUFFLATION W/FILTER CPC TO LUER 620-030-301

## (undated) DEVICE — GLOVE PROTEXIS BLUE W/NEU-THERA 8.0  2D73EB80

## (undated) DEVICE — TUBING SUCTION 10'X3/16" N510

## (undated) DEVICE — STPL ENDO RELOAD 60MM MEDIUM THICK PURPLE EGIA60AMT

## (undated) DEVICE — VESSEL LOOPS YELLOW MAXI 31145694

## (undated) DEVICE — ESU ELEC BLADE 2.75" COATED/INSULATED E1455

## (undated) DEVICE — ESU GROUND PAD UNIVERSAL W/O CORD

## (undated) DEVICE — DRAIN CHEST TUBE 28FR STR 8028

## (undated) DEVICE — SU VICRYL 4-0 PS-2 18" UND J496H

## (undated) DEVICE — DRSG ABDOMINAL 07 1/2X8" 7197D

## (undated) DEVICE — ESU PENCIL W/COATED BLADE E2450H

## (undated) DEVICE — SU VICRYL 3-0 SH 27" UND J416H

## (undated) DEVICE — BLADE KNIFE SURG 11 371111

## (undated) RX ORDER — ONDANSETRON 2 MG/ML
INJECTION INTRAMUSCULAR; INTRAVENOUS
Status: DISPENSED
Start: 2021-05-10

## (undated) RX ORDER — HYDROMORPHONE HYDROCHLORIDE 1 MG/ML
INJECTION, SOLUTION INTRAMUSCULAR; INTRAVENOUS; SUBCUTANEOUS
Status: DISPENSED
Start: 2021-05-10

## (undated) RX ORDER — DEXAMETHASONE SODIUM PHOSPHATE 10 MG/ML
INJECTION, SOLUTION INTRAMUSCULAR; INTRAVENOUS
Status: DISPENSED
Start: 2021-05-10

## (undated) RX ORDER — PROPOFOL 10 MG/ML
INJECTION, EMULSION INTRAVENOUS
Status: DISPENSED
Start: 2020-06-02

## (undated) RX ORDER — GABAPENTIN 300 MG/1
CAPSULE ORAL
Status: DISPENSED
Start: 2021-05-10

## (undated) RX ORDER — FENTANYL CITRATE 50 UG/ML
INJECTION, SOLUTION INTRAMUSCULAR; INTRAVENOUS
Status: DISPENSED
Start: 2023-11-17

## (undated) RX ORDER — CELECOXIB 200 MG/1
CAPSULE ORAL
Status: DISPENSED
Start: 2021-05-10

## (undated) RX ORDER — EPINEPHRINE 1 MG/ML
INJECTION, SOLUTION, CONCENTRATE INTRAVENOUS
Status: DISPENSED
Start: 2023-11-17

## (undated) RX ORDER — ONDANSETRON 2 MG/ML
INJECTION INTRAMUSCULAR; INTRAVENOUS
Status: DISPENSED
Start: 2020-06-02

## (undated) RX ORDER — MOXIFLOXACIN IN NACL,ISO-OS/PF 0.3MG/0.3
SYRINGE (ML) INTRAOCULAR
Status: DISPENSED
Start: 2023-11-17

## (undated) RX ORDER — NEOSTIGMINE METHYLSULFATE 1 MG/ML
VIAL (ML) INJECTION
Status: DISPENSED
Start: 2020-06-02

## (undated) RX ORDER — SODIUM CHLORIDE, SODIUM LACTATE, POTASSIUM CHLORIDE, CALCIUM CHLORIDE 600; 310; 30; 20 MG/100ML; MG/100ML; MG/100ML; MG/100ML
INJECTION, SOLUTION INTRAVENOUS
Status: DISPENSED
Start: 2021-05-10

## (undated) RX ORDER — CEFAZOLIN SODIUM 2 G/100ML
INJECTION, SOLUTION INTRAVENOUS
Status: DISPENSED
Start: 2020-06-02

## (undated) RX ORDER — ALBUTEROL SULFATE 0.83 MG/ML
SOLUTION RESPIRATORY (INHALATION)
Status: DISPENSED
Start: 2021-03-31

## (undated) RX ORDER — ACETAMINOPHEN 325 MG/1
TABLET ORAL
Status: DISPENSED
Start: 2024-01-12

## (undated) RX ORDER — BALANCED SALT SOLUTION 6.4; .75; .48; .3; 3.9; 1.7 MG/ML; MG/ML; MG/ML; MG/ML; MG/ML; MG/ML
SOLUTION OPHTHALMIC
Status: DISPENSED
Start: 2023-11-17

## (undated) RX ORDER — LIDOCAINE HYDROCHLORIDE 20 MG/ML
SOLUTION OROPHARYNGEAL
Status: DISPENSED
Start: 2021-05-18

## (undated) RX ORDER — LIDOCAINE HYDROCHLORIDE 20 MG/ML
INJECTION, SOLUTION EPIDURAL; INFILTRATION; INTRACAUDAL; PERINEURAL
Status: DISPENSED
Start: 2020-06-02

## (undated) RX ORDER — FENTANYL CITRATE 50 UG/ML
INJECTION, SOLUTION INTRAMUSCULAR; INTRAVENOUS
Status: DISPENSED
Start: 2021-05-10

## (undated) RX ORDER — LIDOCAINE HYDROCHLORIDE 10 MG/ML
INJECTION, SOLUTION EPIDURAL; INFILTRATION; INTRACAUDAL; PERINEURAL
Status: DISPENSED
Start: 2021-05-18

## (undated) RX ORDER — FENTANYL CITRATE-0.9 % NACL/PF 10 MCG/ML
PLASTIC BAG, INJECTION (ML) INTRAVENOUS
Status: DISPENSED
Start: 2021-05-10

## (undated) RX ORDER — TETRACAINE HYDROCHLORIDE 5 MG/ML
SOLUTION OPHTHALMIC
Status: DISPENSED
Start: 2023-11-17

## (undated) RX ORDER — GLYCOPYRROLATE 0.2 MG/ML
INJECTION, SOLUTION INTRAMUSCULAR; INTRAVENOUS
Status: DISPENSED
Start: 2020-06-02

## (undated) RX ORDER — LIDOCAINE HYDROCHLORIDE 40 MG/ML
SOLUTION TOPICAL
Status: DISPENSED
Start: 2021-05-18

## (undated) RX ORDER — DEXAMETHASONE SODIUM PHOSPHATE 4 MG/ML
INJECTION, SOLUTION INTRA-ARTICULAR; INTRALESIONAL; INTRAMUSCULAR; INTRAVENOUS; SOFT TISSUE
Status: DISPENSED
Start: 2020-06-02

## (undated) RX ORDER — ALBUMIN, HUMAN INJ 5% 5 %
SOLUTION INTRAVENOUS
Status: DISPENSED
Start: 2021-05-10

## (undated) RX ORDER — CEFAZOLIN SODIUM 2 G/100ML
INJECTION, SOLUTION INTRAVENOUS
Status: DISPENSED
Start: 2021-05-10

## (undated) RX ORDER — OXYCODONE HYDROCHLORIDE 5 MG/1
TABLET ORAL
Status: DISPENSED
Start: 2021-05-10

## (undated) RX ORDER — EPINEPHRINE 1 MG/ML
INJECTION, SOLUTION INTRAMUSCULAR; SUBCUTANEOUS
Status: DISPENSED
Start: 2020-06-02

## (undated) RX ORDER — ACETAMINOPHEN 325 MG/1
TABLET ORAL
Status: DISPENSED
Start: 2023-11-17

## (undated) RX ORDER — GABAPENTIN 100 MG/1
CAPSULE ORAL
Status: DISPENSED
Start: 2021-05-10

## (undated) RX ORDER — CEFAZOLIN SODIUM 1 G/3ML
INJECTION, POWDER, FOR SOLUTION INTRAMUSCULAR; INTRAVENOUS
Status: DISPENSED
Start: 2021-05-10

## (undated) RX ORDER — ACETAMINOPHEN 325 MG/1
TABLET ORAL
Status: DISPENSED
Start: 2021-05-10

## (undated) RX ORDER — FENTANYL CITRATE 50 UG/ML
INJECTION, SOLUTION INTRAMUSCULAR; INTRAVENOUS
Status: DISPENSED
Start: 2020-06-02

## (undated) RX ORDER — CHLORHEXIDINE GLUCONATE ORAL RINSE 1.2 MG/ML
SOLUTION DENTAL
Status: DISPENSED
Start: 2021-05-10

## (undated) RX ORDER — DEXMEDETOMIDINE HYDROCHLORIDE 100 UG/ML
INJECTION, SOLUTION INTRAVENOUS
Status: DISPENSED
Start: 2021-05-10

## (undated) RX ORDER — BUPIVACAINE HYDROCHLORIDE 2.5 MG/ML
INJECTION, SOLUTION EPIDURAL; INFILTRATION; INTRACAUDAL
Status: DISPENSED
Start: 2021-05-10